# Patient Record
Sex: FEMALE | Race: WHITE | NOT HISPANIC OR LATINO | Employment: PART TIME | URBAN - METROPOLITAN AREA
[De-identification: names, ages, dates, MRNs, and addresses within clinical notes are randomized per-mention and may not be internally consistent; named-entity substitution may affect disease eponyms.]

---

## 2017-01-19 ENCOUNTER — ALLSCRIPTS OFFICE VISIT (OUTPATIENT)
Dept: OTHER | Facility: OTHER | Age: 42
End: 2017-01-19

## 2017-01-26 ENCOUNTER — ALLSCRIPTS OFFICE VISIT (OUTPATIENT)
Dept: OTHER | Facility: OTHER | Age: 42
End: 2017-01-26

## 2017-01-26 DIAGNOSIS — Z20.2 CONTACT WITH AND (SUSPECTED) EXPOSURE TO INFECTIONS WITH A PREDOMINANTLY SEXUAL MODE OF TRANSMISSION: ICD-10-CM

## 2017-01-26 DIAGNOSIS — Z12.31 ENCOUNTER FOR SCREENING MAMMOGRAM FOR MALIGNANT NEOPLASM OF BREAST: ICD-10-CM

## 2017-02-02 ENCOUNTER — LAB CONVERSION - ENCOUNTER (OUTPATIENT)
Dept: OTHER | Facility: OTHER | Age: 42
End: 2017-02-02

## 2017-02-02 LAB
HEPATITIS B SURFACE ANTIGEN (HISTORICAL): NORMAL
HEPATITIS C ANTIBODY (HISTORICAL): NORMAL
HIV AG/AB, 4TH GEN (HISTORICAL): NORMAL
RPR SCREEN (HISTORICAL): NORMAL
SIGNAL TO CUT-OFF (HISTORICAL): 0.02

## 2017-03-16 ENCOUNTER — GENERIC CONVERSION - ENCOUNTER (OUTPATIENT)
Dept: OTHER | Facility: OTHER | Age: 42
End: 2017-03-16

## 2017-03-16 LAB
CHLAMYDIA, LIQUID-BASED (HISTORICAL): NORMAL
GC BY MOL. METHOD (HISTORICAL): NORMAL
HPV 18 (HISTORICAL): NORMAL
HPV HIGH RISK 16/18 (HISTORICAL): NORMAL
HPV16 (HISTORICAL): NORMAL
PAP (HISTORICAL): NORMAL
TRICHOMONAS (HISTORICAL): NORMAL

## 2017-04-25 ENCOUNTER — HOSPITAL ENCOUNTER (OUTPATIENT)
Dept: RADIOLOGY | Age: 42
Discharge: HOME | End: 2017-04-25
Payer: COMMERCIAL

## 2017-04-25 DIAGNOSIS — Z12.31 ENCOUNTER FOR SCREENING MAMMOGRAM FOR MALIGNANT NEOPLASM OF BREAST: ICD-10-CM

## 2017-04-25 PROCEDURE — G0202 SCR MAMMO BI INCL CAD: HCPCS

## 2017-06-01 ENCOUNTER — HOSPITAL ENCOUNTER (EMERGENCY)
Facility: HOSPITAL | Age: 42
Discharge: HOME | End: 2017-06-01
Attending: EMERGENCY MEDICINE | Admitting: EMERGENCY MEDICINE
Payer: COMMERCIAL

## 2017-06-01 ENCOUNTER — HOSPITAL ENCOUNTER (OUTPATIENT)
Facility: HOSPITAL | Age: 42
Setting detail: OBSERVATION
Discharge: HOME | End: 2017-06-02
Attending: EMERGENCY MEDICINE | Admitting: INTERNAL MEDICINE
Payer: COMMERCIAL

## 2017-06-01 VITALS
SYSTOLIC BLOOD PRESSURE: 110 MMHG | OXYGEN SATURATION: 100 % | HEART RATE: 68 BPM | RESPIRATION RATE: 18 BRPM | WEIGHT: 167 LBS | TEMPERATURE: 98.2 F | DIASTOLIC BLOOD PRESSURE: 64 MMHG

## 2017-06-01 DIAGNOSIS — G43.911 INTRACTABLE MIGRAINE WITH STATUS MIGRAINOSUS, UNSPECIFIED MIGRAINE TYPE: Primary | ICD-10-CM

## 2017-06-01 DIAGNOSIS — G43.909 MIGRAINE: Primary | ICD-10-CM

## 2017-06-01 PROBLEM — G43.901 MIGRAINE WITH STATUS MIGRAINOSUS: Status: ACTIVE | Noted: 2017-06-01

## 2017-06-01 LAB
ANION GAP SERPL CALCULATED.3IONS-SCNC: 6 MMOL/L (ref 4–13)
B-HCG SERPL-ACNC: <2 MIU/ML
BASOPHILS # BLD AUTO: 0.02 THOUSANDS/ΜL (ref 0–0.1)
BASOPHILS NFR BLD AUTO: 0 % (ref 0–1)
BUN SERPL-MCNC: 13 MG/DL (ref 5–25)
CALCIUM SERPL-MCNC: 8.8 MG/DL (ref 8.3–10.1)
CHLORIDE SERPL-SCNC: 103 MMOL/L (ref 100–108)
CO2 SERPL-SCNC: 31 MMOL/L (ref 21–32)
CREAT SERPL-MCNC: 0.84 MG/DL (ref 0.6–1.3)
EOSINOPHIL # BLD AUTO: 0.2 THOUSAND/ΜL (ref 0–0.61)
EOSINOPHIL NFR BLD AUTO: 3 % (ref 0–6)
ERYTHROCYTE [DISTWIDTH] IN BLOOD BY AUTOMATED COUNT: 13.1 % (ref 11.6–15.1)
GFR SERPL CREATININE-BSD FRML MDRD: >60 ML/MIN/1.73SQ M
GLUCOSE SERPL-MCNC: 93 MG/DL (ref 65–140)
HCT VFR BLD AUTO: 39.8 % (ref 34.8–46.1)
HGB BLD-MCNC: 13.2 G/DL (ref 11.5–15.4)
LYMPHOCYTES # BLD AUTO: 2.47 THOUSANDS/ΜL (ref 0.6–4.47)
LYMPHOCYTES NFR BLD AUTO: 36 % (ref 14–44)
MCH RBC QN AUTO: 28.4 PG (ref 26.8–34.3)
MCHC RBC AUTO-ENTMCNC: 33.2 G/DL (ref 31.4–37.4)
MCV RBC AUTO: 86 FL (ref 82–98)
MONOCYTES # BLD AUTO: 0.47 THOUSAND/ΜL (ref 0.17–1.22)
MONOCYTES NFR BLD AUTO: 7 % (ref 4–12)
NEUTROPHILS # BLD AUTO: 3.66 THOUSANDS/ΜL (ref 1.85–7.62)
NEUTS SEG NFR BLD AUTO: 54 % (ref 43–75)
PLATELET # BLD AUTO: 188 THOUSANDS/UL (ref 149–390)
PMV BLD AUTO: 8.8 FL (ref 8.9–12.7)
POTASSIUM SERPL-SCNC: 4.1 MMOL/L (ref 3.5–5.3)
RBC # BLD AUTO: 4.65 MILLION/UL (ref 3.81–5.12)
SODIUM SERPL-SCNC: 140 MMOL/L (ref 136–145)
WBC # BLD AUTO: 6.82 THOUSAND/UL (ref 4.31–10.16)

## 2017-06-01 PROCEDURE — 96361 HYDRATE IV INFUSION ADD-ON: CPT

## 2017-06-01 PROCEDURE — 96375 TX/PRO/DX INJ NEW DRUG ADDON: CPT

## 2017-06-01 PROCEDURE — 99284 EMERGENCY DEPT VISIT MOD MDM: CPT

## 2017-06-01 PROCEDURE — 84702 CHORIONIC GONADOTROPIN TEST: CPT | Performed by: EMERGENCY MEDICINE

## 2017-06-01 PROCEDURE — 80048 BASIC METABOLIC PNL TOTAL CA: CPT | Performed by: EMERGENCY MEDICINE

## 2017-06-01 PROCEDURE — 96372 THER/PROPH/DIAG INJ SC/IM: CPT

## 2017-06-01 PROCEDURE — 96374 THER/PROPH/DIAG INJ IV PUSH: CPT

## 2017-06-01 PROCEDURE — 96376 TX/PRO/DX INJ SAME DRUG ADON: CPT

## 2017-06-01 PROCEDURE — 85025 COMPLETE CBC W/AUTO DIFF WBC: CPT | Performed by: EMERGENCY MEDICINE

## 2017-06-01 PROCEDURE — 36415 COLL VENOUS BLD VENIPUNCTURE: CPT | Performed by: EMERGENCY MEDICINE

## 2017-06-01 PROCEDURE — 99283 EMERGENCY DEPT VISIT LOW MDM: CPT

## 2017-06-01 RX ORDER — DIPHENHYDRAMINE HCL 25 MG
25 TABLET ORAL EVERY 6 HOURS PRN
Status: DISCONTINUED | OUTPATIENT
Start: 2017-06-01 | End: 2017-06-02 | Stop reason: HOSPADM

## 2017-06-01 RX ORDER — DIPHENHYDRAMINE HYDROCHLORIDE 50 MG/ML
50 INJECTION INTRAMUSCULAR; INTRAVENOUS ONCE
Status: COMPLETED | OUTPATIENT
Start: 2017-06-01 | End: 2017-06-01

## 2017-06-01 RX ORDER — LANOLIN ALCOHOL/MO/W.PET/CERES
3 CREAM (GRAM) TOPICAL
Status: DISCONTINUED | OUTPATIENT
Start: 2017-06-01 | End: 2017-06-02 | Stop reason: HOSPADM

## 2017-06-01 RX ORDER — METOCLOPRAMIDE HYDROCHLORIDE 5 MG/ML
INJECTION INTRAMUSCULAR; INTRAVENOUS
Status: DISCONTINUED
Start: 2017-06-01 | End: 2017-06-01 | Stop reason: WASHOUT

## 2017-06-01 RX ORDER — DIHYDROERGOTAMINE MESYLATE 1 MG/ML
1 INJECTION, SOLUTION INTRAMUSCULAR; INTRAVENOUS; SUBCUTANEOUS ONCE
Status: DISCONTINUED | OUTPATIENT
Start: 2017-06-01 | End: 2017-06-02 | Stop reason: HOSPADM

## 2017-06-01 RX ORDER — PROMETHAZINE HYDROCHLORIDE 25 MG/ML
25 INJECTION, SOLUTION INTRAMUSCULAR; INTRAVENOUS ONCE
Status: COMPLETED | OUTPATIENT
Start: 2017-06-01 | End: 2017-06-01

## 2017-06-01 RX ORDER — MAGNESIUM SULFATE HEPTAHYDRATE 40 MG/ML
2 INJECTION, SOLUTION INTRAVENOUS ONCE
Status: COMPLETED | OUTPATIENT
Start: 2017-06-01 | End: 2017-06-01

## 2017-06-01 RX ORDER — BUTALBITAL, ACETAMINOPHEN AND CAFFEINE 50; 325; 40 MG/1; MG/1; MG/1
1 TABLET ORAL EVERY 4 HOURS PRN
Qty: 20 TABLET | Refills: 0 | Status: ON HOLD | OUTPATIENT
Start: 2017-06-01 | End: 2017-06-01

## 2017-06-01 RX ORDER — ONDANSETRON 2 MG/ML
4 INJECTION INTRAMUSCULAR; INTRAVENOUS EVERY 4 HOURS PRN
Status: DISCONTINUED | OUTPATIENT
Start: 2017-06-01 | End: 2017-06-02 | Stop reason: HOSPADM

## 2017-06-01 RX ORDER — METHYLPREDNISOLONE SODIUM SUCCINATE 125 MG/2ML
125 INJECTION, POWDER, LYOPHILIZED, FOR SOLUTION INTRAMUSCULAR; INTRAVENOUS ONCE
Status: COMPLETED | OUTPATIENT
Start: 2017-06-01 | End: 2017-06-01

## 2017-06-01 RX ORDER — ACETAMINOPHEN 325 MG/1
650 TABLET ORAL EVERY 6 HOURS PRN
Status: DISCONTINUED | OUTPATIENT
Start: 2017-06-01 | End: 2017-06-02 | Stop reason: HOSPADM

## 2017-06-01 RX ORDER — ESCITALOPRAM OXALATE 20 MG/1
20 TABLET ORAL DAILY
Status: DISCONTINUED | OUTPATIENT
Start: 2017-06-01 | End: 2017-06-02 | Stop reason: HOSPADM

## 2017-06-01 RX ORDER — KETOROLAC TROMETHAMINE 30 MG/ML
15 INJECTION, SOLUTION INTRAMUSCULAR; INTRAVENOUS ONCE
Status: COMPLETED | OUTPATIENT
Start: 2017-06-01 | End: 2017-06-01

## 2017-06-01 RX ORDER — HYDROXYZINE HYDROCHLORIDE 25 MG/1
25 TABLET, FILM COATED ORAL
Status: DISCONTINUED | OUTPATIENT
Start: 2017-06-01 | End: 2017-06-02 | Stop reason: HOSPADM

## 2017-06-01 RX ORDER — ONDANSETRON 2 MG/ML
4 INJECTION INTRAMUSCULAR; INTRAVENOUS ONCE
Status: COMPLETED | OUTPATIENT
Start: 2017-06-01 | End: 2017-06-01

## 2017-06-01 RX ORDER — SODIUM CHLORIDE 9 MG/ML
100 INJECTION, SOLUTION INTRAVENOUS CONTINUOUS
Status: DISCONTINUED | OUTPATIENT
Start: 2017-06-01 | End: 2017-06-01

## 2017-06-01 RX ORDER — PROMETHAZINE HYDROCHLORIDE 25 MG/ML
25 INJECTION, SOLUTION INTRAMUSCULAR; INTRAVENOUS EVERY 6 HOURS PRN
Status: DISCONTINUED | OUTPATIENT
Start: 2017-06-01 | End: 2017-06-02 | Stop reason: HOSPADM

## 2017-06-01 RX ORDER — INDOMETHACIN 25 MG/1
50 CAPSULE ORAL
Status: DISCONTINUED | OUTPATIENT
Start: 2017-06-01 | End: 2017-06-02 | Stop reason: HOSPADM

## 2017-06-01 RX ORDER — DIAZEPAM 2 MG/1
2 TABLET ORAL 3 TIMES DAILY
Status: DISCONTINUED | OUTPATIENT
Start: 2017-06-01 | End: 2017-06-02 | Stop reason: HOSPADM

## 2017-06-01 RX ADMIN — SODIUM CHLORIDE 100 ML/HR: 0.9 INJECTION, SOLUTION INTRAVENOUS at 15:38

## 2017-06-01 RX ADMIN — MAGNESIUM SULFATE HEPTAHYDRATE 2 G: 40 INJECTION, SOLUTION INTRAVENOUS at 16:04

## 2017-06-01 RX ADMIN — INDOMETHACIN 50 MG: 25 CAPSULE ORAL at 15:33

## 2017-06-01 RX ADMIN — SODIUM CHLORIDE 500 ML: 0.9 INJECTION, SOLUTION INTRAVENOUS at 01:13

## 2017-06-01 RX ADMIN — PROMETHAZINE HYDROCHLORIDE 25 MG: 25 INJECTION, SOLUTION INTRAMUSCULAR; INTRAVENOUS at 12:48

## 2017-06-01 RX ADMIN — DIAZEPAM 2 MG: 2 TABLET ORAL at 15:38

## 2017-06-01 RX ADMIN — SODIUM CHLORIDE 1000 ML: 0.9 INJECTION, SOLUTION INTRAVENOUS at 12:43

## 2017-06-01 RX ADMIN — HYDROXYZINE HYDROCHLORIDE 25 MG: 25 TABLET, FILM COATED ORAL at 21:37

## 2017-06-01 RX ADMIN — HYDROMORPHONE HYDROCHLORIDE 1 MG: 1 INJECTION, SOLUTION INTRAMUSCULAR; INTRAVENOUS; SUBCUTANEOUS at 01:16

## 2017-06-01 RX ADMIN — KETOROLAC TROMETHAMINE 15 MG: 30 INJECTION, SOLUTION INTRAMUSCULAR at 12:46

## 2017-06-01 RX ADMIN — METHYLPREDNISOLONE SODIUM SUCCINATE 125 MG: 125 INJECTION, POWDER, FOR SOLUTION INTRAMUSCULAR; INTRAVENOUS at 12:43

## 2017-06-01 RX ADMIN — ONDANSETRON 4 MG: 2 INJECTION INTRAMUSCULAR; INTRAVENOUS at 01:12

## 2017-06-01 RX ADMIN — DIPHENHYDRAMINE HYDROCHLORIDE 50 MG: 50 INJECTION, SOLUTION INTRAMUSCULAR; INTRAVENOUS at 12:46

## 2017-06-01 RX ADMIN — DIPHENHYDRAMINE HYDROCHLORIDE 50 MG: 50 INJECTION, SOLUTION INTRAMUSCULAR; INTRAVENOUS at 01:14

## 2017-06-01 RX ADMIN — ACETAMINOPHEN 650 MG: 325 TABLET, FILM COATED ORAL at 21:36

## 2017-06-01 RX ADMIN — MELATONIN TAB 3 MG 3 MG: 3 TAB at 21:37

## 2017-06-01 RX ADMIN — DIAZEPAM 2 MG: 2 TABLET ORAL at 21:37

## 2017-06-01 RX ADMIN — HYDROMORPHONE HYDROCHLORIDE 1 MG: 1 INJECTION, SOLUTION INTRAMUSCULAR; INTRAVENOUS; SUBCUTANEOUS at 02:51

## 2017-06-01 RX ADMIN — HYDROMORPHONE HYDROCHLORIDE 1 MG: 1 INJECTION, SOLUTION INTRAMUSCULAR; INTRAVENOUS; SUBCUTANEOUS at 13:29

## 2017-06-01 RX ADMIN — ONDANSETRON 4 MG: 2 INJECTION INTRAMUSCULAR; INTRAVENOUS at 21:37

## 2017-06-02 VITALS
SYSTOLIC BLOOD PRESSURE: 110 MMHG | WEIGHT: 170.19 LBS | OXYGEN SATURATION: 96 % | TEMPERATURE: 97.6 F | DIASTOLIC BLOOD PRESSURE: 56 MMHG | HEIGHT: 66 IN | RESPIRATION RATE: 16 BRPM | BODY MASS INDEX: 27.35 KG/M2 | HEART RATE: 64 BPM

## 2017-06-02 RX ORDER — PROMETHAZINE HYDROCHLORIDE 25 MG/1
25 TABLET ORAL EVERY 6 HOURS PRN
Qty: 30 TABLET | Refills: 0 | Status: SHIPPED | OUTPATIENT
Start: 2017-06-02 | End: 2017-10-18 | Stop reason: HOSPADM

## 2017-06-02 RX ORDER — INDOMETHACIN 50 MG/1
50 CAPSULE ORAL 3 TIMES DAILY PRN
Qty: 30 CAPSULE | Refills: 0 | Status: SHIPPED | OUTPATIENT
Start: 2017-06-02 | End: 2018-08-16

## 2017-06-02 RX ORDER — DIAZEPAM 2 MG/1
2 TABLET ORAL EVERY 8 HOURS PRN
Qty: 15 TABLET | Refills: 0 | Status: SHIPPED | OUTPATIENT
Start: 2017-06-02 | End: 2017-10-18 | Stop reason: HOSPADM

## 2017-06-02 RX ADMIN — INDOMETHACIN 50 MG: 25 CAPSULE ORAL at 08:42

## 2017-06-02 RX ADMIN — DIAZEPAM 2 MG: 2 TABLET ORAL at 08:42

## 2017-06-02 RX ADMIN — ESCITALOPRAM 20 MG: 20 TABLET, FILM COATED ORAL at 08:42

## 2017-06-20 ENCOUNTER — ALLSCRIPTS OFFICE VISIT (OUTPATIENT)
Dept: OTHER | Facility: OTHER | Age: 42
End: 2017-06-20

## 2017-06-20 DIAGNOSIS — Z13.89 ENCOUNTER FOR SCREENING FOR OTHER DISORDER: ICD-10-CM

## 2017-06-20 DIAGNOSIS — Z20.2 CONTACT WITH AND (SUSPECTED) EXPOSURE TO INFECTIONS WITH A PREDOMINANTLY SEXUAL MODE OF TRANSMISSION: ICD-10-CM

## 2017-06-20 DIAGNOSIS — Z13.220 ENCOUNTER FOR SCREENING FOR LIPOID DISORDERS: ICD-10-CM

## 2017-07-28 ENCOUNTER — ALLSCRIPTS OFFICE VISIT (OUTPATIENT)
Dept: OTHER | Facility: OTHER | Age: 42
End: 2017-07-28

## 2017-10-14 ENCOUNTER — HOSPITAL ENCOUNTER (EMERGENCY)
Facility: HOSPITAL | Age: 42
Discharge: HOME | End: 2017-10-14
Attending: EMERGENCY MEDICINE | Admitting: EMERGENCY MEDICINE
Payer: COMMERCIAL

## 2017-10-14 ENCOUNTER — APPOINTMENT (EMERGENCY)
Dept: CT IMAGING | Facility: HOSPITAL | Age: 42
End: 2017-10-14
Payer: COMMERCIAL

## 2017-10-14 VITALS
SYSTOLIC BLOOD PRESSURE: 113 MMHG | TEMPERATURE: 98.7 F | OXYGEN SATURATION: 97 % | DIASTOLIC BLOOD PRESSURE: 64 MMHG | BODY MASS INDEX: 27.92 KG/M2 | HEART RATE: 84 BPM | WEIGHT: 173 LBS | RESPIRATION RATE: 16 BRPM

## 2017-10-14 DIAGNOSIS — R51.9 HEADACHE: ICD-10-CM

## 2017-10-14 DIAGNOSIS — I77.74 VERTEBRAL ARTERY DISSECTION (HCC): Primary | ICD-10-CM

## 2017-10-14 LAB
ANION GAP BLD CALC-SCNC: 14 MMOL/L (ref 4–13)
BUN BLD-MCNC: 21 MG/DL (ref 5–25)
CA-I BLD-SCNC: 1.12 MMOL/L (ref 1.12–1.32)
CHLORIDE BLD-SCNC: 106 MMOL/L (ref 100–108)
CREAT BLD-MCNC: 0.9 MG/DL (ref 0.6–1.3)
GFR SERPL CREATININE-BSD FRML MDRD: 79 ML/MIN/1.73SQ M
GLUCOSE SERPL-MCNC: 99 MG/DL (ref 65–140)
HCT VFR BLD CALC: 39 % (ref 34.8–46.1)
HGB BLDA-MCNC: 13.3 G/DL (ref 11.5–15.4)
PCO2 BLD: 23 MMOL/L (ref 21–32)
POTASSIUM BLD-SCNC: 4.6 MMOL/L (ref 3.5–5.3)
SODIUM BLD-SCNC: 138 MMOL/L (ref 136–145)
SPECIMEN SOURCE: ABNORMAL

## 2017-10-14 PROCEDURE — 96375 TX/PRO/DX INJ NEW DRUG ADDON: CPT

## 2017-10-14 PROCEDURE — 70496 CT ANGIOGRAPHY HEAD: CPT

## 2017-10-14 PROCEDURE — 96365 THER/PROPH/DIAG IV INF INIT: CPT

## 2017-10-14 PROCEDURE — 80047 BASIC METABLC PNL IONIZED CA: CPT

## 2017-10-14 PROCEDURE — 96361 HYDRATE IV INFUSION ADD-ON: CPT

## 2017-10-14 PROCEDURE — 70498 CT ANGIOGRAPHY NECK: CPT

## 2017-10-14 PROCEDURE — 85014 HEMATOCRIT: CPT

## 2017-10-14 PROCEDURE — 99284 EMERGENCY DEPT VISIT MOD MDM: CPT

## 2017-10-14 RX ORDER — ASPIRIN 325 MG
325 TABLET, DELAYED RELEASE (ENTERIC COATED) ORAL DAILY
Qty: 30 TABLET | Refills: 0 | Status: SHIPPED | OUTPATIENT
Start: 2017-10-14 | End: 2018-01-29

## 2017-10-14 RX ORDER — DEXAMETHASONE SODIUM PHOSPHATE 10 MG/ML
10 INJECTION, SOLUTION INTRAMUSCULAR; INTRAVENOUS ONCE
Status: COMPLETED | OUTPATIENT
Start: 2017-10-14 | End: 2017-10-14

## 2017-10-14 RX ORDER — ASPIRIN 325 MG
325 TABLET ORAL ONCE
Status: COMPLETED | OUTPATIENT
Start: 2017-10-14 | End: 2017-10-14

## 2017-10-14 RX ORDER — KETOROLAC TROMETHAMINE 30 MG/ML
15 INJECTION, SOLUTION INTRAMUSCULAR; INTRAVENOUS ONCE
Status: COMPLETED | OUTPATIENT
Start: 2017-10-14 | End: 2017-10-14

## 2017-10-14 RX ORDER — ONDANSETRON 2 MG/ML
4 INJECTION INTRAMUSCULAR; INTRAVENOUS ONCE
Status: COMPLETED | OUTPATIENT
Start: 2017-10-14 | End: 2017-10-14

## 2017-10-14 RX ORDER — ACETAMINOPHEN 325 MG/1
975 TABLET ORAL EVERY 6 HOURS PRN
Status: DISCONTINUED | OUTPATIENT
Start: 2017-10-14 | End: 2017-10-14 | Stop reason: HOSPADM

## 2017-10-14 RX ADMIN — ONDANSETRON 4 MG: 2 INJECTION INTRAMUSCULAR; INTRAVENOUS at 09:16

## 2017-10-14 RX ADMIN — IOHEXOL 85 ML: 350 INJECTION, SOLUTION INTRAVENOUS at 13:14

## 2017-10-14 RX ADMIN — ACETAMINOPHEN 975 MG: 325 TABLET ORAL at 15:05

## 2017-10-14 RX ADMIN — KETOROLAC TROMETHAMINE 15 MG: 30 INJECTION, SOLUTION INTRAMUSCULAR at 09:19

## 2017-10-14 RX ADMIN — SODIUM CHLORIDE 1000 ML: 0.9 INJECTION, SOLUTION INTRAVENOUS at 09:16

## 2017-10-14 RX ADMIN — ASPIRIN 325 MG: 325 TABLET ORAL at 15:05

## 2017-10-14 RX ADMIN — VALPROATE SODIUM 1000 MG: 100 INJECTION, SOLUTION INTRAVENOUS at 09:34

## 2017-10-14 RX ADMIN — DEXAMETHASONE SODIUM PHOSPHATE 10 MG: 10 INJECTION, SOLUTION INTRAMUSCULAR; INTRAVENOUS at 09:21

## 2017-10-14 NOTE — DISCHARGE INSTRUCTIONS
Please follow up with vascular surgery by calling them as soon as possible  You will likely need to follow up in about 1 month  Please take her daily aspirin  Return to the emergency department if her headache worsens or you have any nausea, vomiting, dizziness, passing out  Return if you have any facial droop or trouble talking or trouble seeing  These could be symptoms of a stroke  Acute Headache, Ambulatory Care   GENERAL INFORMATION:   An acute headache  is pain or discomfort that starts suddenly and gets worse quickly  The cause of an acute headache may not be known  It may be triggered by stress, fatigue, hormones, food, or trauma  Common related symptoms include the following:   · Fever    · Sinus pressure    · Loss of memory    · Nausea or vomiting    · Problems with your vision, such as watery or red eyes, loss of vision, or pain in bright light    · Stiff neck    · Tenderness of the head and neck area    · Trouble staying awake, or being less alert than usual     · Weakness or less energy  Seek immediate care for the following symptoms:   · Severe pain    · A headache that occurs after a blow to the head, a fall, or other trauma     · Confusion or forgetfulness    · Numbness on one side of your face or body  Treatment for an acute headache  may include medicine to decrease pain  You may also need biofeedback or cognitive behavioral therapy  Ask your healthcare provider about these and other treatments for an acute headache  Manage my symptoms:   · Apply heat  on your head for 20 to 30 minutes every 2 hours for as many days as directed  Heat helps decrease pain and muscle spasms  You may alternate heat and ice  · Apply ice  on your head for 15 to 20 minutes every hour or as directed  Use an ice pack, or put crushed ice in a plastic bag  Cover it with a towel  Ice helps decrease pain  · Relax your muscles  Lie down in a comfortable position and close your eyes  Relax your muscles slowly  Start at your toes and work your way up your body  · Keep a record of your headaches  Write down when your headaches start and stop  Include your symptoms and what you were doing when the headache began  Record what you ate or drank for 24 hours before the headache started  Describe the pain and where it hurts  Keep track of what you did to treat your headache and whether it worked  Follow up with your healthcare provider as directed:  Bring your headache record with you when you see your healthcare provider  Write down your questions so you remember to ask them during your visits  CARE AGREEMENT:   You have the right to help plan your care  Learn about your health condition and how it may be treated  Discuss treatment options with your caregivers to decide what care you want to receive  You always have the right to refuse treatment  The above information is an  only  It is not intended as medical advice for individual conditions or treatments  Talk to your doctor, nurse or pharmacist before following any medical regimen to see if it is safe and effective for you  © 2014 [address] is for End User's use only and may not be sold, redistributed or otherwise used for commercial purposes  All illustrations and images included in CareNotes® are the copyrighted property of [employer]  or [employer]

## 2017-10-14 NOTE — ED ATTENDING ATTESTATION
Sam Brothers MD, saw and evaluated the patient  I have discussed the patient with the resident/non-physician practitioner and agree with the resident's/non-physician practitioner's findings, Plan of Care, and MDM as documented in the resident's/non-physician practitioner's note, except where noted  All available labs and Radiology studies were reviewed  At this point I agree with the current assessment done in the Emergency Department  I have conducted an independent evaluation of this patient a history and physical is as follows:      Critical Care Time  CritCare Time        HA was gradual onset  No f/c/s  No neck stiffness  No focal neurological symptoms  No temporal artery pain/tenderness  No vision changes  Headaches are not increasing in severity or frequency  Not worse in the AM  No head trauma  Patient is a 59-year-old female that reports to the emergency department with a headache for the past 12 days  She notes that the headache is mostly posterior on the right side  She denies any focal neurological symptoms  She does have tenderness to the lateral paraspinal muscles down to the trapezium on the right side and notes that she has chronic neck muscle pain and tenderness including a very specific spot at the superior border of the scapula  She did have recent chiropractic manipulation but this was after the headache developed  She does do exercise and says that this sometimes triggers her achiness in her neck  I did discuss the possibility of imaging her neck to rule out dissection but given that this headache is similar to her prior headaches and she has the musculoskeletal tenderness which is also similar to her prior musculoskeletal tenderness, I suspect this is a tension-type headache that may have triggered migraine headache/migraine symptoms  Will treat in the emergency department discussed return precautions for dissection with the patient  Denies eye, ear pain      No dysarthria, nystagmus, dysphagia, diplopia, aphasia, vertigo, ataxia, visions loss, dysmetria  Normal finger to nose, gait, rapid alternating movement,  tandem gait, strength and sensation in bilat upper and lower extremities  Normal upper and lower reflexes  No pronator drift  EOMI, no visual field defects  CN 2-13 intact  GCS 15  AOx3  Spoke with Dr Anaid Farfan, patient with no posterior insufficiency symptoms, well give her full dose aspirin and have her follow up with vascular surgery in 1 month time  Patient given very strict return precautions  Per Dr hughes modular does not appear as though there is any flow limiting aspect on CTA  Patient does note chronic occasional lightheadedness and dizziness but this has not changed within the past several months this has been for years  Patient agreeable with and understands the plan  The patient (and any family present) verbalized understanding of the discharge instructions and warnings that would necessitate return to the Emergency Department  All questions were answered prior to discharge

## 2017-10-14 NOTE — ED NOTES
Pt requesting additional pain medication, PAC told and to give PRN Tylenol, pt refused     Richard Ghosh RN  10/14/17 6660

## 2017-10-14 NOTE — ED PROVIDER NOTES
History  Chief Complaint   Patient presents with    Headache - Recurrent or Known Dx Migraines     pt reports "day 12 of migraine" pt reports nausea along with s/s  Reports late on botox injections due to insurance  51-year-old female presents for evaluation of a migraine that has been ongoing for 12 days  Patient states that she has been feeling a a throbbing sensation over the right occipital region that radiates throughout her head and down her neck  States normally she has frontal headaches and that the location is different  Patient states that she has a history of migraines  States that she has been taking Fioricet for the pain with no relief, last dose note hours ago  Patient states that she normally takes Botox for her pain but as of August has been unable to due to insurance issues  Admits to photophobia and phonophobia  Patient denies any fevers, chills  Reports nausea but denies any vomiting  Denies any cough, shortness of breath, neck stiffness, change in vision, blurred vision  Headache - Recurrent or Known Dx Migraines   Associated symptoms: myalgias, nausea and neck pain    Associated symptoms: no dizziness, no fever, no neck stiffness and no vomiting        Prior to Admission Medications   Prescriptions Last Dose Informant Patient Reported? Taking? Probiotic Product (PRO-BIOTIC BLEND PO)   Yes Yes   Sig: Take by mouth   diazepam (VALIUM) 2 mg tablet   No No   Sig: Take 1 tablet by mouth every 8 (eight) hours as needed for muscle spasms for up to 10 days   escitalopram (LEXAPRO) 20 mg tablet   Yes Yes   Sig: Take 20 mg by mouth daily  hydrOXYzine HCL (ATARAX) 25 mg tablet   Yes Yes   Sig: Take 25 mg by mouth daily at bedtime Indications: Itching due to Histamine     indomethacin (INDOCIN) 50 mg capsule   No No   Sig: Take 1 capsule by mouth 3 (three) times a day as needed (headache)   promethazine (PHENERGAN) 25 mg tablet   No No   Sig: Take 1 tablet by mouth every 6 (six) hours as needed for nausea or vomiting      Facility-Administered Medications: None       Past Medical History:   Diagnosis Date    Anxiety     Migraine     Migraine        Past Surgical History:   Procedure Laterality Date     SECTION, LOW TRANSVERSE  2008    DILATION AND CURETTAGE OF UTERUS      LASIK         History reviewed  No pertinent family history  I have reviewed and agree with the history as documented  Social History   Substance Use Topics    Smoking status: Never Smoker    Smokeless tobacco: Not on file    Alcohol use No        Review of Systems   Constitutional: Negative for chills and fever  Gastrointestinal: Positive for nausea  Negative for vomiting  Musculoskeletal: Positive for myalgias and neck pain  Negative for arthralgias and neck stiffness  Neurological: Positive for headaches  Negative for dizziness and facial asymmetry  Physical Exam  ED Triage Vitals   Temperature Pulse Respirations Blood Pressure SpO2   10/14/17 0825 10/14/17 0823 10/14/17 0823 10/14/17 0823 10/14/17 0823   98 7 °F (37 1 °C) 96 18 122/69 98 %      Temp Source Heart Rate Source Patient Position - Orthostatic VS BP Location FiO2 (%)   10/14/17 0825 10/14/17 0823 10/14/17 0823 10/14/17 0823 --   Oral Monitor Lying Right arm       Pain Score       10/14/17 0823       7           Physical Exam   Constitutional: She is oriented to person, place, and time  She appears well-developed and well-nourished  She is cooperative  No distress  Well appearing in room, using phone  HENT:   Head: Normocephalic and atraumatic  Eyes: Conjunctivae and EOM are normal  Pupils are equal, round, and reactive to light  Neck: Normal range of motion  Neck supple  Muscular tenderness present  No spinous process tenderness present  No neck rigidity  Normal range of motion present  Cardiovascular: Normal rate and normal heart sounds  No murmur heard    Pulmonary/Chest: Effort normal and breath sounds normal    Musculoskeletal: Normal range of motion  Neurological: She is alert and oriented to person, place, and time  No cranial nerve deficit  Skin: Skin is warm  No rash noted  She is not diaphoretic  No erythema  Psychiatric: She has a normal mood and affect  Vitals reviewed  ED Medications  Medications   acetaminophen (TYLENOL) tablet 975 mg (975 mg Oral Given 10/14/17 1505)   sodium chloride 0 9 % bolus 1,000 mL (0 mL Intravenous Stopped 10/14/17 1419)   ondansetron (ZOFRAN) injection 4 mg (4 mg Intravenous Given 10/14/17 0916)   valproate (DEPACON) 1,000 mg in sodium chloride 0 9 % 50 mL for headache (0 g Intravenous Stopped 10/14/17 1015)   ketorolac (TORADOL) 30 mg/mL injection 15 mg (15 mg Intravenous Given 10/14/17 0919)   dexamethasone (PF) (DECADRON) injection 10 mg (10 mg Intravenous Given 10/14/17 0921)   iohexol (OMNIPAQUE) 350 MG/ML injection (MULTI-DOSE) 85 mL (85 mL Intravenous Given 10/14/17 1314)   aspirin tablet 325 mg (325 mg Oral Given 10/14/17 1505)       Diagnostic Studies  Labs Reviewed   POCT CHEM 8+ - Abnormal        Result Value Ref Range Status    Anion Gap, Istat 14 (*) 4 - 13 mmol/L Final    SODIUM, I-STAT 138  136 - 145 mmol/l Final    Potassium, i-STAT 4 6  3 5 - 5 3 mmol/L Final    Chloride, istat 106  100 - 108 mmol/L Final    CO2, i-STAT 23  21 - 32 mmol/L Final    Calcium, Ionized i-STAT 1 12  1 12 - 1 32 mmol/L Final    BUN, I-STAT 21  5 - 25 mg/dl Final    Creatinine, i-STAT 0 9  0 6 - 1 3 mg/dl Final    eGFR 79  ml/min/1 73sq m Final    Glucose, i-STAT 99  65 - 140 mg/dl Final    Hct, i-STAT 39  34 8 - 46 1 % Final    Hgb, i-STAT 13 3  11 5 - 15 4 g/dl Final    Specimen Type VENOUS   Final       CTA head and neck with and without contrast   Final Result   Findings of a right vertebral 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  Remainder of the cervical vasculature is unremarkable        No signs of intracranial vascular occlusive disease or aneurysm formation  No acute intracranial abnormality  ##phoslh##phoslh          ##cfslh   I personally discussed this result with Andrés Nicolás on 10/14/2017 2:25 PM    ##         Workstation performed: [id number]             Procedures  Procedures      Phone Contacts  ED Phone Contact    ED Course  ED Course as of Oct 14 1722   Sat Oct 14, 2017   1041 Pt reports pain initially a 7 and has decreased to 5/6 at this time  MDM  Number of Diagnoses or Management Options  Headache:   Vertebral artery dissection [facility]):   Diagnosis management comments: 44 yo female presents for evaluation of headache x 12 days  Has a hx of migraines  Will treat with migraine cocktail and reassess  Pt will get CTA if sxs persist  A/P as written in attending note  Amount and/or Complexity of Data Reviewed  Tests in the radiology section of CPT®: ordered and reviewed  Discuss the patient with other providers: yes      CritCare Time    Disposition  Final diagnoses:   Vertebral artery dissection [facility])   Headache     ED Disposition     ED Disposition Condition Comment    Discharge  Jesus Pluido discharge to home/self care  Condition at discharge: Good        Follow-up Information     Follow up With Specialties Details Why [address]  In 4 weeks  [facility] [facility] [place] [address]    Hadley Barron MD Internal Medicine In 3 days  [address] [place] [address]  [phone]          Discharge Medication List as of 10/14/2017  3:18 PM      START taking these medications    Details   aspirin (ECOTRIN) 325 mg EC tablet Take 1 tablet by mouth daily for 30 days, Starting Sat 10/14/2017, Until Mon 11/13/2017, Print         CONTINUE these medications which have NOT CHANGED    Details   escitalopram (LEXAPRO) 20 mg tablet Take 20 mg by mouth daily  , Until Discontinued, Historical Med      hydrOXYzine HCL (ATARAX) 25 mg tablet Take 25 mg by mouth daily at bedtime Indications: Itching due to Histamine  , Until Discontinued, Historical Med      Probiotic Product (PRO-BIOTIC BLEND PO) Take by mouth, Historical Med      diazepam (VALIUM) 2 mg tablet Take 1 tablet by mouth every 8 (eight) hours as needed for muscle spasms for up to 10 days, Starting 6/2/2017, Until Mon 6/12/17, Print      indomethacin (INDOCIN) 50 mg capsule Take 1 capsule by mouth 3 (three) times a day as needed (headache), Starting 6/2/2017, Until Discontinued, Normal      promethazine (PHENERGAN) 25 mg tablet Take 1 tablet by mouth every 6 (six) hours as needed for nausea or vomiting, Starting 6/2/2017, Until Discontinued, Normal           No discharge procedures on file      ED Provider  Electronically Signed by       Ulices Mallory PA-C  10/14/17 5788

## 2017-10-16 ENCOUNTER — APPOINTMENT (EMERGENCY)
Dept: CT IMAGING | Facility: HOSPITAL | Age: 42
DRG: 054 | End: 2017-10-16
Payer: COMMERCIAL

## 2017-10-16 ENCOUNTER — HOSPITAL ENCOUNTER (INPATIENT)
Facility: HOSPITAL | Age: 42
LOS: 2 days | Discharge: HOME | DRG: 054 | End: 2017-10-18
Attending: EMERGENCY MEDICINE | Admitting: FAMILY MEDICINE
Payer: COMMERCIAL

## 2017-10-16 DIAGNOSIS — I77.74 VERTEBRAL ARTERY DISSECTION (HCC): Primary | ICD-10-CM

## 2017-10-16 DIAGNOSIS — G43.901: ICD-10-CM

## 2017-10-16 LAB
ANION GAP SERPL CALCULATED.3IONS-SCNC: 5 MMOL/L (ref 4–13)
BASOPHILS # BLD AUTO: 0.02 THOUSANDS/ΜL (ref 0–0.1)
BASOPHILS NFR BLD AUTO: 0 % (ref 0–1)
BUN SERPL-MCNC: 15 MG/DL (ref 5–25)
CALCIUM SERPL-MCNC: 8.3 MG/DL (ref 8.3–10.1)
CHLORIDE SERPL-SCNC: 105 MMOL/L (ref 100–108)
CO2 SERPL-SCNC: 28 MMOL/L (ref 21–32)
CREAT SERPL-MCNC: 0.92 MG/DL (ref 0.6–1.3)
EOSINOPHIL # BLD AUTO: 0.23 THOUSAND/ΜL (ref 0–0.61)
EOSINOPHIL NFR BLD AUTO: 3 % (ref 0–6)
ERYTHROCYTE [DISTWIDTH] IN BLOOD BY AUTOMATED COUNT: 12.8 % (ref 11.6–15.1)
GFR SERPL CREATININE-BSD FRML MDRD: 77 ML/MIN/1.73SQ M
GLUCOSE SERPL-MCNC: 93 MG/DL (ref 65–140)
HCT VFR BLD AUTO: 36.1 % (ref 34.8–46.1)
HGB BLD-MCNC: 12 G/DL (ref 11.5–15.4)
LYMPHOCYTES # BLD AUTO: 2.71 THOUSANDS/ΜL (ref 0.6–4.47)
LYMPHOCYTES NFR BLD AUTO: 32 % (ref 14–44)
MCH RBC QN AUTO: 28.6 PG (ref 26.8–34.3)
MCHC RBC AUTO-ENTMCNC: 33.2 G/DL (ref 31.4–37.4)
MCV RBC AUTO: 86 FL (ref 82–98)
MONOCYTES # BLD AUTO: 0.47 THOUSAND/ΜL (ref 0.17–1.22)
MONOCYTES NFR BLD AUTO: 6 % (ref 4–12)
NEUTROPHILS # BLD AUTO: 5 THOUSANDS/ΜL (ref 1.85–7.62)
NEUTS SEG NFR BLD AUTO: 59 % (ref 43–75)
PLATELET # BLD AUTO: 203 THOUSANDS/UL (ref 149–390)
PMV BLD AUTO: 9.2 FL (ref 8.9–12.7)
POTASSIUM SERPL-SCNC: 3.8 MMOL/L (ref 3.5–5.3)
RBC # BLD AUTO: 4.19 MILLION/UL (ref 3.81–5.12)
SODIUM SERPL-SCNC: 138 MMOL/L (ref 136–145)
WBC # BLD AUTO: 8.43 THOUSAND/UL (ref 4.31–10.16)

## 2017-10-16 PROCEDURE — 96365 THER/PROPH/DIAG IV INF INIT: CPT

## 2017-10-16 PROCEDURE — 70498 CT ANGIOGRAPHY NECK: CPT

## 2017-10-16 PROCEDURE — 96366 THER/PROPH/DIAG IV INF ADDON: CPT

## 2017-10-16 PROCEDURE — 85025 COMPLETE CBC W/AUTO DIFF WBC: CPT | Performed by: PHYSICIAN ASSISTANT

## 2017-10-16 PROCEDURE — 80048 BASIC METABOLIC PNL TOTAL CA: CPT | Performed by: PHYSICIAN ASSISTANT

## 2017-10-16 PROCEDURE — 70496 CT ANGIOGRAPHY HEAD: CPT

## 2017-10-16 PROCEDURE — 96375 TX/PRO/DX INJ NEW DRUG ADDON: CPT

## 2017-10-16 PROCEDURE — 36415 COLL VENOUS BLD VENIPUNCTURE: CPT | Performed by: PHYSICIAN ASSISTANT

## 2017-10-16 RX ORDER — BUTALBITAL, ACETAMINOPHEN AND CAFFEINE 50; 325; 40 MG/1; MG/1; MG/1
1 TABLET ORAL EVERY 4 HOURS PRN
COMMUNITY
End: 2017-10-18 | Stop reason: HOSPADM

## 2017-10-16 RX ORDER — DIPHENHYDRAMINE HYDROCHLORIDE 50 MG/ML
25 INJECTION INTRAMUSCULAR; INTRAVENOUS EVERY 6 HOURS PRN
Status: DISCONTINUED | OUTPATIENT
Start: 2017-10-16 | End: 2017-10-17

## 2017-10-16 RX ORDER — ONDANSETRON 2 MG/ML
4 INJECTION INTRAMUSCULAR; INTRAVENOUS ONCE
Status: COMPLETED | OUTPATIENT
Start: 2017-10-16 | End: 2017-10-16

## 2017-10-16 RX ORDER — METOCLOPRAMIDE HYDROCHLORIDE 5 MG/ML
10 INJECTION INTRAMUSCULAR; INTRAVENOUS ONCE
Status: COMPLETED | OUTPATIENT
Start: 2017-10-16 | End: 2017-10-16

## 2017-10-16 RX ORDER — MAGNESIUM SULFATE HEPTAHYDRATE 40 MG/ML
2 INJECTION, SOLUTION INTRAVENOUS ONCE
Status: COMPLETED | OUTPATIENT
Start: 2017-10-16 | End: 2017-10-16

## 2017-10-16 RX ORDER — ONDANSETRON 4 MG/1
4 TABLET, ORALLY DISINTEGRATING ORAL ONCE
Status: DISCONTINUED | OUTPATIENT
Start: 2017-10-16 | End: 2017-10-16

## 2017-10-16 RX ADMIN — IOHEXOL 85 ML: 350 INJECTION, SOLUTION INTRAVENOUS at 21:23

## 2017-10-16 RX ADMIN — ONDANSETRON 4 MG: 2 INJECTION INTRAMUSCULAR; INTRAVENOUS at 21:43

## 2017-10-16 RX ADMIN — DIPHENHYDRAMINE HYDROCHLORIDE 25 MG: 50 INJECTION, SOLUTION INTRAMUSCULAR; INTRAVENOUS at 20:22

## 2017-10-16 RX ADMIN — MAGNESIUM SULFATE HEPTAHYDRATE 2 G: 40 INJECTION, SOLUTION INTRAVENOUS at 20:30

## 2017-10-16 RX ADMIN — METOCLOPRAMIDE 10 MG: 5 INJECTION, SOLUTION INTRAMUSCULAR; INTRAVENOUS at 20:35

## 2017-10-16 RX ADMIN — HYDROMORPHONE HYDROCHLORIDE 1 MG: 1 INJECTION, SOLUTION INTRAMUSCULAR; INTRAVENOUS; SUBCUTANEOUS at 20:23

## 2017-10-16 NOTE — ED PROVIDER NOTES
History  Chief Complaint   Patient presents with    Headache     c/o headache and nausea x 14 days  Pt seen in ER for similar symptoms on Sat -dx with Vertebral Artery Dissection  Pt presents to ER tonight "because I can't stand the pain and the pain is moving" Pt denies visual disturbances, numbness/tingling at present time  Asif Cam is a 43 y o  female w PMH anxiety, migraine who presents for evaluation of headache  Patient seen here in ED on 10/14 with severe right-sided headache  She has history of migraine but her headache was different than her usual headache  At this point headache has been ongoing for 14 days  CTA h/c on 10/14 revealed vertebral artery dissection at V3  After discussion w vascular surgery patient was deemed appropriate for outpatient follow up w vascular and she scheduled an appt in 1 mo  Since d/c she has been taking tylenol wo relief of sx  Moved up vascular appt until Wed but pain remains intense and is now located over her temple  Prior to Admission Medications   Prescriptions Last Dose Informant Patient Reported? Taking? Probiotic Product (PRO-BIOTIC BLEND PO)   Yes No   Sig: Take by mouth   aspirin (ECOTRIN) 325 mg EC tablet   No No   Sig: Take 1 tablet by mouth daily for 30 days   butalbital-acetaminophen-caffeine (FIORICET,ESGIC) -40 mg per tablet   Yes Yes   Sig: Take 1 tablet by mouth every 4 (four) hours as needed for headaches   diazepam (VALIUM) 2 mg tablet   No No   Sig: Take 1 tablet by mouth every 8 (eight) hours as needed for muscle spasms for up to 10 days   escitalopram (LEXAPRO) 20 mg tablet   Yes No   Sig: Take 20 mg by mouth daily  hydrOXYzine HCL (ATARAX) 25 mg tablet   Yes No   Sig: Take 25 mg by mouth daily at bedtime Indications: Itching due to Histamine     indomethacin (INDOCIN) 50 mg capsule   No No   Sig: Take 1 capsule by mouth 3 (three) times a day as needed (headache)   promethazine (PHENERGAN) 25 mg tablet   No No   Sig: Take 1 tablet by mouth every 6 (six) hours as needed for nausea or vomiting      Facility-Administered Medications: None       Past Medical History:   Diagnosis Date    Anxiety     Migraine     Migraine        Past Surgical History:   Procedure Laterality Date     SECTION, LOW TRANSVERSE  2008    DILATION AND CURETTAGE OF UTERUS  2007    LASIK         History reviewed  No pertinent family history  I have reviewed and agree with the history as documented  Social History   Substance Use Topics    Smoking status: Never Smoker    Smokeless tobacco: Never Used    Alcohol use No        Review of Systems   Constitutional: Negative for chills, diaphoresis and fever  HENT: Negative for congestion and sore throat  Eyes: Negative for visual disturbance  Respiratory: Negative for cough, chest tightness, shortness of breath and wheezing  Cardiovascular: Negative for chest pain and leg swelling  Gastrointestinal: Negative for abdominal pain, constipation, diarrhea, nausea and vomiting  Genitourinary: Negative for difficulty urinating, dysuria, frequency, hematuria, urgency, vaginal bleeding, vaginal discharge and vaginal pain  Musculoskeletal: Negative for arthralgias and myalgias  Neurological: Positive for headaches  Negative for dizziness, weakness, light-headedness and numbness  Psychiatric/Behavioral: The patient is not nervous/anxious  Physical Exam  ED Triage Vitals   Temperature Pulse Respirations Blood Pressure SpO2   10/16/17 1918 10/16/17 1918 10/16/17 1918 10/16/17 1918 10/16/17 2031   97 9 °F (36 6 °C) 69 18 117/68 99 %      Temp Source Heart Rate Source Patient Position - Orthostatic VS BP Location FiO2 (%)   10/16/17 1918 10/16/17 1918 10/16/17 1918 10/16/17 1918 --   Oral Monitor Sitting Left arm       Pain Score       10/16/17 1918       Worst Possible Pain           Physical Exam   Constitutional: She is oriented to person, place, and time   She appears well-developed and well-nourished  No distress  HENT:   Head: Normocephalic and atraumatic  Eyes: Pupils are equal, round, and reactive to light  Neck: Normal range of motion  Neck supple  No tracheal deviation present  Cardiovascular: Normal rate, regular rhythm, normal heart sounds and intact distal pulses  Exam reveals no gallop and no friction rub  No murmur heard  Pulmonary/Chest: Effort normal and breath sounds normal  No respiratory distress  She has no wheezes  She has no rales  Abdominal: Soft  Bowel sounds are normal  She exhibits no distension and no mass  There is no tenderness  There is no guarding  Musculoskeletal: She exhibits no edema or deformity  Neurological: She is alert and oriented to person, place, and time  gcs 15  nonfocal    Skin: Skin is warm and dry  She is not diaphoretic  Psychiatric: She has a normal mood and affect  Her behavior is normal    Nursing note and vitals reviewed        ED Medications  Medications   aspirin (ECOTRIN) EC tablet 325 mg (325 mg Oral Given 10/17/17 0927)   butalbital-acetaminophen-caffeine (FIORICET,ESGIC) -40 mg per tablet 1 tablet (not administered)   diazepam (VALIUM) tablet 2 mg (not administered)   escitalopram (LEXAPRO) tablet 20 mg (20 mg Oral Given 10/17/17 0927)   indomethacin (INDOCIN) capsule 50 mg (not administered)   sodium chloride 0 9 % infusion (75 mL/hr Intravenous New Bag 10/17/17 0114)   ondansetron (ZOFRAN) injection 4 mg (4 mg Intravenous Given 10/17/17 0940)   metoclopramide (REGLAN) injection 5 mg (5 mg Intravenous Given 10/17/17 1027)   methocarbamol (ROBAXIN) tablet 1,000 mg (not administered)   ketorolac (TORADOL) 30 mg/mL injection 30 mg (30 mg Intravenous Given 10/17/17 1129)   metoclopramide (REGLAN) injection 10 mg (10 mg Intravenous Not Given 10/17/17 1124)   HYDROmorphone (DILAUDID) 1 mg/mL injection 1 mg (1 mg Intravenous Given 10/16/17 2023)   magnesium sulfate 2 g/50 mL IVPB (premix) 2 g (0 g Intravenous Stopped 10/16/17 2349)   metoclopramide (REGLAN) injection 10 mg (10 mg Intravenous Given 10/16/17 2035)   iohexol (OMNIPAQUE) 350 MG/ML injection (MULTI-DOSE) 85 mL (85 mL Intravenous Given 10/16/17 2123)   ondansetron (ZOFRAN) injection 4 mg (4 mg Intravenous Given 10/16/17 2143)   dexamethasone (PF) (DECADRON) injection 10 mg (10 mg Intravenous Given 10/17/17 0114)   magnesium sulfate 2 g/50 mL IVPB (premix) 2 g (2 g Intravenous New Bag 10/17/17 1130)       Diagnostic Studies  Labs Reviewed   CBC AND DIFFERENTIAL - Normal       Result Value Ref Range Status    WBC 8 43  4 31 - 10 16 Thousand/uL Final    RBC 4 19  3 81 - 5 12 Million/uL Final    Hemoglobin 12 0  11 5 - 15 4 g/dL Final    Hematocrit 36 1  34 8 - 46 1 % Final    MCV 86  82 - 98 fL Final    MCH 28 6  26 8 - 34 3 pg Final    MCHC 33 2  31 4 - 37 4 g/dL Final    RDW 12 8  11 6 - 15 1 % Final    MPV 9 2  8 9 - 12 7 fL Final    Platelets 199  100 - 390 Thousands/uL Final    Neutrophils Relative 59  43 - 75 % Final    Lymphocytes Relative 32  14 - 44 % Final    Monocytes Relative 6  4 - 12 % Final    Eosinophils Relative 3  0 - 6 % Final    Basophils Relative 0  0 - 1 % Final    Neutrophils Absolute 5 00  1 85 - 7 62 Thousands/µL Final    Lymphocytes Absolute 2 71  0 60 - 4 47 Thousands/µL Final    Monocytes Absolute 0 47  0 17 - 1 22 Thousand/µL Final    Eosinophils Absolute 0 23  0 00 - 0 61 Thousand/µL Final    Basophils Absolute 0 02  0 00 - 0 10 Thousands/µL Final   BASIC METABOLIC PANEL    Sodium 799  136 - 145 mmol/L Final    Potassium 3 8  3 5 - 5 3 mmol/L Final    Chloride 105  100 - 108 mmol/L Final    CO2 28  21 - 32 mmol/L Final    Anion Gap 5  4 - 13 mmol/L Final    BUN 15  5 - 25 mg/dL Final    Creatinine 0 92  0 60 - 1 30 mg/dL Final    Comment: Standardized to IDMS reference method    Glucose 93  65 - 140 mg/dL Final    Comment:   If the patient is fasting, the ADA then defines impaired fasting glucose as > 100 mg/dL and diabetes as > or equal to 123 mg/dL  Specimen collection should occur prior to Sulfasalazine administration due to the potential for falsely depressed results  Specimen collection should occur prior to Sulfapyridine administration due to the potential for falsely elevated results  Calcium 8 3  8 3 - 10 1 mg/dL Final    eGFR 77  ml/min/1 73sq m Final    Narrative:     National Kidney Disease Education Program recommendations are as follows:  GFR calculation is accurate only with a steady state creatinine  Chronic Kidney disease less than 60 ml/min/1 73 sq  meters  Kidney failure less than 15 ml/min/1 73 sq  meters  CTA head and neck with and without contrast   Final Result   1  Unchanged appearance of right vertebral artery dissection/pseudoaneurysm V3 segment  2  No evidence of acute intracranial hemorrhage  3  Unchanged mild degenerative changes to the cervical spine with grade 1 anterolisthesis of C3 on C4  Workstation performed: [id number]             Procedures  Procedures      Phone Contacts  ED Phone Contact    ED Course  ED Course                                MDM  Number of Diagnoses or Management Options  Vertebral artery dissection [facility]):   Diagnosis management comments: DDX includes but not ltd to:   Severe worsening ha w underlying known vertebral artery dissection of V3 segment   Suspect evolution dissection vs more likely uncontrolled pain     Plan is to obtain:  CBC to check for anemia, leukocytosis, hydration status  Chemistry panel to check for lyte abnormalities, organ function   CTA head and neck for acute vascular anomaly or injury such as dissection/ aneurysm/ stenosis    Based on results:  CT unchanged from prior  Discussed w   Doctor of vascular who explained no plan for acute intervention as per vascular surgery  We will admit for neuro eval and appropriate pain control  Had considerable relief here w dilaudid / antiemetics / benadryl / Abdulkadir Speaker      Portions of the record may have been created with voice recognition software   Occasional wrong word or "sound a like" substitutions may have occurred due to the inherent limitations of voice recognition software   Read the chart carefully and recognize, using context, where substitutions have occurred  CritCare Time    Disposition  Final diagnoses:   Vertebral artery dissection [facility])     ED Disposition     ED Disposition Condition Comment    Admit  Case was discussed with [facility] and the patient's admission status was agreed to be Admission Status: inpatient status to the service of Dr Solitario Tierney   Follow-up Information     Follow up With Specialties Details Why Contact Info Additional [facility]  If symptoms worsen [address]  [phone] [facility], [address], [place], [place], [address]        Current Discharge Medication List      CONTINUE these medications which have NOT CHANGED    Details   butalbital-acetaminophen-caffeine (FIORICET,ESGIC) -40 mg per tablet Take 1 tablet by mouth every 4 (four) hours as needed for headaches      aspirin (ECOTRIN) 325 mg EC tablet Take 1 tablet by mouth daily for 30 days  Qty: 30 tablet, Refills: 0      diazepam (VALIUM) 2 mg tablet Take 1 tablet by mouth every 8 (eight) hours as needed for muscle spasms for up to 10 days  Qty: 15 tablet, Refills: 0      escitalopram (LEXAPRO) 20 mg tablet Take 20 mg by mouth daily  hydrOXYzine HCL (ATARAX) 25 mg tablet Take 25 mg by mouth daily at bedtime Indications: Itching due to Histamine        indomethacin (INDOCIN) 50 mg capsule Take 1 capsule by mouth 3 (three) times a day as needed (headache)  Qty: 30 capsule, Refills: 0      Probiotic Product (PRO-BIOTIC BLEND PO) Take by mouth      promethazine (PHENERGAN) 25 mg tablet Take 1 tablet by mouth every 6 (six) hours as needed for nausea or vomiting  Qty: 30 tablet, Refills: 0           No discharge procedures on file      ED Provider  Electronically Signed by       Arti Morgan PA-C  10/17/17 0941

## 2017-10-17 PROBLEM — I72.6 VERTEBRAL ARTERY ANEURYSM (HCC): Status: ACTIVE | Noted: 2017-10-17

## 2017-10-17 LAB
ANION GAP SERPL CALCULATED.3IONS-SCNC: 5 MMOL/L (ref 4–13)
BUN SERPL-MCNC: 12 MG/DL (ref 5–25)
CALCIUM SERPL-MCNC: 8.3 MG/DL (ref 8.3–10.1)
CHLORIDE SERPL-SCNC: 102 MMOL/L (ref 100–108)
CO2 SERPL-SCNC: 27 MMOL/L (ref 21–32)
CREAT SERPL-MCNC: 0.87 MG/DL (ref 0.6–1.3)
ERYTHROCYTE [DISTWIDTH] IN BLOOD BY AUTOMATED COUNT: 12.7 % (ref 11.6–15.1)
GFR SERPL CREATININE-BSD FRML MDRD: 82 ML/MIN/1.73SQ M
GLUCOSE SERPL-MCNC: 151 MG/DL (ref 65–140)
HCT VFR BLD AUTO: 37.2 % (ref 34.8–46.1)
HGB BLD-MCNC: 12.1 G/DL (ref 11.5–15.4)
MCH RBC QN AUTO: 28.1 PG (ref 26.8–34.3)
MCHC RBC AUTO-ENTMCNC: 32.5 G/DL (ref 31.4–37.4)
MCV RBC AUTO: 86 FL (ref 82–98)
PLATELET # BLD AUTO: 195 THOUSANDS/UL (ref 149–390)
PMV BLD AUTO: 9.1 FL (ref 8.9–12.7)
POTASSIUM SERPL-SCNC: 4.5 MMOL/L (ref 3.5–5.3)
RBC # BLD AUTO: 4.31 MILLION/UL (ref 3.81–5.12)
SODIUM SERPL-SCNC: 134 MMOL/L (ref 136–145)
WBC # BLD AUTO: 8.91 THOUSAND/UL (ref 4.31–10.16)

## 2017-10-17 PROCEDURE — 85027 COMPLETE CBC AUTOMATED: CPT | Performed by: PHYSICIAN ASSISTANT

## 2017-10-17 PROCEDURE — 80048 BASIC METABOLIC PNL TOTAL CA: CPT | Performed by: PHYSICIAN ASSISTANT

## 2017-10-17 PROCEDURE — 99285 EMERGENCY DEPT VISIT HI MDM: CPT

## 2017-10-17 RX ORDER — ACETAMINOPHEN 325 MG/1
975 TABLET ORAL EVERY 6 HOURS SCHEDULED
Status: DISCONTINUED | OUTPATIENT
Start: 2017-10-17 | End: 2017-10-17

## 2017-10-17 RX ORDER — BUTALBITAL, ACETAMINOPHEN AND CAFFEINE 50; 325; 40 MG/1; MG/1; MG/1
1 TABLET ORAL EVERY 4 HOURS PRN
Status: DISCONTINUED | OUTPATIENT
Start: 2017-10-17 | End: 2017-10-18 | Stop reason: HOSPADM

## 2017-10-17 RX ORDER — DIAZEPAM 2 MG/1
2 TABLET ORAL EVERY 8 HOURS PRN
Status: DISCONTINUED | OUTPATIENT
Start: 2017-10-17 | End: 2017-10-18 | Stop reason: HOSPADM

## 2017-10-17 RX ORDER — INDOMETHACIN 25 MG/1
50 CAPSULE ORAL 3 TIMES DAILY PRN
Status: DISCONTINUED | OUTPATIENT
Start: 2017-10-17 | End: 2017-10-18 | Stop reason: HOSPADM

## 2017-10-17 RX ORDER — KETOROLAC TROMETHAMINE 30 MG/ML
30 INJECTION, SOLUTION INTRAMUSCULAR; INTRAVENOUS EVERY 8 HOURS SCHEDULED
Status: DISCONTINUED | OUTPATIENT
Start: 2017-10-17 | End: 2017-10-17

## 2017-10-17 RX ORDER — SODIUM CHLORIDE 9 MG/ML
75 INJECTION, SOLUTION INTRAVENOUS CONTINUOUS
Status: DISCONTINUED | OUTPATIENT
Start: 2017-10-17 | End: 2017-10-18 | Stop reason: HOSPADM

## 2017-10-17 RX ORDER — METOCLOPRAMIDE HYDROCHLORIDE 5 MG/ML
5 INJECTION INTRAMUSCULAR; INTRAVENOUS EVERY 6 HOURS PRN
Status: DISCONTINUED | OUTPATIENT
Start: 2017-10-17 | End: 2017-10-18 | Stop reason: HOSPADM

## 2017-10-17 RX ORDER — DIPHENHYDRAMINE HYDROCHLORIDE 50 MG/ML
25 INJECTION INTRAMUSCULAR; INTRAVENOUS EVERY 8 HOURS SCHEDULED
Status: DISCONTINUED | OUTPATIENT
Start: 2017-10-17 | End: 2017-10-17

## 2017-10-17 RX ORDER — ASPIRIN 325 MG
325 TABLET, DELAYED RELEASE (ENTERIC COATED) ORAL DAILY
Status: DISCONTINUED | OUTPATIENT
Start: 2017-10-17 | End: 2017-10-18 | Stop reason: HOSPADM

## 2017-10-17 RX ORDER — KETOROLAC TROMETHAMINE 30 MG/ML
30 INJECTION, SOLUTION INTRAMUSCULAR; INTRAVENOUS EVERY 6 HOURS SCHEDULED
Status: DISCONTINUED | OUTPATIENT
Start: 2017-10-17 | End: 2017-10-18 | Stop reason: HOSPADM

## 2017-10-17 RX ORDER — DEXAMETHASONE SODIUM PHOSPHATE 10 MG/ML
10 INJECTION, SOLUTION INTRAMUSCULAR; INTRAVENOUS ONCE
Status: COMPLETED | OUTPATIENT
Start: 2017-10-17 | End: 2017-10-17

## 2017-10-17 RX ORDER — METOCLOPRAMIDE HYDROCHLORIDE 5 MG/ML
10 INJECTION INTRAMUSCULAR; INTRAVENOUS EVERY 6 HOURS SCHEDULED
Status: DISCONTINUED | OUTPATIENT
Start: 2017-10-17 | End: 2017-10-18 | Stop reason: HOSPADM

## 2017-10-17 RX ORDER — DIPHENHYDRAMINE HYDROCHLORIDE 50 MG/ML
25 INJECTION INTRAMUSCULAR; INTRAVENOUS EVERY 6 HOURS PRN
Status: DISCONTINUED | OUTPATIENT
Start: 2017-10-17 | End: 2017-10-18 | Stop reason: HOSPADM

## 2017-10-17 RX ORDER — MAGNESIUM SULFATE HEPTAHYDRATE 40 MG/ML
2 INJECTION, SOLUTION INTRAVENOUS ONCE
Status: COMPLETED | OUTPATIENT
Start: 2017-10-17 | End: 2017-10-17

## 2017-10-17 RX ORDER — METHOCARBAMOL 500 MG/1
1000 TABLET, FILM COATED ORAL EVERY 6 HOURS PRN
Status: DISCONTINUED | OUTPATIENT
Start: 2017-10-17 | End: 2017-10-18 | Stop reason: HOSPADM

## 2017-10-17 RX ORDER — ONDANSETRON 2 MG/ML
4 INJECTION INTRAMUSCULAR; INTRAVENOUS EVERY 6 HOURS PRN
Status: DISCONTINUED | OUTPATIENT
Start: 2017-10-17 | End: 2017-10-18 | Stop reason: HOSPADM

## 2017-10-17 RX ORDER — ESCITALOPRAM OXALATE 20 MG/1
20 TABLET ORAL DAILY
Status: DISCONTINUED | OUTPATIENT
Start: 2017-10-17 | End: 2017-10-18 | Stop reason: HOSPADM

## 2017-10-17 RX ORDER — METOCLOPRAMIDE HYDROCHLORIDE 5 MG/ML
10 INJECTION INTRAMUSCULAR; INTRAVENOUS EVERY 8 HOURS SCHEDULED
Status: DISCONTINUED | OUTPATIENT
Start: 2017-10-17 | End: 2017-10-17

## 2017-10-17 RX ORDER — KETOROLAC TROMETHAMINE 30 MG/ML
15 INJECTION, SOLUTION INTRAMUSCULAR; INTRAVENOUS EVERY 6 HOURS PRN
Status: DISCONTINUED | OUTPATIENT
Start: 2017-10-17 | End: 2017-10-17

## 2017-10-17 RX ORDER — METHOCARBAMOL 500 MG/1
500 TABLET, FILM COATED ORAL EVERY 6 HOURS PRN
Status: DISCONTINUED | OUTPATIENT
Start: 2017-10-17 | End: 2017-10-17

## 2017-10-17 RX ADMIN — METOCLOPRAMIDE 5 MG: 5 INJECTION, SOLUTION INTRAMUSCULAR; INTRAVENOUS at 02:43

## 2017-10-17 RX ADMIN — METOCLOPRAMIDE 5 MG: 5 INJECTION, SOLUTION INTRAMUSCULAR; INTRAVENOUS at 18:23

## 2017-10-17 RX ADMIN — REGULAR STRENGTH 325 MG: 325 TABLET ORAL at 09:27

## 2017-10-17 RX ADMIN — KETOROLAC TROMETHAMINE 30 MG: 30 INJECTION, SOLUTION INTRAMUSCULAR at 18:22

## 2017-10-17 RX ADMIN — MAGNESIUM SULFATE HEPTAHYDRATE 2 G: 40 INJECTION, SOLUTION INTRAVENOUS at 11:30

## 2017-10-17 RX ADMIN — ONDANSETRON 4 MG: 2 INJECTION INTRAMUSCULAR; INTRAVENOUS at 09:40

## 2017-10-17 RX ADMIN — KETOROLAC TROMETHAMINE 30 MG: 30 INJECTION, SOLUTION INTRAMUSCULAR at 18:21

## 2017-10-17 RX ADMIN — DEXAMETHASONE SODIUM PHOSPHATE 10 MG: 10 INJECTION, SOLUTION INTRAMUSCULAR; INTRAVENOUS at 01:14

## 2017-10-17 RX ADMIN — ACETAMINOPHEN 975 MG: 325 TABLET ORAL at 05:27

## 2017-10-17 RX ADMIN — METOCLOPRAMIDE 5 MG: 5 INJECTION, SOLUTION INTRAMUSCULAR; INTRAVENOUS at 10:27

## 2017-10-17 RX ADMIN — ESCITALOPRAM OXALATE 20 MG: 20 TABLET ORAL at 09:27

## 2017-10-17 RX ADMIN — SODIUM CHLORIDE 75 ML/HR: 0.9 INJECTION, SOLUTION INTRAVENOUS at 14:34

## 2017-10-17 RX ADMIN — KETOROLAC TROMETHAMINE 30 MG: 30 INJECTION, SOLUTION INTRAMUSCULAR at 11:29

## 2017-10-17 RX ADMIN — DIPHENHYDRAMINE HYDROCHLORIDE 25 MG: 50 INJECTION, SOLUTION INTRAMUSCULAR; INTRAVENOUS at 18:22

## 2017-10-17 RX ADMIN — ACETAMINOPHEN 975 MG: 325 TABLET ORAL at 01:13

## 2017-10-17 RX ADMIN — DIPHENHYDRAMINE HYDROCHLORIDE 25 MG: 50 INJECTION, SOLUTION INTRAMUSCULAR; INTRAVENOUS at 18:20

## 2017-10-17 RX ADMIN — HYDROMORPHONE HYDROCHLORIDE 1 MG: 1 INJECTION, SOLUTION INTRAMUSCULAR; INTRAVENOUS; SUBCUTANEOUS at 09:30

## 2017-10-17 RX ADMIN — DIPHENHYDRAMINE HYDROCHLORIDE 25 MG: 50 INJECTION, SOLUTION INTRAMUSCULAR; INTRAVENOUS at 09:45

## 2017-10-17 RX ADMIN — HYDROMORPHONE HYDROCHLORIDE 1 MG: 1 INJECTION, SOLUTION INTRAMUSCULAR; INTRAVENOUS; SUBCUTANEOUS at 01:13

## 2017-10-17 RX ADMIN — DIPHENHYDRAMINE HYDROCHLORIDE 25 MG: 50 INJECTION, SOLUTION INTRAMUSCULAR; INTRAVENOUS at 02:48

## 2017-10-17 RX ADMIN — ONDANSETRON 4 MG: 2 INJECTION INTRAMUSCULAR; INTRAVENOUS at 01:13

## 2017-10-17 RX ADMIN — SODIUM CHLORIDE 75 ML/HR: 0.9 INJECTION, SOLUTION INTRAVENOUS at 01:14

## 2017-10-17 NOTE — DISCHARGE INSTR - AVS FIRST PAGE
1  Cervicalgia and headache- your current exam and history support the development of cervicalgia and subsequent muscle tension headache possibly as a result of activities prior to the start of the event  Your current use of headache medications over the last 10 days or so has been inadequate to treat the muscle tension and inflammation likely producing the cervicalgias  The vertebral artery dissection/pseudoaneurysm (3 )  found on your CTA done on both the 14th and the 16th would not produce the muscle pain and tension and subsequent headache you are experiencing  2  Chronic migraine history- chronic migraine history dates for approximately 2 decades  Continue through [facility] utilizing Botox Elavil and a pharmacologic cocktail for rescue for your intermittent migraines  We will not make any changes at this time  3  Right Vertebral artery dissection/pseudoaneurysm- on your CTA done at presentation on the 14th and then subsequently repeated on her films on the 16th is noted to have a V3 segment of pseudoaneurysm noted incidentally found  There is no treatment needed at this time  the films can be reviewed in approximately 1 month for stability  You can follow up on a nonurgent basis with Neuro vascular radiology Dr Sheron Wang as an outpatient

## 2017-10-17 NOTE — CASE MANAGEMENT
Initial Clinical Review    Admission: Date/Time/Statement: 10/16/17 @ 2324     Orders Placed This Encounter   Procedures    Inpatient Admission (expected length of stay for this patient is greater than two midnights)     Standing Status:   Standing     Number of Occurrences:   1     Order Specific Question:   Admitting Physician     Answer:   Mello Wagoner [[id number]]     Order Specific Question:   Level of Care     Answer:   Med Surg [16]     Order Specific Question:   Estimated length of stay     Answer:   More than 2 Midnights     Order Specific Question:   Certification     Answer:   I certify that inpatient services are medically necessary for this patient for a duration of greater than two midnights  See H&P and MD Progress Notes for additional information about the patient's course of treatment  ED: Date/Time/Mode of Arrival:   ED Arrival Information     Expected Arrival Acuity Means of Arrival Escorted By Service Admission Type    - 10/16/2017 19:16 Emergent Walk-In Family Member General Medicine Emergency    Arrival Complaint    headache          Chief Complaint:   Chief Complaint   Patient presents with    Headache     c/o headache and nausea x 14 days  Pt seen in ER for similar symptoms on Sat -dx with Vertebral Artery Dissection  Pt presents to ER tonight "because I can't stand the pain and the pain is moving" Pt denies visual disturbances, numbness/tingling at present time  History of Illness: 43 y o  female, PMHx of Migraines- receiving botox injections at [facility], who presents with migraine, head pain x14 days  Patient states that over the last 2 weeks, she has had significant, 10/10 throbbing pain over the right side of her head  She states that she was seen in the emergency department on Saturday, and was diagnosed with a vertebral artery aneurysm  At that time, she states that she was told to take some Tylenol and follow up with vascular as an outpatient    Patient states that Tylenol has not improved her headache at all  Additionally, she states that the medication she received in the emergency department on Saturday did not give her much relief either  She states she got a migraine cocktail and Decadron at that time  Patient reports having sensitivity to light and sound, she prefers a quiet and dark room  She denies visual changes  Additionally, she reports associated nausea without vomiting  Patient states that she has a muscle spasm on her right upper back  She states that she has had several massages for this without very much relief      Patient states that she has dizziness and lightheadedness upon standing, due to her low blood pressure  She states that this is unchanged  ED Vital Signs:   ED Triage Vitals   Temperature Pulse Respirations Blood Pressure SpO2   10/16/17 1918 10/16/17 1918 10/16/17 1918 10/16/17 1918 10/16/17 2031   97 9 °F (36 6 °C) 69 18 117/68 99 %      Temp Source Heart Rate Source Patient Position - Orthostatic VS BP Location FiO2 (%)   10/16/17 1918 10/16/17 1918 10/16/17 1918 10/16/17 1918 --   Oral Monitor Sitting Left arm       Pain Score       10/16/17 1918       Worst Possible Pain        Wt Readings from Last 1 Encounters:   10/17/17 79 5 kg (175 lb 4 3 oz)     Abnormal Labs/Diagnostic Test Results:     CTA of Head/Neck: 1  Unchanged appearance of right vertebral artery dissection/pseudoaneurysm V3 segment  2  No evidence of acute intracranial hemorrhage  3  Unchanged mild degenerative changes to the cervical spine with grade 1 anterolisthesis of C3 on C4       ED Treatment:   Medication Administration from 10/16/2017 1916 to 10/17/2017 0044       Date/Time Order Dose Route Action Action by Comments     10/16/2017 2023 HYDROmorphone (DILAUDID) 1 mg/mL injection 1 mg 1 mg Intravenous Given Flavia Castellon RN      10/16/2017 2022 diphenhydrAMINE (BENADRYL) injection 25 mg 25 mg Intravenous Given Flavia Castellon RN      10/16/2017 1326 magnesium sulfate 2 g/50 mL IVPB (premix) 2 g 0 g Intravenous Stopped aPrk Jacobson RN      10/16/2017 2030 magnesium sulfate 2 g/50 mL IVPB (premix) 2 g 2 g Intravenous New Bag Park Carter RN      10/16/2017 2035 metoclopramide (REGLAN) injection 10 mg 10 mg Intravenous Given Park Jacobson RN      10/16/2017 2123 iohexol (OMNIPAQUE) 350 MG/ML injection (MULTI-DOSE) 85 mL 85 mL Intravenous Given Keshawn Jorge       10/16/2017 2143 ondansetron (ZOFRAN) injection 4 mg 4 mg Intravenous Given Park Jacobson RN         Physical Exam   Constitutional: She is oriented to person, place, and time  She appears well-developed and well-nourished  She is cooperative  She does not appear ill  No distress  Cardiovascular: Normal rate, regular rhythm, normal heart sounds and normal pulses  No murmur heard  No LE edema bilaterally  Pulmonary/Chest: Effort normal and breath sounds normal    Musculoskeletal: Normal range of motion  Severe palpable muscle spasm over bilateral proximal trapezius, R>L  Past Medical/Surgical History: Active Ambulatory Problems     Diagnosis Date Noted    Migraine with status migrainosus 06/01/2017    Migraine      Resolved Ambulatory Problems     Diagnosis Date Noted    No Resolved Ambulatory Problems     Past Medical History:   Diagnosis Date    Anxiety     Migraine     Migraine        Admitting Diagnosis: Vertebral artery dissection (HCC) [I77 74]  Migraine with status migrainosus [G43 901]  Headache [R51]    Age/Sex: 43 y o  female    Assessment/Plan:     Hospital Problem List:      Principal Problem:    Migraine with status migrainosus  Active Problems:    Anxiety        Plan for the Primary Problem(s):  · Status Migrainosus  ? Hx of Migraines  Dx with R vertebral artery dissection 10/14/17 in ED  Vascular contacted- recommended outpatient follow up  Returns today with continued migraine    ? CTA Head/Neck- Unchanged appearance of right vertebral artery dissection/pseudoaneurysm V3 segment  No evidence of acute intracranial hemorrhage  Unchanged mild degenerative changes to the cervical spine with grade 1 anterolisthesis of C3 on C4   ? Dilaudid, Benadryl, Zofran, Reglan, Mg given in ED  ? Migraine cocktail  ? Decadron  ? Neuro checks q4h  ? Consult Neurology      Plan for Additional Problems:   · Anxiety- Continue home medications      VTE Prophylaxis: Low Risk  / sequential compression device   Code Status: Full Code  POLST: POLST form is not discussed and not completed at this time      Anticipated Length of Stay:  Patient will be admitted on an Emergency basis with an anticipated length of stay of  Less than 2 midnights     Justification for Hospital Stay: Status Migrainosus         Admission Orders:  Inpatient/Tele  Continuous Cardiac Monitoring  Serial Cardiac Enzymes q6h x 3  Consult Neuro r/e migraine with status migrainosus  Bilateral Sequential Compression Device  Neuro Check q4h  NSS @ 75  Scheduled Meds:   acetaminophen 975 mg Oral Q6H [address]   aspirin 325 mg Oral Daily   diphenhydrAMINE 25 mg Intravenous Q8H [address]   escitalopram 20 mg Oral Daily   ketorolac 30 mg Intravenous Q8H [address]   magnesium sulfate 2 g Intravenous Once   metoclopramide 10 mg Intravenous Q8H [address]     IV Benadryl 25 mg x 3 thus far  IV Dilaudid 1 mg x 4 thus far  IV Reglan 5 mg x 2 thus far  IV Zofran 4 mg x 2 thus far

## 2017-10-17 NOTE — PROGRESS NOTES
Pt C/O generalized burning and itching after receiving IV Decadron  IV Benadryl given  SLIM Nathalia LUCERO, notified  No new orders at this time  Will continue to monitor

## 2017-10-17 NOTE — CONSULTS
Consultation - Neurology   Mihai Sparrow 43 y o  female MRN: [id number]  Unit/Bed#: -01 Encounter: [id number]      Physician Requesting Consult: Porsche Phelps MD  Reason for Consult / Principal Problem:  Cervicalgia and headache  Hx and PE limited by:  None  Review of previous medical records was completed  Family, was not present at the bedside for history and examination, although the patient's good friend was at the bedside  Assessment/Plan:  1  Cervicalgia and headache- this patient's current exam and history support the development of cervicalgia and subsequent muscle tension headache possibly as a result of her activities prior to the start of the event  Her current use of headache medications over the last 10 days or so has been inadequate to treat the muscle tension and inflammation likely producing the cervicalgia is  The vertebral artery dissection/pseudoaneurysm (3 )  found on her CTA done on both the 14th and the 16th when not produce the muscle pain and tension and subsequent headache she is experiencing  2  Chronic migraine history- her chronic migraine history dates for approximately 2 decades  She is currently being seen through [facility] utilizing Botox Elavil and a pharmacologic cocktail for rescue for her intermittent migraines  We will not make any changes at this time  3  Right Vertebral artery dissection/pseudoaneurysm- on her CTA done at presentation on the 14th and then subsequently repeated on her films on the 16th she is noted to have a V3 segment of pseudoaneurysm noted incidentally found  There is no treatment needed at this time  Of films can be reviewed in approximately 1 month for stability  She can follow up on a nonurgent basis with vascular surgery as an outpatient  Plan: At this time we would suggest the use of ice is the patient uses pre morbidly with relief    We will also reorder her magnesium start her on a several day program of Robaxin for muscle relaxation and then also a keep her on Toradol anti-inflammatories at this time  I have suggested to the patient that she may benefit from outpatient cervical range of motion and daily exercise at home  She is free to continue with her of Botox injections through [facility]  She should not need to follow up with our headache clinic as she is already an established patient with them  HPI: Timbo Esquivel is a right handed  43 y o  female who  neurology is asked to see for complaint of a headache  As background Ms Esther Lopez reports that she has had migraines for at least 2 decades  She reports that she has been through multiple cycles of 2 for meds depending on the severity of her migraines  She reports that currently she is following with [facility] and has been getting Botox with relief over the last year or so  She reports she was last had a Botox in June and she was due to get another 1 in September so however her insurance changed and she is currently trying to work on getting her insurance to cover [facility] again to keep the same provider  She reports she takes Elavil nightly 50 mg prescribed by her [facility] physician and although she reports this is not her prophylactic for her migraines she reports she has been on it  She also reports her rescue medications are Fioricet and indocin, although she reports she does not use them all the time together  Often separately  She reports she also follows with a chiropractor and has been seeing her chiropractor on an ongoing basis also for years  This current pain episode started approximately 2 weeks ago  The patient reports that is different than her usual migraines which she reports were generally anterior with of photo and phonophobia and significant nausea  She reports that this episode she feels significant pain predominantly all in the right cervical area with radiation to the cranial base    She reports she generally does not get of this type of pain but it feels so severe  She was in the emergency room on Saturday for this complaint of pain and she had a CTA done which was noted to have a small V3 vertebral artery questions pseudo aneurysm versus dissection  She was discharged home with instructions to see vascular surgery and have a repeat film in approximately 1 month  She reports these cervical pain that she continue to have for the next 24 hours continue to still be so severe that she came in to the emergency room yesterday again as a follow-up  She reports she had a CTA yesterday and that CTA also demonstrated the same right vertebral abnormality that it did 48 hours before  Based on the complaints of pain the patient was admitted at this time  When queried as to which she may have done different or possibly triggered or had any injury approximately 2 or 3 weeks ago she reports that she has had no falls no recent injuries  She reports she has been power washing the house  Apparently she and her friend were washing twice over the last 2-3 weeks  She is right-handed some and she reports that she would try to when she gets tired with her right hand switched to her left  She is unable to report any other injuries or falls and she reports that since this episode started within the last 2 weeks after her power washing she has also seen the chiropractor an additional 3 times in the last week alone simply because of the pain  Additionally she reports is an ongoing problem she has had dizziness frequently when she stands up or transitions from a sitting to standing position that she in fact fainted recently in the house  Apparently the visiting nurse was there visiting her father who has MS apparently the visiting nurse asked for the med list she apparently jumped up to get it and apparently she fainted in the kitchen within a few minutes    She reports no injury with that episode she reports that that was the most significant  Otherwise she reports her dizziness with the visual spots in front of her eyes is frequent when she changes to a standing position  ROS: 12 system cued query: For her typical migraines with the anterior pain in the foot on the phonophobia she gets she reports some eye pain particularly when she moves around she reports she does have some of that now with the physical exam bilaterally  She reports that she also gets the significant pain behind her eyes as well  She reports that she to self isolate she attempts to either use her Fioricet or indomethacin either 1  She reports that she is generally getting 1 or 2 migraines a month and that the Botox has been helping with them  She reports her other prophylactic medications in the past has been Topamax 50 mg b i d  with little effect she also reports she has also tried the Zoloft without relief as well  She reports she uses cold is well at home at the onset of her migraines and that is sometimes effective  Currently she reports the pain is headed back up to possibly have 5 or 6 after had come down to a 2 overnight  She reports she did not sleep at all last night and was up on the unable to sleep  She reports us some of the medications have been only mildly affected  She reports the pain is in the left shoulder area with radiation into the neck as well as the cranial base  She reports that with this current cervicalgia she is also beginning to have migrainous features  She was nauseous earlier currently she is hungry and is looking forward to eating  She denies any numbness or tingling with this negative  or previous episodes either in her face or her limbs  No lateralizing weakness no cognitive delays or speech or language issues  No lateralizing weakness no chest pain no shortness of breath no palpitations  The nausea does come and go    The remainder of her query was      Historical Information     Past Medical History: Diagnosis Date    Anxiety     Migraine      Past Surgical History:   Procedure Laterality Date     SECTION, LOW TRANSVERSE      DILATION AND CURETTAGE OF UTERUS      LASIK         Social History  she is currently unemployed she is caring for her father  She has never been a smoker no alcohol no drug use  Family History: she is single her mother is  some within a few years  She also had migraines  At the patient's father has MS he is disabled and requires care at home she cares for her father  She reports he has migraines  She reports her male relatives on her father side all have migraines  No Known Allergies  Meds:all current active meds have been reviewed    Scheduled Meds:    aspirin 325 mg Oral Daily   escitalopram 20 mg Oral Daily   ketorolac 30 mg Intravenous Q6H [facility]   metoclopramide 10 mg Intravenous Q6H [facility]     PRN Meds: butalbital-acetaminophen-caffeine    Diazepam   diphenhydrAMINE   HYDROmorphone   Indomethacin   Ketorolac   metoclopromide   Ondansetron     Physical Exam:   Objective   Vitals:Blood pressure 114/62, pulse 78, temperature 98 6 °F (37 °C), temperature source Oral, resp  rate 18, height 5' 6" (1 676 m), weight 79 5 kg (175 lb 4 3 oz), SpO2 97 %  ,Body mass index is 28 29 kg/m²  The orthostatics that we just got were all stable with the systolics in the 096-371 region  She did not have any decrease with sitting up or walking on this occasion  Patient was examined in bed she was not gaited at this time so that we could get orthostatics  General: alert, appears stated age and cooperative  Head: Normocephalic, without obvious abnormality, atraumatic  Oral exam:  Mucous membranes moist;   Neck: no carotid bruit, she had a complaints of pain to palpation on the right cranial base, the paracervical region and along the scapular and trapezius ridge  She had a somewhat limited range of motion as well    There was no significant tightness appreciated on my palpation our assessment  Lungs: clear to auscultation ant  bilaterally  Heart: regular rate and rhythm, S1, S2 normal, no murmur appreciated,   Abdomen: soft, +BS    Extremities: atraumatic, no cyanosis or edema    Neurologic:   Mental status: Alert, completely oriented, spontaneous conversation and thought content appropriate, there were no cognitive deficits noted   CN Exam: TRINIDAD, EOM's I, VF full, Gaze conjugate No sensory or motor lateralizations (No PP on face), Hearing I B, CNIX-XII I B  Motor: full power, age appropriate x 4 limbs  Sensory: intact  X 4 limbs, 4 mod inc lt, temp, vib and prop, (not PP tested)  Cerebellar:  Maneuvers intact no past pointing or drift from modified high recumbent Romberg position, no ataxia w maneuvers, Fine motor & finger taps, age appropriate, WNL  DTR's: +2 Age appropriate, WNL; Plantars: downgoing  Gait:  She was not gaited at this time so that orthostatics could be done           Lab Results:   I have personally reviewed pertinent reports    , CBC:   Results from last 7 days  Lab Units 10/17/17  0436 10/16/17  2037 10/14/17  1230   WBC Thousand/uL 8 91 8 43  --    RBC Million/uL 4 31 4 19  --    HEMOGLOBIN g/dL 12 1 12 0  --    I STAT HEMOGLOBIN g/dl  --   --  13 3   HEMATOCRIT % 37 2 36 1  --    MCV fL 86 86  --    PLATELETS Thousands/uL 195 203  --    , BMP/CMP:   Results from last 7 days  Lab Units 10/17/17  0436 10/16/17  2037 10/14/17  1230   SODIUM mmol/L 134* 138  --    POTASSIUM mmol/L 4 5 3 8  --    CHLORIDE mmol/L 102 105  --    CO2 mmol/L 27 28  --    ANION GAP mmol/L 5 5  --    BUN mg/dL 12 15  --    CREATININE mg/dL 0 87 0 92  --    GLUCOSE RANDOM mg/dL 151* 93  --    GLUCOSE, ISTAT mg/dl  --   --  99   CALCIUM mg/dL 8 3 8 3  --    EGFR ml/min/1 73sq m 82 77 79   , Vitamin B12:   , HgBA1C:   , TSH:       Invalid input(s): TSH, Coagulation:   , Lipid Profile:   , Urinalysis:       Invalid input(s): URIBILINOGEN     Imaging Studies: I have personally reviewed pertinent films in PACS and Reviewed them with the attending at this time  Noted is the small area of what appears to be a pseudo aneurysm on the right vertebral         Counseling / Coordination of Care  Total time spent today 50 minutes  Greater than 50% of total time was spent with the patient and / or family counseling and / or coordination of care  A description of the counseling / coordination of care: All of the above was discussed with the patient in the room with her friend  Several questions were answered concerning headache and migraine triggers  We also discussed cervicalgia is and triggers for all muscle tension pharmacologic as well as nonpharmacologic relief measures  Follow-up was also discussed with the patient  The nature of incidental findings on physical examination was also discussed  The benefits and risks of chiropractic care was also discussed with the patient at this time  She had several questions all of which I believe were answered to her satisfaction at this time  Dictation voice to text software has been used in the creation of this document  Please consider this in light of any contextual or grammatical errors

## 2017-10-17 NOTE — ASSESSMENT & PLAN NOTE
· Patient comfortable at this time  · We discussed narcotics and she is agreeable to discontinuing dilaudid in the setting of migraine

## 2017-10-17 NOTE — H&P
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/14/17 in ED  Vascular contacted- recommended outpatient follow up  Returns today with continued migraine  · CTA Head/Neck- Unchanged appearance of right vertebral artery dissection/pseudoaneurysm V3 segment  No evidence of acute intracranial hemorrhage  Unchanged mild degenerative changes to the cervical spine with grade 1 anterolisthesis of C3 on C4  · Dilaudid, Benadryl, Zofran, Reglan, Mg given in ED  · Migraine cocktail  · Decadron  · Neuro checks q4h  · Consult Neurology  Plan for Additional Problems:   · Anxiety- Continue home medications  VTE Prophylaxis: Low Risk  / sequential compression device   Code Status: Full Code  POLST: POLST form is not discussed and not completed at this time  Anticipated Length of Stay:  Patient will be admitted on an Emergency basis with an anticipated length of stay of  Less than 2 midnights  Justification for Hospital Stay: Status Migrainosus  Total Time for Visit, including Counseling / Coordination of Care: 45 minutes  Greater than 50% of this total time spent on direct patient counseling and coordination of care  Chief Complaint:   Migraine  History of Present Illness:    Devyn Noel is a 43 y o  female, PMHx of Migraines- receiving botox injections at [place] [address], who presents with migraine, head pain x14 days  Patient states that over the last 2 weeks, she has had significant, 10/10 throbbing pain over the right side of her head    She states that she was seen in the emergency department on Saturday, and was diagnosed with a vertebral artery aneurysm  At that time, she states that she was told to take some Tylenol and follow up with vascular as an outpatient  Patient states that Tylenol has not improved her headache at all  Additionally, she states that the medication she received in the emergency department on Saturday did not give her much relief either  She states she got a migraine cocktail and Decadron at that time  Patient reports having sensitivity to light and sound, she prefers a quiet and dark room  She denies visual changes  Additionally, she reports associated nausea without vomiting  Patient states that she has a muscle spasm on her right upper back  She states that she has had several massages for this without very much relief  Patient states that she has dizziness and lightheadedness upon standing, due to her low blood pressure  She states that this is unchanged  Review of Systems:    Review of Systems   Constitutional: Negative for appetite change, chills, diaphoresis and fever  HENT: Negative for ear pain and hearing loss  Eyes: Positive for photophobia  Negative for visual disturbance  Respiratory: Negative for cough and shortness of breath  Cardiovascular: Negative for chest pain  Gastrointestinal: Positive for nausea  Negative for abdominal pain, constipation, diarrhea and vomiting  Genitourinary: Negative for dysuria and hematuria  Neurological: Positive for dizziness, light-headedness and headaches  Negative for tremors, seizures, syncope, facial asymmetry, speech difficulty, weakness and numbness  All other systems reviewed and are negative        Past Medical and Surgical History:     Past Medical History:   Diagnosis Date    Anxiety     Migraine     Migraine        Past Surgical History:   Procedure Laterality Date     SECTION, LOW TRANSVERSE  2008    DILATION AND CURETTAGE OF UTERUS  2007    LASIK         Meds/Allergies:    Prior to Admission medications    Medication Sig Start Date End Date Taking? Authorizing Provider   butalbital-acetaminophen-caffeine (FIORICET,ESGIC) -40 mg per tablet Take 1 tablet by mouth every 4 (four) hours as needed for headaches   Yes Historical Provider, MD   aspirin (ECOTRIN) 325 mg EC tablet Take 1 tablet by mouth daily for 30 days 10/14/17 11/13/17  Violet Kam MD   diazepam (VALIUM) 2 mg tablet Take 1 tablet by mouth every 8 (eight) hours as needed for muscle spasms for up to 10 days 6/2/17 6/12/17  Harvie Goodell, MD   escitalopram (LEXAPRO) 20 mg tablet Take 20 mg by mouth daily  Historical Provider, MD   hydrOXYzine HCL (ATARAX) 25 mg tablet Take 25 mg by mouth daily at bedtime Indications: Itching due to Histamine  Historical Provider, MD   indomethacin (INDOCIN) 50 mg capsule Take 1 capsule by mouth 3 (three) times a day as needed (headache) 6/2/17   Harvie Goodell, MD   Probiotic Product (PRO-BIOTIC BLEND PO) Take by mouth    Historical Provider, MD   promethazine (PHENERGAN) 25 mg tablet Take 1 tablet by mouth every 6 (six) hours as needed for nausea or vomiting 6/2/17   Harvie Goodell, MD     I have reviewed home medications with patient personally  Allergies: No Known Allergies    Social History:     Marital Status: Single   Occupation: Unknown  Patient Pre-hospital Living Situation: Home  Patient Pre-hospital Level of Mobility: Independent  Patient Pre-hospital Diet Restrictions: None    Substance Use History:   History   Alcohol Use No     History   Smoking Status    Never Smoker   Smokeless Tobacco    Never Used     History   Drug Use No       Family History:    non-contributory    Physical Exam:     Vitals:   Blood Pressure: 115/67 (10/16/17 2115)  Pulse: 74 (10/16/17 2115)  Temperature: 97 9 °F (36 6 °C) (10/16/17 1918)  Temp Source: Oral (10/16/17 1918)  Respirations: 16 (10/16/17 2115)  Weight - Scale: 80 3 kg (177 lb) (10/16/17 1918)  SpO2: 98 % (10/16/17 2115)    Physical Exam   Constitutional: She is oriented to person, place, and time  She appears well-developed and well-nourished  She is cooperative  She does not appear ill  No distress  HENT:   Head: Normocephalic and atraumatic  Eyes: Conjunctivae, EOM and lids are normal  Pupils are equal, round, and reactive to light  Cardiovascular: Normal rate, regular rhythm, normal heart sounds and normal pulses  No murmur heard  No LE edema bilaterally  Pulmonary/Chest: Effort normal and breath sounds normal    Abdominal: Soft  Normal appearance and bowel sounds are normal  There is no tenderness  Musculoskeletal: Normal range of motion  Severe palpable muscle spasm over bilateral proximal trapezius, R>L  Neurological: She is alert and oriented to person, place, and time  She has normal strength and normal reflexes  She is not disoriented  No cranial nerve deficit or sensory deficit  Skin: Skin is warm, dry and intact  She is not diaphoretic  Psychiatric: She has a normal mood and affect  Her speech is normal and behavior is normal  Cognition and memory are normal    Vitals reviewed  Additional Data:     Lab Results: I have personally reviewed pertinent reports  Results from last 7 days  Lab Units 10/16/17  2037   WBC Thousand/uL 8 43   HEMOGLOBIN g/dL 12 0   HEMATOCRIT % 36 1   PLATELETS Thousands/uL 203   NEUTROS PCT % 59   LYMPHS PCT % 32   MONOS PCT % 6   EOS PCT % 3       Results from last 7 days  Lab Units 10/16/17  2037   SODIUM mmol/L 138   POTASSIUM mmol/L 3 8   CHLORIDE mmol/L 105   CO2 mmol/L 28   BUN mg/dL 15   CREATININE mg/dL 0 92   CALCIUM mg/dL 8 3   GLUCOSE RANDOM mg/dL 93           Imaging: I have personally reviewed pertinent reports        Cta Head And Neck With And Without Contrast    Result Date: 10/16/2017  Narrative: CTA NECK AND BRAIN WITH AND WITHOUT CONTRAST INDICATION: Worsening headache COMPARISON:   October 14, 2017 TECHNIQUE:  Routine CT imaging of the Brain without contrast   Post contrast imaging was performed after administration of iodinated contrast through the neck and brain  Post contrast axial 0 625 mm images timed to opacify the arterial system  3D rendering was performed on an independent workstation  MIP reconstructions performed  Coronal reconstructions were performed of the noncontrast portion of the brain  Radiation dose length product (DLP) for this visit:  1254 mGy-cm   This examination, like all CT scans performed in the [facility], was performed utilizing techniques to minimize radiation dose exposure, including the use of iterative reconstruction and automated exposure control  IV Contrast:  85 mL of iohexol (OMNIPAQUE)     IMAGE QUALITY:   Diagnostic FINDINGS: NONCONTRAST BRAIN PARENCHYMA:  No intracranial mass, mass effect or midline shift  No acute intracranial hemorrhage  No CT signs of acute infarction  VENTRICLES AND EXTRA-AXIAL SPACES:  Normal for patient's age  VISUALIZED ORBITS AND PARANASAL SINUSES:  Unremarkable  CALVARIUM AND EXTRACRANIAL SOFT TISSUES:   Normal  CERVICAL VASCULATURE AORTIC ARCH AND GREAT VESSELS:  Normal aortic arch and great vessel origins  Normal visualized subclavian vessels  RIGHT VERTEBRAL ARTERY CERVICAL SEGMENT: The right V1 and V2 segments are normal in caliber  There is unchanged 1 5 mm the 3 focal outpouching, 1 5 mm, which appears anterolaterally  Findings compatible with pseudoaneurysm formation from dissection  LEFT VERTEBRAL ARTERY CERVICAL SEGMENT:  Normal origin  The vessel is normal in caliber throughout the neck  RIGHT EXTRACRANIAL CAROTID SEGMENT:  Normal caliber common carotid artery  Normal bifurcation and cervical internal carotid artery  No stenosis or dissection  LEFT EXTRACRANIAL CAROTID SEGMENT:  Normal caliber common carotid artery  Normal bifurcation and cervical internal carotid artery    No stenosis or dissection  NASCET criteria was used to determine the degree of internal carotid artery diameter stenosis  INTRACRANIAL VASCULATURE INTERNAL CAROTID ARTERIES:  Normal enhancement of the intracranial portions of the internal carotid arteries  Normal ophthalmic artery origins  Normal ICA terminus  Right fetal type posterior communicating artery  ANTERIOR CIRCULATION:  Symmetric A1 segments and anterior cerebral arteries with normal enhancement  Normal anterior communicating artery  MIDDLE CEREBRAL ARTERY CIRCULATION:  M1 segment and middle cerebral artery branches demonstrate normal enhancement bilaterally  DISTAL VERTEBRAL ARTERIES:  Normal distal vertebral arteries  Posterior inferior cerebellar artery origins are normal  Normal vertebral basilar junction  Right PICA is not clearly identified  BASILAR ARTERY:  Basilar artery is normal in caliber  Normal superior cerebellar arteries  POSTERIOR CEREBRAL ARTERIES: Both posterior cerebral arteries arises from the basilar tip  Both arteries demonstrate normal flow-related enhancement  Normal posterior communicating arteries  DURAL VENOUS SINUSES:  Normal  NON VASCULAR ANATOMY BONY STRUCTURES:  No acute osseous abnormality  Reversal of the normal cervical lordosis  Mild degenerative change at C6-C7  Grade 1 anterolisthesis of C3 on C4 is unchanged with a pseudobulge  SOFT TISSUES OF THE NECK:  Normal         THORACIC INLET:  Unremarkable  Impression: 1  Unchanged appearance of right vertebral artery dissection/pseudoaneurysm V3 segment  2  No evidence of acute intracranial hemorrhage  3  Unchanged mild degenerative changes to the cervical spine with grade 1 anterolisthesis of C3 on C4  Workstation performed: [id number]     Cta Head And Neck With And Without Contrast    Result Date: 10/14/2017  Narrative: CTA NECK AND BRAIN WITH AND WITHOUT CONTRAST INDICATION: Headache  COMPARISON:   None   TECHNIQUE:  Routine CT imaging of the Brain without contrast  Post contrast imaging was performed after administration of iodinated contrast through the neck and brain  Post contrast axial 0 625 mm images timed to opacify the arterial system  3D rendering was performed on an independent workstation  MIP reconstructions performed  Coronal reconstructions were performed of the noncontrast portion of the brain  Radiation dose length product (DLP) for this visit:  1390 mGy-cm   This examination, like all CT scans performed in the [facility], was performed utilizing techniques to minimize radiation dose exposure, including the use of iterative reconstruction and automated exposure control  IV Contrast:  85 mL of iohexol (OMNIPAQUE)     IMAGE QUALITY:   Diagnostic FINDINGS: NONCONTRAST BRAIN PARENCHYMA:  There is no acute intracranial hemorrhage, mass effect or midline shift  No extra-axial collection identified  Gray-white differentiation is maintained  No substantial white matter changes are seen  Cerebral volume is within normal limits for age  VENTRICLES AND EXTRA-AXIAL SPACES:  Normal for patient's age  VISUALIZED ORBITS AND PARANASAL SINUSES:  Polypoid mucosal thickening the maxillary sinuses  CALVARIUM AND EXTRACRANIAL SOFT TISSUES:   Normal  CERVICAL VASCULATURE AORTIC ARCH AND GREAT VESSELS:  Normal aortic arch and great vessel origins  Normal visualized subclavian vessels  RIGHT VERTEBRAL ARTERY CERVICAL SEGMENT:  The right V1 and V2 segments are normal in caliber  There is a focal outpouching, 1 5 mm, which appears anteriorly directed in the proximal V3 with a rapid caliber change of V3 as it becomes V4  Findings compatible with pseudoaneurysm formation from dissection  LEFT VERTEBRAL ARTERY CERVICAL SEGMENT:  Normal origin  The vessel is normal in caliber throughout the neck  RIGHT EXTRACRANIAL CAROTID SEGMENT:  Normal caliber common carotid artery  Normal bifurcation and cervical internal carotid artery  No stenosis or dissection  0% LEFT EXTRACRANIAL CAROTID SEGMENT:  Normal caliber common carotid artery  Normal bifurcation and cervical internal carotid artery  No stenosis or dissection  0% NASCET criteria was used to determine the degree of internal carotid artery diameter stenosis  INTRACRANIAL VASCULATURE INTERNAL CAROTID ARTERIES:  Normal enhancement of the intracranial portions of the internal carotid arteries  Normal ophthalmic artery origins  Normal ICA terminus  Right fetal type posterior communicating artery  ANTERIOR CIRCULATION:  Symmetric A1 segments and anterior cerebral arteries with normal enhancement  Normal anterior communicating artery  MIDDLE CEREBRAL ARTERY CIRCULATION:  M1 segment and middle cerebral artery branches demonstrate normal enhancement bilaterally  DISTAL VERTEBRAL ARTERIES:  Bilateral vertebral arteries are patent  Slight outpouching in the dural intersection without a clear [place]  Right PICA is not clearly identified  BASILAR ARTERY:  Basilar artery is normal in caliber  Normal superior cerebellar arteries  POSTERIOR CEREBRAL ARTERIES: Both posterior cerebral arteries arises from the basilar tip  Both arteries demonstrate normal flow-related enhancement  DURAL VENOUS SINUSES:  Normal  NON VASCULAR ANATOMY BONY STRUCTURES:  No acute osseous abnormality  SOFT TISSUES OF THE NECK:  Normal         THORACIC INLET:  Unremarkable  Impression: Findings of a right vertebral 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  Remainder of the cervical vasculature is unremarkable  No signs of intracranial vascular occlusive disease or aneurysm formation  No acute intracranial abnormality  ##phoslh##phoslh  ##cfslh I personally discussed this result with Jennifer Bergman on 10/14/2017 2:25 PM  ## Workstation performed: [id number]       EKG, Pathology, and Other Studies Reviewed on Admission:   · EKG: Unavailable       Allscripts Records Reviewed: Yes     ** Please Note: Dragon 360 Dictation voice to text software may have been used in the creation of this document   **

## 2017-10-17 NOTE — PROGRESS NOTES
Orthostatic blood pressures/heart rate as follows:    Laying: /58  HR 77  Sitting: /63  HR 76  Standing: /60  HR 89    Will continue to monitor patient, call bell within reach  To get another set of orthostatics around lunch time per neurology

## 2017-10-17 NOTE — PROGRESS NOTES
Progress Note - Alyson Peers 43 y o  female MRN: [id number]    Unit/Bed#: -01 Encounter: [id number]        * Migraine with status migrainosus   Assessment & Plan    · Patient comfortable at this time  · We discussed narcotics and she is agreeable to discontinuing dilaudid in the setting of migraine        Vertebral artery aneurysm (HCC)   Assessment & Plan    · Remains stable per repeat imaging  · F/U with vascular in AM        Anxiety   Assessment & Plan    · Continue meds            DVT Prophylaxis: low risk    Discussions with Specialists or Other Care Team Provider: Neurology    Education and Discussions with Family / Patient: offered to call family, patient refused    Time Spent for Care: 20 minutes  More than 50% of total time spent on counseling and coordination of care as described above  Current Length of Stay: 1 day(s)    Code Status: Level 1 - Full Code      Subjective:   I feel better right now    Objective:     Vitals:   Temp (24hrs), Av 1 °F (36 7 °C), Min:97 8 °F (36 6 °C), Max:98 6 °F (37 °C)    HR:  [68-99] 89  Resp:  [16-18] 18  BP: (104-131)/(58-81) 111/60  SpO2:  [97 %-100 %] 97 %  Body mass index is 28 29 kg/m²  Input and Output Summary (last 24 hours): Intake/Output Summary (Last 24 hours) at 10/17/17 1033  Last data filed at 10/16/17 2349   Gross per 24 hour   Intake               50 ml   Output                0 ml   Net               50 ml       Physical Exam:     Physical Exam   Constitutional: She is oriented to person, place, and time  No distress  Cardiovascular: Normal rate  Exam reveals no gallop and no friction rub  No murmur heard  Pulmonary/Chest: Effort normal  No respiratory distress  She has no wheezes  She has no rales  Abdominal: Soft  She exhibits no distension  There is no tenderness  There is no rebound and no guarding  Musculoskeletal: She exhibits no edema or tenderness     Neurological: She is alert and oriented to person, place, and time    Skin: Skin is warm and dry  She is not diaphoretic  Additional Data:     Labs:      Results from last 7 days  Lab Units 10/17/17  0436 10/16/17  2037   WBC Thousand/uL 8 91 8 43   HEMOGLOBIN g/dL 12 1 12 0   HEMATOCRIT % 37 2 36 1   PLATELETS Thousands/uL 195 203   NEUTROS PCT %  --  59   LYMPHS PCT %  --  32   MONOS PCT %  --  6   EOS PCT %  --  3       Results from last 7 days  Lab Units 10/17/17  0436   SODIUM mmol/L 134*   POTASSIUM mmol/L 4 5   CHLORIDE mmol/L 102   CO2 mmol/L 27   BUN mg/dL 12   CREATININE mg/dL 0 87   CALCIUM mg/dL 8 3   GLUCOSE RANDOM mg/dL 151*             Recent Cultures (last 7 days):           Last 24 Hours Medication List:     acetaminophen 975 mg Oral Q6H [address]   aspirin 325 mg Oral Daily   escitalopram 20 mg Oral Daily        Today, Patient Was Seen By: Urbano Castañeda MD    ** Please Note: Dragon 360 Dictation voice to text software may have been used in the creation of this document   **

## 2017-10-18 VITALS
HEIGHT: 66 IN | HEART RATE: 77 BPM | OXYGEN SATURATION: 98 % | RESPIRATION RATE: 18 BRPM | WEIGHT: 175.27 LBS | DIASTOLIC BLOOD PRESSURE: 59 MMHG | TEMPERATURE: 98.6 F | SYSTOLIC BLOOD PRESSURE: 103 MMHG | BODY MASS INDEX: 28.17 KG/M2

## 2017-10-18 RX ORDER — METHOCARBAMOL 500 MG/1
1000 TABLET, FILM COATED ORAL EVERY 6 HOURS PRN
Qty: 20 TABLET | Refills: 0 | Status: SHIPPED | OUTPATIENT
Start: 2017-10-18 | End: 2018-04-23

## 2017-10-18 RX ADMIN — DIPHENHYDRAMINE HYDROCHLORIDE 25 MG: 50 INJECTION, SOLUTION INTRAMUSCULAR; INTRAVENOUS at 07:26

## 2017-10-18 RX ADMIN — DIPHENHYDRAMINE HYDROCHLORIDE 25 MG: 50 INJECTION, SOLUTION INTRAMUSCULAR; INTRAVENOUS at 00:57

## 2017-10-18 RX ADMIN — ESCITALOPRAM OXALATE 20 MG: 20 TABLET ORAL at 09:06

## 2017-10-18 RX ADMIN — KETOROLAC TROMETHAMINE 30 MG: 30 INJECTION, SOLUTION INTRAMUSCULAR at 00:57

## 2017-10-18 RX ADMIN — METOCLOPRAMIDE 10 MG: 5 INJECTION, SOLUTION INTRAMUSCULAR; INTRAVENOUS at 07:26

## 2017-10-18 RX ADMIN — REGULAR STRENGTH 325 MG: 325 TABLET ORAL at 09:06

## 2017-10-18 RX ADMIN — KETOROLAC TROMETHAMINE 30 MG: 30 INJECTION, SOLUTION INTRAMUSCULAR at 07:26

## 2017-10-18 RX ADMIN — METOCLOPRAMIDE 10 MG: 5 INJECTION, SOLUTION INTRAMUSCULAR; INTRAVENOUS at 00:57

## 2017-10-18 RX ADMIN — SODIUM CHLORIDE 75 ML/HR: 0.9 INJECTION, SOLUTION INTRAVENOUS at 05:19

## 2017-10-18 NOTE — PLAN OF CARE
Problem: Potential for Falls  Goal: Patient will remain free of falls  INTERVENTIONS:  - Assess patient frequently for physical needs  -  Identify cognitive and physical deficits and behaviors that affect risk of falls    -  [place] fall precautions as indicated by assessment   - Educate patient/family on patient safety including physical limitations  - Instruct patient to call for assistance with activity based on assessment  - Modify environment to reduce risk of injury  - Consider OT/PT consult to assist with strengthening/mobility   Outcome: Completed Date Met: 10/18/17

## 2017-10-18 NOTE — DISCHARGE SUMMARY
Discharge Summary - [facility]    Patient Information: Gerda Marin 43 y o  female MRN: [id number]  Unit/Bed#: -01 Encounter: [id number]    Discharging Physician / Practitioner: Charlesetta Kehr, MD  PCP: Apryl Landis MD  Admission Date: 10/16/2017  Discharge Date: 10/18/17    Reason for Admission:  Headache    Discharge Diagnoses:     Principal Problem:    Migraine with status migrainosus  Active Problems:    Vertebral artery aneurysm [facility])    Anxiety  Resolved Problems:    * No resolved hospital problems  *      Consultations During Hospital Stay:  · Neurology    Procedures Performed:     · CTA head and neck 1  Unchanged appearance of right vertebral artery dissection/pseudoaneurysm V3 segment  2  No evidence of acute intracranial hemorrhage  3  Unchanged mild degenerative changes to the cervical spine with grade 1 anterolisthesis of C3 on C4  ·   Significant Findings / Test Results:     · None    Incidental Findings:   · None     Test Results Pending at Discharge (will require follow up): · None     Outpatient Tests Requested:  · None    Complications:  None    Hospital Course:     Gerda Marin is a 43 y o  female patient who originally presented to the hospital on 10/16/2017 due to headache  Patient was recently at [facility] Emergency room on October 14th for headache during that time she had a CTA of the head and neck which was as above with a right vertebral artery dissection and pseudoaneurysm at the V3 segment  Patient is admitted for this headache which was felt to be migraine in nature  She was placed on a migraine protocol in consultation with Neurology and has had dramatic improvement    She is now her baseline is anxious to be discharged home she wants to follow up with her vascular surgeon she has an appointment for later today    Condition at Discharge: fair     Discharge Day Visit / Exam:     Subjective:  I feel much better  Vitals: Blood Pressure: 103/59 (10/18/17 0700)  Pulse: 77 (10/18/17 0700)  Temperature: 98 6 °F (37 °C) (10/18/17 0700)  Temp Source: Oral (10/18/17 0700)  Respirations: 18 (10/18/17 0700)  Height: 5' 6" (167 6 cm) (10/17/17 0041)  Weight - Scale: 79 5 kg (175 lb 4 3 oz) (10/17/17 0041)  SpO2: 98 % (10/18/17 0700)  Exam:   Physical Exam   Constitutional: She is oriented to person, place, and time  No distress  Cardiovascular: Normal rate  Exam reveals no gallop and no friction rub  No murmur heard  Pulmonary/Chest: Effort normal  No respiratory distress  She has no wheezes  She has no rales  Abdominal: Soft  She exhibits no distension  There is no tenderness  There is no rebound and no guarding  Neurological: She is alert and oriented to person, place, and time  No cranial nerve deficit  She exhibits normal muscle tone  Coordination normal    Skin: She is not diaphoretic  Discharge instructions/Information to patient and family:   See after visit summary for information provided to patient and family  Provisions for Follow-Up Care:  See after visit summary for information related to follow-up care and any pertinent home health orders  Disposition:     Home    For Discharges to [facility] SNF:   · Not Applicable to this Patient - Not Applicable to this Patient    Planned Readmission: no     Discharge Statement:  I spent 25 minutes discharging the patient  This time was spent on the day of discharge  I had direct contact with the patient on the day of discharge  Greater than 50% of the total time was spent examining patient, answering all patient questions, arranging and discussing plan of care with patient as well as directly providing post-discharge instructions  Additional time then spent on discharge activities  Discharge Medications:  See after visit summary for reconciled discharge medications provided to patient and family        ** Please Note: This note has been constructed using a voice recognition system **

## 2017-10-18 NOTE — CASE MANAGEMENT
Notification of Discharge  This is a Notification of Discharge from our facility [address]  Please be advised that this patient has been discharge from our facility  Below you will find the admission and discharge date and time including the patients disposition  PRESENTATION DATE: 10/16/2017  7:24 PM  IP ADMISSION DATE: 10/16/17 2324  DISCHARGE DATE: 10/18/2017 11:06 AM  DISPOSITION: Home/Self Care    [facility] in the [facility] by [address] for 2017  Network Utilization Review Department  Phone: [phone]; Fax [phone]  ATTENTION: The Network Utilization Review Department is now centralized for our 7 Facilities  Make a note that we have a new phone and fax numbers for our Department  Please call with any questions or concerns to [phone] and carefully follow the prompts so that you are directed to the right person  All voicemails are confidential  Fax any determinations, approvals, denials, and requests for initial or continue stay review clinical to [phone]  Due to HIGH CALL volume, it would be easier if you could please send faxed requests to expedite your requests and in part, help us provide discharge notifications faster

## 2017-11-17 ENCOUNTER — ALLSCRIPTS OFFICE VISIT (OUTPATIENT)
Dept: OTHER | Facility: OTHER | Age: 42
End: 2017-11-17

## 2017-11-17 ENCOUNTER — TRANSCRIBE ORDERS (OUTPATIENT)
Dept: ADMINISTRATIVE | Facility: HOSPITAL | Age: 42
End: 2017-11-17

## 2017-11-17 DIAGNOSIS — I77.74 DISSECTION OF VERTEBRAL ARTERY (HCC): Primary | ICD-10-CM

## 2017-11-17 DIAGNOSIS — I77.74 DISSECTION OF VERTEBRAL ARTERY (HCC): ICD-10-CM

## 2017-11-20 NOTE — CONSULTS
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)    Plan  Vertebral artery dissection    · Clopidogrel Bisulfate 75 MG Oral Tablet (Plavix); TAKE 1 TABLET DAILY   Rx By: Dave Sage; Dispense: 60 Days ; #:60 Tablet; Refill: 0;Vertebral artery dissection; MARCELLUS = N; Verified Transmission to [facility]/PHARMACY #[id number]; Last Updated By: System, [employer]; 11/17/2017 2:11:49 PM   · Follow-up visit in 1 week Evaluation and Treatment  Follow-up  Status: Hold For -Scheduling  Requested for: [id number]   Ordered; For: Vertebral artery dissection; Ordered By: Dave Sage Performed:  Due: [id number]   · (1) P2Y12 PLATELET INHIBITOR; Status:Active; Requested for:47Qkl0215;    Perform:[facility]; HCJ:[id number]; Ordered;artery dissection; Ordered By:León Myers;   · CTA HEAD AND NECK W WO CONTRAST; Status:Need Information - FinancialAuthorization; Requested for:[id number];    Perform:[facility]; [phone]; Last Updated By:Rebeka Miller; 11/17/2017 2:32:17 PM;Ordered;artery dissection; Ordered By:León Myers;    Discussion/Summary    Ms Orin Snellen is a 43year-old woman that developed a right V3 pseudoaneurysm either spontaneously or in the setting of neck strain  She had been discharged on  mg  She has persistent headaches unfortunately but no neurological deficits  She will initiate Plavix 75 mg daily and switch to 81 mg ASA and return to clinic with a follow-up CTA in about 4 weeks  She should stop taking indomethacin and she should limit her usage of Fioricet  I have encouraged her to take the Robaxin regularly  Furthermore, I have recommended she stops all sorts of sports-like physical activity such as the gym and yoga  Chief Complaint  Patient presents for consult for 1 5 mm pseudoaneurysm  History of Present Illness  The patient is being seen for an initial evaluation of and possible 1 5mm pseudoaneursym a cerebral aneurysm   Symptoms:  headache-- and-- dizziness, but-- no diplopia,-- no visual disturbance,-- no extraocular weakness,-- no facial pain,-- no facial weakness,-- no extremity weakness-- and-- no syncope  Ms Ronnell Dixon is a 43year-old woman with a history of chronic migraines who suddenly developed 15 days of worsening/severe right posterior lower headaches/upper neck pain  She denies any trauma but was performing some powerwashing and painting activities  Her migraine medications did not provide relief  She has chronic neck pain usually in the occipital area that she visits a chiropractor for who performs adjustments/manipulations  She had a treatment 2 days before her acute symptoms began  Then had 2-3 more manipulations with the onset of new neck pain  There was no relief then she went to the hospital  This pain was associated with nausea some dizziness (not vertigo) however she has occasional milder episodes of dizziness at baseline  No pulsatile tinitus, vision changes, no other stroke-like symptoms  She was in the [facility] ER where a small right V3 pseudoaneurysm was discovered  She was discharged on 325 mg ASA  She now presents approximately 3 seeks after her diagnosis and continues to have headaches  She takes fioricet and indomethacin for her headaches which have also not relieved her pain  She takes Robaxin 500 mg at night only  Review of Systems   Constitutional: No fever, no chills, feels well, no tiredness, no recent weight gain or weight loss  Eyes: right eye twitches  ENT: no complaints of earache, no loss of hearing, no nose bleeds, no nasal discharge, no sore throat, no hoarseness  Cardiovascular: No complaints of slow heart rate, no fast heart rate, no chest pain, no palpitations, no leg claudication, no lower extremity edema  Respiratory: No complaints of shortness of breath, no wheezing, no cough, no SOB on exertion, no orthopnea, no PND    Gastrointestinal: constipation--   The patient presents with complaints of nausea (along with headaches)  Genitourinary: No complaints of dysuria, no incontinence, no pelvic pain, no dysmenorrhea, no vaginal discharge or bleeding  Musculoskeletal: Neck pain, but-- No complaints of arthralgias, no myalgias, no joint swelling or stiffness, no limb pain or swelling  Integumentary: No complaints of skin rash or lesions, no itching, no skin wounds, no breast pain or lump  Neurological: headache--   The patient presents with complaints of dizziness (Gets dizzy when going from sitting to standing )  Psychiatric: anxiety-- and-- depression  Endocrine: No complaints of proptosis, no hot flashes, no muscle weakness, no deepening of the voice, no feelings of weakness  Hematologic/Lymphatic: No complaints of swollen glands, no swollen glands in the neck, does not bleed easily, does not bruise easily  ROS reviewed  Active Problems  1  Abscess of groin (682 2) (L02 214)   2  Anxiety and depression (300 00,311) (F41 8)   3  Chronic migraine without aura (346 70) (G43 709)   4  Depression screen (V79 0) (Z13 89)   5  Encounter for contraceptive management, unspecified (V25 9) (Z30 9)   6  Encounter for gynecological examination without abnormal finding (V72 31) (Z01 419)   7  Exposure to STD (V01 6) (Z20 2)   8  Neck pain (723 1) (M54 2)   9  Other screening mammogram (V76 12) (Z12 31)   10  Screening for lipoid disorders (V77 91) (Z13 220)   11  Vulvovaginitis (616 10) (N76 0)   12  Yeast infection (112 9) (B37 9)    Past Medical History  1  History of RAMIREZ positive (795 79) (R76 8)   2  History of anxiety disorder (V11 8) (Z86 59)   3  History of depression (V11 8) (Z86 59)   4  History of pregnancy (V13 29)    The active problems and past medical history were reviewed and updated today  Surgical History  1  History of Cervix Cryosurgery   2  History of  Section   3  History of Complete Colonoscopy   4  History of Corneal LASIK Bilateral   5   History of Dilation And Curettage    The surgical history was reviewed and updated today  Family History  Mother    1  Family history of hypertension (V17 49) (Z82 49)   2  Family history of lung cancer (V16 1) (Z80 1)   3  Family history of malignant neoplasm of brain (V16 8) (Z80 8)  Maternal Aunt    4  Family history of breast cancer in female (V16 3) (Z80 3)    The family history was reviewed and updated today  Social History     ·    · Feels safe at home   · Never a smoker   · No alcohol use   · No drug use  The social history was reviewed and updated today  Current Meds   1  Atarax 25 MG TABS; Therapy: (Recorded:13Nov2015) to Recorded   2  Butalbital-APAP-Caffeine -40 MG Oral Capsule; TAKE 1 CAPSULE Every 6 hours PRN; Therapy: 88HTK0065 to (Evaluate:12Vzk7101)  Requested for: 20Jun2017; Last Rx:20Jun2017 Ordered   3  Elavil 25 MG Oral Tablet; TAKE 1 TABLET AT BEDTIME; Therapy: (Recorded:17Nov2017) to Recorded   4  Escitalopram Oxalate 20 MG Oral Tablet; take 1 tablet by mouth every day; Therapy: 25Qvc8649 to (Evaluate:86Bxe5750)  Requested for: 21Aug2017; Last Rx:21Aug2017 Ordered   5  Lexapro 20 MG Oral Tablet; Therapy: (Recorded:13Nov2015) to Recorded    Allergies  1  No Known Drug Allergies    Vitals  Vital Signs    Recorded: 26KOC0376 01:09PM   Temperature 98 9 F, Oral   Heart Rate 80   Respiration 16   Systolic 812, LUE, Sitting   Diastolic 70, LUE, Sitting   Height 5 ft 6 in   Weight 170 lb    BMI Calculated 27 44   BSA Calculated 1 87   Pain Scale 3       Physical Exam    Constitutional Patient appears healthy and well developed  No signs of acute distress present  Neurologic - Mental Status: Alert and Oriented x3  -- Mood and affect: Affect is normal  -- Memory is grossly intact  -- Attention is WNL  -- Speech is articulated and fluent  -- Knowledge and vocabulary consistent with education  Cranial Nerve Exam:  2nd cranial nerve: Visual field was full to confrontation  -- 3rd, 4th, and 6th cranial nerves: Normal with no deficit  -- 5th cranial nerve: Sensation was intact in all three divisions to light touch and temperature  Motor function was intact  -- 7th cranial nerve: Face symmetrical at grimace and at rest -- 8th cranial nerve: Grossly intact to finger rub bilaterally  -- 9th and 10th cranial nerves: Uvula is midline  -- 11th cranial nerve: Shoulder shrug equal bilaterally  -- 12th cranial nerve: Tongue mideline, no atrophy present  Motor System General Motor Strength: No pronator drift and no parietal drift  Motor System - Upper Extremities: Normal to inspection and palpation  Strength: Deltoids 5/5 bilaterally  Biceps 5/5 bilaterally  Triceps 5/5 bilaterally  Extensor carpi radials is 5/5 bilaterally  Extensor digitorum 5/5 bilaterally  Intrinsic 5/5 bilaterally   5/5 bilaterally  -- Muscle tone: Normal bilaterally  -- Muscle Bulk: Normal bilaterally  Motor System - Lower Extremities: Normal to inspection and palpation  Strength: Iliopsoas 5/5 bilaterally  Quadriceps 5/5 bilaterally  Hamstrings 5/5 bilaterally  Gastrocnemius 5/5 bilaterally  -- Muscle tone: Normal bilaterally  Coordination: Finger to nose was normal   Sensory: Sensation grossly intact to light touch  Sensation grossly intact to light touch  Gait and Station: [place] with a normal gait  Results/Data   I personally reviewed the CTA in detail with the patient  Provider Comments    The total visit time today was 60 minutes with greater than 50% of the time spent counselling the patient in regards to her pseudoaneurysm  Future Appointments    Date/Time Provider Specialty Site   12/15/2017 11:00 AM PAMELA Harrison   Neurosurgery [facility] NEUROSURGICAL [facility]       Signatures   Electronically signed by : PAMELA Siddiqui ; Nov 19 2017  3:23PM EST                       (Author)

## 2017-12-04 ENCOUNTER — APPOINTMENT (OUTPATIENT)
Dept: LAB | Facility: CLINIC | Age: 42
End: 2017-12-04
Payer: COMMERCIAL

## 2017-12-04 DIAGNOSIS — I77.74 DISSECTION OF VERTEBRAL ARTERY (HCC): ICD-10-CM

## 2017-12-04 LAB — PA ADP BLD-ACNC: 176 PRU (ref 194–418)

## 2017-12-04 PROCEDURE — 85576 BLOOD PLATELET AGGREGATION: CPT

## 2017-12-04 PROCEDURE — 36415 COLL VENOUS BLD VENIPUNCTURE: CPT

## 2017-12-05 ENCOUNTER — GENERIC CONVERSION - ENCOUNTER (OUTPATIENT)
Dept: OTHER | Facility: OTHER | Age: 42
End: 2017-12-05

## 2017-12-11 ENCOUNTER — HOSPITAL ENCOUNTER (OUTPATIENT)
Dept: CT IMAGING | Facility: HOSPITAL | Age: 42
Discharge: HOME | End: 2017-12-11
Attending: RADIOLOGY
Payer: COMMERCIAL

## 2017-12-11 DIAGNOSIS — I77.74 DISSECTION OF VERTEBRAL ARTERY (HCC): ICD-10-CM

## 2017-12-11 PROCEDURE — 70498 CT ANGIOGRAPHY NECK: CPT

## 2017-12-11 PROCEDURE — 70496 CT ANGIOGRAPHY HEAD: CPT

## 2017-12-11 RX ADMIN — IOHEXOL 85 ML: 350 INJECTION, SOLUTION INTRAVENOUS at 19:09

## 2017-12-14 ENCOUNTER — GENERIC CONVERSION - ENCOUNTER (OUTPATIENT)
Dept: OTHER | Facility: OTHER | Age: 42
End: 2017-12-14

## 2017-12-15 ENCOUNTER — GENERIC CONVERSION - ENCOUNTER (OUTPATIENT)
Dept: OTHER | Facility: OTHER | Age: 42
End: 2017-12-15

## 2017-12-15 ENCOUNTER — TRANSCRIBE ORDERS (OUTPATIENT)
Dept: ADMINISTRATIVE | Facility: HOSPITAL | Age: 42
End: 2017-12-15

## 2017-12-15 DIAGNOSIS — I77.74 DISSECTION OF VERTEBRAL ARTERY (HCC): Primary | ICD-10-CM

## 2018-01-12 VITALS
WEIGHT: 170 LBS | BODY MASS INDEX: 27.32 KG/M2 | TEMPERATURE: 98.9 F | RESPIRATION RATE: 16 BRPM | HEART RATE: 80 BPM | SYSTOLIC BLOOD PRESSURE: 100 MMHG | DIASTOLIC BLOOD PRESSURE: 70 MMHG | HEIGHT: 66 IN

## 2018-01-12 VITALS
TEMPERATURE: 98.8 F | WEIGHT: 168.13 LBS | SYSTOLIC BLOOD PRESSURE: 100 MMHG | HEART RATE: 80 BPM | HEIGHT: 67 IN | BODY MASS INDEX: 26.39 KG/M2 | DIASTOLIC BLOOD PRESSURE: 70 MMHG

## 2018-01-14 VITALS
SYSTOLIC BLOOD PRESSURE: 124 MMHG | HEIGHT: 66 IN | DIASTOLIC BLOOD PRESSURE: 78 MMHG | WEIGHT: 177 LBS | BODY MASS INDEX: 28.45 KG/M2

## 2018-01-14 VITALS
TEMPERATURE: 97.9 F | HEIGHT: 67 IN | BODY MASS INDEX: 27 KG/M2 | WEIGHT: 172 LBS | HEART RATE: 88 BPM | DIASTOLIC BLOOD PRESSURE: 56 MMHG | SYSTOLIC BLOOD PRESSURE: 94 MMHG

## 2018-01-15 VITALS
HEIGHT: 67 IN | SYSTOLIC BLOOD PRESSURE: 108 MMHG | DIASTOLIC BLOOD PRESSURE: 72 MMHG | BODY MASS INDEX: 28.09 KG/M2 | WEIGHT: 179 LBS

## 2018-01-23 NOTE — RESULT NOTES
Verified Results  CTA HEAD AND NECK W WO CONTRAST [id number] 06:38PM Adrian Flores Order Number: [id number]    - Patient Instructions: To schedule this appointment, please contact Central Scheduling at [phone]  [facility] dec 11 2017 at 7pm 3 hr food fasting BA     Test Name Result Flag Reference   CTA HEAD AND NECK W WO CONTRAST (Report)     CTA NECK AND BRAIN WITH AND WITHOUT CONTRAST     INDICATION: Follow-up vertebral artery dissection  I77 74: Dissection of vertebral artery  History taken directly from the electronic ordering system  COMPARISON:  10/16/2017     TECHNIQUE: Routine CT imaging of the Brain without contrast  Post contrast imaging was performed after administration of iodinated contrast through the neck and brain  Post contrast axial 0 625 mm images timed to opacify the arterial system  3D rendering was performed on an independent workstation  MIP reconstructions performed  Coronal reconstructions were performed of the noncontrast portion of the brain  Radiation dose length product (DLP) for this visit: 1239 mGy-cm   This examination, like all CT scans performed in the [address] [place] [address], was performed utilizing techniques to minimize radiation dose exposure, including the use of iterative    reconstruction and automated exposure control  IV Contrast: 85 mL of iohexol (OMNIPAQUE)        IMAGE QUALITY:  Diagnostic     FINDINGS:   NONCONTRAST BRAIN     PARENCHYMA: No intracranial mass, mass effect or midline shift  No acute intracranial hemorrhage  No CT signs of acute infarction  VENTRICLES AND EXTRA-AXIAL SPACES: Normal for patient's age  VISUALIZED ORBITS AND PARANASAL SINUSES: Unremarkable  CALVARIUM AND EXTRACRANIAL SOFT TISSUES:  Normal        CERVICAL VASCULATURE     AORTIC ARCH AND GREAT VESSELS: Normal aortic arch and great vessel origins  Normal visualized subclavian vessels          RIGHT VERTEBRAL ARTERY CERVICAL SEGMENT: Normal origin  The vessel is normal in caliber throughout the neck  LEFT VERTEBRAL ARTERY CERVICAL SEGMENT: Normal origin  The vessel is normal in caliber throughout the neck  RIGHT EXTRACRANIAL CAROTID SEGMENT: Normal caliber common carotid artery  Normal bifurcation and cervical internal carotid artery  No stenosis or dissection  LEFT EXTRACRANIAL CAROTID SEGMENT: Normal caliber common carotid artery  Normal bifurcation and cervical internal carotid artery  No stenosis or dissection  NASCET criteria was used to determine the degree of internal carotid artery diameter stenosis  INTRACRANIAL VASCULATURE      INTERNAL CAROTID ARTERIES: Normal enhancement of the intracranial portions of the internal carotid arteries  Normal ophthalmic artery origins  Normal ICA terminus  ANTERIOR CIRCULATION: Symmetric A1 segments and anterior cerebral arteries with normal enhancement  Normal anterior communicating artery  MIDDLE CEREBRAL ARTERY CIRCULATION: M1 segment and middle cerebral artery branches demonstrate normal enhancement bilaterally  DISTAL VERTEBRAL ARTERIES: Normal distal vertebral arteries  Previously noted pseudoaneurysm in the V3 segment of the right vertebral artery no longer detected  Parasagittal anterior contour abnormality on image 122, series 5 in the transverse foramen   of the C1 segment noted in the region of previous pseudoaneurysm  No intimal flap  Distal luminal opacification normal  Posterior inferior cerebellar artery origins are normal  Normal vertebral basilar junction  BASILAR ARTERY: Basilar artery is normal in caliber  Normal superior cerebellar arteries  POSTERIOR CEREBRAL ARTERIES: There is fetal origin of the right posterior cerebral artery  The left posterior cerebral artery arises from the basilar tip  Both demonstrate no focal stenosis  Normal posterior communicating arteries       DURAL VENOUS SINUSES: Normal        NON VASCULAR ANATOMY BONY STRUCTURES: Mild spondylotic changes noted  There is nonspecific straightening of the cervical lordosis without subluxation  SOFT TISSUES OF THE NECK: Mild bilateral maxillary sinus mucosal thickening  THORACIC INLET: Unremarkable  IMPRESSION:       1  No hemodynamically significant stenosis in the major arteries of the neck  Interval resolution of the previously present pseudoaneurysm in the V3 segment of the right vertebral artery  No pseudoaneurysm or evidence of dissection currently  2  No intracranial aneurysm or major intracranial arterial stenosis  3  No acute intracranial hemorrhage  Workstation performed: [id number]     Signed by:    Eliza Oliveira MD   12/12/17

## 2018-01-23 NOTE — RESULT NOTES
Verified Results  (1) P2Y12 PLATELET INHIBITOR 24HTC9886 03:14PM Patsy Boeck Order Number: [id number]     Test Name Result Flag Reference   P2Y12 176 PRU L 194-418   Test results are reported in P2Y12 reaction units(PRU)  Values below 194 PRU are specific evidence of P2Y12 inhibitor effect  Please note: GPIIb/IIIa inhibitors such as abciximab (ReoPro), eptifibatide (Integrilin) and tirofiban (Aggrastat) interfere with the VerifyNow Aspirin Test and PRUTest (P2Y12)  Patients who have been treated with Glycoprotein IIb/IIIa inhibitor drugs should not be tested until platelet function has recovered  This time period is approximately 14 days after discontinuation of abciximab (ReoPro) and up to 48 hours for eptifibatide (integrillin) and tirofiban (Aggrastat)  The platelet function recovery times varies among individuals and is longer for patients with renal dysfunction

## 2018-01-24 ENCOUNTER — HOSPITAL ENCOUNTER (OUTPATIENT)
Dept: CT IMAGING | Facility: HOSPITAL | Age: 43
Discharge: HOME | End: 2018-01-24
Attending: RADIOLOGY
Payer: COMMERCIAL

## 2018-01-24 VITALS
SYSTOLIC BLOOD PRESSURE: 108 MMHG | WEIGHT: 170 LBS | DIASTOLIC BLOOD PRESSURE: 65 MMHG | BODY MASS INDEX: 27.32 KG/M2 | HEIGHT: 66 IN | TEMPERATURE: 97.9 F | HEART RATE: 92 BPM | RESPIRATION RATE: 16 BRPM

## 2018-01-24 VITALS — SYSTOLIC BLOOD PRESSURE: 117 MMHG | DIASTOLIC BLOOD PRESSURE: 61 MMHG | HEART RATE: 83 BPM

## 2018-01-24 VITALS — HEART RATE: 96 BPM | SYSTOLIC BLOOD PRESSURE: 100 MMHG | DIASTOLIC BLOOD PRESSURE: 71 MMHG

## 2018-01-24 DIAGNOSIS — I77.74 DISSECTION OF VERTEBRAL ARTERY (HCC): ICD-10-CM

## 2018-01-24 PROCEDURE — 70498 CT ANGIOGRAPHY NECK: CPT

## 2018-01-24 RX ADMIN — IOHEXOL 85 ML: 350 INJECTION, SOLUTION INTRAVENOUS at 09:05

## 2018-01-26 DIAGNOSIS — I77.74 DISSECTION OF VERTEBRAL ARTERY (HCC): ICD-10-CM

## 2018-01-29 ENCOUNTER — OFFICE VISIT (OUTPATIENT)
Dept: NEUROSURGERY | Facility: CLINIC | Age: 43
End: 2018-01-29
Payer: COMMERCIAL

## 2018-01-29 VITALS
WEIGHT: 185 LBS | HEART RATE: 85 BPM | SYSTOLIC BLOOD PRESSURE: 98 MMHG | HEIGHT: 66 IN | TEMPERATURE: 98.5 F | BODY MASS INDEX: 29.73 KG/M2 | DIASTOLIC BLOOD PRESSURE: 60 MMHG

## 2018-01-29 DIAGNOSIS — I77.74 DISSECTION OF VERTEBRAL ARTERY (HCC): Primary | ICD-10-CM

## 2018-01-29 PROCEDURE — 99213 OFFICE O/P EST LOW 20 MIN: CPT | Performed by: RADIOLOGY

## 2018-01-29 RX ORDER — ASPIRIN 81 MG/1
81 TABLET ORAL DAILY
COMMUNITY
End: 2019-04-15

## 2018-01-29 RX ORDER — HYDROXYZINE HYDROCHLORIDE 25 MG/1
25 TABLET, FILM COATED ORAL
COMMUNITY
End: 2018-05-31

## 2018-01-29 RX ORDER — BUTALBITAL, ASPIRIN, AND CAFFEINE 50; 325; 40 MG/1; MG/1; MG/1
1 CAPSULE ORAL EVERY 4 HOURS PRN
COMMUNITY
End: 2018-05-31

## 2018-01-29 NOTE — PROGRESS NOTES
Assessment/Plan:    No problem-specific Assessment & Plan notes found for this encounter  Diagnoses and all orders for this visit:    Dissection of vertebral artery ([address] )  -     CTA neck w wo contrast; Future    Other orders  -     aspirin (ECOTRIN LOW STRENGTH) 81 mg EC tablet; Take 81 mg by mouth daily  -     hydrOXYzine HCL (ATARAX) 25 mg tablet; Take 25 mg by mouth daily at bedtime  -     butalbital-aspirin-caffeine (FIORINAL) -40 mg per capsule; Take 1 capsule by mouth every 4 (four) hours as needed for headaches          Ms Belle Mercedes presents for follow-up of her right vertebral artery pseudoaneurysm  CTA shows good healing and stability over the past 6 weeks  She may stop Plavix and continue on ASA 81 mg  If there is a need to stop ASA for a procedure she may without hesitation  She will return to the office in one year with a follow-up CTA  She may gradually begin resuming physical activities with a reminder that she be cognizant of any potential cervical injury  Patient ID: Nathaly Bailey is a 43 y o  female  HPI    Ms Belle Mercedes presents for follow-up of her right vertebral artery pseudoaneurysm  She continues to have headaches but no worse than before  She also describes significant dizziness in the setting of likely orthostatics  She is not wearing compression stocking but is hydrating with gatorade  No episodes of syncope  No vertigo or stroke like symptoms  The following portions of the patient's history were reviewed and updated as appropriate:   She  has a past medical history of Anxiety; Depression; Migraine; Migraine; Neck pain; Pregnancy; Vertebral artery dissection ([address] ); Vulvovaginitis; and Yeast infection  She  does not have any pertinent problems on file  She  has a past surgical history that includes  section, low transverse (); Dilation and curettage of uterus (); and LASIK    Her family history includes Hypertension in her mother; Lung cancer in her mother  She  reports that she has never smoked  She has never used smokeless tobacco  She reports that she does not drink alcohol or use drugs  Current Outpatient Prescriptions   Medication Sig Dispense Refill    aspirin (ECOTRIN LOW STRENGTH) 81 mg EC tablet Take 81 mg by mouth daily      butalbital-aspirin-caffeine (FIORINAL) -40 mg per capsule Take 1 capsule by mouth every 4 (four) hours as needed for headaches      escitalopram (LEXAPRO) 20 mg tablet Take 20 mg by mouth daily   hydrOXYzine HCL (ATARAX) 25 mg tablet Take 25 mg by mouth daily at bedtime      indomethacin (INDOCIN) 50 mg capsule Take 1 capsule by mouth 3 (three) times a day as needed (headache) 30 capsule 0    methocarbamol (ROBAXIN) 500 mg tablet Take 2 tablets by mouth every 6 (six) hours as needed for muscle spasms 20 tablet 0    Probiotic Product (PRO-BIOTIC BLEND PO) Take by mouth       No current facility-administered medications for this visit  Current Outpatient Prescriptions on File Prior to Visit   Medication Sig    escitalopram (LEXAPRO) 20 mg tablet Take 20 mg by mouth daily   indomethacin (INDOCIN) 50 mg capsule Take 1 capsule by mouth 3 (three) times a day as needed (headache)    methocarbamol (ROBAXIN) 500 mg tablet Take 2 tablets by mouth every 6 (six) hours as needed for muscle spasms    Probiotic Product (PRO-BIOTIC BLEND PO) Take by mouth    [DISCONTINUED] aspirin (ECOTRIN) 325 mg EC tablet Take 1 tablet by mouth daily for 30 days     No current facility-administered medications on file prior to visit  She has No Known Allergies       Review of Systems   Constitutional: Negative for activity change, appetite change, chills, diaphoresis, fatigue, fever and unexpected weight change  HENT: Negative  Eyes: Negative  Respiratory: Negative  Cardiovascular: Negative  Gastrointestinal: Negative  Endocrine: Negative  Genitourinary: Negative  Musculoskeletal: Negative      Skin: Negative  Allergic/Immunologic: Negative  Neurological: Positive for dizziness, weakness and headaches  Negative for tremors, seizures, syncope, speech difficulty, light-headedness and numbness  Hematological: Negative  Psychiatric/Behavioral: Negative  Objective:     Physical Exam   Constitutional: She is oriented to person, place, and time  She appears well-developed  HENT:   Head: Normocephalic  Eyes: EOM are normal  Pupils are equal, round, and reactive to light  Neck: Normal range of motion  Cardiovascular: Normal rate and regular rhythm  Pulmonary/Chest: Effort normal and breath sounds normal    Abdominal: Soft  Bowel sounds are normal    Musculoskeletal: Normal range of motion  Neurological: She is alert and oriented to person, place, and time  She has normal reflexes  No cranial nerve deficit  Coordination normal    Skin: Skin is warm  Psychiatric: She has a normal mood and affect   Her behavior is normal  Judgment and thought content normal

## 2018-02-21 DIAGNOSIS — F41.8 MIXED ANXIETY DEPRESSIVE DISORDER: Primary | ICD-10-CM

## 2018-02-21 RX ORDER — ESCITALOPRAM OXALATE 20 MG/1
TABLET ORAL
Qty: 90 TABLET | Refills: 0 | Status: SHIPPED | OUTPATIENT
Start: 2018-02-21 | End: 2019-02-23 | Stop reason: SDUPTHER

## 2018-02-28 ENCOUNTER — OFFICE VISIT (OUTPATIENT)
Dept: OBGYN CLINIC | Facility: CLINIC | Age: 43
End: 2018-02-28
Payer: COMMERCIAL

## 2018-02-28 VITALS — DIASTOLIC BLOOD PRESSURE: 60 MMHG | SYSTOLIC BLOOD PRESSURE: 102 MMHG | HEIGHT: 66 IN

## 2018-02-28 DIAGNOSIS — N89.8 VAGINAL ITCHING: ICD-10-CM

## 2018-02-28 DIAGNOSIS — Z11.3 SCREENING FOR STD (SEXUALLY TRANSMITTED DISEASE): Primary | ICD-10-CM

## 2018-02-28 PROBLEM — F32.A ANXIETY AND DEPRESSION: Status: ACTIVE | Noted: 2017-06-20

## 2018-02-28 PROBLEM — F41.9 ANXIETY AND DEPRESSION: Status: ACTIVE | Noted: 2017-06-20

## 2018-02-28 PROBLEM — I77.74 VERTEBRAL ARTERY DISSECTION (HCC): Status: ACTIVE | Noted: 2017-11-17

## 2018-02-28 PROBLEM — G43.709 CHRONIC MIGRAINE WITHOUT AURA: Status: ACTIVE | Noted: 2017-06-20

## 2018-02-28 PROCEDURE — 99213 OFFICE O/P EST LOW 20 MIN: CPT | Performed by: NURSE PRACTITIONER

## 2018-02-28 RX ORDER — AMITRIPTYLINE HYDROCHLORIDE 50 MG/1
1 TABLET, FILM COATED ORAL DAILY
COMMUNITY
Start: 2018-02-06 | End: 2020-05-29

## 2018-02-28 NOTE — PROGRESS NOTES
Assessment/Plan:    No problem-specific Assessment & Plan notes found for this encounter  Reviewed with patient abrasion noted on right labial fold  Area looks red and excoriated most likely from scratching  No lesions noted on vulva or labia  No abnormal discharge or odors noted  Reviewed with patient can place Vaseline, Desitin, A&D ointment, Neosporin, or coconut oil on area  Advised to try not to scratch area as making it worse  Will send out vaginitis/vaginosis panel to evaluate due to recent antibiotic use and complaints of itching  Will send out to test for GC/CT  Rx given for HIV/Hep B and C/RPR  Pt has annual scheduled for Tuesday of next week will follow up on labial abrasion  Diagnoses and all orders for this visit:    Screening for STD (sexually transmitted disease)  -     Hepatitis B surface antigen; Future  -     Hepatitis C antibody  -     HIV 1/2 AG-AB combo  -     RPR  -     GP Chlamydia + Gonorrhea, Liquid-Based    Vaginal itching  -     GP Vaginitis/Vaginosis W/O PAP W/O HPV  Expanded  -     GP Chlamydia + Gonorrhea, Liquid-Based    Other orders  -     amitriptyline (ELAVIL) 50 mg tablet; Take 1 tablet by mouth daily          Subjective:      Patient ID: Dakota Henrietta is a 43 y o  female  Pt presents today for vaginal itching on her right labial area  Pt noticed itching began about one month ago  Pt states she did recently engage in protected intercourse with a new partner in January prior to this starting  Pt declines any lesions present  Pt denies any abnormal discharge, burning, or odor  Pt just complains of itching  Pt denies any new soaps, lotions, or detergents  Wears loose cotton clothing  Pt does get hair waxed and waxed prior to this occurring  LMP: 18  Period Cycle (Days): 30  Period Duration (Days): 3-4  Period Pattern: Regular  Menstrual Flow: Moderate  Menstrual Control:  Thin pad  Dysmenorrhea: None  OB History      Para Term  AB Living    2 1 SAB TAB Ectopic Multiple Live Births                     Obstetric Comments    Having  2008 (breech)   Abortions 1 including marriages x1             The following portions of the patient's history were reviewed and updated as appropriate: allergies, current medications, past family history, past medical history, past social history, past surgical history and problem list     Review of Systems   Genitourinary:        Vaginal itching   All other systems reviewed and are negative  Objective:    /60 (BP Location: Left arm, Cuff Size: Standard)   Ht 5' 6" (1 676 m)      Physical Exam   Constitutional: She is oriented to person, place, and time  She appears well-developed and well-nourished  Cardiovascular: Normal rate, regular rhythm and normal heart sounds  Pulmonary/Chest: Effort normal and breath sounds normal    Genitourinary: Vagina normal and uterus normal  No labial fusion  There is injury (abrasion noted in right labial fold, area of itching) on the right labia  There is no rash, tenderness or lesion on the right labia  There is no rash, tenderness, lesion or injury on the left labia  Cervix exhibits no motion tenderness, no discharge and no friability  Musculoskeletal: Normal range of motion  Neurological: She is alert and oriented to person, place, and time  Skin: Skin is warm and dry  Psychiatric: She has a normal mood and affect   Her behavior is normal  Judgment and thought content normal

## 2018-03-02 LAB
A VAGINAE DNA VAG NAA+PROBE-LOG#: ABNORMAL
A VAGINAE DNA VAG QL NAA+PROBE: DETECTED
BVAB2 DNA VAG QL NAA+PROBE: DETECTED
DEPRECATED C TRACH RRNA XXX QL PRB: NOT DETECTED
G VAGINALIS DNA SPEC QL NAA+PROBE: DETECTED
G VAGINALIS DNA VAG NAA+PROBE-LOG#: >5.25
LACTOBACILLUS DNA VAG NAA+PROBE-LOG#: ABNORMAL
LACTOBACILLUS DNA VAG NAA+PROBE-LOG#: DETECTED
MEGA1 DNA VAG QL NAA+PROBE: DETECTED
N GONORRHOEA DNA UR QL NAA+PROBE: NOT DETECTED
SL AMB C.ALBICANS BY PCR: NOT DETECTED
SL AMB CANDIDA GENUS: NOT DETECTED
T VAGINALIS RRNA SPEC QL NAA+PROBE: NOT DETECTED

## 2018-03-05 DIAGNOSIS — B96.89 BV (BACTERIAL VAGINOSIS): Primary | ICD-10-CM

## 2018-03-05 DIAGNOSIS — N76.0 BV (BACTERIAL VAGINOSIS): Primary | ICD-10-CM

## 2018-03-05 RX ORDER — METRONIDAZOLE 500 MG/1
500 TABLET ORAL EVERY 12 HOURS SCHEDULED
Qty: 14 TABLET | Refills: 0 | Status: SHIPPED | OUTPATIENT
Start: 2018-03-05 | End: 2018-03-06 | Stop reason: SDUPTHER

## 2018-03-06 DIAGNOSIS — N76.0 BV (BACTERIAL VAGINOSIS): ICD-10-CM

## 2018-03-06 DIAGNOSIS — B96.89 BV (BACTERIAL VAGINOSIS): ICD-10-CM

## 2018-03-06 RX ORDER — METRONIDAZOLE 500 MG/1
500 TABLET ORAL EVERY 12 HOURS SCHEDULED
Qty: 14 TABLET | Refills: 0 | Status: SHIPPED | OUTPATIENT
Start: 2018-03-06 | End: 2018-03-13

## 2018-03-09 ENCOUNTER — TELEPHONE (OUTPATIENT)
Dept: OBGYN CLINIC | Facility: CLINIC | Age: 43
End: 2018-03-09

## 2018-03-09 NOTE — TELEPHONE ENCOUNTER
Reviewed with patient that she may have a yeast infection from antibiotic that she was put on  She said that it can't be because it is the same itch as when she came in to be evaluated in the first place    Routed Call to [place] to review with patient

## 2018-03-09 NOTE — TELEPHONE ENCOUNTER
Spoke with Dorcas Last  She states area that is itching in more external genital area on the right side only  After reviewing my note I reviewed with patient that area had looked like an area of dry cracked skin with a red streak right in labial fold  Pt was questioning if she can use her daughters nystatin cream  Reviewed with patient to try Vaseline first, patient states it does not work  Pt also complaining of anal itching reviewed with patient may be hemorrhoids  Pt expressed she has been straining with BM lately  Advised Hydrocortisone cream on hemorrhoids  Reviewed with patient can try hydrocortisone cream on vaginal area as well  Asked patient if she would like to try Nystatin cream in case it is a skin yeast infection  Pt stated she will try Hydrocortisone first  Pt has appointment for annual exam next week  Can reassess at that appointment  Pt also clarifying if she needs to finish antibiotic  Reviewed with patient to finish all of her antibiotic

## 2018-03-14 ENCOUNTER — OFFICE VISIT (OUTPATIENT)
Dept: OBGYN CLINIC | Facility: CLINIC | Age: 43
End: 2018-03-14
Payer: COMMERCIAL

## 2018-03-14 VITALS
HEIGHT: 66 IN | SYSTOLIC BLOOD PRESSURE: 112 MMHG | BODY MASS INDEX: 28.61 KG/M2 | DIASTOLIC BLOOD PRESSURE: 70 MMHG | WEIGHT: 178 LBS

## 2018-03-14 DIAGNOSIS — Z01.419 ENCNTR FOR GYN EXAM (GENERAL) (ROUTINE) W/O ABN FINDINGS: ICD-10-CM

## 2018-03-14 DIAGNOSIS — Z12.39 BREAST CANCER SCREENING: Primary | ICD-10-CM

## 2018-03-14 PROCEDURE — 99396 PREV VISIT EST AGE 40-64: CPT | Performed by: NURSE PRACTITIONER

## 2018-03-14 NOTE — PROGRESS NOTES
Assessment/Plan   Diagnoses and all orders for this visit:    Breast cancer screening  -     Mammo screening bilateral w cad; Future    Encntr for gyn exam (general) (routine) w/o abn findings  -     GP Liquid-Based Pap + HPV Plus        Discussion    Reviewed normal exam today  Normal breast exam today  Monthly SBEs advised  Mammograms yearly  Rx for mammogram given  Pt to check insurance coverage for tubal ligation and will call office to set up appointment with doctor for consult  Declines any birth control now  Reviewed with patient no hemorrhoids, rashes, or fissures noted in rectal area  Reviewed if itching persists to contact GI  Encourage at least 1200 mg calcium citrate + 2000 IUs vitamin D3 divided through diet and supplement throughout the day  Encourage 30-40 min weight bearing exercise most days of week  Colon cancer screening with a colonoscopy is recommended starting at age 48 and reviewed benefits  All questions have been answered to her satisfaction  RTO for annual or sooner if needed  Subjective      Null is a 43 y o  female who presents for annual well woman exam    Last exam 18 Pap Normal HPV negative   Pap guidelines reviewed with patient  Pt would like Pap today  Pt denies any abnormal vaginal discharge, itching, or odor  Recently treated with Flagyl for BV  Pt not currently in a relationship and denies the need for STD testing  Recently had testing when seen for a problem visit 18 GC/CT/Trich negative  Menstrual Cycle:  LMP: 3/1/18  OB History      Para Term  AB Living    2 1            SAB TAB Ectopic Multiple Live Births    Obstetric Comments    Having   (breech)   Abortions 1 including marriages x1    Contraception: None, desires tubal ligation, will check with insurance prior to scheduling consult  Practices monthly SBEs, no breast complaints today     Last Mammogram 17 [facility] 1 Negative  Colonoscopy  Normal   Denies any bowel or bladder issues  Does state some itching in rectal area  Pt follows with PCP for regular check-ups and blood work  Review of Systems   All other systems reviewed and are negative  The following portions of the patient's history were reviewed and updated as appropriate: allergies, current medications, past family history, past medical history, past social history, past surgical history and problem list     Period Cycle (Days): 30  Period Duration (Days): 2-3  Period Pattern: Regular  Menstrual Flow: Moderate  Menstrual Control: Maxi pad  Dysmenorrhea: None    OB History      Para Term  AB Living    2 1            SAB TAB Ectopic Multiple Live Births                     Obstetric Comments    Having  2008 (breech)   Abortions 1 including marriages x1           Past Medical History:   Diagnosis Date    RAMIREZ positive     Anxiety     Depression     Migraine     Migraine     Neck pain     Pregnancy     Vertebral artery dissection ([facility] [address] )     Vulvovaginitis     Yeast infection        Past Surgical History:   Procedure Laterality Date     SECTION, LOW TRANSVERSE      COLONOSCOPY      DILATION AND CURETTAGE OF UTERUS  2007    GYNECOLOGIC CRYOSURGERY      cervix    LASIK         Family History   Problem Relation Age of Onset    Hypertension Mother     Lung cancer Mother     Brain cancer Mother     Breast cancer Maternal Aunt      dx in 42's        Social History     Social History    Marital status:      Spouse name: N/A    Number of children: N/A    Years of education: N/A     Occupational History    Not on file       Social History Main Topics    Smoking status: Never Smoker    Smokeless tobacco: Never Used    Alcohol use No    Drug use: No    Sexual activity: Not on file     Other Topics Concern    Not on file     Social History Narrative    Feels safe at home          Current Outpatient Prescriptions:     amitriptyline (ELAVIL) 50 mg tablet, Take 1 tablet by mouth daily, Disp: , Rfl:     aspirin (ECOTRIN LOW STRENGTH) 81 mg EC tablet, Take 81 mg by mouth daily, Disp: , Rfl:     butalbital-aspirin-caffeine (FIORINAL) -40 mg per capsule, Take 1 capsule by mouth every 4 (four) hours as needed for headaches, Disp: , Rfl:     escitalopram (LEXAPRO) 20 mg tablet, TAKE 1 TABLET BY MOUTH EVERY DAY, Disp: 90 tablet, Rfl: 0    hydrOXYzine HCL (ATARAX) 25 mg tablet, Take 25 mg by mouth daily at bedtime, Disp: , Rfl:     indomethacin (INDOCIN) 50 mg capsule, Take 1 capsule by mouth 3 (three) times a day as needed (headache), Disp: 30 capsule, Rfl: 0    methocarbamol (ROBAXIN) 500 mg tablet, Take 2 tablets by mouth every 6 (six) hours as needed for muscle spasms, Disp: 20 tablet, Rfl: 0    Probiotic Product (PRO-BIOTIC BLEND PO), Take by mouth, Disp: , Rfl:     No Known Allergies    Objective   Vitals:    03/14/18 1303   BP: 112/70   BP Location: Left arm   Patient Position: Sitting   Cuff Size: Adult   Weight: 80 7 kg (178 lb)   Height: 5' 6" (1 676 m)     Physical Exam   Constitutional: She is oriented to person, place, and time  She appears well-developed and well-nourished  HENT:   Head: Normocephalic  Neck: Normal range of motion  Neck supple  No tracheal deviation present  No thyromegaly present  Cardiovascular: Normal rate, regular rhythm and normal heart sounds  Pulmonary/Chest: Effort normal and breath sounds normal  Right breast exhibits no inverted nipple, no mass, no nipple discharge, no skin change and no tenderness  Left breast exhibits no inverted nipple, no mass, no nipple discharge, no skin change and no tenderness  Breasts are symmetrical    Abdominal: Soft  Bowel sounds are normal  She exhibits no distension and no mass  There is no tenderness  There is no rebound and no guarding     Genitourinary: Vagina normal and uterus normal  Rectal exam shows no external hemorrhoid, no fissure, no mass, no tenderness, anal tone normal and guaiac negative stool  No breast swelling, tenderness, discharge or bleeding  No labial fusion  There is no rash, tenderness, lesion or injury on the right labia  There is no rash, tenderness, lesion or injury on the left labia  Cervix exhibits no motion tenderness, no discharge and no friability  Right adnexum displays no mass, no tenderness and no fullness  Left adnexum displays no mass, no tenderness and no fullness  Musculoskeletal: Normal range of motion  Neurological: She is alert and oriented to person, place, and time  Skin: Skin is warm and dry  Psychiatric: She has a normal mood and affect   Her behavior is normal  Judgment and thought content normal

## 2018-03-19 LAB
HPV HR 12 DNA CVX QL NAA+PROBE: NOT DETECTED
HPV16 DNA SPEC QL NAA+PROBE: NOT DETECTED
HPV18 DNA SPEC QL NAA+PROBE: NOT DETECTED
THIN PREP CVX: NORMAL

## 2018-03-27 LAB
HBV SURFACE AG SERPL QL IA: NORMAL
HCV AB S/CO SERPL IA: 0.01
HCV AB SERPL QL IA: NORMAL
HIV 1+2 AB+HIV1 P24 AG SERPL QL IA: NORMAL
RPR SER QL: NORMAL

## 2018-04-23 ENCOUNTER — APPOINTMENT (EMERGENCY)
Dept: CT IMAGING | Facility: HOSPITAL | Age: 43
End: 2018-04-23
Payer: COMMERCIAL

## 2018-04-23 ENCOUNTER — HOSPITAL ENCOUNTER (EMERGENCY)
Facility: HOSPITAL | Age: 43
Discharge: HOME | End: 2018-04-23
Attending: EMERGENCY MEDICINE
Payer: COMMERCIAL

## 2018-04-23 VITALS
DIASTOLIC BLOOD PRESSURE: 59 MMHG | BODY MASS INDEX: 28.59 KG/M2 | WEIGHT: 177.91 LBS | TEMPERATURE: 97.7 F | SYSTOLIC BLOOD PRESSURE: 104 MMHG | RESPIRATION RATE: 18 BRPM | OXYGEN SATURATION: 100 % | HEIGHT: 66 IN | HEART RATE: 92 BPM

## 2018-04-23 DIAGNOSIS — F41.9 ANXIETY: ICD-10-CM

## 2018-04-23 DIAGNOSIS — K29.70 GASTRITIS: Primary | ICD-10-CM

## 2018-04-23 LAB
ALBUMIN SERPL BCP-MCNC: 3.7 G/DL (ref 3.5–5)
ALP SERPL-CCNC: 98 U/L (ref 46–116)
ALT SERPL W P-5'-P-CCNC: 58 U/L (ref 12–78)
ANION GAP SERPL CALCULATED.3IONS-SCNC: 10 MMOL/L (ref 4–13)
AST SERPL W P-5'-P-CCNC: 63 U/L (ref 5–45)
BACTERIA UR QL AUTO: ABNORMAL /HPF
BASOPHILS # BLD AUTO: 0.03 THOUSANDS/ΜL (ref 0–0.1)
BASOPHILS NFR BLD AUTO: 0 % (ref 0–1)
BILIRUB SERPL-MCNC: 0.5 MG/DL (ref 0.2–1)
BILIRUB UR QL STRIP: NEGATIVE
BUN SERPL-MCNC: 21 MG/DL (ref 5–25)
CALCIUM SERPL-MCNC: 9.3 MG/DL (ref 8.3–10.1)
CHLORIDE SERPL-SCNC: 99 MMOL/L (ref 100–108)
CLARITY UR: ABNORMAL
CO2 SERPL-SCNC: 29 MMOL/L (ref 21–32)
COLOR UR: YELLOW
CREAT SERPL-MCNC: 1.18 MG/DL (ref 0.6–1.3)
EOSINOPHIL # BLD AUTO: 0.2 THOUSAND/ΜL (ref 0–0.61)
EOSINOPHIL NFR BLD AUTO: 2 % (ref 0–6)
ERYTHROCYTE [DISTWIDTH] IN BLOOD BY AUTOMATED COUNT: 12.8 % (ref 11.6–15.1)
EXT PREG TEST URINE: NEGATIVE
GFR SERPL CREATININE-BSD FRML MDRD: 57 ML/MIN/1.73SQ M
GLUCOSE SERPL-MCNC: 113 MG/DL (ref 65–140)
GLUCOSE UR STRIP-MCNC: NEGATIVE MG/DL
HCT VFR BLD AUTO: 40.6 % (ref 34.8–46.1)
HGB BLD-MCNC: 13.9 G/DL (ref 11.5–15.4)
HGB UR QL STRIP.AUTO: NEGATIVE
KETONES UR STRIP-MCNC: NEGATIVE MG/DL
LACTATE SERPL-SCNC: 2.2 MMOL/L (ref 0.5–2)
LEUKOCYTE ESTERASE UR QL STRIP: NEGATIVE
LIPASE SERPL-CCNC: 151 U/L (ref 73–393)
LYMPHOCYTES # BLD AUTO: 1.61 THOUSANDS/ΜL (ref 0.6–4.47)
LYMPHOCYTES NFR BLD AUTO: 14 % (ref 14–44)
MCH RBC QN AUTO: 28.6 PG (ref 26.8–34.3)
MCHC RBC AUTO-ENTMCNC: 34.2 G/DL (ref 31.4–37.4)
MCV RBC AUTO: 84 FL (ref 82–98)
MONOCYTES # BLD AUTO: 0.66 THOUSAND/ΜL (ref 0.17–1.22)
MONOCYTES NFR BLD AUTO: 6 % (ref 4–12)
NEUTROPHILS # BLD AUTO: 9.09 THOUSANDS/ΜL (ref 1.85–7.62)
NEUTS SEG NFR BLD AUTO: 78 % (ref 43–75)
NITRITE UR QL STRIP: NEGATIVE
NON-SQ EPI CELLS URNS QL MICRO: ABNORMAL /HPF
PH UR STRIP.AUTO: 8 [PH] (ref 4.5–8)
PLATELET # BLD AUTO: 213 THOUSANDS/UL (ref 149–390)
PMV BLD AUTO: 8.7 FL (ref 8.9–12.7)
POTASSIUM SERPL-SCNC: 3.6 MMOL/L (ref 3.5–5.3)
PROT SERPL-MCNC: 7.9 G/DL (ref 6.4–8.2)
PROT UR STRIP-MCNC: ABNORMAL MG/DL
RBC # BLD AUTO: 4.86 MILLION/UL (ref 3.81–5.12)
RBC #/AREA URNS AUTO: ABNORMAL /HPF
SODIUM SERPL-SCNC: 138 MMOL/L (ref 136–145)
SP GR UR STRIP.AUTO: 1.01 (ref 1–1.03)
UROBILINOGEN UR QL STRIP.AUTO: 0.2 E.U./DL
WBC # BLD AUTO: 11.59 THOUSAND/UL (ref 4.31–10.16)
WBC #/AREA URNS AUTO: ABNORMAL /HPF

## 2018-04-23 PROCEDURE — 81001 URINALYSIS AUTO W/SCOPE: CPT

## 2018-04-23 PROCEDURE — 80053 COMPREHEN METABOLIC PANEL: CPT | Performed by: EMERGENCY MEDICINE

## 2018-04-23 PROCEDURE — 96375 TX/PRO/DX INJ NEW DRUG ADDON: CPT

## 2018-04-23 PROCEDURE — 99284 EMERGENCY DEPT VISIT MOD MDM: CPT

## 2018-04-23 PROCEDURE — 36415 COLL VENOUS BLD VENIPUNCTURE: CPT | Performed by: EMERGENCY MEDICINE

## 2018-04-23 PROCEDURE — 81025 URINE PREGNANCY TEST: CPT | Performed by: EMERGENCY MEDICINE

## 2018-04-23 PROCEDURE — 83690 ASSAY OF LIPASE: CPT | Performed by: EMERGENCY MEDICINE

## 2018-04-23 PROCEDURE — 85025 COMPLETE CBC W/AUTO DIFF WBC: CPT | Performed by: EMERGENCY MEDICINE

## 2018-04-23 PROCEDURE — 83605 ASSAY OF LACTIC ACID: CPT | Performed by: EMERGENCY MEDICINE

## 2018-04-23 PROCEDURE — 96374 THER/PROPH/DIAG INJ IV PUSH: CPT

## 2018-04-23 PROCEDURE — 74177 CT ABD & PELVIS W/CONTRAST: CPT

## 2018-04-23 RX ORDER — SUCRALFATE 1 G/1
1 TABLET ORAL 4 TIMES DAILY
Qty: 28 TABLET | Refills: 0 | Status: SHIPPED | OUTPATIENT
Start: 2018-04-23 | End: 2018-05-31

## 2018-04-23 RX ORDER — FAMOTIDINE 20 MG/1
20 TABLET, FILM COATED ORAL 2 TIMES DAILY
Qty: 30 TABLET | Refills: 0 | Status: SHIPPED | OUTPATIENT
Start: 2018-04-23 | End: 2018-05-31

## 2018-04-23 RX ORDER — OXYCODONE HYDROCHLORIDE 5 MG/1
5 TABLET ORAL EVERY 6 HOURS PRN
Qty: 12 TABLET | Refills: 0 | Status: SHIPPED | OUTPATIENT
Start: 2018-04-23 | End: 2018-05-03

## 2018-04-23 RX ORDER — ONDANSETRON 2 MG/ML
4 INJECTION INTRAMUSCULAR; INTRAVENOUS ONCE
Status: COMPLETED | OUTPATIENT
Start: 2018-04-23 | End: 2018-04-23

## 2018-04-23 RX ORDER — PROMETHAZINE HYDROCHLORIDE 12.5 MG/1
12.5 TABLET ORAL EVERY 6 HOURS PRN
COMMUNITY
End: 2018-08-16

## 2018-04-23 RX ADMIN — IOHEXOL 100 ML: 350 INJECTION, SOLUTION INTRAVENOUS at 04:51

## 2018-04-23 RX ADMIN — ONDANSETRON 4 MG: 2 INJECTION INTRAMUSCULAR; INTRAVENOUS at 03:49

## 2018-04-23 RX ADMIN — HYDROMORPHONE HYDROCHLORIDE 1 MG: 1 INJECTION, SOLUTION INTRAMUSCULAR; INTRAVENOUS; SUBCUTANEOUS at 03:49

## 2018-04-23 RX ADMIN — FAMOTIDINE 20 MG: 10 INJECTION INTRAVENOUS at 05:42

## 2018-04-23 NOTE — ED PROVIDER NOTES
History  Chief Complaint   Patient presents with    Abdominal Pain     Pt arrives via EMS after waking up from sleep a little before 0230 with severe upper abdominal pain which radiates into her back  Denies N/V/D       27-year-old female comes in complaining of severe upper abdominal pain that radiates into her back  Patient denies nausea vomiting or diarrhea  Patient states that she has never had an episode like this in the past   Patient is very anxious on arrival to the emergency department  Patient is on crying and unable to give much of a history  Upon review of her medical record she does have a history of having a vertebral artery dissection approximately 1 year ago  History provided by:  Patient  History limited by:  Acuity of condition   used: No    Abdominal Pain   Pain location:  Epigastric  Pain quality: sharp, shooting and stabbing    Pain radiates to:  Back  Pain severity:  Severe  Onset quality:  Sudden  Duration:  2 hours  Timing:  Constant  Progression:  Worsening  Chronicity:  New  Context: awakening from sleep    Context: not alcohol use, not previous surgeries and not trauma    Ineffective treatments:  None tried  Associated symptoms: anorexia    Associated symptoms: no chest pain, no cough, no diarrhea, no fatigue, no fever, no hematuria, no shortness of breath and no vomiting    Risk factors: no alcohol abuse, no NSAID use and not pregnant        Prior to Admission Medications   Prescriptions Last Dose Informant Patient Reported? Taking?    Probiotic Product (PRO-BIOTIC BLEND PO)   Yes Yes   Sig: Take by mouth   amitriptyline (ELAVIL) 50 mg tablet   Yes Yes   Sig: Take 1 tablet by mouth daily   aspirin (ECOTRIN LOW STRENGTH) 81 mg EC tablet   Yes Yes   Sig: Take 81 mg by mouth daily   butalbital-aspirin-caffeine (FIORINAL) -40 mg per capsule  Self Yes Yes   Sig: Take 1 capsule by mouth every 4 (four) hours as needed for headaches   escitalopram (LEXAPRO) 20 mg tablet   No Yes   Sig: TAKE 1 TABLET BY MOUTH EVERY DAY   hydrOXYzine HCL (ATARAX) 25 mg tablet  Self Yes Yes   Sig: Take 25 mg by mouth daily at bedtime   indomethacin (INDOCIN) 50 mg capsule   No Yes   Sig: Take 1 capsule by mouth 3 (three) times a day as needed (headache)   promethazine (PHENERGAN) 12 5 MG tablet   Yes Yes   Sig: Take 12 5 mg by mouth every 6 (six) hours as needed      Facility-Administered Medications: None       Past Medical History:   Diagnosis Date    RAMIREZ positive     Anxiety     Depression     Migraine     Migraine     Neck pain     Pregnancy     Vertebral artery dissection (HCC)     Vulvovaginitis     Yeast infection        Past Surgical History:   Procedure Laterality Date     SECTION, LOW TRANSVERSE      COLONOSCOPY      DILATION AND CURETTAGE OF UTERUS  2007    GYNECOLOGIC CRYOSURGERY      cervix    LASIK         Family History   Problem Relation Age of Onset    Hypertension Mother     Lung cancer Mother     Brain cancer Mother     Breast cancer Maternal Aunt      dx in 42's      I have reviewed and agree with the history as documented  Social History   Substance Use Topics    Smoking status: Never Smoker    Smokeless tobacco: Never Used    Alcohol use No        Review of Systems   Constitutional: Negative for fatigue and fever  HENT: Negative for congestion and ear pain  Eyes: Negative for discharge and redness  Respiratory: Negative for apnea, cough, shortness of breath and wheezing  Cardiovascular: Negative for chest pain  Gastrointestinal: Positive for abdominal pain and anorexia  Negative for diarrhea and vomiting  Endocrine: Negative for cold intolerance and polydipsia  Genitourinary: Negative for difficulty urinating and hematuria  Musculoskeletal: Negative for arthralgias and back pain  Skin: Negative for color change and rash  Allergic/Immunologic: Negative for environmental allergies and immunocompromised state  Neurological: Negative for numbness and headaches  Hematological: Negative for adenopathy  Does not bruise/bleed easily  Psychiatric/Behavioral: Negative for agitation and behavioral problems  Physical Exam  ED Triage Vitals [04/23/18 0314]   Temperature Pulse Respirations Blood Pressure SpO2   97 7 °F (36 5 °C) 84 20 117/61 100 %      Temp Source Heart Rate Source Patient Position - Orthostatic VS BP Location FiO2 (%)   Oral Monitor Lying Left arm --      Pain Score       Worst Possible Pain           Orthostatic Vital Signs  Vitals:    04/23/18 0314 04/23/18 0430 04/23/18 0500 04/23/18 0544   BP: 117/61 120/71 132/63 104/59   Pulse: 84 100 104 92   Patient Position - Orthostatic VS: Lying  Lying Lying       Physical Exam   Constitutional: She is oriented to person, place, and time  Vital signs are normal  She appears well-developed and well-nourished  Non-toxic appearance  She appears distressed  HENT:   Head: Normocephalic and atraumatic  Right Ear: Tympanic membrane and external ear normal    Left Ear: Tympanic membrane and external ear normal    Nose: Nose normal  No rhinorrhea, sinus tenderness or nasal deformity  Mouth/Throat: Uvula is midline and oropharynx is clear and moist  Normal dentition  Eyes: Conjunctivae, EOM and lids are normal  Pupils are equal, round, and reactive to light  Right eye exhibits no discharge  Left eye exhibits no discharge  Neck: Trachea normal and normal range of motion  Neck supple  No JVD present  Carotid bruit is not present  Cardiovascular: Normal rate, regular rhythm, intact distal pulses and normal pulses  No extrasystoles are present  PMI is not displaced  Pulmonary/Chest: Effort normal and breath sounds normal  No accessory muscle usage  No respiratory distress  She has no wheezes  She has no rhonchi  She has no rales  Abdominal: Soft  Normal appearance and bowel sounds are normal  She exhibits no mass   There is tenderness in the epigastric area  There is no rigidity, no rebound and no guarding  Musculoskeletal:        Right shoulder: She exhibits normal range of motion, no bony tenderness, no swelling and no deformity  Cervical back: Normal  She exhibits normal range of motion, no tenderness, no bony tenderness and no deformity  Lymphadenopathy:     She has no cervical adenopathy  She has no axillary adenopathy  Neurological: She is alert and oriented to person, place, and time  She has normal strength and normal reflexes  No cranial nerve deficit or sensory deficit  GCS eye subscore is 4  GCS verbal subscore is 5  GCS motor subscore is 6  Skin: Skin is warm and dry  No rash noted  Psychiatric: Her speech is normal and behavior is normal  Her mood appears anxious  Nursing note and vitals reviewed        ED Medications  Medications   ondansetron (ZOFRAN) injection 4 mg (4 mg Intravenous Given 4/23/18 0349)   HYDROmorphone (DILAUDID) injection 1 mg (1 mg Intravenous Given 4/23/18 0349)   iohexol (OMNIPAQUE) 350 MG/ML injection (MULTI-DOSE) 100 mL (100 mL Intravenous Given 4/23/18 0451)   famotidine (PEPCID) injection 20 mg (20 mg Intravenous Given 4/23/18 0542)       Diagnostic Studies  Results Reviewed     Procedure Component Value Units Date/Time    Urine Microscopic [[id number]]  (Abnormal) Collected:  04/23/18 0431    Lab Status:  Final result Specimen:  Urine from Urine, Clean Catch Updated:  04/23/18 0528     RBC, UA 0-1 (A) /hpf      WBC, UA 0-1 (A) /hpf      Epithelial Cells Occasional /hpf      Bacteria, UA Occasional /hpf     POCT pregnancy, urine [[id number]]  (Normal) Resulted:  04/23/18 0432    Lab Status:  Final result Updated:  04/23/18 0432     EXT PREG TEST UR (Ref: Negative) negative    ED Urine Macroscopic [[id number]]  (Abnormal) Collected:  04/23/18 0431    Lab Status:  Final result Specimen:  Urine Updated:  04/23/18 0430     Color, UA Yellow     Clarity, UA Cloudy     pH, UA 8 0     Leukocytes, UA Negative Nitrite, UA Negative     Protein, UA Trace (A) mg/dl      Glucose, UA Negative mg/dl      Ketones, UA Negative mg/dl      Urobilinogen, UA 0 2 E U /dl      Bilirubin, UA Negative     Blood, UA Negative     Specific Gravity, UA 1 015    Narrative:       CLINITEK RESULT    Lactic acid, plasma [[id number]]  (Abnormal) Collected:  04/23/18 0341    Lab Status:  Final result Specimen:  Blood from Arm, Left Updated:  04/23/18 0428     LACTIC ACID 2 2 (HH) mmol/L     Narrative:         Result may be elevated if tourniquet was used during collection  Lipase [[id number]]  (Normal) Collected:  04/23/18 0341    Lab Status:  Final result Specimen:  Blood from Arm, Left Updated:  04/23/18 0416     Lipase 151 u/L     Comprehensive metabolic panel [[id number]]  (Abnormal) Collected:  04/23/18 0341    Lab Status:  Final result Specimen:  Blood from Arm, Left Updated:  04/23/18 0416     Sodium 138 mmol/L      Potassium 3 6 mmol/L      Chloride 99 (L) mmol/L      CO2 29 mmol/L      Anion Gap 10 mmol/L      BUN 21 mg/dL      Creatinine 1 18 mg/dL      Glucose 113 mg/dL      Calcium 9 3 mg/dL      AST 63 (H) U/L      ALT 58 U/L      Alkaline Phosphatase 98 U/L      Total Protein 7 9 g/dL      Albumin 3 7 g/dL      Total Bilirubin 0 50 mg/dL      eGFR 57 ml/min/1 73sq m     Narrative:         National Kidney Disease Education Program recommendations are as follows:  GFR calculation is accurate only with a steady state creatinine  Chronic Kidney disease less than 60 ml/min/1 73 sq  meters  Kidney failure less than 15 ml/min/1 73 sq  meters      CBC and differential [[id number]]  (Abnormal) Collected:  04/23/18 0341    Lab Status:  Final result Specimen:  Blood from Arm, Left Updated:  04/23/18 0401     WBC 11 59 (H) Thousand/uL      RBC 4 86 Million/uL      Hemoglobin 13 9 g/dL      Hematocrit 40 6 %      MCV 84 fL      MCH 28 6 pg      MCHC 34 2 g/dL      RDW 12 8 %      MPV 8 7 (L) fL      Platelets 635 Thousands/uL      Neutrophils Relative 78 (H) %      Lymphocytes Relative 14 %      Monocytes Relative 6 %      Eosinophils Relative 2 %      Basophils Relative 0 %      Neutrophils Absolute 9 09 (H) Thousands/µL      Lymphocytes Absolute 1 61 Thousands/µL      Monocytes Absolute 0 66 Thousand/µL      Eosinophils Absolute 0 20 Thousand/µL      Basophils Absolute 0 03 Thousands/µL                  CT abdomen pelvis with contrast   Final Result by Alexei Burleson DO (04/23 0543)   1  No acute abdominal pelvic pathology  2   2 cm right ovarian cyst with trace fluid in the right adnexa and pelvis  3   Heterogeneous appearance of the uterus  Consider routine, nonemergent, outpatient follow-up ultrasound of the pelvis for further evaluation  Findings are consistent with the preliminary report from [facility] which was provided shortly after completion of the exam          Workstation performed: [id number]                    Procedures  Procedures       Phone Contacts  ED Phone Contact    ED Course  ED Course                                MDM  Number of Diagnoses or Management Options  Anxiety: new and requires workup  Gastritis: new and requires workup     Amount and/or Complexity of Data Reviewed  Clinical lab tests: ordered and reviewed  Tests in the radiology section of CPT®: ordered and reviewed  Tests in the medicine section of CPT®: ordered and reviewed    Risk of Complications, Morbidity, and/or Mortality  General comments: Patient is now pain-free      Patient Progress  Patient progress: improved    CritCare Time    Disposition  Final diagnoses:   Gastritis   Anxiety     Time reflects when diagnosis was documented in both MDM as applicable and the Disposition within this note     Time User Action Codes Description Comment    4/23/2018  5:15 AM Phyliss Grade K Add [K29 70] Gastritis     4/23/2018  5:15 AM Annabella Latin Add [F41 9] Anxiety       ED Disposition     ED Disposition Condition Comment    Discharge  Toby Rothman discharge to home/self care  Condition at discharge: Good        Follow-up Information     Follow up With Specialties Details Why Contact Info    Park Gaxiola MD Internal Medicine Schedule an appointment as soon as possible for a visit  [facility]      Rod Clark MD Gastroenterology Schedule an appointment as soon as possible for a visit  [address] [place] [address] 8  [phone]          Discharge Medication List as of 4/23/2018  5:22 AM      START taking these medications    Details   famotidine (PEPCID) 20 mg tablet Take 1 tablet (20 mg total) by mouth 2 (two) times a day, Starting Mon 4/23/2018, Normal      oxyCODONE (ROXICODONE) 5 mg immediate release tablet Take 1 tablet (5 mg total) by mouth every 6 (six) hours as needed for moderate pain for up to 10 days Max Daily Amount: 20 mg, Starting Mon 4/23/2018, Until Thu 5/3/2018, Print      sucralfate (CARAFATE) 1 g tablet Take 1 tablet (1 g total) by mouth 4 (four) times a day for 7 days, Starting Mon 4/23/2018, Until Mon 4/30/2018, Normal         CONTINUE these medications which have NOT CHANGED    Details   amitriptyline (ELAVIL) 50 mg tablet Take 1 tablet by mouth daily, Starting Tue 2/6/2018, Historical Med      aspirin (ECOTRIN LOW STRENGTH) 81 mg EC tablet Take 81 mg by mouth daily, Historical Med      butalbital-aspirin-caffeine (FIORINAL) -40 mg per capsule Take 1 capsule by mouth every 4 (four) hours as needed for headaches, Historical Med      escitalopram (LEXAPRO) 20 mg tablet TAKE 1 TABLET BY MOUTH EVERY DAY, Normal      hydrOXYzine HCL (ATARAX) 25 mg tablet Take 25 mg by mouth daily at bedtime, Historical Med      indomethacin (INDOCIN) 50 mg capsule Take 1 capsule by mouth 3 (three) times a day as needed (headache), Starting 6/2/2017, Until Discontinued, Normal      Probiotic Product (PRO-BIOTIC BLEND PO) Take by mouth, Historical Med      promethazine (PHENERGAN) 12 5 MG tablet Take 12 5 mg by mouth every 6 (six) hours as needed, Historical Med           No discharge procedures on file      ED Provider  Electronically Signed by           Nika Thurston DO  04/25/18 3274

## 2018-04-23 NOTE — DISCHARGE INSTRUCTIONS
Gastritis   AMBULATORY CARE:   Gastritis  is inflammation or irritation of the lining of your stomach  Common symptoms include the following:   · Stomach pain, burning, or tenderness when you press on it    · Stomach fullness or tightness    · Nausea or vomiting    · Loss of appetite, or feeling full quickly when you eat    · Bad breath    · Fatigue or feeling more tired than usual    · Heartburn  Call 911 for any of the following:   · You develop chest pain or shortness of breath  Seek care immediately if:   · You vomit blood  · You have black or bloody bowel movements  · You have severe stomach or back pain  Contact your healthcare provider if:   · You have a fever  · You have new or worsening symptoms, even after treatment  · You have questions or concerns about your condition or care  Treatment for gastritis:  Your symptoms may go away without treatment  Treatment will depend on what is causing your gastritis  Your healthcare provider may recommend changes to the medicines you take  Medicines may be given to help treat a bacterial infection or decrease stomach acid  Manage or prevent gastritis:   · Do not smoke  Nicotine and other chemicals in cigarettes and cigars can make your symptoms worse and cause lung damage  Ask your healthcare provider for information if you currently smoke and need help to quit  E-cigarettes or smokeless tobacco still contain nicotine  Talk to your healthcare provider before you use these products  · Do not drink alcohol  Alcohol can prevent healing and make your gastritis worse  Talk to your healthcare provider if you need help to stop drinking  · Do not take NSAIDs or aspirin unless directed  These and similar medicines can cause irritation  If your healthcare provider says it is okay to take NSAIDs, take them with food  · Do not eat foods that cause irritation  Foods such as oranges and salsa can cause burning or pain   Eat a variety of healthy foods  Examples include fruits (not citrus), vegetables, low-fat dairy products, beans, whole-grain breads, and lean meats and fish  Try to eat small meals, and drink water with your meals  Do not eat for at least 3 hours before you go to bed  · Find ways to relax and decrease stress  Stress can increase stomach acid and make gastritis worse  Activities such as yoga, meditation, or listening to music can help you relax  Spend time with friends, or do things you enjoy  Follow up with your healthcare provider as directed: You may need ongoing tests or treatment, or referral to a gastroenterologist  Write down your questions so you remember to ask them during your visits  © 2017 [employer]  Information is for End User's use only and may not be sold, redistributed or otherwise used for commercial purposes  All illustrations and images included in CareNotes® are the copyrighted property of [employer]  or [employer]  The above information is an  only  It is not intended as medical advice for individual conditions or treatments  Talk to your doctor, nurse or pharmacist before following any medical regimen to see if it is safe and effective for you

## 2018-04-24 ENCOUNTER — TRANSCRIBE ORDERS (OUTPATIENT)
Dept: LAB | Facility: CLINIC | Age: 43
End: 2018-04-24

## 2018-05-10 ENCOUNTER — HOSPITAL ENCOUNTER (OUTPATIENT)
Dept: RADIOLOGY | Age: 43
Discharge: HOME | End: 2018-05-10
Payer: COMMERCIAL

## 2018-05-10 DIAGNOSIS — Z12.39 BREAST CANCER SCREENING: ICD-10-CM

## 2018-05-10 PROCEDURE — 77067 SCR MAMMO BI INCL CAD: CPT

## 2018-05-31 ENCOUNTER — OFFICE VISIT (OUTPATIENT)
Dept: INTERNAL MEDICINE CLINIC | Age: 43
End: 2018-05-31
Payer: COMMERCIAL

## 2018-05-31 VITALS
DIASTOLIC BLOOD PRESSURE: 74 MMHG | OXYGEN SATURATION: 98 % | SYSTOLIC BLOOD PRESSURE: 120 MMHG | BODY MASS INDEX: 28.48 KG/M2 | HEART RATE: 89 BPM | TEMPERATURE: 98.6 F | WEIGHT: 177.2 LBS | HEIGHT: 66 IN

## 2018-05-31 DIAGNOSIS — F41.9 ANXIETY AND DEPRESSION: ICD-10-CM

## 2018-05-31 DIAGNOSIS — K21.9 GASTROESOPHAGEAL REFLUX DISEASE, ESOPHAGITIS PRESENCE NOT SPECIFIED: Primary | ICD-10-CM

## 2018-05-31 DIAGNOSIS — G43.709 CHRONIC MIGRAINE WITHOUT AURA WITHOUT STATUS MIGRAINOSUS, NOT INTRACTABLE: ICD-10-CM

## 2018-05-31 DIAGNOSIS — F32.A ANXIETY AND DEPRESSION: ICD-10-CM

## 2018-05-31 PROBLEM — G44.86 CERVICOGENIC HEADACHE: Status: ACTIVE | Noted: 2018-05-31

## 2018-05-31 PROCEDURE — 99213 OFFICE O/P EST LOW 20 MIN: CPT | Performed by: INTERNAL MEDICINE

## 2018-05-31 RX ORDER — OMEPRAZOLE 20 MG/1
20 CAPSULE, DELAYED RELEASE ORAL DAILY
Qty: 30 CAPSULE | Refills: 1 | Status: SHIPPED | OUTPATIENT
Start: 2018-05-31 | End: 2018-08-16

## 2018-05-31 RX ORDER — BUTALBITAL, ACETAMINOPHEN AND CAFFEINE 300; 40; 50 MG/1; MG/1; MG/1
CAPSULE ORAL
COMMUNITY
End: 2018-06-18 | Stop reason: SDUPTHER

## 2018-05-31 NOTE — ASSESSMENT & PLAN NOTE
Patient complains of difficulty swallowing at times and indigestion  She does take Pepcid p r n  Her risk factors do include the intermittent use of nonsteroidals  She is instructed to try and stop doing that and we will start her on a months worth of omeprazole to see if it resolves it    If not she probably will need an EGD

## 2018-05-31 NOTE — ASSESSMENT & PLAN NOTE
She also has a history of mild anxiety and depression and remains on Lexapro 20 mg daily with a rare Xanax and Elavil HS

## 2018-05-31 NOTE — PROGRESS NOTES
Assessment/Plan:    GERD (gastroesophageal reflux disease)  Patient complains of difficulty swallowing at times and indigestion  She does take Pepcid p r n  Her risk factors do include the intermittent use of nonsteroidals  She is instructed to try and stop doing that and we will start her on a months worth of omeprazole to see if it resolves it  If not she probably will need an EGD    Chronic migraine without aura   Patient has a long history of chronic migraines that flare periodically  She does see neuro and gets Botox injections every 3 months  Recently she ended up in the hospital with a headache and had to be treated with a narcotic and nonsteroidals  Currently she appears back at baseline    Anxiety and depression   She also has a history of mild anxiety and depression and remains on Lexapro 20 mg daily with a rare Xanax and Elavil HS       Diagnoses and all orders for this visit:    Gastroesophageal reflux disease, esophagitis presence not specified  -     omeprazole (PriLOSEC) 20 mg delayed release capsule; Take 1 capsule (20 mg total) by mouth daily for 30 days    Chronic migraine without aura without status migrainosus, not intractable    Anxiety and depression    Other orders  -     Botulinum Toxin Type A (BOTOX) 200 units SOLR; Inject IM in the physicians office  -     Butalbital-APAP-Caffeine -40 MG CAPS; Take 1 capsule (-40mg) by mouth as needed  PRN          Subjective:      Patient ID: Timbo Esquivel is a 43 y o  female  Although the patient has several chronic issues that she wants to briefly discussed , she is really here for her indigestion and mild dysphagia  Heartburn   She complains of belching, chest pain, dysphagia and heartburn  She reports no abdominal pain, no coughing, no hoarse voice, no nausea, no sore throat, no water brash or no wheezing  This is a new problem  The current episode started more than 1 month ago  The problem occurs occasionally   The problem has been unchanged  The symptoms are aggravated by certain foods  No associated symptoms  She has tried an antacid and a histamine-2 antagonist for the symptoms  The treatment provided moderate relief  Review of Systems   Constitutional: Negative  HENT: Negative for congestion, ear pain, hearing loss, hoarse voice, postnasal drip, sinus pressure, sore throat, trouble swallowing and voice change  Eyes: Negative for visual disturbance  Respiratory: Negative for cough, chest tightness, shortness of breath and wheezing  Cardiovascular: Positive for chest pain  Negative for palpitations and leg swelling  Gastrointestinal: Positive for dysphagia and heartburn  Negative for abdominal distention, abdominal pain, anal bleeding, blood in stool, constipation, diarrhea and nausea  Dysphasia, indigestion   Endocrine: Negative  Negative for cold intolerance, polydipsia, polyphagia and polyuria  Genitourinary: Negative for dysuria, flank pain, frequency, hematuria and urgency  Musculoskeletal: Negative  Negative for arthralgias, back pain, joint swelling, myalgias and neck pain  Skin: Negative for rash  Allergic/Immunologic: Negative for immunocompromised state  Neurological: Positive for headaches  Hematological: Negative for adenopathy  Psychiatric/Behavioral: Negative for confusion, dysphoric mood, sleep disturbance and suicidal ideas  The patient is not nervous/anxious            Objective:      /74 (BP Location: Left arm, Patient Position: Sitting)   Pulse 89   Temp 98 6 °F (37 °C) (Tympanic)   Ht 5' 6" (1 676 m)   Wt 80 4 kg (177 lb 3 2 oz)   SpO2 98%   BMI 28 60 kg/m²          Physical Exam

## 2018-05-31 NOTE — PATIENT INSTRUCTIONS
Gastroesophageal Reflux Disease   AMBULATORY CARE:   Gastroesophageal reflux  reflux occurs when acid and food in the stomach back up into the esophagus  Gastroesophageal reflux disease (GERD) is reflux that occurs more than twice a week for a few weeks  It usually causes heartburn and other symptoms  GERD can cause other health problems over time if it is not treated  Common symptoms include:  Heartburn is the most common symptom of GERD  You may feel burning pain in your chest or below the breast bone  This usually occurs after meals and spreads to your neck, jaw, or shoulder  The pain gets better when you change positions  You may also have any of the following:  · Bitter or acid taste in your mouth    · Dry cough    · Trouble swallowing or pain with swallowing    · Hoarseness or sore throat    · Frequent burping or hiccups    · Feeling of fullness soon after you start eating  Seek care immediately if:  · You feel full and cannot burp or vomit  · You have severe chest pain and sudden trouble breathing  · Your bowel movements are black, bloody, or tarry-looking  · Your vomit looks like coffee grounds or has blood in it  Contact your healthcare provider if:   · You vomit large amounts, or you vomit often  · You have trouble breathing after you vomit  · You have trouble swallowing, or pain with swallowing  · You are losing weight without trying  · Your symptoms get worse or do not improve with treatment  · You have questions or concerns about your condition or care  Treatment for GERD:  Your healthcare provider may prescribe medicine to decrease stomach acid  He may also prescribe medicine that help your esophagus and stomach move food and liquid to your intestines  Surgery may be done if other treatments do not work  You may need surgery to wrap the upper part of the stomach around the esophageal sphincter  This will strengthen the sphincter and prevent reflux     Manage GERD: · Do not have foods or drinks that may increase heartburn  These include chocolate, peppermint, fried or fatty foods, drinks that contain caffeine, or carbonated drinks (soda)  Other foods include spicy foods, onions, tomatoes, and tomato-based foods  Do not have foods or drinks that can irritate your esophagus, such as citrus fruits, juices, and alcohol  · Do not eat large meals  When you eat a lot of food at one time, your stomach needs more acid to digest it  Eat 6 small meals each day instead of 3 large ones, and eat slowly  Do not eat meals 2 to 3 hours before bedtime  · Elevate the head of your bed  Place 6-inch blocks under the head of your bed frame  You may also use more than one pillow under your head and shoulders while you sleep  · Maintain a healthy weight  If you are overweight, weight loss may help relieve symptoms of GERD  · Do not smoke  Smoking weakens the lower esophageal sphincter and increases the risk of GERD  Ask your healthcare provider for information if you currently smoke and need help to quit  E-cigarettes or smokeless tobacco still contain nicotine  Talk to your healthcare provider before you use these products  · Do not wear clothing that is tight around your waist   Tight clothing can put pressure on your stomach and cause or worsen GERD symptoms  Follow up with your healthcare provider as directed:  Write down your questions so you remember to ask them during your visits  © 2017 [employer] [employer] Information is for End User's use only and may not be sold, redistributed or otherwise used for commercial purposes  All illustrations and images included in CareNotes® are the copyrighted property of [employer] , [employer]  or [facility]  The above information is an  only  It is not intended as medical advice for individual conditions or treatments   Talk to your doctor, nurse or pharmacist before following any medical regimen to see if it is safe and effective for you

## 2018-05-31 NOTE — ASSESSMENT & PLAN NOTE
Patient has a long history of chronic migraines that flare periodically  She does see neuro and gets Botox injections every 3 months  Recently she ended up in the hospital with a headache and had to be treated with a narcotic and nonsteroidals    Currently she appears back at baseline

## 2018-06-18 DIAGNOSIS — G43.909 MIGRAINE WITHOUT STATUS MIGRAINOSUS, NOT INTRACTABLE, UNSPECIFIED MIGRAINE TYPE: Primary | ICD-10-CM

## 2018-06-18 RX ORDER — BUTALBITAL, ACETAMINOPHEN AND CAFFEINE 300; 40; 50 MG/1; MG/1; MG/1
1 CAPSULE ORAL 2 TIMES DAILY PRN
Qty: 50 CAPSULE | Refills: 0 | Status: SHIPPED | OUTPATIENT
Start: 2018-06-18 | End: 2020-12-14

## 2018-07-31 DIAGNOSIS — B37.3 YEAST INFECTION OF THE VAGINA: Primary | ICD-10-CM

## 2018-07-31 RX ORDER — FLUCONAZOLE 150 MG/1
150 TABLET ORAL EVERY OTHER DAY
Qty: 2 TABLET | Refills: 0 | Status: SHIPPED | OUTPATIENT
Start: 2018-07-31 | End: 2018-08-03

## 2018-08-16 ENCOUNTER — OFFICE VISIT (OUTPATIENT)
Dept: INTERNAL MEDICINE CLINIC | Age: 43
End: 2018-08-16
Payer: COMMERCIAL

## 2018-08-16 VITALS
HEIGHT: 66 IN | TEMPERATURE: 98.5 F | SYSTOLIC BLOOD PRESSURE: 100 MMHG | WEIGHT: 182 LBS | DIASTOLIC BLOOD PRESSURE: 70 MMHG | BODY MASS INDEX: 29.25 KG/M2 | HEART RATE: 64 BPM

## 2018-08-16 DIAGNOSIS — R13.19 ESOPHAGEAL DYSPHAGIA: ICD-10-CM

## 2018-08-16 DIAGNOSIS — F41.9 ANXIETY AND DEPRESSION: ICD-10-CM

## 2018-08-16 DIAGNOSIS — R07.9 CHEST PAIN IN ADULT: ICD-10-CM

## 2018-08-16 DIAGNOSIS — F32.A ANXIETY AND DEPRESSION: ICD-10-CM

## 2018-08-16 DIAGNOSIS — K21.9 GASTROESOPHAGEAL REFLUX DISEASE, ESOPHAGITIS PRESENCE NOT SPECIFIED: Primary | ICD-10-CM

## 2018-08-16 PROBLEM — R13.10 DYSPHAGIA: Status: ACTIVE | Noted: 2018-08-16

## 2018-08-16 PROCEDURE — 3008F BODY MASS INDEX DOCD: CPT | Performed by: INTERNAL MEDICINE

## 2018-08-16 PROCEDURE — 99214 OFFICE O/P EST MOD 30 MIN: CPT | Performed by: INTERNAL MEDICINE

## 2018-08-16 PROCEDURE — 3725F SCREEN DEPRESSION PERFORMED: CPT | Performed by: INTERNAL MEDICINE

## 2018-08-16 PROCEDURE — 93000 ELECTROCARDIOGRAM COMPLETE: CPT | Performed by: INTERNAL MEDICINE

## 2018-08-16 RX ORDER — PANTOPRAZOLE SODIUM 40 MG/1
40 TABLET, DELAYED RELEASE ORAL DAILY
Qty: 30 TABLET | Refills: 1 | Status: SHIPPED | OUTPATIENT
Start: 2018-08-16 | End: 2018-10-12 | Stop reason: SDUPTHER

## 2018-08-16 RX ORDER — ALPRAZOLAM 0.25 MG/1
0.25 TABLET ORAL 2 TIMES DAILY PRN
Qty: 30 TABLET | Refills: 0 | Status: SHIPPED | OUTPATIENT
Start: 2018-08-16 | End: 2019-04-15

## 2018-08-16 RX ORDER — HYDROXYZINE HYDROCHLORIDE 25 MG/1
TABLET, FILM COATED ORAL
COMMUNITY
Start: 2018-08-02 | End: 2020-05-29

## 2018-08-16 NOTE — PATIENT INSTRUCTIONS
Gastroesophageal Reflux Disease   AMBULATORY CARE:   Gastroesophageal reflux  reflux occurs when acid and food in the stomach back up into the esophagus  Gastroesophageal reflux disease (GERD) is reflux that occurs more than twice a week for a few weeks  It usually causes heartburn and other symptoms  GERD can cause other health problems over time if it is not treated  Common symptoms include:  Heartburn is the most common symptom of GERD  You may feel burning pain in your chest or below the breast bone  This usually occurs after meals and spreads to your neck, jaw, or shoulder  The pain gets better when you change positions  You may also have any of the following:  · Bitter or acid taste in your mouth    · Dry cough    · Trouble swallowing or pain with swallowing    · Hoarseness or sore throat    · Frequent burping or hiccups    · Feeling of fullness soon after you start eating  Seek care immediately if:  · You feel full and cannot burp or vomit  · You have severe chest pain and sudden trouble breathing  · Your bowel movements are black, bloody, or tarry-looking  · Your vomit looks like coffee grounds or has blood in it  Contact your healthcare provider if:   · You vomit large amounts, or you vomit often  · You have trouble breathing after you vomit  · You have trouble swallowing, or pain with swallowing  · You are losing weight without trying  · Your symptoms get worse or do not improve with treatment  · You have questions or concerns about your condition or care  Treatment for GERD:  Your healthcare provider may prescribe medicine to decrease stomach acid  He may also prescribe medicine that help your esophagus and stomach move food and liquid to your intestines  Surgery may be done if other treatments do not work  You may need surgery to wrap the upper part of the stomach around the esophageal sphincter  This will strengthen the sphincter and prevent reflux     Manage GERD: · Do not have foods or drinks that may increase heartburn  These include chocolate, peppermint, fried or fatty foods, drinks that contain caffeine, or carbonated drinks (soda)  Other foods include spicy foods, onions, tomatoes, and tomato-based foods  Do not have foods or drinks that can irritate your esophagus, such as citrus fruits, juices, and alcohol  · Do not eat large meals  When you eat a lot of food at one time, your stomach needs more acid to digest it  Eat 6 small meals each day instead of 3 large ones, and eat slowly  Do not eat meals 2 to 3 hours before bedtime  · Elevate the head of your bed  Place 6-inch blocks under the head of your bed frame  You may also use more than one pillow under your head and shoulders while you sleep  · Maintain a healthy weight  If you are overweight, weight loss may help relieve symptoms of GERD  · Do not smoke  Smoking weakens the lower esophageal sphincter and increases the risk of GERD  Ask your healthcare provider for information if you currently smoke and need help to quit  E-cigarettes or smokeless tobacco still contain nicotine  Talk to your healthcare provider before you use these products  · Do not wear clothing that is tight around your waist   Tight clothing can put pressure on your stomach and cause or worsen GERD symptoms  Follow up with your healthcare provider as directed:  Write down your questions so you remember to ask them during your visits  © 2017 [employer] Information is for End User's use only and may not be sold, redistributed or otherwise used for commercial purposes  All illustrations and images included in CareNotes® are the copyrighted property of [employer]  or [employer]  The above information is an  only  It is not intended as medical advice for individual conditions or treatments   Talk to your doctor, nurse or pharmacist before following any medical regimen to see if it is safe and effective for you

## 2018-08-16 NOTE — ASSESSMENT & PLAN NOTE
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

## 2018-08-16 NOTE — PROGRESS NOTES
Assessment/Plan:    GERD (gastroesophageal reflux disease)  Patient still has epigastric/substernal chest discomfort  She had mild relief with omeprazole but ran out  She has never off had an ultrasound of her gallbladder  She does note that now she is having some difficulty swallowing  Will restart a PPI in the form of Protonix    Dysphagia  Patient has had rare dysphagia or food does not want to go down right over the last several years however recently has become much more common and associated with painful swallowing  She denies significant our reflux and no water brash  Will refer to GI  She sought EP GI in the past for colonoscopy that was normal    Anxiety and depression  Patient does see someone for her anxiety and depression but notes that her anxiety at times he has become unbearable  She has a caretaker for her father who has a neurological disorder  Will give her 30 Xanax    Chest pain in adult  Since her discomfort is mostly substernal an EKG was performed and was normal       Diagnoses and all orders for this visit:    Gastroesophageal reflux disease, esophagitis presence not specified  -     pantoprazole (PROTONIX) 40 mg tablet; Take 1 tablet (40 mg total) by mouth daily  -     Ambulatory referral to Gastroenterology; Future    Esophageal dysphagia  -     Ambulatory referral to Gastroenterology; Future  -     US gallbladder; Future    Anxiety and depression  -     ALPRAZolam (XANAX) 0 25 mg tablet; Take 1 tablet (0 25 mg total) by mouth 2 (two) times a day as needed for anxiety for up to 30 days    Chest pain in adult  -     POCT ECG  -     US gallbladder; Future    Other orders  -     hydrOXYzine HCL (ATARAX) 25 mg tablet;           Subjective:      Patient ID: Ralph Brush is a 37 y o  female  She is here to talk about her epigastric discomfort      Heartburn   She complains of abdominal pain, choking and dysphagia   She reports no belching, no chest pain, no coughing, no early satiety, no hoarse voice, no nausea, no sore throat, no water brash or no wheezing  This is a recurrent problem  The current episode started more than 1 month ago  The problem occurs frequently  The problem has been gradually worsening  Nothing aggravates the symptoms  Pertinent negatives include no anemia, fatigue, orthopnea or weight loss  Risk factors include caffeine use  She has tried an antacid, a diet change and a PPI for the symptoms  The treatment provided mild relief  CT of abdomen in April  Anxiety   Presents for follow-up visit  Symptoms include decreased concentration, depressed mood, excessive worry, nervous/anxious behavior, obsessions, palpitations and panic  Patient reports no chest pain, confusion, dizziness, nausea, shortness of breath or suicidal ideas  Symptoms occur occasionally  The severity of symptoms is moderate  The quality of sleep is fair  Compliance with medications is 51-75%  Side effects of treatment include GI discomfort  Review of Systems   Constitutional: Negative for chills, fatigue, fever, unexpected weight change and weight loss  HENT: Negative for congestion, ear pain, hearing loss, hoarse voice, postnasal drip, sinus pressure, sore throat, trouble swallowing and voice change  Eyes: Negative for visual disturbance  Respiratory: Positive for choking  Negative for cough, chest tightness, shortness of breath and wheezing  Cardiovascular: Positive for palpitations  Negative for chest pain and leg swelling  Gastrointestinal: Positive for abdominal pain and dysphagia  Negative for abdominal distention, anal bleeding, blood in stool, constipation, diarrhea and nausea  Endocrine: Negative for cold intolerance, polydipsia, polyphagia and polyuria  Genitourinary: Negative for dysuria, flank pain, frequency, hematuria and urgency  Musculoskeletal: Negative for arthralgias, back pain, gait problem, joint swelling, myalgias and neck pain  Skin: Negative for rash  Allergic/Immunologic: Negative for immunocompromised state  Neurological: Negative for dizziness, syncope, facial asymmetry, weakness, light-headedness, numbness and headaches  Hematological: Negative for adenopathy  Psychiatric/Behavioral: Positive for decreased concentration  Negative for confusion, sleep disturbance and suicidal ideas  The patient is nervous/anxious  Objective:      /70 (BP Location: Left arm, Patient Position: Sitting)   Pulse 64   Temp 98 5 °F (36 9 °C)   Ht 5' 6" (1 676 m)   Wt 82 6 kg (182 lb)   BMI 29 38 kg/m²          Physical Exam   Constitutional: She is oriented to person, place, and time  She appears well-developed and well-nourished  No distress  HENT:   Right Ear: External ear normal    Left Ear: External ear normal    Nose: Nose normal    Mouth/Throat: Oropharynx is clear and moist  No oropharyngeal exudate  Eyes: EOM are normal  Pupils are equal, round, and reactive to light  Neck: Normal range of motion  Neck supple  No JVD present  No thyromegaly present  Cardiovascular: Normal rate, regular rhythm, normal heart sounds and intact distal pulses  Exam reveals no gallop  No murmur heard  Pulmonary/Chest: Effort normal and breath sounds normal  No respiratory distress  She has no wheezes  She has no rales  Abdominal: Soft  Bowel sounds are normal  She exhibits no distension and no mass  There is no tenderness  Musculoskeletal: Normal range of motion  She exhibits no tenderness  Lymphadenopathy:     She has no cervical adenopathy  Neurological: She is alert and oriented to person, place, and time  No cranial nerve deficit  Coordination normal    Skin: No rash noted  Psychiatric: She has a normal mood and affect   Her behavior is normal  Judgment and thought content normal

## 2018-08-16 NOTE — ASSESSMENT & PLAN NOTE
Patient still has epigastric/substernal chest discomfort  She had mild relief with omeprazole but ran out  She has never off had an ultrasound of her gallbladder  She does note that now she is having some difficulty swallowing    Will restart a PPI in the form of Protonix

## 2018-08-16 NOTE — ASSESSMENT & PLAN NOTE
Patient has had rare dysphagia or food does not want to go down right over the last several years however recently has become much more common and associated with painful swallowing  She denies significant our reflux and no water brash  Will refer to GI    She sought EP GI in the past for colonoscopy that was normal

## 2018-08-18 DIAGNOSIS — K21.9 GASTROESOPHAGEAL REFLUX DISEASE, ESOPHAGITIS PRESENCE NOT SPECIFIED: ICD-10-CM

## 2018-08-20 RX ORDER — OMEPRAZOLE 20 MG/1
20 CAPSULE, DELAYED RELEASE ORAL DAILY
Qty: 30 CAPSULE | Refills: 1 | OUTPATIENT
Start: 2018-08-20 | End: 2018-09-19

## 2018-10-01 ENCOUNTER — TELEPHONE (OUTPATIENT)
Dept: NEUROSURGERY | Facility: CLINIC | Age: 43
End: 2018-10-01

## 2018-10-01 NOTE — TELEPHONE ENCOUNTER
Received call from Jamari Archer reporting sudden onset of neck pain radiating down her back to her shoulder blade  She said that it started on Friday and has not improved, feels this pain is replicant of the neck pain she experienced when she was diagnosed with dissection of vertebral artery  She denies tingle, numbers, vertigo, blurred vision  Migraines reported however she has them chronically  Spoke with Dr Freya Loa about her symptoms he said it would be ok to scheduled her 1 year follow up with CTA sooner  Advised patient that should be begin to experience stroke like symptoms to report to the ED right away

## 2018-10-04 ENCOUNTER — HOSPITAL ENCOUNTER (OUTPATIENT)
Dept: RADIOLOGY | Age: 43
Discharge: HOME | End: 2018-10-04
Attending: RADIOLOGY
Payer: COMMERCIAL

## 2018-10-04 DIAGNOSIS — I77.74 DISSECTION OF VERTEBRAL ARTERY (HCC): ICD-10-CM

## 2018-10-04 PROCEDURE — 70498 CT ANGIOGRAPHY NECK: CPT

## 2018-10-04 RX ADMIN — IOHEXOL 85 ML: 350 INJECTION, SOLUTION INTRAVENOUS at 14:46

## 2018-10-12 DIAGNOSIS — K21.9 GASTROESOPHAGEAL REFLUX DISEASE, ESOPHAGITIS PRESENCE NOT SPECIFIED: ICD-10-CM

## 2018-10-12 RX ORDER — PANTOPRAZOLE SODIUM 40 MG/1
TABLET, DELAYED RELEASE ORAL
Qty: 30 TABLET | Refills: 1 | Status: SHIPPED | OUTPATIENT
Start: 2018-10-12 | End: 2018-12-15 | Stop reason: SDUPTHER

## 2018-10-22 ENCOUNTER — TELEPHONE (OUTPATIENT)
Dept: NEUROSURGERY | Facility: CLINIC | Age: 43
End: 2018-10-22

## 2018-10-22 NOTE — TELEPHONE ENCOUNTER
LMOM TO RESCHEDULE APT ON 10/29/18 WITH TRAVIS  TOLD HER THAT TRAVIS IS SEEING PATIENT THE NEXT DAY (TUESDAY) OR ANYTIME AFTER THAT      LEFT MY EXT TO CALL ME BACK

## 2018-10-30 ENCOUNTER — TELEPHONE (OUTPATIENT)
Dept: NEUROSURGERY | Facility: CLINIC | Age: 43
End: 2018-10-30

## 2018-10-30 DIAGNOSIS — I77.74 VERTEBRAL ARTERY DISSECTION (HCC): Primary | ICD-10-CM

## 2018-10-30 NOTE — TELEPHONE ENCOUNTER
Ms Sujaat Retana had recent complaints of severe headache  There was concern for repeat dissection although no trauma  Repeat CTA was ordered which showed no vascular pathology  She may stop her aspirin  No further follow-up is necessary  I have recommended she see a Neurologist for headache management  She was not interested in a referral at this time

## 2018-12-15 DIAGNOSIS — K21.9 GASTROESOPHAGEAL REFLUX DISEASE, ESOPHAGITIS PRESENCE NOT SPECIFIED: ICD-10-CM

## 2018-12-17 RX ORDER — PANTOPRAZOLE SODIUM 40 MG/1
TABLET, DELAYED RELEASE ORAL
Qty: 30 TABLET | Refills: 1 | Status: SHIPPED | OUTPATIENT
Start: 2018-12-17 | End: 2019-02-10 | Stop reason: SDUPTHER

## 2018-12-28 DIAGNOSIS — B37.3 YEAST VAGINITIS: Primary | ICD-10-CM

## 2018-12-28 RX ORDER — FLUCONAZOLE 150 MG/1
150 TABLET ORAL EVERY OTHER DAY
Qty: 2 TABLET | Refills: 0 | Status: SHIPPED | OUTPATIENT
Start: 2018-12-28 | End: 2018-12-31

## 2019-02-10 DIAGNOSIS — K21.9 GASTROESOPHAGEAL REFLUX DISEASE, ESOPHAGITIS PRESENCE NOT SPECIFIED: ICD-10-CM

## 2019-02-10 RX ORDER — PANTOPRAZOLE SODIUM 40 MG/1
TABLET, DELAYED RELEASE ORAL
Qty: 30 TABLET | Refills: 1 | Status: SHIPPED | OUTPATIENT
Start: 2019-02-10 | End: 2019-04-09 | Stop reason: SDUPTHER

## 2019-02-23 DIAGNOSIS — F41.8 MIXED ANXIETY DEPRESSIVE DISORDER: ICD-10-CM

## 2019-02-25 RX ORDER — ESCITALOPRAM OXALATE 20 MG/1
TABLET ORAL
Qty: 90 TABLET | Refills: 0 | Status: SHIPPED | OUTPATIENT
Start: 2019-02-25 | End: 2019-06-14 | Stop reason: SDUPTHER

## 2019-04-09 DIAGNOSIS — K21.9 GASTROESOPHAGEAL REFLUX DISEASE, ESOPHAGITIS PRESENCE NOT SPECIFIED: ICD-10-CM

## 2019-04-09 RX ORDER — PANTOPRAZOLE SODIUM 40 MG/1
TABLET, DELAYED RELEASE ORAL
Qty: 30 TABLET | Refills: 0 | Status: SHIPPED | OUTPATIENT
Start: 2019-04-09 | End: 2019-06-01 | Stop reason: SDUPTHER

## 2019-04-15 ENCOUNTER — ANNUAL EXAM (OUTPATIENT)
Dept: OBGYN CLINIC | Facility: CLINIC | Age: 44
End: 2019-04-15
Payer: COMMERCIAL

## 2019-04-15 VITALS
DIASTOLIC BLOOD PRESSURE: 64 MMHG | WEIGHT: 185 LBS | SYSTOLIC BLOOD PRESSURE: 116 MMHG | HEIGHT: 66 IN | BODY MASS INDEX: 29.73 KG/M2

## 2019-04-15 DIAGNOSIS — Z12.31 ENCOUNTER FOR SCREENING MAMMOGRAM FOR MALIGNANT NEOPLASM OF BREAST: ICD-10-CM

## 2019-04-15 DIAGNOSIS — Z11.3 SCREENING FOR STD (SEXUALLY TRANSMITTED DISEASE): ICD-10-CM

## 2019-04-15 DIAGNOSIS — Z01.419 ENCOUNTER FOR GYNECOLOGICAL EXAMINATION (GENERAL) (ROUTINE) WITHOUT ABNORMAL FINDINGS: Primary | ICD-10-CM

## 2019-04-15 PROCEDURE — 99396 PREV VISIT EST AGE 40-64: CPT | Performed by: PHYSICIAN ASSISTANT

## 2019-04-15 RX ORDER — LATANOPROST 50 UG/ML
SOLUTION/ DROPS OPHTHALMIC
Refills: 2 | COMMUNITY
Start: 2019-01-14

## 2019-04-15 RX ORDER — ALPRAZOLAM 0.25 MG/1
TABLET ORAL
COMMUNITY
End: 2019-08-13 | Stop reason: SDUPTHER

## 2019-04-15 RX ORDER — LINACLOTIDE 145 UG/1
CAPSULE, GELATIN COATED ORAL
Refills: 3 | COMMUNITY
Start: 2019-01-07 | End: 2019-08-13

## 2019-04-18 LAB
DEPRECATED C TRACH RRNA XXX QL PRB: NOT DETECTED
N GONORRHOEA DNA UR QL NAA+PROBE: NOT DETECTED
T VAGINALIS RRNA SPEC QL NAA+PROBE: NOT DETECTED

## 2019-05-06 ENCOUNTER — EVALUATION (OUTPATIENT)
Dept: PHYSICAL THERAPY | Facility: CLINIC | Age: 44
End: 2019-05-06
Payer: COMMERCIAL

## 2019-05-06 ENCOUNTER — TRANSCRIBE ORDERS (OUTPATIENT)
Dept: PHYSICAL THERAPY | Facility: CLINIC | Age: 44
End: 2019-05-06

## 2019-05-06 DIAGNOSIS — M76.72 PERONEAL TENDINITIS OF LEFT LOWER EXTREMITY: Primary | ICD-10-CM

## 2019-05-06 PROCEDURE — 97140 MANUAL THERAPY 1/> REGIONS: CPT | Performed by: PHYSICAL THERAPIST

## 2019-05-06 PROCEDURE — 97161 PT EVAL LOW COMPLEX 20 MIN: CPT | Performed by: PHYSICAL THERAPIST

## 2019-05-10 ENCOUNTER — OFFICE VISIT (OUTPATIENT)
Dept: PHYSICAL THERAPY | Facility: CLINIC | Age: 44
End: 2019-05-10
Payer: COMMERCIAL

## 2019-05-10 DIAGNOSIS — M76.72 PERONEAL TENDINITIS OF LEFT LOWER EXTREMITY: Primary | ICD-10-CM

## 2019-05-10 PROCEDURE — 97110 THERAPEUTIC EXERCISES: CPT | Performed by: PHYSICAL THERAPIST

## 2019-05-10 PROCEDURE — 97140 MANUAL THERAPY 1/> REGIONS: CPT | Performed by: PHYSICAL THERAPIST

## 2019-05-13 ENCOUNTER — APPOINTMENT (OUTPATIENT)
Dept: PHYSICAL THERAPY | Facility: CLINIC | Age: 44
End: 2019-05-13
Payer: COMMERCIAL

## 2019-05-17 ENCOUNTER — OFFICE VISIT (OUTPATIENT)
Dept: PHYSICAL THERAPY | Facility: CLINIC | Age: 44
End: 2019-05-17
Payer: COMMERCIAL

## 2019-05-17 DIAGNOSIS — M76.72 PERONEAL TENDINITIS OF LEFT LOWER EXTREMITY: Primary | ICD-10-CM

## 2019-05-17 PROCEDURE — 97110 THERAPEUTIC EXERCISES: CPT | Performed by: PHYSICAL THERAPIST

## 2019-05-17 PROCEDURE — 97140 MANUAL THERAPY 1/> REGIONS: CPT | Performed by: PHYSICAL THERAPIST

## 2019-05-21 ENCOUNTER — APPOINTMENT (OUTPATIENT)
Dept: PHYSICAL THERAPY | Facility: CLINIC | Age: 44
End: 2019-05-21
Payer: COMMERCIAL

## 2019-05-24 ENCOUNTER — APPOINTMENT (OUTPATIENT)
Dept: PHYSICAL THERAPY | Facility: CLINIC | Age: 44
End: 2019-05-24
Payer: COMMERCIAL

## 2019-06-01 DIAGNOSIS — K21.9 GASTROESOPHAGEAL REFLUX DISEASE, ESOPHAGITIS PRESENCE NOT SPECIFIED: ICD-10-CM

## 2019-06-03 ENCOUNTER — HOSPITAL ENCOUNTER (OUTPATIENT)
Dept: RADIOLOGY | Age: 44
Discharge: HOME | End: 2019-06-03
Payer: COMMERCIAL

## 2019-06-03 VITALS — BODY MASS INDEX: 28.77 KG/M2 | WEIGHT: 179 LBS | HEIGHT: 66 IN

## 2019-06-03 DIAGNOSIS — Z12.31 ENCOUNTER FOR SCREENING MAMMOGRAM FOR MALIGNANT NEOPLASM OF BREAST: ICD-10-CM

## 2019-06-03 PROCEDURE — 77067 SCR MAMMO BI INCL CAD: CPT

## 2019-06-03 PROCEDURE — 77063 BREAST TOMOSYNTHESIS BI: CPT

## 2019-06-03 RX ORDER — PANTOPRAZOLE SODIUM 40 MG/1
TABLET, DELAYED RELEASE ORAL
Qty: 30 TABLET | Refills: 0 | Status: SHIPPED | OUTPATIENT
Start: 2019-06-03 | End: 2019-07-02 | Stop reason: SDUPTHER

## 2019-06-14 DIAGNOSIS — B37.3 YEAST VAGINITIS: Primary | ICD-10-CM

## 2019-06-14 DIAGNOSIS — F41.8 MIXED ANXIETY DEPRESSIVE DISORDER: ICD-10-CM

## 2019-06-14 RX ORDER — ESCITALOPRAM OXALATE 20 MG/1
TABLET ORAL
Qty: 30 TABLET | Refills: 0 | Status: SHIPPED | OUTPATIENT
Start: 2019-06-14 | End: 2019-07-24 | Stop reason: SDUPTHER

## 2019-06-14 RX ORDER — FLUCONAZOLE 150 MG/1
150 TABLET ORAL EVERY OTHER DAY
Qty: 2 TABLET | Refills: 0 | Status: SHIPPED | OUTPATIENT
Start: 2019-06-14 | End: 2019-06-17

## 2019-07-02 DIAGNOSIS — K21.9 GASTROESOPHAGEAL REFLUX DISEASE, ESOPHAGITIS PRESENCE NOT SPECIFIED: ICD-10-CM

## 2019-07-02 RX ORDER — PANTOPRAZOLE SODIUM 40 MG/1
TABLET, DELAYED RELEASE ORAL
Qty: 30 TABLET | Refills: 0 | Status: SHIPPED | OUTPATIENT
Start: 2019-07-02 | End: 2019-08-13 | Stop reason: SDUPTHER

## 2019-07-23 ENCOUNTER — TELEPHONE (OUTPATIENT)
Dept: OBGYN CLINIC | Facility: CLINIC | Age: 44
End: 2019-07-23

## 2019-07-23 DIAGNOSIS — B37.9 YEAST INFECTION: Primary | ICD-10-CM

## 2019-07-23 RX ORDER — FLUCONAZOLE 150 MG/1
150 TABLET ORAL EVERY OTHER DAY
Qty: 2 TABLET | Refills: 0 | Status: SHIPPED | OUTPATIENT
Start: 2019-07-23 | End: 2019-07-26

## 2019-07-23 NOTE — TELEPHONE ENCOUNTER
Sent rx to [facility] to sign  Diflucan 150mg, QOD added to chart for [facility]

## 2019-07-24 DIAGNOSIS — F41.8 MIXED ANXIETY DEPRESSIVE DISORDER: ICD-10-CM

## 2019-07-24 RX ORDER — ESCITALOPRAM OXALATE 20 MG/1
TABLET ORAL
Qty: 30 TABLET | Refills: 0 | Status: SHIPPED | OUTPATIENT
Start: 2019-07-24 | End: 2019-08-22 | Stop reason: SDUPTHER

## 2019-08-01 DIAGNOSIS — K21.9 GASTROESOPHAGEAL REFLUX DISEASE, ESOPHAGITIS PRESENCE NOT SPECIFIED: ICD-10-CM

## 2019-08-01 RX ORDER — PANTOPRAZOLE SODIUM 40 MG/1
TABLET, DELAYED RELEASE ORAL
Qty: 30 TABLET | Refills: 0 | OUTPATIENT
Start: 2019-08-01

## 2019-08-12 ENCOUNTER — TELEPHONE (OUTPATIENT)
Dept: INTERNAL MEDICINE CLINIC | Age: 44
End: 2019-08-12

## 2019-08-12 DIAGNOSIS — K21.9 GASTROESOPHAGEAL REFLUX DISEASE, ESOPHAGITIS PRESENCE NOT SPECIFIED: ICD-10-CM

## 2019-08-12 RX ORDER — PANTOPRAZOLE SODIUM 40 MG/1
40 TABLET, DELAYED RELEASE ORAL DAILY
Qty: 30 TABLET | Refills: 0 | Status: CANCELLED | OUTPATIENT
Start: 2019-08-12

## 2019-08-13 ENCOUNTER — OFFICE VISIT (OUTPATIENT)
Dept: INTERNAL MEDICINE CLINIC | Age: 44
End: 2019-08-13
Payer: COMMERCIAL

## 2019-08-13 VITALS
DIASTOLIC BLOOD PRESSURE: 60 MMHG | HEIGHT: 66 IN | HEART RATE: 76 BPM | SYSTOLIC BLOOD PRESSURE: 110 MMHG | OXYGEN SATURATION: 98 % | WEIGHT: 187 LBS | BODY MASS INDEX: 30.05 KG/M2 | TEMPERATURE: 97.6 F

## 2019-08-13 DIAGNOSIS — K21.9 GASTROESOPHAGEAL REFLUX DISEASE, ESOPHAGITIS PRESENCE NOT SPECIFIED: ICD-10-CM

## 2019-08-13 DIAGNOSIS — E66.9 OBESITY (BMI 30.0-34.9): ICD-10-CM

## 2019-08-13 DIAGNOSIS — R63.5 WEIGHT GAIN: ICD-10-CM

## 2019-08-13 DIAGNOSIS — Z13.220 SCREENING, LIPID: ICD-10-CM

## 2019-08-13 DIAGNOSIS — F41.8 MIXED ANXIETY DEPRESSIVE DISORDER: Primary | ICD-10-CM

## 2019-08-13 PROBLEM — G43.901 MIGRAINE WITH STATUS MIGRAINOSUS: Status: RESOLVED | Noted: 2017-06-01 | Resolved: 2019-08-13

## 2019-08-13 PROBLEM — Z12.39 ENCOUNTER FOR SCREENING FOR MALIGNANT NEOPLASM OF BREAST: Status: ACTIVE | Noted: 2019-08-13

## 2019-08-13 PROBLEM — R07.9 CHEST PAIN IN ADULT: Status: RESOLVED | Noted: 2018-08-16 | Resolved: 2019-08-13

## 2019-08-13 PROBLEM — E66.811 OBESITY (BMI 30.0-34.9): Status: ACTIVE | Noted: 2019-08-13

## 2019-08-13 PROBLEM — R13.10 DYSPHAGIA: Status: RESOLVED | Noted: 2018-08-16 | Resolved: 2019-08-13

## 2019-08-13 PROCEDURE — 3725F SCREEN DEPRESSION PERFORMED: CPT | Performed by: INTERNAL MEDICINE

## 2019-08-13 PROCEDURE — 99214 OFFICE O/P EST MOD 30 MIN: CPT | Performed by: INTERNAL MEDICINE

## 2019-08-13 PROCEDURE — 3008F BODY MASS INDEX DOCD: CPT | Performed by: INTERNAL MEDICINE

## 2019-08-13 PROCEDURE — 1036F TOBACCO NON-USER: CPT | Performed by: INTERNAL MEDICINE

## 2019-08-13 RX ORDER — PANTOPRAZOLE SODIUM 40 MG/1
40 TABLET, DELAYED RELEASE ORAL DAILY
Qty: 30 TABLET | Refills: 0 | Status: SHIPPED | OUTPATIENT
Start: 2019-08-13 | End: 2019-09-09 | Stop reason: SDUPTHER

## 2019-08-13 RX ORDER — HYDROXYZINE HYDROCHLORIDE 25 MG/1
TABLET, FILM COATED ORAL
COMMUNITY
End: 2019-08-13

## 2019-08-13 RX ORDER — ALPRAZOLAM 0.25 MG/1
0.5 TABLET ORAL 2 TIMES DAILY PRN
Qty: 30 TABLET | Refills: 1 | Status: SHIPPED | OUTPATIENT
Start: 2019-08-13 | End: 2020-02-11 | Stop reason: SDUPTHER

## 2019-08-13 NOTE — ASSESSMENT & PLAN NOTE
Patient has gained weight over the past year blames it on her current social and family situation  She was again counseled on diet and exercise for same

## 2019-08-13 NOTE — PROGRESS NOTES
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 is in a child custody lopez with her ex  She does not want to go on an antidepressant    Obesity (BMI 30 0-34  9)  Patient has gained weight over the past year blames it on her current social and family situation  She was again counseled on diet and exercise for same  Diagnoses and all orders for this visit:    Mixed anxiety depressive disorder  -     ALPRAZolam (XANAX) 0 25 mg tablet; Take 2 tablets (0 5 mg total) by mouth 2 (two) times a day as needed for anxiety    Gastroesophageal reflux disease, esophagitis presence not specified  -     pantoprazole (PROTONIX) 40 mg tablet; Take 1 tablet (40 mg total) by mouth daily    Screening, lipid  -     Lipid panel; Future    Weight gain  -     Comprehensive metabolic panel; Future  -     TSH, 3rd generation with Free T4 reflex; Future    Obesity (BMI 30 0-34 9)    Other orders  -     Discontinue: hydrOXYzine HCL (ATARAX) 25 mg tablet; hydroxyzine HCl 25 mg tablet          Subjective:      Patient ID: Geni Erazo is a 40 y o  female  Patient is here for mostly for refills of her Xanax and Protonix  She is doing well other than that she is overwhelmed by caring for her father with end-stage MS and having custody issues with her Ex  Anxiety   Presents for follow-up visit  Symptoms include chest pain, decreased concentration, depressed mood and nervous/anxious behavior  Patient reports no confusion, dizziness, nausea, palpitations, shortness of breath or suicidal ideas  Symptoms occur most days  The severity of symptoms is moderate  The quality of sleep is fair   Nighttime awakenings: occasional                Review of Systems Constitutional: Negative for chills, fatigue, fever and unexpected weight change  HENT: Negative for congestion, ear pain, hearing loss, postnasal drip, sinus pressure, sore throat, trouble swallowing and voice change  Eyes: Negative for visual disturbance  Respiratory: Negative for cough, chest tightness, shortness of breath and wheezing  Cardiovascular: Positive for chest pain  Negative for palpitations and leg swelling  Gastrointestinal: Negative for abdominal distention, abdominal pain, anal bleeding, blood in stool, constipation, diarrhea and nausea  Endocrine: Negative for cold intolerance, polydipsia, polyphagia and polyuria  Genitourinary: Negative for dysuria, flank pain, frequency, hematuria and urgency  Musculoskeletal: Negative for arthralgias, back pain, gait problem, joint swelling, myalgias and neck pain  Skin: Negative for rash  Allergic/Immunologic: Negative for immunocompromised state  Neurological: Negative for dizziness, syncope, facial asymmetry, weakness, light-headedness, numbness and headaches  Hematological: Negative for adenopathy  Psychiatric/Behavioral: Positive for decreased concentration  Negative for confusion, sleep disturbance and suicidal ideas  The patient is nervous/anxious  Objective:      /60 (BP Location: Left arm, Patient Position: Sitting)   Pulse 76   Temp 97 6 °F (36 4 °C) (Tympanic)   Ht 5' 6" (1 676 m)   Wt 84 8 kg (187 lb)   SpO2 98%   BMI 30 18 kg/m²          Physical Exam   Constitutional: She is oriented to person, place, and time  She appears well-developed and well-nourished  No distress  Overweight   HENT:   Right Ear: External ear normal    Left Ear: External ear normal    Nose: Nose normal    Mouth/Throat: Oropharynx is clear and moist  No oropharyngeal exudate  Eyes: Pupils are equal, round, and reactive to light  EOM are normal    Neck: Normal range of motion  Neck supple  No JVD present   No thyromegaly present  Cardiovascular: Normal rate, regular rhythm, normal heart sounds and intact distal pulses  Exam reveals no gallop  No murmur heard  Pulmonary/Chest: Effort normal and breath sounds normal  No respiratory distress  She has no wheezes  She has no rales  Abdominal: Soft  Bowel sounds are normal  She exhibits no distension and no mass  There is no tenderness  Musculoskeletal: Normal range of motion  She exhibits no tenderness  Lymphadenopathy:     She has no cervical adenopathy  Neurological: She is alert and oriented to person, place, and time  No cranial nerve deficit  Coordination normal    Skin: No rash noted  Psychiatric: She has a normal mood and affect  Her behavior is normal  Judgment and thought content normal    Vitals reviewed  BMI Counseling: Body mass index is 30 18 kg/m²  Discussed the patient's BMI with her  The BMI is above average  BMI counseling and education was provided to the patient  Exercise recommendations include moderate aerobic physical activity for 150 minutes/week

## 2019-08-13 NOTE — PATIENT INSTRUCTIONS
Weight Management   AMBULATORY CARE:   Why it is important to manage your weight:  Being overweight increases your risk of health conditions such as heart disease, high blood pressure, type 2 diabetes, and certain types of cancer  It can also increase your risk for osteoarthritis, sleep apnea, and other respiratory problems  Aim for a slow, steady weight loss  Even a small amount of weight loss can lower your risk of health problems  How to lose weight safely:  A safe and healthy way to lose weight is to eat fewer calories and get regular exercise  You can lose up about 1 pound a week by decreasing the number of calories you eat by 500 calories each day  You can decrease calories by eating smaller portion sizes or by cutting out high-calorie foods  Read labels to find out how many calories are in the foods you eat  You can also burn calories with exercise such as walking, swimming, or biking  You will be more likely to keep weight off if you make these changes part of your lifestyle  Healthy meal plan for weight management:  A healthy meal plan includes a variety of foods, contains fewer calories, and helps you stay healthy  A healthy meal plan includes the following:  · Eat whole-grain foods more often  A healthy meal plan should contain fiber  Fiber is the part of grains, fruits, and vegetables that is not broken down by your body  Whole-grain foods are healthy and provide extra fiber in your diet  Some examples of whole-grain foods are whole-wheat breads and pastas, oatmeal, brown rice, and bulgur  · Eat a variety of vegetables every day  Include dark, leafy greens such as spinach, kale, aleah greens, and mustard greens  Eat yellow and orange vegetables such as carrots, sweet potatoes, and winter squash  · Eat a variety of fruits every day  Choose fresh or canned fruit (canned in its own juice or light syrup) instead of juice  Fruit juice has very little or no fiber  · Eat low-fat dairy foods  Drink fat-free (skim) milk or 1% milk  Eat fat-free yogurt and low-fat cottage cheese  Try low-fat cheeses such as mozzarella and other reduced-fat cheeses  · Choose meat and other protein foods that are low in fat  Choose beans or other legumes such as split peas or lentils  Choose fish, skinless poultry (chicken or turkey), or lean cuts of red meat (beef or pork)  Before you cook meat or poultry, cut off any visible fat  · Use less fat and oil  Try baking foods instead of frying them  Add less fat, such as margarine, sour cream, regular salad dressing and mayonnaise to foods  Eat fewer high-fat foods  Some examples of high-fat foods include french fries, doughnuts, ice cream, and cakes  · Eat fewer sweets  Limit foods and drinks that are high in sugar  This includes candy, cookies, regular soda, and sweetened drinks  Ways to decrease calories:   · Eat smaller portions  ¨ Use a small plate with smaller servings  ¨ Do not eat second helpings  ¨ When you eat at a restaurant, ask for a box and place half of your meal in the box before you eat  ¨ Share an entrée with someone else  · Replace high-calorie snacks with healthy, low-calorie snacks  ¨ Choose fresh fruit, vegetables, fat-free rice cakes, or air-popped popcorn instead of potato chips, nuts, or chocolate  ¨ Choose water or calorie-free drinks instead of soda or sweetened drinks  · Eat regular meals  Skipping meals can lead to overeating later in the day  Eat a healthy snack in place of a meal if you do not have time to eat a regular meal      · Do not shop for groceries when you are hungry  You may be more likely to make unhealthy food choices  Take a grocery list of healthy foods and shop after you have eaten  Exercise:  Exercise at least 30 minutes per day on most days of the week  Some examples of exercise include walking, biking, dancing, and swimming   You can also fit in more physical activity by taking the stairs instead of the elevator or parking farther away from stores  Ask your healthcare provider about the best exercise plan for you  Other things to consider as you try to lose weight:   · Be aware of situations that may give you the urge to overeat, such as eating while watching television  Find ways to avoid these situations  For example, read a book, go for a walk, or do crafts  · Meet with a weight loss support group or friends who are also trying to lose weight  This may help you stay motivated to continue working on your weight loss goals  © 2017 [facility] Information is for End User's use only and may not be sold, redistributed or otherwise used for commercial purposes  All illustrations and images included in CareNotes® are the copyrighted property of A D A M , Inc  or Roman Davis  The above information is an  only  It is not intended as medical advice for individual conditions or treatments  Talk to your doctor, nurse or pharmacist before following any medical regimen to see if it is safe and effective for you  Heart Healthy Diet   AMBULATORY CARE:   A heart healthy diet  is an eating plan low in total fat, unhealthy fats, and sodium (salt)  A heart healthy diet helps decrease your risk for heart disease and stroke  Limit the amount of fat you eat to 25% to 35% of your total daily calories  Limit sodium to less than 2,300 mg each day  Healthy fats:  Healthy fats can help improve cholesterol levels  The risk for heart disease is decreased when cholesterol levels are normal  Choose healthy fats, such as the following:  · Unsaturated fat  is found in foods such as soybean, canola, olive, corn, and safflower oils  It is also found in soft tub margarine that is made with liquid vegetable oil  · Omega-3 fat  is found in certain fish, such as salmon, tuna, and trout, and in walnuts and flaxseed    Unhealthy fats:  Unhealthy fats can cause unhealthy cholesterol levels in your blood and increase your risk of heart disease  Limit unhealthy fats, such as the following:  · Cholesterol  is found in animal foods, such as eggs and lobster, and in dairy products made from whole milk  Limit cholesterol to less than 300 milligrams (mg) each day  You may need to limit cholesterol to 200 mg each day if you have heart disease  · Saturated fat  is found in meats, such as messer and hamburger  It is also found in chicken or turkey skin, whole milk, and butter  Limit saturated fat to less than 7% of your total daily calories  Limit saturated fat to less than 6% if you have heart disease or are at increased risk for it  · Trans fat  is found in packaged foods, such as potato chips and cookies  It is also in hard margarine, some fried foods, and shortening  Avoid trans fats as much as possible    Heart healthy foods and drinks to include:  Ask your dietitian or healthcare provider how many servings to have from each of the following food groups:  · Grains:      ¨ Whole-wheat breads, cereals, and pastas, and brown rice    ¨ Low-fat, low-sodium crackers and chips    · Vegetables:      ¨ Broccoli, green beans, green peas, and spinach    ¨ Collards, kale, and lima beans    ¨ Carrots, sweet potatoes, tomatoes, and peppers    ¨ Canned vegetables with no salt added    · Fruits:      ¨ Bananas, peaches, pears, and pineapple    ¨ Grapes, raisins, and dates    ¨ Oranges, tangerines, grapefruit, orange juice, and grapefruit juice    ¨ Apricots, mangoes, melons, and papaya    ¨ Raspberries and strawberries    ¨ Canned fruit with no added sugar    · Low-fat dairy products:      ¨ Nonfat (skim) milk, 1% milk, and low-fat almond, cashew, or soy milks fortified with calcium    ¨ Low-fat cheese, regular or frozen yogurt, and cottage cheese    · Meats and proteins , such as lean cuts of beef and pork (loin, leg, round), skinless chicken and turkey, legumes, soy products, egg whites, and nuts  Foods and drinks to limit or avoid:  Ask your dietitian or healthcare provider about these and other foods that are high in unhealthy fat, sodium, and sugar:  · Snack or packaged foods , such as frozen dinners, cookies, macaroni and cheese, and cereals with more than 300 mg of sodium per serving    · Canned or dry mixes  for cakes, soups, sauces, or gravies    · Vegetables with added sodium , such as instant potatoes, vegetables with added sauces, or regular canned vegetables    · Other foods high in sodium , such as ketchup, barbecue sauce, salad dressing, pickles, olives, soy sauce, and miso    · High-fat dairy foods  such as whole or 2% milk, cream cheese, or sour cream, and cheeses     · High-fat protein foods  such as high-fat cuts of beef (T-bone steaks, ribs), chicken or turkey with skin, and organ meats, such as liver    · Cured or smoked meats , such as hot dogs, messer, and sausage    · Unhealthy fats and oils , such as butter, stick margarine, shortening, and cooking oils such as coconut or palm oil    · Food and drinks high in sugar , such as soft drinks (soda), sports drinks, sweetened tea, candy, cake, cookies, pies, and doughnuts  Other diet guidelines to follow:   · Eat more foods containing omega-3 fats  Eat fish high in omega-3 fats at least 2 times a week  · Limit alcohol  Too much alcohol can damage your heart and raise your blood pressure  Women should limit alcohol to 1 drink a day  Men should limit alcohol to 2 drinks a day  A drink of alcohol is 12 ounces of beer, 5 ounces of wine, or 1½ ounces of liquor  · Choose low-sodium foods  High-sodium foods can lead to high blood pressure  Add little or no salt to food you prepare  Use herbs and spices in place of salt  · Eat more fiber  to help lower cholesterol levels  Eat at least 5 servings of fruits and vegetables each day  Eat 3 ounces of whole-grain foods each day  Legumes (beans) are also a good source of fiber    Lifestyle guidelines: · Do not smoke  Nicotine and other chemicals in cigarettes and cigars can cause lung and heart damage  Ask your healthcare provider for information if you currently smoke and need help to quit  E-cigarettes or smokeless tobacco still contain nicotine  Talk to your healthcare provider before you use these products  · Exercise regularly  to help you maintain a healthy weight and improve your blood pressure and cholesterol levels  Ask your healthcare provider about the best exercise plan for you  Do not start an exercise program without asking your healthcare provider  Follow up with your healthcare provider as directed:  Write down your questions so you remember to ask them during your visits  © 2017 [address] Information is for End User's use only and may not be sold, redistributed or otherwise used for commercial purposes  All illustrations and images included in CareNotes® are the copyrighted property of [employer]  or Roman Davis  The above information is an  only  It is not intended as medical advice for individual conditions or treatments  Talk to your doctor, nurse or pharmacist before following any medical regimen to see if it is safe and effective for you

## 2019-08-13 NOTE — ASSESSMENT & PLAN NOTE
She continues to follow with Psychology for her anxiety and depression but still requires Xanax p r n  She takes care of her father's end-stage MS and is in a child custody lopez with her ex    She does not want to go on an antidepressant

## 2019-08-22 ENCOUNTER — DOCTOR'S OFFICE (OUTPATIENT)
Dept: URBAN - METROPOLITAN AREA CLINIC 136 | Facility: CLINIC | Age: 44
Setting detail: OPHTHALMOLOGY
End: 2019-08-22
Payer: COMMERCIAL

## 2019-08-22 ENCOUNTER — RX ONLY (RX ONLY)
Age: 44
End: 2019-08-22

## 2019-08-22 DIAGNOSIS — F41.8 MIXED ANXIETY DEPRESSIVE DISORDER: ICD-10-CM

## 2019-08-22 DIAGNOSIS — H10.12: ICD-10-CM

## 2019-08-22 DIAGNOSIS — B30.2: ICD-10-CM

## 2019-08-22 DIAGNOSIS — B30.1: ICD-10-CM

## 2019-08-22 DIAGNOSIS — H10.11: ICD-10-CM

## 2019-08-22 DIAGNOSIS — B30.3: ICD-10-CM

## 2019-08-22 DIAGNOSIS — B30.0: ICD-10-CM

## 2019-08-22 PROCEDURE — 92004 COMPRE OPH EXAM NEW PT 1/>: CPT | Performed by: OPHTHALMOLOGY

## 2019-08-22 RX ORDER — ESCITALOPRAM OXALATE 20 MG/1
TABLET ORAL
Qty: 30 TABLET | Refills: 0 | Status: SHIPPED | OUTPATIENT
Start: 2019-08-22 | End: 2019-09-18 | Stop reason: SDUPTHER

## 2019-08-22 ASSESSMENT — VISUAL ACUITY
OS_BCVA: 20/30-2
OD_BCVA: 20/20-1

## 2019-08-22 ASSESSMENT — REFRACTION_MANIFEST
OS_VA1: 20/
OS_VA2: 20/
OS_VA3: 20/
OS_VA1: 20/
OU_VA: 20/
OD_VA1: 20/
OD_VA2: 20/
OD_VA1: 20/
OD_VA3: 20/
OS_VA2: 20/
OS_VA3: 20/
OU_VA: 20/
OD_VA3: 20/
OD_VA2: 20/

## 2019-08-22 ASSESSMENT — LID EXAM ASSESSMENTS
OD_BLEPHARITIS: T
OD_EDEMA: RUL 2+
OD_MEIBOMITIS: T

## 2019-08-22 ASSESSMENT — REFRACTION_CURRENTRX
OS_OVR_VA: 20/
OD_OVR_VA: 20/
OS_OVR_VA: 20/
OD_OVR_VA: 20/
OS_OVR_VA: 20/
OD_OVR_VA: 20/

## 2019-08-22 ASSESSMENT — SUPERFICIAL PUNCTATE KERATITIS (SPK)
OD_SPK: T
OS_SPK: T

## 2019-08-29 ENCOUNTER — DOCTOR'S OFFICE (OUTPATIENT)
Dept: URBAN - METROPOLITAN AREA CLINIC 136 | Facility: CLINIC | Age: 44
Setting detail: OPHTHALMOLOGY
End: 2019-08-29
Payer: COMMERCIAL

## 2019-08-29 DIAGNOSIS — B30.0: ICD-10-CM

## 2019-08-29 DIAGNOSIS — B30.3: ICD-10-CM

## 2019-08-29 DIAGNOSIS — B30.2: ICD-10-CM

## 2019-08-29 DIAGNOSIS — B30.1: ICD-10-CM

## 2019-08-29 PROCEDURE — 99213 OFFICE O/P EST LOW 20 MIN: CPT | Performed by: OPTOMETRIST

## 2019-08-29 ASSESSMENT — LID EXAM ASSESSMENTS
OD_BLEPHARITIS: T
OD_MEIBOMITIS: T
OD_EDEMA: RUL T 1+

## 2019-08-29 ASSESSMENT — REFRACTION_CURRENTRX
OD_OVR_VA: 20/
OS_OVR_VA: 20/
OD_OVR_VA: 20/
OD_OVR_VA: 20/

## 2019-08-29 ASSESSMENT — REFRACTION_MANIFEST
OS_VA1: 20/
OD_VA2: 20/
OD_VA1: 20/
OS_VA1: 20/
OD_VA3: 20/
OD_VA3: 20/
OD_VA1: 20/
OS_VA2: 20/
OS_VA3: 20/
OS_VA2: 20/
OU_VA: 20/
OU_VA: 20/
OD_VA2: 20/
OS_VA3: 20/

## 2019-08-29 ASSESSMENT — KERATOMETRY
OS_K2POWER_DIOPTERS: 43.25
OD_AXISANGLE_DEGREES: 063
OD_K2POWER_DIOPTERS: 42.75
OD_K1POWER_DIOPTERS: 42.00
OS_K1POWER_DIOPTERS: 41.75
OS_AXISANGLE_DEGREES: 108

## 2019-08-29 ASSESSMENT — SUPERFICIAL PUNCTATE KERATITIS (SPK)
OS_SPK: T
OD_SPK: T

## 2019-08-29 ASSESSMENT — REFRACTION_AUTOREFRACTION
OS_AXIS: 002
OS_SPHERE: -0.50
OD_SPHERE: -0.50
OD_CYLINDER: -0.50
OD_AXIS: 031
OS_CYLINDER: -0.75

## 2019-08-29 ASSESSMENT — CONFRONTATIONAL VISUAL FIELD TEST (CVF)
OS_FINDINGS: FULL
OD_FINDINGS: FULL

## 2019-08-29 ASSESSMENT — VISUAL ACUITY
OD_BCVA: 20/20-2
OS_BCVA: 20/20-2

## 2019-08-29 ASSESSMENT — AXIALLENGTH_DERIVED
OD_AL: 24.3191
OS_AL: 24.322

## 2019-08-29 ASSESSMENT — SPHEQUIV_DERIVED
OD_SPHEQUIV: -0.75
OS_SPHEQUIV: -0.875

## 2019-09-05 ENCOUNTER — DOCTOR'S OFFICE (OUTPATIENT)
Dept: URBAN - METROPOLITAN AREA CLINIC 136 | Facility: CLINIC | Age: 44
Setting detail: OPHTHALMOLOGY
End: 2019-09-05
Payer: COMMERCIAL

## 2019-09-05 DIAGNOSIS — B30.1: ICD-10-CM

## 2019-09-05 DIAGNOSIS — H16.221: ICD-10-CM

## 2019-09-05 DIAGNOSIS — H10.11: ICD-10-CM

## 2019-09-05 DIAGNOSIS — H10.12: ICD-10-CM

## 2019-09-05 DIAGNOSIS — H16.222: ICD-10-CM

## 2019-09-05 DIAGNOSIS — B30.0: ICD-10-CM

## 2019-09-05 DIAGNOSIS — H00.15: ICD-10-CM

## 2019-09-05 DIAGNOSIS — B30.3: ICD-10-CM

## 2019-09-05 DIAGNOSIS — B30.2: ICD-10-CM

## 2019-09-05 PROCEDURE — 99214 OFFICE O/P EST MOD 30 MIN: CPT | Performed by: OPHTHALMOLOGY

## 2019-09-05 ASSESSMENT — REFRACTION_MANIFEST
OD_VA2: 20/
OS_VA1: 20/
OD_VA1: 20/
OS_VA3: 20/
OU_VA: 20/
OD_VA2: 20/
OS_VA3: 20/
OD_VA3: 20/
OU_VA: 20/
OD_VA3: 20/
OS_VA2: 20/
OD_VA1: 20/
OS_VA2: 20/
OS_VA1: 20/

## 2019-09-05 ASSESSMENT — LID EXAM ASSESSMENTS
OD_BLEPHARITIS: T
OS_EDEMA: LLL 1+ 2+
OD_MEIBOMITIS: T
OD_EDEMA: ABSENT

## 2019-09-05 ASSESSMENT — VISUAL ACUITY
OD_BCVA: 20/20
OS_BCVA: 20/25+1

## 2019-09-05 ASSESSMENT — REFRACTION_AUTOREFRACTION
OS_SPHERE: -0.50
OS_AXIS: 002
OD_CYLINDER: -0.50
OS_CYLINDER: -0.75
OD_SPHERE: -0.50
OD_AXIS: 031

## 2019-09-05 ASSESSMENT — SPHEQUIV_DERIVED
OS_SPHEQUIV: -0.875
OD_SPHEQUIV: -0.75

## 2019-09-05 ASSESSMENT — SUPERFICIAL PUNCTATE KERATITIS (SPK)
OS_SPK: T
OD_SPK: T

## 2019-09-05 ASSESSMENT — REFRACTION_CURRENTRX
OS_OVR_VA: 20/
OD_OVR_VA: 20/
OD_OVR_VA: 20/
OS_OVR_VA: 20/
OS_OVR_VA: 20/
OD_OVR_VA: 20/

## 2019-09-09 DIAGNOSIS — K21.9 GASTROESOPHAGEAL REFLUX DISEASE, ESOPHAGITIS PRESENCE NOT SPECIFIED: ICD-10-CM

## 2019-09-12 ENCOUNTER — DOCTOR'S OFFICE (OUTPATIENT)
Dept: URBAN - METROPOLITAN AREA CLINIC 136 | Facility: CLINIC | Age: 44
Setting detail: OPHTHALMOLOGY
End: 2019-09-12
Payer: COMMERCIAL

## 2019-09-12 DIAGNOSIS — H00.11: ICD-10-CM

## 2019-09-12 DIAGNOSIS — H10.11: ICD-10-CM

## 2019-09-12 DIAGNOSIS — B30.0: ICD-10-CM

## 2019-09-12 DIAGNOSIS — B30.3: ICD-10-CM

## 2019-09-12 DIAGNOSIS — B30.1: ICD-10-CM

## 2019-09-12 DIAGNOSIS — H16.221: ICD-10-CM

## 2019-09-12 DIAGNOSIS — H00.15: ICD-10-CM

## 2019-09-12 DIAGNOSIS — B30.2: ICD-10-CM

## 2019-09-12 DIAGNOSIS — H40.002: ICD-10-CM

## 2019-09-12 DIAGNOSIS — H10.12: ICD-10-CM

## 2019-09-12 DIAGNOSIS — H40.001: ICD-10-CM

## 2019-09-12 DIAGNOSIS — H16.222: ICD-10-CM

## 2019-09-12 PROCEDURE — 99214 OFFICE O/P EST MOD 30 MIN: CPT | Performed by: OPHTHALMOLOGY

## 2019-09-12 RX ORDER — PANTOPRAZOLE SODIUM 40 MG/1
TABLET, DELAYED RELEASE ORAL
Qty: 30 TABLET | Refills: 0 | Status: SHIPPED | OUTPATIENT
Start: 2019-09-12 | End: 2020-03-25

## 2019-09-12 ASSESSMENT — VISUAL ACUITY
OD_BCVA: 20/20-1
OS_BCVA: 20/20-2

## 2019-09-12 ASSESSMENT — REFRACTION_MANIFEST
OU_VA: 20/
OS_VA3: 20/
OS_VA2: 20/
OD_VA3: 20/
OD_VA2: 20/
OU_VA: 20/
OD_VA1: 20/
OS_VA3: 20/
OS_VA1: 20/
OS_VA2: 20/
OD_VA2: 20/
OD_VA1: 20/
OS_VA1: 20/
OD_VA3: 20/

## 2019-09-12 ASSESSMENT — CONFRONTATIONAL VISUAL FIELD TEST (CVF)
OS_FINDINGS: FULL
OD_FINDINGS: FULL

## 2019-09-12 ASSESSMENT — LID EXAM ASSESSMENTS
OS_EDEMA: LLL T
OD_MEIBOMITIS: T
OD_BLEPHARITIS: T
OD_EDEMA: T 1+

## 2019-09-12 ASSESSMENT — REFRACTION_CURRENTRX
OS_OVR_VA: 20/
OD_OVR_VA: 20/
OS_OVR_VA: 20/
OS_OVR_VA: 20/

## 2019-09-12 ASSESSMENT — REFRACTION_AUTOREFRACTION
OD_SPHERE: -0.50
OS_SPHERE: -0.50
OS_AXIS: 002
OS_CYLINDER: -0.75
OD_AXIS: 031
OD_CYLINDER: -0.50

## 2019-09-12 ASSESSMENT — SPHEQUIV_DERIVED
OD_SPHEQUIV: -0.75
OS_SPHEQUIV: -0.875

## 2019-09-12 ASSESSMENT — SUPERFICIAL PUNCTATE KERATITIS (SPK)
OS_SPK: T
OD_SPK: T

## 2019-09-18 DIAGNOSIS — F41.8 MIXED ANXIETY DEPRESSIVE DISORDER: ICD-10-CM

## 2019-09-18 RX ORDER — ESCITALOPRAM OXALATE 20 MG/1
TABLET ORAL
Qty: 30 TABLET | Refills: 0 | Status: SHIPPED | OUTPATIENT
Start: 2019-09-18 | End: 2019-10-14 | Stop reason: SDUPTHER

## 2019-10-11 DIAGNOSIS — K21.9 GASTROESOPHAGEAL REFLUX DISEASE, ESOPHAGITIS PRESENCE NOT SPECIFIED: ICD-10-CM

## 2019-10-11 RX ORDER — PANTOPRAZOLE SODIUM 40 MG/1
TABLET, DELAYED RELEASE ORAL
Qty: 30 TABLET | Refills: 0 | Status: SHIPPED | OUTPATIENT
Start: 2019-10-11 | End: 2019-11-13 | Stop reason: SDUPTHER

## 2019-10-14 DIAGNOSIS — F41.8 MIXED ANXIETY DEPRESSIVE DISORDER: ICD-10-CM

## 2019-10-14 RX ORDER — ESCITALOPRAM OXALATE 20 MG/1
20 TABLET ORAL DAILY
Qty: 30 TABLET | Refills: 3 | Status: SHIPPED | OUTPATIENT
Start: 2019-10-14 | End: 2020-03-13 | Stop reason: SDUPTHER

## 2019-11-13 DIAGNOSIS — K21.9 GASTROESOPHAGEAL REFLUX DISEASE, ESOPHAGITIS PRESENCE NOT SPECIFIED: ICD-10-CM

## 2019-11-13 RX ORDER — PANTOPRAZOLE SODIUM 40 MG/1
TABLET, DELAYED RELEASE ORAL
Qty: 30 TABLET | Refills: 0 | Status: SHIPPED | OUTPATIENT
Start: 2019-11-13 | End: 2019-12-21 | Stop reason: SDUPTHER

## 2019-12-21 DIAGNOSIS — K21.9 GASTROESOPHAGEAL REFLUX DISEASE, ESOPHAGITIS PRESENCE NOT SPECIFIED: ICD-10-CM

## 2019-12-23 RX ORDER — PANTOPRAZOLE SODIUM 40 MG/1
TABLET, DELAYED RELEASE ORAL
Qty: 30 TABLET | Refills: 0 | Status: SHIPPED | OUTPATIENT
Start: 2019-12-23 | End: 2020-01-20

## 2020-01-08 ENCOUNTER — HOSPITAL ENCOUNTER (EMERGENCY)
Facility: HOSPITAL | Age: 45
Discharge: HOME | End: 2020-01-08
Attending: EMERGENCY MEDICINE | Admitting: EMERGENCY MEDICINE
Payer: COMMERCIAL

## 2020-01-08 VITALS
HEART RATE: 80 BPM | OXYGEN SATURATION: 100 % | RESPIRATION RATE: 18 BRPM | WEIGHT: 180 LBS | SYSTOLIC BLOOD PRESSURE: 110 MMHG | TEMPERATURE: 98.7 F | DIASTOLIC BLOOD PRESSURE: 70 MMHG | BODY MASS INDEX: 29.05 KG/M2

## 2020-01-08 DIAGNOSIS — G43.909 MIGRAINE: Primary | ICD-10-CM

## 2020-01-08 LAB
EXT PREG TEST URINE: NEGATIVE
EXT. CONTROL ED NAV: NORMAL

## 2020-01-08 PROCEDURE — 99283 EMERGENCY DEPT VISIT LOW MDM: CPT

## 2020-01-08 PROCEDURE — 96376 TX/PRO/DX INJ SAME DRUG ADON: CPT

## 2020-01-08 PROCEDURE — 99284 EMERGENCY DEPT VISIT MOD MDM: CPT | Performed by: EMERGENCY MEDICINE

## 2020-01-08 PROCEDURE — 81025 URINE PREGNANCY TEST: CPT | Performed by: EMERGENCY MEDICINE

## 2020-01-08 PROCEDURE — 96374 THER/PROPH/DIAG INJ IV PUSH: CPT

## 2020-01-08 PROCEDURE — 96361 HYDRATE IV INFUSION ADD-ON: CPT

## 2020-01-08 PROCEDURE — 96375 TX/PRO/DX INJ NEW DRUG ADDON: CPT

## 2020-01-08 RX ORDER — SUMATRIPTAN 5 MG/1
1 SPRAY NASAL EVERY 2 HOUR PRN
Qty: 1 INHALER | Refills: 0 | Status: SHIPPED | OUTPATIENT
Start: 2020-01-08 | End: 2020-06-30

## 2020-01-08 RX ORDER — DEXAMETHASONE SODIUM PHOSPHATE 4 MG/ML
10 INJECTION, SOLUTION INTRA-ARTICULAR; INTRALESIONAL; INTRAMUSCULAR; INTRAVENOUS; SOFT TISSUE ONCE
Status: COMPLETED | OUTPATIENT
Start: 2020-01-08 | End: 2020-01-08

## 2020-01-08 RX ORDER — DIPHENHYDRAMINE HYDROCHLORIDE 50 MG/ML
25 INJECTION INTRAMUSCULAR; INTRAVENOUS ONCE
Status: COMPLETED | OUTPATIENT
Start: 2020-01-08 | End: 2020-01-08

## 2020-01-08 RX ORDER — KETOROLAC TROMETHAMINE 30 MG/ML
15 INJECTION, SOLUTION INTRAMUSCULAR; INTRAVENOUS ONCE
Status: COMPLETED | OUTPATIENT
Start: 2020-01-08 | End: 2020-01-08

## 2020-01-08 RX ORDER — METOCLOPRAMIDE HYDROCHLORIDE 5 MG/ML
10 INJECTION INTRAMUSCULAR; INTRAVENOUS ONCE
Status: COMPLETED | OUTPATIENT
Start: 2020-01-08 | End: 2020-01-08

## 2020-01-08 RX ADMIN — DIPHENHYDRAMINE HYDROCHLORIDE 25 MG: 50 INJECTION, SOLUTION INTRAMUSCULAR; INTRAVENOUS at 19:23

## 2020-01-08 RX ADMIN — DEXAMETHASONE SODIUM PHOSPHATE 10 MG: 4 INJECTION, SOLUTION INTRAMUSCULAR; INTRAVENOUS at 19:57

## 2020-01-08 RX ADMIN — KETOROLAC TROMETHAMINE 15 MG: 30 INJECTION, SOLUTION INTRAMUSCULAR at 19:56

## 2020-01-08 RX ADMIN — DIPHENHYDRAMINE HYDROCHLORIDE 25 MG: 50 INJECTION, SOLUTION INTRAMUSCULAR; INTRAVENOUS at 20:17

## 2020-01-08 RX ADMIN — METOCLOPRAMIDE 10 MG: 5 INJECTION, SOLUTION INTRAMUSCULAR; INTRAVENOUS at 19:26

## 2020-01-08 RX ADMIN — SODIUM CHLORIDE 1000 ML: 0.9 INJECTION, SOLUTION INTRAVENOUS at 19:23

## 2020-01-09 NOTE — DISCHARGE INSTRUCTIONS
Try Imitrex nasal spray at the onset of headaches  Can use every 2 hours  Do not use more than 8 times per day

## 2020-01-09 NOTE — ED NOTES
Patient has c/o "being so uncomfortable, I cant take the reglan it makes me so restless"   Will make provider aware     Elina Eduardo RN  01/08/20 1955

## 2020-01-09 NOTE — ED NOTES
PT awake and alert, no distress noted  No other questions upon d/c       April Francis Neal RN  01/08/20 8261

## 2020-01-09 NOTE — ED PROVIDER NOTES
History  Chief Complaint   Patient presents with    Migraine     per pt "she has been having some migraine for 9 days now, she has a hx of chromic migraines but her meds have not been working "       History provided by:  Medical records and patient   used: No    Migraine   Location:  Frontal, temporal  Quality:  Throbbing  Severity:  Moderate  Onset quality:  Gradual  Duration:  9 days  Timing:  Constant  Progression:  Waxing and waning  Chronicity:  Recurrent  Context:  Cough, URI symptoms  Relieved by:  Nothing  Worsened by:  Lights, coughing  Ineffective treatments:  Fiorcet  Associated symptoms: congestion, cough, fatigue, headaches, rhinorrhea and vomiting    Associated symptoms: no abdominal pain, no chest pain, no fever, no loss of consciousness, no myalgias, no rash, no shortness of breath and no sore throat        Prior to Admission Medications   Prescriptions Last Dose Informant Patient Reported? Taking?    ALPRAZolam (XANAX) 0 25 mg tablet   No No   Sig: Take 2 tablets (0 5 mg total) by mouth 2 (two) times a day as needed for anxiety   Botulinum Toxin Type A (BOTOX) 200 units SOLR   Yes No   Sig: Inject IM in the physicians office   Butalbital-APAP-Caffeine -40 MG CAPS   No No   Sig: Take 1 tablet by mouth 2 (two) times a day as needed (migraine)   Probiotic Product (PRO-BIOTIC BLEND PO)   Yes No   Sig: Take by mouth   amitriptyline (ELAVIL) 50 mg tablet   Yes No   Sig: Take 1 tablet by mouth daily   escitalopram (LEXAPRO) 20 mg tablet   No No   Sig: Take 1 tablet (20 mg total) by mouth daily   hydrOXYzine HCL (ATARAX) 25 mg tablet   Yes No   latanoprost (XALATAN) 0 005 % ophthalmic solution   Yes No   Sig: INSTILL 1 DROP IN AFFECTED EYE AT BEDTIME   pantoprazole (PROTONIX) 40 mg tablet   No No   Sig: TAKE 1 TABLET BY MOUTH EVERY DAY   pantoprazole (PROTONIX) 40 mg tablet   No No   Sig: TAKE 1 TABLET BY MOUTH EVERY DAY      Facility-Administered Medications: None       Past Medical History:   Diagnosis Date    Abnormal Pap smear of cervix     RAMIREZ positive     Anxiety     Basal cell carcinoma     Depression     Migraine     Migraine     Neck pain     Pregnancy     Vertebral artery dissection (HCC)     Vulvovaginitis     Yeast infection        Past Surgical History:   Procedure Laterality Date     SECTION, LOW TRANSVERSE  2008    COLONOSCOPY      DILATION AND CURETTAGE OF UTERUS  2007    GYNECOLOGIC CRYOSURGERY      cervix    LASIK      SKIN LESION EXCISION      basal cell carcinoma of left cheek       Family History   Problem Relation Age of Onset    Hypertension Mother     Lung cancer Mother     Brain cancer Mother     Breast cancer Maternal Aunt         dx in 42's     No Known Problems Maternal Aunt      I have reviewed and agree with the history as documented  Social History     Tobacco Use    Smoking status: Never Smoker    Smokeless tobacco: Never Used   Substance Use Topics    Alcohol use: No    Drug use: No        Review of Systems   Constitutional: Positive for fatigue  Negative for fever  HENT: Positive for congestion and rhinorrhea  Negative for sore throat  Respiratory: Positive for cough  Negative for shortness of breath  Cardiovascular: Negative for chest pain  Gastrointestinal: Positive for vomiting  Negative for abdominal pain  Musculoskeletal: Negative for myalgias  Skin: Negative for rash  Neurological: Positive for headaches  Negative for loss of consciousness  All other systems reviewed and are negative  Physical Exam  Physical Exam   Constitutional: She is oriented to person, place, and time  She appears well-developed and well-nourished  HENT:   Head: Normocephalic and atraumatic  No temporal TTP bilaterally   Eyes: Conjunctivae are normal  Right eye exhibits no nystagmus  Left eye exhibits no nystagmus  Fundoscopic exam:       The right eye shows no papilledema          The left eye shows no papilledema  Neck: Normal range of motion  Neck supple  No neck rigidity  No Kernig's sign noted  Cardiovascular: Normal rate, regular rhythm, normal heart sounds and intact distal pulses  No murmur heard  Pulmonary/Chest: Effort normal and breath sounds normal  No respiratory distress  Abdominal: Soft  Musculoskeletal: Normal range of motion  She exhibits no deformity  Neurological: She is alert and oriented to person, place, and time  No cranial nerve deficit or sensory deficit  She exhibits normal muscle tone  She displays a negative Romberg sign  Coordination and gait normal  GCS eye subscore is 4  GCS verbal subscore is 5  GCS motor subscore is 6  Skin: Skin is warm and dry  She is not diaphoretic  Psychiatric: She has a normal mood and affect  Her behavior is normal  Judgment and thought content normal    Nursing note and vitals reviewed        Vital Signs  ED Triage Vitals [01/08/20 1655]   Temperature Pulse Respirations Blood Pressure SpO2   99 1 °F (37 3 °C) 105 18 104/61 100 %      Temp Source Heart Rate Source Patient Position - Orthostatic VS BP Location FiO2 (%)   Oral Monitor Sitting Left arm --      Pain Score       Worst Possible Pain           Vitals:    01/08/20 1655 01/08/20 2101   BP: 104/61 110/70   Pulse: 105 80   Patient Position - Orthostatic VS: Sitting Sitting         Visual Acuity      ED Medications  Medications   sodium chloride 0 9 % bolus 1,000 mL (0 mL Intravenous Stopped 1/8/20 2101)   metoclopramide (REGLAN) injection 10 mg (10 mg Intravenous Given 1/8/20 1926)   diphenhydrAMINE (BENADRYL) injection 25 mg (25 mg Intravenous Given 1/8/20 1923)   ketorolac (TORADOL) injection 15 mg (15 mg Intravenous Given 1/8/20 1956)   dexamethasone (DECADRON) injection 10 mg (10 mg Intravenous Given 1/8/20 1957)   diphenhydrAMINE (BENADRYL) injection 25 mg (25 mg Intravenous Given 1/8/20 2017)       Diagnostic Studies  Results Reviewed     Procedure Component Value Units Date/Time POCT pregnancy, urine [[id number]]  (Normal) Resulted:  01/08/20 1925    Lab Status:  Final result Updated:  01/08/20 1925     EXT PREG TEST UR (Ref: Negative) negative     Control valid                 No orders to display              Procedures  Procedures         ED Course  ED Course as of Jan 08 2153 Wed Jan 08, 2020 2130 Improved symptoms with ED treatment  HA at a 2 on scale to 10, comfortable with discharge  MDM  Number of Diagnoses or Management Options  Migraine:   Diagnosis management comments: 41 yo female with h/o migraines presents with usual migraine symptoms unrelieved by home meds, denies trauma, denies fever, denies sudden onset or vision/speech/motor changes  Pt neuro intact  With marked improvement of symptoms after ED treatment  Feels safe with discharge  Low suspicion for SAH, CO toxicity, Giant Cell, or meningitis  Amount and/or Complexity of Data Reviewed  Review and summarize past medical records: yes    Risk of Complications, Morbidity, and/or Mortality  Presenting problems: moderate  Diagnostic procedures: moderate  Management options: moderate    Patient Progress  Patient progress: improved        Disposition  Final diagnoses:   Migraine     Time reflects when diagnosis was documented in both MDM as applicable and the Disposition within this note     Time User Action Codes Description Comment    1/8/2020  9:31 PM Caitlin, [place] Add [G43 909] Migraine       ED Disposition     ED Disposition Condition Date/Time Comment    Discharge Stable Wed Jan 8, 2020  9:34 PM Laurel Bear discharge to home/self care              Follow-up Information     Follow up With Specialties Details Why Contact Info Additional Information    Mireya Torrez MD Internal Medicine In 1 week  [address]       [address] Emergency Department Emergency Medicine  As needed, If symptoms worsen [address] [facility] [address] [place] [address]  [phone] [facility] ED, [address], [place], [place], [address]          Patient's Medications   Discharge Prescriptions    SUMATRIPTAN (IMITREX) 5 MG/ACT NASAL SPRAY    1 spray (5 mg total) into each nostril every 2 (two) hours as needed for migraine (not to exceed 40mg per day)       Start Date: 1/8/2020  End Date: --       Order Dose: 5 mg       Quantity: 1 Inhaler    Refills: 0     No discharge procedures on file      ED Provider  Electronically Signed by           Chrissy Headley MD  01/08/20 3036

## 2020-01-19 DIAGNOSIS — K21.9 GASTROESOPHAGEAL REFLUX DISEASE, ESOPHAGITIS PRESENCE NOT SPECIFIED: ICD-10-CM

## 2020-01-20 RX ORDER — PANTOPRAZOLE SODIUM 40 MG/1
TABLET, DELAYED RELEASE ORAL
Qty: 30 TABLET | Refills: 4 | Status: SHIPPED | OUTPATIENT
Start: 2020-01-20 | End: 2020-06-24 | Stop reason: SDUPTHER

## 2020-02-11 DIAGNOSIS — F41.8 MIXED ANXIETY DEPRESSIVE DISORDER: ICD-10-CM

## 2020-02-11 NOTE — TELEPHONE ENCOUNTER
Report Prepared: 2020   Date Range: 2019 - 2020       Download PDF      Download CSV    Cholo Mendieta     Linked Records  Name  ID Gender Address   Meche Leal 1975 1 female 36 [address] [place] [address] [address] [place] [id number]   Report Criteria  First Nameprimo  Last Namekngena  DOB1975  Summary      Summary  Total Prescriptions   6   Total Private Pay   1   Total Prescribers   5   Total Pharmacies   1    Opioids* (excluding buprenorphine)  Current Qty   0 0   Current MME/day   0 0   30 Day Avg MME/day   2 28    Buprenorphine*  Current Qty   0 0   Current mg/day   0 0   30 Day Avg mg/day   0 0    Prescriptions    Filled  ID  Written  Drug  QTY  Days  Prescriber  Rx #  Pharmacy *  Refills  Daily Dose  Pymt Type      2020  1  2020  PROMETHAZINE-CODEINE SYRUP  120 0  6  GE MAN  [id number]  [facility] ([id number])  0  6 0 MME  [place]    01/10/2020  1  01/10/2020  PROMETHAZINE-CODEINE SYRUP  120 0  30  SA ZER  [id number]  [facility] ([id number])  0  1 2 MME  [place]    2019  1  2019  [id number] -40  45 0  8  AS CHR  [id number]  [facility] ([id number])  0   [place]    2019  1  2019  ALPRAZOLAM 0 25 MG TABLET  30 0  8  LI BLO  [id number]  [facility] ([id number])  1   [place]    2019  1  2019  ALPRAZOLAM 0 25 MG TABLET  30 0  8  LI BLO  [id number]  [facility] ([id number])  0   [place]    2019  1  2019  DIAZEPAM 10 MG TABLET  4 0  1  HAILEY BAS  [id number]  [facility] ([id number])  0   [place]    *Pharmacy is created using a combination of pharmacy name and the last four digits of the pharmacy license number  *Per CDC guidance, the MME conversion factors prescribed or provided as part of medication-assisted treatment for opioid use disorder should not be used to benchmark against dosage thresholds meant for opioids prescribed for pain   Buprenorphine products have no agreed upon morphine equivalency, and as partial opioid agonists, are not expected to be associated with overdose risk in the same dose-dependent manner as doses for full agonist opioids  MME = morphine milligram equivalents  mg = dose in milligrams  Prescribers    Name  Linda Fitzpatrick [address]  Zip  Phone    [address]  [place]  [address]  [place]  [place]  [address]     [facility]  [address]  [place]  [address]  [address] [facility] [address]  [address]  [place]  [address]     [address] [facility]  [address] [facility]  [place]  [address]  Zip  Phone    [address], [place] [phone] [address]  [place]  [address]  [phone]    ×   Contested Record Flag    Prescriber Delegate - Unlicensed Disclaimer:  Information from the [address] is protected health information and any information accessed must be treated as confidential  Any person who unintentionally or intentionally makes an unauthorized disclosure of information from the [address] will be subject to civil and criminal penalties as set forth in the ABC-MAP Act 2014-191, Act of Oct  27, 2014, P L  2911  The information in the [address] may contain errors resulting from the reporting of information received  Additional independent verification of patient profile information with pharmacies and prescribers may sometimes be prudent or necessary       Powered By       [place] Linda [address]    [address], [address] [place] [address]

## 2020-02-12 DIAGNOSIS — F41.8 MIXED ANXIETY DEPRESSIVE DISORDER: ICD-10-CM

## 2020-02-12 RX ORDER — ALPRAZOLAM 0.25 MG/1
0.5 TABLET ORAL 2 TIMES DAILY PRN
Qty: 30 TABLET | Refills: 0 | Status: SHIPPED | OUTPATIENT
Start: 2020-02-12 | End: 2020-03-25 | Stop reason: SDUPTHER

## 2020-02-12 RX ORDER — ALPRAZOLAM 0.25 MG/1
0.5 TABLET ORAL 2 TIMES DAILY PRN
Qty: 30 TABLET | Refills: 0 | Status: SHIPPED | OUTPATIENT
Start: 2020-02-12 | End: 2020-02-12 | Stop reason: SDUPTHER

## 2020-03-13 DIAGNOSIS — F41.8 MIXED ANXIETY DEPRESSIVE DISORDER: ICD-10-CM

## 2020-03-13 RX ORDER — ESCITALOPRAM OXALATE 20 MG/1
20 TABLET ORAL DAILY
Qty: 30 TABLET | Refills: 0 | Status: SHIPPED | OUTPATIENT
Start: 2020-03-13 | End: 2020-03-25 | Stop reason: SDUPTHER

## 2020-03-25 ENCOUNTER — TELEMEDICINE (OUTPATIENT)
Dept: INTERNAL MEDICINE CLINIC | Age: 45
End: 2020-03-25
Payer: COMMERCIAL

## 2020-03-25 DIAGNOSIS — G43.709 CHRONIC MIGRAINE WITHOUT AURA WITHOUT STATUS MIGRAINOSUS, NOT INTRACTABLE: ICD-10-CM

## 2020-03-25 DIAGNOSIS — F32.A ANXIETY AND DEPRESSION: Primary | ICD-10-CM

## 2020-03-25 DIAGNOSIS — F41.9 ANXIETY AND DEPRESSION: Primary | ICD-10-CM

## 2020-03-25 PROCEDURE — G2012 BRIEF CHECK IN BY MD/QHP: HCPCS | Performed by: INTERNAL MEDICINE

## 2020-03-25 RX ORDER — ALPRAZOLAM 0.25 MG/1
0.5 TABLET ORAL 2 TIMES DAILY PRN
Qty: 30 TABLET | Refills: 5 | Status: SHIPPED | OUTPATIENT
Start: 2020-03-25 | End: 2020-08-12 | Stop reason: SDUPTHER

## 2020-03-25 RX ORDER — ESCITALOPRAM OXALATE 20 MG/1
20 TABLET ORAL DAILY
Qty: 30 TABLET | Refills: 0 | Status: SHIPPED | OUTPATIENT
Start: 2020-03-25 | End: 2020-05-06

## 2020-03-25 NOTE — PATIENT INSTRUCTIONS
Anxiety   AMBULATORY CARE:   Anxiety  is a condition that causes you to feel extremely worried or nervous  The feelings are so strong that they can cause problems with your daily activities or sleep  Anxiety may be triggered by something you fear, or it may happen without a cause  Family or work stress, smoking, caffeine, and alcohol can increase your risk for anxiety  Certain medicines or health conditions can also increase your risk  Anxiety can become a long-term condition if it is not managed or treated  Common signs and symptoms that may occur with anxiety:   · Fatigue or muscle tightness     · Shaking, restlessness, or irritability     · Problems focusing     · Trouble sleeping     · Feeling jumpy, easily startled, or dizzy     · Rapid heartbeat or shortness of breath  Call 911 if:   · You have chest pain, tightness, or heaviness that may spread to your shoulders, arms, jaw, neck, or back  · You feel like hurting yourself or someone else  Contact your healthcare provider if:   · Your symptoms get worse or do not get better with treatment  · You think your medicine may be causing side effects  · Your anxiety keeps you from doing your regular daily activities  · You have new symptoms since your last visit  · You have questions or concerns about your condition or care  Treatment for anxiety  may include medicines to help you feel calm and relaxed, and decrease your symptoms  Medicines are usually given together with therapy or other treatments  Manage anxiety:   · Talk to someone about your anxiety  Your healthcare provider may suggest counseling  Cognitive behavioral therapy can help you understand and change how you react to events that trigger your symptoms  You might feel more comfortable talking with a friend or family member about your anxiety  Choose someone you know will be supportive and encouraging  · Find ways to relax    Activities such as exercise, meditation, or listening to music can help you relax  Spend time with friends, or do things you enjoy  · Practice deep breathing  Deep breathing can help you relax when you feel anxious  Focus on taking slow, deep breaths several times a day, or during an anxiety attack  Breathe in through your nose and out through your mouth  · Create a regular sleep routine  Regular sleep can help you feel calmer during the day  Go to sleep and wake up at the same times every day  Do not watch television or use the computer right before bed  Your room should be comfortable, dark, and quiet  · Eat a variety of healthy foods  Healthy foods include fruits, vegetables, low-fat dairy products, lean meats, fish, whole-grain breads, and cooked beans  Healthy foods can help you feel less anxious and have more energy  · Exercise regularly  Exercise can increase your energy level  Exercise may also lift your mood and help you sleep better  Your healthcare provider can help you create an exercise plan  · Do not smoke  Nicotine and other chemicals in cigarettes and cigars can increase anxiety  Ask your healthcare provider for information if you currently smoke and need help to quit  E-cigarettes or smokeless tobacco still contain nicotine  Talk to your healthcare provider before you use these products  · Do not have caffeine  Caffeine can make your symptoms worse  Do not have foods or drinks that are meant to increase your energy level  · Limit or do not drink alcohol  Ask your healthcare provider if alcohol is safe for you  You may not be able to drink alcohol if you take certain anxiety or depression medicines  Limit alcohol to 1 drink per day if you are a woman  Limit alcohol to 2 drinks per day if you are a man  A drink of alcohol is 12 ounces of beer, 5 ounces of wine, or 1½ ounces of liquor  · Do not use drugs  Drugs can make your anxiety worse  It can also make anxiety hard to manage   Talk to your healthcare provider if you use drugs and want help to quit  Follow up with your healthcare provider as directed:  Write down your questions so you remember to ask them during your visits  © 2017 [address] Information is for End User's use only and may not be sold, redistributed or otherwise used for commercial purposes  All illustrations and images included in CareNotes® are the copyrighted property of A D A M , Inc  or Roman Davis  The above information is an  only  It is not intended as medical advice for individual conditions or treatments  Talk to your doctor, nurse or pharmacist before following any medical regimen to see if it is safe and effective for you

## 2020-03-25 NOTE — ASSESSMENT & PLAN NOTE
Her migraines are actually the biggest problem but she sees a doctor at [facility] for same and is on multiple injections for same    She has an appointment with them on April 18th

## 2020-03-25 NOTE — PROGRESS NOTES
Virtual Regular Visit    Problem List Items Addressed This Visit        Cardiovascular and Mediastinum    Chronic migraine without aura     Her migraines are actually the biggest problem but she sees a doctor at [facility] for same and is on multiple injections for same  She has an appointment with them on April 18th         Relevant Medications    Fremanezumab-vfrm (Ajovy) 225 MG/1 5ML SOSY    escitalopram (LEXAPRO) 20 mg tablet       Other    Anxiety and depression - Primary     Despite recent events and her father having MS and being in the hospital recently, she states that her mood has been quite stable and only takes a rare Xanax plus her usual Lexapro         Relevant Medications    escitalopram (LEXAPRO) 20 mg tablet    ALPRAZolam (XANAX) 0 25 mg tablet               Reason for visit is for recheck of her anxiety and depression and refills    Encounter provider Anat Teixeira MD    Provider located at [address] [place] [id number]      Recent Visits  No visits were found meeting these conditions  Showing recent visits within past 7 days and meeting all other requirements     Future Appointments  No visits were found meeting these conditions  Showing future appointments within next 150 days and meeting all other requirements        After connecting through [employer], the patient was identified by name and date of birth  David Hobson was informed that this is a telemedicine visit and that the visit is being conducted through telephone which may not be secure and therefore, might not be HIPAA-compliant  My office door was closed  No one else was in the room  She acknowledged consent and understanding of privacy and security of the video platform  The patient has agreed to participate and understands they can discontinue the visit at any time      Subjective  David Hobson is a 40 y o  female with chronic anxiety and depression and chronic migraines      Past Medical History:   Diagnosis Date    Abnormal Pap smear of cervix     RAMIREZ positive     Anxiety     Basal cell carcinoma     Depression     Migraine     Migraine     Neck pain     Pregnancy     Vertebral artery dissection (HCC)     Vulvovaginitis     Yeast infection        Past Surgical History:   Procedure Laterality Date     SECTION, LOW TRANSVERSE      COLONOSCOPY      DILATION AND CURETTAGE OF UTERUS      GYNECOLOGIC CRYOSURGERY      cervix    LASIK      SKIN LESION EXCISION      basal cell carcinoma of left cheek       Current Outpatient Medications   Medication Sig Dispense Refill    ALPRAZolam (XANAX) 0 25 mg tablet Take 2 tablets (0 5 mg total) by mouth 2 (two) times a day as needed for anxiety 30 tablet 5    amitriptyline (ELAVIL) 50 mg tablet Take 1 tablet by mouth daily      Botulinum Toxin Type A (BOTOX) 200 units SOLR Inject IM in the physicians office      Butalbital-APAP-Caffeine -40 MG CAPS Take 1 tablet by mouth 2 (two) times a day as needed (migraine) 50 capsule 0    escitalopram (LEXAPRO) 20 mg tablet Take 1 tablet (20 mg total) by mouth daily 30 tablet 0    Fremanezumab-vfrm (Ajovy) 225 MG/1 5ML SOSY 1 5 mL      hydrOXYzine HCL (ATARAX) 25 mg tablet       latanoprost (XALATAN) 0 005 % ophthalmic solution INSTILL 1 DROP IN AFFECTED EYE AT BEDTIME  2    pantoprazole (PROTONIX) 40 mg tablet TAKE 1 TABLET BY MOUTH EVERY DAY 30 tablet 4    Probiotic Product (PRO-BIOTIC BLEND PO) Take by mouth      SUMAtriptan (IMITREX) 5 MG/ACT nasal spray 1 spray (5 mg total) into each nostril every 2 (two) hours as needed for migraine (not to exceed 40mg per day) 1 Inhaler 0     No current facility-administered medications for this visit  No Known Allergies    Review of Systems   Constitutional: Negative for chills, fatigue, fever and unexpected weight change     HENT: Negative for congestion, ear pain, hearing loss, postnasal drip, sinus pressure, sore throat, trouble swallowing and voice change  Eyes: Negative for visual disturbance  Respiratory: Negative for cough, chest tightness, shortness of breath and wheezing  Cardiovascular: Negative for chest pain, palpitations and leg swelling  Gastrointestinal: Negative for abdominal distention, abdominal pain, anal bleeding, blood in stool, constipation, diarrhea and nausea  Endocrine: Negative for cold intolerance, polydipsia, polyphagia and polyuria  Genitourinary: Negative for dysuria, flank pain, frequency, hematuria and urgency  Musculoskeletal: Negative for arthralgias, back pain, gait problem, joint swelling, myalgias and neck pain  Skin: Negative for rash  Allergic/Immunologic: Negative for immunocompromised state  Neurological: Negative for dizziness, syncope, facial asymmetry, weakness, light-headedness, numbness and headaches  Hematological: Negative for adenopathy  Psychiatric/Behavioral: Negative for confusion, sleep disturbance and suicidal ideas  I spent 15 minutes with the patient during this visit

## 2020-03-25 NOTE — ASSESSMENT & PLAN NOTE
Despite recent events and her father having MS and being in the hospital recently, she states that her mood has been quite stable and only takes a rare Xanax plus her usual Lexapro

## 2020-04-28 DIAGNOSIS — B37.3 VAGINAL YEAST INFECTION: Primary | ICD-10-CM

## 2020-04-28 RX ORDER — FLUCONAZOLE 150 MG/1
150 TABLET ORAL EVERY OTHER DAY
Qty: 2 TABLET | Refills: 0 | Status: SHIPPED | OUTPATIENT
Start: 2020-04-28 | End: 2020-05-01

## 2020-05-06 DIAGNOSIS — F41.9 ANXIETY AND DEPRESSION: ICD-10-CM

## 2020-05-06 DIAGNOSIS — F32.A ANXIETY AND DEPRESSION: ICD-10-CM

## 2020-05-06 RX ORDER — ESCITALOPRAM OXALATE 20 MG/1
TABLET ORAL
Qty: 30 TABLET | Refills: 0 | Status: SHIPPED | OUTPATIENT
Start: 2020-05-06 | End: 2020-05-28

## 2020-05-28 ENCOUNTER — TELEPHONE (OUTPATIENT)
Dept: INTERNAL MEDICINE CLINIC | Age: 45
End: 2020-05-28

## 2020-05-28 DIAGNOSIS — F41.9 ANXIETY AND DEPRESSION: ICD-10-CM

## 2020-05-28 DIAGNOSIS — F32.A ANXIETY AND DEPRESSION: ICD-10-CM

## 2020-05-28 RX ORDER — ESCITALOPRAM OXALATE 20 MG/1
TABLET ORAL
Qty: 30 TABLET | Refills: 0 | Status: SHIPPED | OUTPATIENT
Start: 2020-05-28 | End: 2020-06-24

## 2020-05-29 DIAGNOSIS — F51.04 PSYCHOPHYSIOLOGICAL INSOMNIA: Primary | ICD-10-CM

## 2020-05-29 RX ORDER — MIRTAZAPINE 7.5 MG/1
7.5 TABLET, FILM COATED ORAL
Qty: 30 TABLET | Refills: 5 | Status: SHIPPED | OUTPATIENT
Start: 2020-05-29 | End: 2020-06-24

## 2020-06-11 DIAGNOSIS — B37.3 YEAST VAGINITIS: Primary | ICD-10-CM

## 2020-06-11 RX ORDER — FLUCONAZOLE 150 MG/1
150 TABLET ORAL EVERY OTHER DAY
Qty: 2 TABLET | Refills: 0 | Status: SHIPPED | OUTPATIENT
Start: 2020-06-11 | End: 2020-06-14

## 2020-06-24 ENCOUNTER — OFFICE VISIT (OUTPATIENT)
Dept: INTERNAL MEDICINE CLINIC | Age: 45
End: 2020-06-24
Payer: COMMERCIAL

## 2020-06-24 VITALS
BODY MASS INDEX: 30.22 KG/M2 | WEIGHT: 188 LBS | HEART RATE: 94 BPM | HEIGHT: 66 IN | OXYGEN SATURATION: 98 % | DIASTOLIC BLOOD PRESSURE: 60 MMHG | SYSTOLIC BLOOD PRESSURE: 100 MMHG | TEMPERATURE: 98.3 F

## 2020-06-24 DIAGNOSIS — F32.A ANXIETY AND DEPRESSION: ICD-10-CM

## 2020-06-24 DIAGNOSIS — E66.9 OBESITY (BMI 30.0-34.9): ICD-10-CM

## 2020-06-24 DIAGNOSIS — G47.09 OTHER INSOMNIA: Primary | ICD-10-CM

## 2020-06-24 DIAGNOSIS — F41.9 ANXIETY AND DEPRESSION: ICD-10-CM

## 2020-06-24 DIAGNOSIS — K21.9 GASTROESOPHAGEAL REFLUX DISEASE, ESOPHAGITIS PRESENCE NOT SPECIFIED: ICD-10-CM

## 2020-06-24 PROCEDURE — 3008F BODY MASS INDEX DOCD: CPT | Performed by: INTERNAL MEDICINE

## 2020-06-24 PROCEDURE — 99213 OFFICE O/P EST LOW 20 MIN: CPT | Performed by: INTERNAL MEDICINE

## 2020-06-24 PROCEDURE — 1036F TOBACCO NON-USER: CPT | Performed by: INTERNAL MEDICINE

## 2020-06-24 RX ORDER — ZOLPIDEM TARTRATE 5 MG/1
5 TABLET ORAL
Qty: 15 TABLET | Refills: 0 | Status: SHIPPED | OUTPATIENT
Start: 2020-06-24 | End: 2020-07-24 | Stop reason: SDUPTHER

## 2020-06-24 RX ORDER — PANTOPRAZOLE SODIUM 40 MG/1
40 TABLET, DELAYED RELEASE ORAL DAILY
Qty: 30 TABLET | Refills: 4 | Status: SHIPPED | OUTPATIENT
Start: 2020-06-24 | End: 2020-09-18 | Stop reason: SDUPTHER

## 2020-06-24 RX ORDER — ESCITALOPRAM OXALATE 20 MG/1
TABLET ORAL
Qty: 30 TABLET | Refills: 0 | Status: SHIPPED | OUTPATIENT
Start: 2020-06-24 | End: 2020-07-24

## 2020-06-30 ENCOUNTER — ANNUAL EXAM (OUTPATIENT)
Dept: OBGYN CLINIC | Facility: CLINIC | Age: 45
End: 2020-06-30
Payer: COMMERCIAL

## 2020-06-30 VITALS
HEIGHT: 66 IN | WEIGHT: 187.6 LBS | TEMPERATURE: 97.5 F | DIASTOLIC BLOOD PRESSURE: 72 MMHG | SYSTOLIC BLOOD PRESSURE: 108 MMHG | BODY MASS INDEX: 30.15 KG/M2

## 2020-06-30 DIAGNOSIS — Z11.3 SCREENING EXAMINATION FOR STD (SEXUALLY TRANSMITTED DISEASE): ICD-10-CM

## 2020-06-30 DIAGNOSIS — Z01.419 ENCOUNTER FOR GYNECOLOGICAL EXAMINATION WITHOUT ABNORMAL FINDING: Primary | ICD-10-CM

## 2020-06-30 DIAGNOSIS — N89.8 VAGINAL ITCHING: ICD-10-CM

## 2020-06-30 DIAGNOSIS — Z12.39 BREAST CANCER SCREENING: ICD-10-CM

## 2020-06-30 DIAGNOSIS — R23.2 HOT FLASHES: ICD-10-CM

## 2020-06-30 PROCEDURE — 87661 TRICHOMONAS VAGINALIS AMPLIF: CPT | Performed by: PHYSICIAN ASSISTANT

## 2020-06-30 PROCEDURE — G0145 SCR C/V CYTO,THINLAYER,RESCR: HCPCS | Performed by: PHYSICIAN ASSISTANT

## 2020-06-30 PROCEDURE — 99396 PREV VISIT EST AGE 40-64: CPT | Performed by: PHYSICIAN ASSISTANT

## 2020-06-30 PROCEDURE — 87591 N.GONORRHOEAE DNA AMP PROB: CPT | Performed by: PHYSICIAN ASSISTANT

## 2020-06-30 PROCEDURE — 87491 CHLMYD TRACH DNA AMP PROBE: CPT | Performed by: PHYSICIAN ASSISTANT

## 2020-06-30 PROCEDURE — 87070 CULTURE OTHR SPECIMN AEROBIC: CPT | Performed by: PHYSICIAN ASSISTANT

## 2020-06-30 PROCEDURE — 87624 HPV HI-RISK TYP POOLED RSLT: CPT | Performed by: PHYSICIAN ASSISTANT

## 2020-07-01 LAB
HPV HR 12 DNA CVX QL NAA+PROBE: NEGATIVE
HPV16 DNA CVX QL NAA+PROBE: NEGATIVE
HPV18 DNA CVX QL NAA+PROBE: NEGATIVE

## 2020-07-02 LAB
C TRACH DNA SPEC QL NAA+PROBE: NEGATIVE
N GONORRHOEA DNA SPEC QL NAA+PROBE: NEGATIVE

## 2020-07-04 LAB
BACTERIA GENITAL AEROBE CULT: NORMAL
T VAGINALIS RRNA SPEC QL NAA+PROBE: NEGATIVE

## 2020-07-06 LAB
LAB AP GYN PRIMARY INTERPRETATION: NORMAL
Lab: NORMAL

## 2020-07-09 ENCOUNTER — OFFICE VISIT (OUTPATIENT)
Dept: INTERNAL MEDICINE CLINIC | Age: 45
End: 2020-07-09
Payer: COMMERCIAL

## 2020-07-09 VITALS
BODY MASS INDEX: 30.57 KG/M2 | DIASTOLIC BLOOD PRESSURE: 54 MMHG | TEMPERATURE: 97.8 F | HEIGHT: 66 IN | OXYGEN SATURATION: 97 % | SYSTOLIC BLOOD PRESSURE: 84 MMHG | WEIGHT: 190.2 LBS | HEART RATE: 97 BPM

## 2020-07-09 DIAGNOSIS — G25.81 RESTLESS LEGS SYNDROME (RLS): Primary | ICD-10-CM

## 2020-07-09 DIAGNOSIS — G47.09 OTHER INSOMNIA: ICD-10-CM

## 2020-07-09 PROCEDURE — 1036F TOBACCO NON-USER: CPT | Performed by: INTERNAL MEDICINE

## 2020-07-09 PROCEDURE — 99213 OFFICE O/P EST LOW 20 MIN: CPT | Performed by: INTERNAL MEDICINE

## 2020-07-09 PROCEDURE — 3008F BODY MASS INDEX DOCD: CPT | Performed by: INTERNAL MEDICINE

## 2020-07-09 RX ORDER — PRAMIPEXOLE DIHYDROCHLORIDE 0.25 MG/1
0.25 TABLET ORAL 3 TIMES DAILY
Qty: 90 TABLET | Refills: 0 | Status: SHIPPED | OUTPATIENT
Start: 2020-07-09 | End: 2020-08-03

## 2020-07-09 NOTE — PATIENT INSTRUCTIONS
Restless Legs Syndrome   AMBULATORY CARE:   Restless legs syndrome  (RLS) is a condition that causes a powerful urge to move your legs and feet  You may also have tingling, creeping, itching, or throbbing sensations in your legs  You may have discomfort or pain  Movement relieves the symptoms for a short time  RLS is usually worse late in the day and at night  Your symptoms may come and go for days or weeks at a time, and worsen during periods of stress  Contact your healthcare provider if:   · You cannot sleep because of your symptoms  · Your symptoms are getting worse  · You have questions or concerns about your condition or care  Medicines:   · Medicines  may help decrease your RLS symptoms  · Take your medicine as directed  Contact your healthcare provider if you think your medicine is not helping or if you have side effects  Tell him of her if you are allergic to any medicine  Keep a list of the medicines, vitamins, and herbs you take  Include the amounts, and when and why you take them  Bring the list or the pill bottles to follow-up visits  Carry your medicine list with you in case of an emergency  Manage your symptoms:   · Keep your legs warm  Wear thick socks or use an electric blanket  It may also help to take a hot bath or massage your legs before bedtime  · Exercise regularly  Moderate physical activity such as walking and stretching may help relieve your symptoms  Ask your healthcare provider about the best exercise plan for you  · Get enough sleep  You may need to go to bed earlier to get the sleep you need  Go to bed and wake up at the same time every day  · Do not drink caffeine or alcohol in the evening  Do not smoke or use tobacco products in the evening  Caffeine, alcohol, and tobacco can prevent you from sleeping well  Follow up with your healthcare provider as directed:  Write down your questions so you remember to ask them during your visits     © 2017 [employer] [address] is for End User's use only and may not be sold, redistributed or otherwise used for commercial purposes  All illustrations and images included in CareNotes® are the copyrighted property of A D A M , Inc  or Roman Davis  The above information is an  only  It is not intended as medical advice for individual conditions or treatments  Talk to your doctor, nurse or pharmacist before following any medical regimen to see if it is safe and effective for you

## 2020-07-09 NOTE — PROGRESS NOTES
Assessment/Plan:    Restless legs syndrome (RLS)  Patient is complaining of restless legs which is gradually getting worse and extending up her legs  It is interfering with her sleep and she does occasionally take a Ambien for insomnia  Try Mirapex and she will go for her blood work    Other insomnia  She is under significant stress due to placing her father with severe Parkinson's in an SNF  But she is only taking a rare Ambien       Diagnoses and all orders for this visit:    Restless legs syndrome (RLS)  -     pramipexole (MIRAPEX) 0 25 mg tablet; Take 1 tablet (0 25 mg total) by mouth 3 (three) times a day    Other insomnia          Subjective:      Patient ID: Mare Ray is a 40 y o  female  Patient has had increasing restless legs that is gradually extending up her legs on a daily basis  She has no symptoms of Parkinson's at present but is very stressed over her father's health and placement issues  She has not gone for the blood work that was previously ordered but did see her gynecologist   Although her sleep is poor she has only taken an occasional Ambien to get rest every few days          Review of Systems   Constitutional: Negative for chills, fatigue, fever and unexpected weight change  HENT: Negative for congestion, ear pain, hearing loss, postnasal drip, sinus pressure, sore throat, trouble swallowing and voice change  Eyes: Negative for visual disturbance  Respiratory: Negative for cough, chest tightness, shortness of breath and wheezing  Cardiovascular: Negative for chest pain, palpitations and leg swelling  Gastrointestinal: Negative for abdominal distention, abdominal pain, anal bleeding, blood in stool, constipation, diarrhea and nausea  Endocrine: Negative for cold intolerance, polydipsia, polyphagia and polyuria  Genitourinary: Negative for dysuria, flank pain, frequency, hematuria and urgency     Musculoskeletal: Negative for arthralgias, back pain, gait problem, joint swelling, myalgias and neck pain  Skin: Negative for rash  Allergic/Immunologic: Negative for immunocompromised state  Neurological: Positive for headaches  Negative for dizziness, tremors, syncope, facial asymmetry, weakness, light-headedness and numbness  Restless legs   Hematological: Negative for adenopathy  Psychiatric/Behavioral: Positive for sleep disturbance  Negative for confusion and suicidal ideas  The patient is nervous/anxious  Objective:      BP (!) 84/54 (BP Location: Left arm, Patient Position: Sitting)   Pulse 97   Temp 97 8 °F (36 6 °C) (Tympanic)   Ht 5' 6" (1 676 m)   Wt 86 3 kg (190 lb 3 2 oz)   LMP 06/23/2020 (Exact Date)   SpO2 97%   BMI 30 70 kg/m²          Physical Exam   Constitutional: She is oriented to person, place, and time  She appears well-developed and well-nourished  No distress  HENT:   Right Ear: External ear normal    Left Ear: External ear normal    Nose: Nose normal    Mouth/Throat: Oropharynx is clear and moist  No oropharyngeal exudate  Eyes: Pupils are equal, round, and reactive to light  EOM are normal    Neck: Normal range of motion  Neck supple  No JVD present  No thyromegaly present  Cardiovascular: Normal rate, regular rhythm, normal heart sounds and intact distal pulses  Exam reveals no gallop  No murmur heard  Pulmonary/Chest: Effort normal and breath sounds normal  No respiratory distress  She has no wheezes  She has no rales  Abdominal: Soft  Bowel sounds are normal  She exhibits no distension and no mass  There is no tenderness  Musculoskeletal: Normal range of motion  She exhibits no tenderness  Lymphadenopathy:     She has no cervical adenopathy  Neurological: She is alert and oriented to person, place, and time  No cranial nerve deficit  Coordination normal    Skin: No rash noted  Psychiatric: She has a normal mood and affect   Her behavior is normal  Judgment and thought content normal

## 2020-07-09 NOTE — ASSESSMENT & PLAN NOTE
Patient is complaining of restless legs which is gradually getting worse and extending up her legs  It is interfering with her sleep and she does occasionally take a Ambien for insomnia    Try Mirapex and she will go for her blood work

## 2020-07-09 NOTE — ASSESSMENT & PLAN NOTE
She is under significant stress due to placing her father with severe Parkinson's in an SNF    But she is only taking a rare Ambien

## 2020-07-14 LAB
ALBUMIN SERPL-MCNC: 4 G/DL (ref 3.6–5.1)
ALBUMIN/GLOB SERPL: 1.3 (CALC) (ref 1–2.5)
ALP SERPL-CCNC: 75 U/L (ref 31–125)
ALT SERPL-CCNC: 12 U/L (ref 6–29)
AST SERPL-CCNC: 18 U/L (ref 10–30)
BASOPHILS # BLD AUTO: 31 CELLS/UL (ref 0–200)
BASOPHILS NFR BLD AUTO: 0.5 %
BILIRUB SERPL-MCNC: 0.4 MG/DL (ref 0.2–1.2)
BUN SERPL-MCNC: 11 MG/DL (ref 7–25)
BUN/CREAT SERPL: NORMAL (CALC) (ref 6–22)
CALCIUM SERPL-MCNC: 9 MG/DL (ref 8.6–10.2)
CHLORIDE SERPL-SCNC: 104 MMOL/L (ref 98–110)
CHOLEST SERPL-MCNC: 185 MG/DL
CHOLEST/HDLC SERPL: 2.8 (CALC)
CO2 SERPL-SCNC: 29 MMOL/L (ref 20–32)
CREAT SERPL-MCNC: 0.87 MG/DL (ref 0.5–1.1)
EOSINOPHIL # BLD AUTO: 153 CELLS/UL (ref 15–500)
EOSINOPHIL NFR BLD AUTO: 2.5 %
ERYTHROCYTE [DISTWIDTH] IN BLOOD BY AUTOMATED COUNT: 13 % (ref 11–15)
GLOBULIN SER CALC-MCNC: 3 G/DL (CALC) (ref 1.9–3.7)
GLUCOSE SERPL-MCNC: 91 MG/DL (ref 65–99)
HBV SURFACE AG SERPL QL IA: NORMAL
HCT VFR BLD AUTO: 40.6 % (ref 35–45)
HCV AB S/CO SERPL IA: 0.01
HCV AB SERPL QL IA: NORMAL
HDLC SERPL-MCNC: 65 MG/DL
HGB BLD-MCNC: 13.5 G/DL (ref 11.7–15.5)
HIV 1+2 AB+HIV1 P24 AG SERPL QL IA: NORMAL
LDLC SERPL CALC-MCNC: 101 MG/DL (CALC)
LYMPHOCYTES # BLD AUTO: 1781 CELLS/UL (ref 850–3900)
LYMPHOCYTES NFR BLD AUTO: 29.2 %
MCH RBC QN AUTO: 28.8 PG (ref 27–33)
MCHC RBC AUTO-ENTMCNC: 33.3 G/DL (ref 32–36)
MCV RBC AUTO: 86.6 FL (ref 80–100)
MONOCYTES # BLD AUTO: 354 CELLS/UL (ref 200–950)
MONOCYTES NFR BLD AUTO: 5.8 %
NEUTROPHILS # BLD AUTO: 3782 CELLS/UL (ref 1500–7800)
NEUTROPHILS NFR BLD AUTO: 62 %
NONHDLC SERPL-MCNC: 120 MG/DL (CALC)
PLATELET # BLD AUTO: 225 THOUSAND/UL (ref 140–400)
PMV BLD REES-ECKER: 9.2 FL (ref 7.5–12.5)
POTASSIUM SERPL-SCNC: 4.2 MMOL/L (ref 3.5–5.3)
PROT SERPL-MCNC: 7 G/DL (ref 6.1–8.1)
RBC # BLD AUTO: 4.69 MILLION/UL (ref 3.8–5.1)
RPR SER QL: NORMAL
SL AMB EGFR AFRICAN AMERICAN: 94 ML/MIN/1.73M2
SL AMB EGFR NON AFRICAN AMERICAN: 81 ML/MIN/1.73M2
SODIUM SERPL-SCNC: 139 MMOL/L (ref 135–146)
T4 FREE SERPL-MCNC: 1 NG/DL (ref 0.8–1.8)
TRIGL SERPL-MCNC: 97 MG/DL
TSH SERPL-ACNC: 1.4 MIU/L
WBC # BLD AUTO: 6.1 THOUSAND/UL (ref 3.8–10.8)

## 2020-07-24 DIAGNOSIS — F41.9 ANXIETY AND DEPRESSION: ICD-10-CM

## 2020-07-24 DIAGNOSIS — F32.A ANXIETY AND DEPRESSION: ICD-10-CM

## 2020-07-24 DIAGNOSIS — G47.09 OTHER INSOMNIA: ICD-10-CM

## 2020-07-24 RX ORDER — ESCITALOPRAM OXALATE 20 MG/1
TABLET ORAL
Qty: 30 TABLET | Refills: 0 | Status: SHIPPED | OUTPATIENT
Start: 2020-07-24 | End: 2020-08-17

## 2020-07-24 RX ORDER — ZOLPIDEM TARTRATE 10 MG/1
10 TABLET ORAL
Qty: 30 TABLET | Refills: 1 | Status: SHIPPED | OUTPATIENT
Start: 2020-07-24 | End: 2020-10-01 | Stop reason: SDUPTHER

## 2020-07-24 NOTE — TELEPHONE ENCOUNTER
·   Home  ? Dashboard  ?  Announcements    RxSearch  ? Patient Request  ? Bulk Patient Search  ? Requests History  ? MyRx    User Profile  ? My Profile  ? Default PMPi States  ? Password Reset  ? Log Out    Training  ? [employer] User Guide  ? Help    PDMP Links  ? Terms and Conditions  ? Clinical Resources  ? [place] Prescribing an     ? Report Suspicious     ? More Links    · kavya Wild  ? My Profile  ? Default PMPi States  ? Password Reset  ?  Log Out  RxSearch  > Patient Request      Support: [phone]    Back    Patient Report    Refine Search   Report Prepared: 2020   Date Range: 2019 - 2020       Download PDF      Download CSV    Zachary Pace     Linked Records  Name  ID Gender Address   Jamarcus Russ 1975 1 female [address] [place] [address]   Report Criteria  First Namejessica  Last Nameknight  DOB1975  Summary      Summary  Total Prescriptions   8   Total Private Pay   1   Total Prescribers   4   Total Pharmacies   1    Opioids* (excluding buprenorphine)  Current Qty   0 0   Current MME/day   0 0   30 Day Avg MME/day   0 0    Buprenorphine*  Current Qty   0 0   Current mg/day   0 0   30 Day Avg mg/day   0 0    Prescriptions    Filled  ID  Written  Drug  QTY  Days  Prescriber  Rx #  Pharmacy *  Refills  Daily Dose  Pymt Type      2020  1  2020  ZOLPIDEM TARTRATE 5 MG TABLET  15 0  15  [facility]  [id number]  [place] ([id number])  0   [place]    2020  1  2020  ALPRAZOLAM 0 25 MG TABLET  30 0  7  [facility]  [id number]  [facility] ([id number])  0   [place]    2020  1  2020  ALPRAZOLAM 0 25 MG TABLET  30 0  15  [facility]  [id number]  [facility] ([id number])  0   [place]    2020  1  2020  PROMETHAZINE-CODEINE SYRUP  120 0  6  GE MAN  [id number]  [facility]NS ([id number])  0  6 0 MME  [place]    01/10/2020  1  01/10/2020  PROMETHAZINE-CODEINE SYRUP  120 0  30  SA ZER  [id number]  P[facility]NS ([id number])  0  1 2 MME  [place]    2019  1  2019 [id number]  45 0  8  AS CHR  [id number]  [facility] ([id number])  0   [place]    11/06/2019  1  08/13/2019  ALPRAZOLAM 0 25 MG TABLET  30 0  8  [facility]  [id number]  [facility] ([id number])  1   [place]    08/13/2019  1  08/13/2019  ALPRAZOLAM 0 25 MG TABLET  30 0  8  [facility]  [id number]  [facility] ([id number])  0   [place]    *Pharmacy is created using a combination of pharmacy name and the last four digits of the pharmacy license number  *Per CDC guidance, the MME conversion factors prescribed or provided as part of medication-assisted treatment for opioid use disorder should not be used to benchmark against dosage thresholds meant for opioids prescribed for pain  Buprenorphine products have no agreed upon morphine equivalency, and as partial opioid agonists, are not expected to be associated with overdose risk in the same dose-dependent manner as doses for full agonist opioids  MME = morphine milligram equivalents  mg = dose in milligrams  Prescribers    Name  Linda Fitzpatrick [address]  Zip  Phone    Edu Gonzáles MD  [facility]  [place]  [address]  [address]  [address]  [place]  [address]     [address] [facility]  [address] [facility]  [place]  [address]  Zip  Phone    [address]  [phone] [facility]  [place]  [place]  [address]  [phone]    ×   Contested Record Flag    Prescriber Delegate - Unlicensed Disclaimer:  Information from the [address] is protected health information and any information accessed must be treated as confidential  Any person who unintentionally or intentionally makes an unauthorized disclosure of information from the [address] will be subject to civil and criminal penalties as set forth in the ABC-MAP Act 2014-191, Act of Oct  27, 2014, P L  2911   The information in the [address] may contain errors resulting from the reporting of information received  Additional independent verification of patient profile information with pharmacies and prescribers may sometimes be prudent or necessary

## 2020-08-01 DIAGNOSIS — G25.81 RESTLESS LEGS SYNDROME (RLS): ICD-10-CM

## 2020-08-03 RX ORDER — PRAMIPEXOLE DIHYDROCHLORIDE 0.25 MG/1
TABLET ORAL
Qty: 90 TABLET | Refills: 0 | Status: SHIPPED | OUTPATIENT
Start: 2020-08-03 | End: 2020-08-31

## 2020-08-12 DIAGNOSIS — F41.9 ANXIETY AND DEPRESSION: ICD-10-CM

## 2020-08-12 DIAGNOSIS — F32.A ANXIETY AND DEPRESSION: ICD-10-CM

## 2020-08-12 RX ORDER — ALPRAZOLAM 0.25 MG/1
0.5 TABLET ORAL 2 TIMES DAILY PRN
Qty: 30 TABLET | Refills: 1 | Status: SHIPPED | OUTPATIENT
Start: 2020-08-12 | End: 2021-07-29 | Stop reason: SDUPTHER

## 2020-08-16 DIAGNOSIS — F32.A ANXIETY AND DEPRESSION: ICD-10-CM

## 2020-08-16 DIAGNOSIS — F41.9 ANXIETY AND DEPRESSION: ICD-10-CM

## 2020-08-17 RX ORDER — ESCITALOPRAM OXALATE 20 MG/1
TABLET ORAL
Qty: 30 TABLET | Refills: 0 | Status: SHIPPED | OUTPATIENT
Start: 2020-08-17 | End: 2020-09-02

## 2020-08-30 DIAGNOSIS — G25.81 RESTLESS LEGS SYNDROME (RLS): ICD-10-CM

## 2020-08-31 RX ORDER — PRAMIPEXOLE DIHYDROCHLORIDE 0.25 MG/1
TABLET ORAL
Qty: 90 TABLET | Refills: 3 | Status: SHIPPED | OUTPATIENT
Start: 2020-08-31 | End: 2021-01-18 | Stop reason: SDUPTHER

## 2020-09-02 DIAGNOSIS — F41.9 ANXIETY AND DEPRESSION: ICD-10-CM

## 2020-09-02 DIAGNOSIS — F32.A ANXIETY AND DEPRESSION: ICD-10-CM

## 2020-09-02 RX ORDER — ESCITALOPRAM OXALATE 20 MG/1
TABLET ORAL
Qty: 30 TABLET | Refills: 0 | Status: SHIPPED | OUTPATIENT
Start: 2020-09-02 | End: 2020-09-02

## 2020-09-02 RX ORDER — ESCITALOPRAM OXALATE 20 MG/1
20 TABLET ORAL DAILY
Qty: 30 TABLET | Refills: 0 | Status: SHIPPED | OUTPATIENT
Start: 2020-09-02 | End: 2020-12-15

## 2020-09-02 RX ORDER — ESCITALOPRAM OXALATE 20 MG/1
TABLET ORAL
Qty: 30 TABLET | Refills: 0 | Status: SHIPPED | OUTPATIENT
Start: 2020-09-02 | End: 2020-09-02 | Stop reason: SDUPTHER

## 2020-09-07 ENCOUNTER — APPOINTMENT (EMERGENCY)
Dept: CT IMAGING | Facility: HOSPITAL | Age: 45
End: 2020-09-07
Payer: COMMERCIAL

## 2020-09-07 ENCOUNTER — HOSPITAL ENCOUNTER (EMERGENCY)
Facility: HOSPITAL | Age: 45
Discharge: HOME | End: 2020-09-07
Attending: EMERGENCY MEDICINE | Admitting: EMERGENCY MEDICINE
Payer: COMMERCIAL

## 2020-09-07 VITALS
OXYGEN SATURATION: 99 % | BODY MASS INDEX: 30.12 KG/M2 | HEART RATE: 66 BPM | TEMPERATURE: 98.3 F | WEIGHT: 187.39 LBS | SYSTOLIC BLOOD PRESSURE: 127 MMHG | HEIGHT: 66 IN | RESPIRATION RATE: 18 BRPM | DIASTOLIC BLOOD PRESSURE: 75 MMHG

## 2020-09-07 DIAGNOSIS — G43.909 MIGRAINE: Primary | ICD-10-CM

## 2020-09-07 LAB
ANION GAP SERPL CALCULATED.3IONS-SCNC: 6 MMOL/L (ref 4–13)
BASOPHILS # BLD AUTO: 0.03 THOUSANDS/ΜL (ref 0–0.1)
BASOPHILS NFR BLD AUTO: 0 % (ref 0–1)
BUN SERPL-MCNC: 16 MG/DL (ref 5–25)
CALCIUM SERPL-MCNC: 8.4 MG/DL (ref 8.3–10.1)
CHLORIDE SERPL-SCNC: 103 MMOL/L (ref 100–108)
CO2 SERPL-SCNC: 27 MMOL/L (ref 21–32)
CREAT SERPL-MCNC: 0.88 MG/DL (ref 0.6–1.3)
EOSINOPHIL # BLD AUTO: 0.23 THOUSAND/ΜL (ref 0–0.61)
EOSINOPHIL NFR BLD AUTO: 3 % (ref 0–6)
ERYTHROCYTE [DISTWIDTH] IN BLOOD BY AUTOMATED COUNT: 12.4 % (ref 11.6–15.1)
EXT PREG TEST URINE: NEGATIVE
EXT. CONTROL ED NAV: NORMAL
GFR SERPL CREATININE-BSD FRML MDRD: 80 ML/MIN/1.73SQ M
GLUCOSE SERPL-MCNC: 95 MG/DL (ref 65–140)
HCT VFR BLD AUTO: 38.1 % (ref 34.8–46.1)
HGB BLD-MCNC: 12.4 G/DL (ref 11.5–15.4)
IMM GRANULOCYTES # BLD AUTO: 0.03 THOUSAND/UL (ref 0–0.2)
IMM GRANULOCYTES NFR BLD AUTO: 0 % (ref 0–2)
LYMPHOCYTES # BLD AUTO: 1.65 THOUSANDS/ΜL (ref 0.6–4.47)
LYMPHOCYTES NFR BLD AUTO: 23 % (ref 14–44)
MCH RBC QN AUTO: 28.8 PG (ref 26.8–34.3)
MCHC RBC AUTO-ENTMCNC: 32.5 G/DL (ref 31.4–37.4)
MCV RBC AUTO: 89 FL (ref 82–98)
MONOCYTES # BLD AUTO: 0.39 THOUSAND/ΜL (ref 0.17–1.22)
MONOCYTES NFR BLD AUTO: 5 % (ref 4–12)
NEUTROPHILS # BLD AUTO: 4.89 THOUSANDS/ΜL (ref 1.85–7.62)
NEUTS SEG NFR BLD AUTO: 69 % (ref 43–75)
NRBC BLD AUTO-RTO: 0 /100 WBCS
PLATELET # BLD AUTO: 193 THOUSANDS/UL (ref 149–390)
PMV BLD AUTO: 9.1 FL (ref 8.9–12.7)
POTASSIUM SERPL-SCNC: 3.8 MMOL/L (ref 3.5–5.3)
RBC # BLD AUTO: 4.3 MILLION/UL (ref 3.81–5.12)
SODIUM SERPL-SCNC: 136 MMOL/L (ref 136–145)
WBC # BLD AUTO: 7.22 THOUSAND/UL (ref 4.31–10.16)

## 2020-09-07 PROCEDURE — 99284 EMERGENCY DEPT VISIT MOD MDM: CPT | Performed by: EMERGENCY MEDICINE

## 2020-09-07 PROCEDURE — 96367 TX/PROPH/DG ADDL SEQ IV INF: CPT

## 2020-09-07 PROCEDURE — 96375 TX/PRO/DX INJ NEW DRUG ADDON: CPT

## 2020-09-07 PROCEDURE — 36415 COLL VENOUS BLD VENIPUNCTURE: CPT | Performed by: EMERGENCY MEDICINE

## 2020-09-07 PROCEDURE — 96365 THER/PROPH/DIAG IV INF INIT: CPT

## 2020-09-07 PROCEDURE — 99284 EMERGENCY DEPT VISIT MOD MDM: CPT

## 2020-09-07 PROCEDURE — 80048 BASIC METABOLIC PNL TOTAL CA: CPT | Performed by: EMERGENCY MEDICINE

## 2020-09-07 PROCEDURE — 70498 CT ANGIOGRAPHY NECK: CPT

## 2020-09-07 PROCEDURE — G1004 CDSM NDSC: HCPCS

## 2020-09-07 PROCEDURE — 85025 COMPLETE CBC W/AUTO DIFF WBC: CPT | Performed by: EMERGENCY MEDICINE

## 2020-09-07 PROCEDURE — 70496 CT ANGIOGRAPHY HEAD: CPT

## 2020-09-07 PROCEDURE — 96372 THER/PROPH/DIAG INJ SC/IM: CPT

## 2020-09-07 PROCEDURE — 81025 URINE PREGNANCY TEST: CPT | Performed by: EMERGENCY MEDICINE

## 2020-09-07 RX ORDER — MAGNESIUM SULFATE 1 G/100ML
1 INJECTION INTRAVENOUS ONCE
Status: COMPLETED | OUTPATIENT
Start: 2020-09-07 | End: 2020-09-07

## 2020-09-07 RX ORDER — KETOROLAC TROMETHAMINE 30 MG/ML
15 INJECTION, SOLUTION INTRAMUSCULAR; INTRAVENOUS ONCE
Status: COMPLETED | OUTPATIENT
Start: 2020-09-07 | End: 2020-09-07

## 2020-09-07 RX ORDER — ACETAMINOPHEN 325 MG/1
975 TABLET ORAL ONCE
Status: COMPLETED | OUTPATIENT
Start: 2020-09-07 | End: 2020-09-07

## 2020-09-07 RX ORDER — DIPHENHYDRAMINE HYDROCHLORIDE 50 MG/ML
25 INJECTION INTRAMUSCULAR; INTRAVENOUS ONCE
Status: COMPLETED | OUTPATIENT
Start: 2020-09-07 | End: 2020-09-07

## 2020-09-07 RX ORDER — DEXAMETHASONE SODIUM PHOSPHATE 4 MG/ML
10 INJECTION, SOLUTION INTRA-ARTICULAR; INTRALESIONAL; INTRAMUSCULAR; INTRAVENOUS; SOFT TISSUE ONCE
Status: COMPLETED | OUTPATIENT
Start: 2020-09-07 | End: 2020-09-07

## 2020-09-07 RX ORDER — OLANZAPINE 10 MG/1
5 INJECTION, POWDER, LYOPHILIZED, FOR SOLUTION INTRAMUSCULAR ONCE
Status: COMPLETED | OUTPATIENT
Start: 2020-09-07 | End: 2020-09-07

## 2020-09-07 RX ADMIN — VALPROATE SODIUM 1000 MG: 100 INJECTION, SOLUTION INTRAVENOUS at 19:39

## 2020-09-07 RX ADMIN — WATER 1 ML: 1 INJECTION INTRAMUSCULAR; INTRAVENOUS; SUBCUTANEOUS at 17:38

## 2020-09-07 RX ADMIN — SODIUM CHLORIDE 1000 ML: 0.9 INJECTION, SOLUTION INTRAVENOUS at 17:24

## 2020-09-07 RX ADMIN — OLANZAPINE 5 MG: 10 INJECTION, POWDER, FOR SOLUTION INTRAMUSCULAR at 17:28

## 2020-09-07 RX ADMIN — KETOROLAC TROMETHAMINE 15 MG: 30 INJECTION, SOLUTION INTRAMUSCULAR at 17:25

## 2020-09-07 RX ADMIN — IOHEXOL 85 ML: 350 INJECTION, SOLUTION INTRAVENOUS at 19:03

## 2020-09-07 RX ADMIN — MAGNESIUM SULFATE IN DEXTROSE 1 G: 10 INJECTION, SOLUTION INTRAVENOUS at 17:30

## 2020-09-07 RX ADMIN — DEXAMETHASONE SODIUM PHOSPHATE 10 MG: 4 INJECTION, SOLUTION INTRAMUSCULAR; INTRAVENOUS at 18:44

## 2020-09-07 RX ADMIN — DIPHENHYDRAMINE HYDROCHLORIDE 25 MG: 50 INJECTION, SOLUTION INTRAMUSCULAR; INTRAVENOUS at 17:26

## 2020-09-07 RX ADMIN — ACETAMINOPHEN 650 MG: 325 TABLET, FILM COATED ORAL at 17:30

## 2020-09-07 NOTE — ED PROVIDER NOTES
History  Chief Complaint   Patient presents with    Migraine     Pt reports a migraine since friday  Pt reports today around noon it has gotten worse  Pt reports her migraine medications are not helping the pain  Pt denies hitting her head  Pt reports it feels a little worse than her normal migraine  HPI     Patient is a pleasant 39year-old female that reports to the emergency department with a migraine and Friday  The patient has a history, dissection, diagnosed by myself  She notes this feels like her typical migraine and does not feel like her prior dissection  No focal neurological defects  No vomiting but she does have some nausea  Headache is posterior, radiating anterior, associated with photophobia, constant, present since Friday  HA was gradual onset  No f/c/s  No neck stiffness  No focal neurological symptoms  No temporal artery pain/tenderness  No vision changes  Not worse in the AM  No head trauma  No dysarthria, nystagmus, dysphagia, diplopia, aphasia, vertigo, ataxia, visions loss, dysmetria  Normal finger to nose, gait, rapid alternating movement,  tandem gait, strength and sensation in bilat upper and lower extremities  Normal upper and lower reflexes  No pronator drift  Normal heel to shin  EOMI, no visual field defects  CN 2-12 intact  GCS 15  AOx3  MDM pleasant 51-year-old female, will treat for migraine, doubt dissection, if no improvement noted in her symptoms consider imaging, otherwise, if feeling better, discharged home  Of note, migraine initially was not improving, CT ordered, no acute findings, now at time of dc, migraine improved, will dc  Discussed with the patient who agrees with the workup and plan, understands return precautions and followup instructions  Prior to Admission Medications   Prescriptions Last Dose Informant Patient Reported? Taking?    ALPRAZolam (XANAX) 0 25 mg tablet   No No   Sig: Take 2 tablets (0 5 mg total) by mouth 2 (two) times a day as needed for anxiety   Botulinum Toxin Type A (BOTOX) 200 units SOLR   Yes No   Sig: Inject IM in the physicians office   Butalbital-APAP-Caffeine -40 MG CAPS   No No   Sig: Take 1 tablet by mouth 2 (two) times a day as needed (migraine)   Galcanezumab-gnlm (Emgality) 120 MG/ML SOAJ   Yes No   Sig: Emgality Pen 120 mg/mL subcutaneous pen injector   Inject 120mg once a month   Probiotic Product (PRO-BIOTIC BLEND PO)   Yes No   Sig: Take by mouth   escitalopram (LEXAPRO) 20 mg tablet   No No   Sig: Take 1 tablet (20 mg total) by mouth daily   latanoprost (XALATAN) 0 005 % ophthalmic solution   Yes No   Sig: INSTILL 1 DROP IN AFFECTED EYE AT BEDTIME   pantoprazole (PROTONIX) 40 mg tablet   No No   Sig: Take 1 tablet (40 mg total) by mouth daily   pramipexole (MIRAPEX) 0 25 mg tablet   No No   Sig: TAKE 1 TABLET BY MOUTH THREE TIMES A DAY   zolpidem (AMBIEN) 10 mg tablet   No No   Sig: Take 1 tablet (10 mg total) by mouth daily at bedtime as needed for sleep      Facility-Administered Medications: None       Past Medical History:   Diagnosis Date    Abnormal Pap smear of cervix     RAMIREZ positive     Anxiety     Basal cell carcinoma     Depression     Migraine     Migraine     Neck pain     Pregnancy     Vertebral artery dissection (HCC)     Vulvovaginitis     Yeast infection        Past Surgical History:   Procedure Laterality Date     SECTION, LOW TRANSVERSE      COLONOSCOPY      DILATION AND CURETTAGE OF UTERUS  2007    GYNECOLOGIC CRYOSURGERY      cervix    LASIK      SKIN LESION EXCISION      basal cell carcinoma of left cheek       Family History   Problem Relation Age of Onset    Hypertension Mother     Lung cancer Mother     Brain cancer Mother     Breast cancer Maternal Aunt         dx in 42's     No Known Problems Maternal Aunt      I have reviewed and agree with the history as documented      E-Cigarette/Vaping    E-Cigarette Use Never User E-Cigarette/Vaping Substances     Social History     Tobacco Use    Smoking status: Never Smoker    Smokeless tobacco: Never Used   Substance Use Topics    Alcohol use: No    Drug use: No       Review of Systems   Neurological: Positive for headaches  All other systems reviewed and are negative  Physical Exam  Physical Exam  Vitals signs and nursing note reviewed  Constitutional:       Appearance: She is well-developed  HENT:      Head: Normocephalic and atraumatic  Right Ear: External ear normal       Left Ear: External ear normal    Eyes:      Conjunctiva/sclera: Conjunctivae normal    Neck:      Musculoskeletal: Normal range of motion and neck supple  Vascular: No JVD  Trachea: No tracheal deviation  Cardiovascular:      Rate and Rhythm: Normal rate and regular rhythm  Heart sounds: Normal heart sounds  Pulmonary:      Effort: Pulmonary effort is normal  No respiratory distress  Breath sounds: No wheezing or rales  Abdominal:      General: Bowel sounds are normal       Palpations: Abdomen is soft  Tenderness: There is no abdominal tenderness  There is no guarding or rebound  Musculoskeletal:         General: No tenderness  Skin:     General: Skin is warm and dry  Findings: No erythema or rash  Neurological:      General: No focal deficit present  Mental Status: She is alert and oriented to person, place, and time  Motor: No weakness  Psychiatric:         Behavior: Behavior normal          Thought Content:  Thought content normal          Vital Signs  ED Triage Vitals   Temperature Pulse Respirations Blood Pressure SpO2   09/07/20 1647 09/07/20 1648 09/07/20 1648 09/07/20 1648 09/07/20 1648   98 3 °F (36 8 °C) 81 18 129/77 99 %      Temp Source Heart Rate Source Patient Position - Orthostatic VS BP Location FiO2 (%)   09/07/20 1647 09/07/20 1648 09/07/20 1648 09/07/20 1648 --   Oral Monitor Lying Right arm       Pain Score       09/07/20 1648       Worst Possible Pain           Vitals:    09/07/20 1648 09/07/20 1923   BP: 129/77 125/74   Pulse: 81 67   Patient Position - Orthostatic VS: Lying Lying         Visual Acuity      ED Medications  Medications   valproate (DEPACON) 1,000 mg in sodium chloride 0 9 % 50 mL for headache (1,000 mg Intravenous New Bag 9/7/20 1939)   OLANZapine (ZyPREXA) IM injection 5 mg (5 mg Intramuscular Given 9/7/20 1728)   diphenhydrAMINE (BENADRYL) injection 25 mg (25 mg Intravenous Given 9/7/20 1726)   sodium chloride 0 9 % bolus 1,000 mL (0 mL Intravenous Stopped 9/7/20 1844)   ketorolac (TORADOL) injection 15 mg (15 mg Intravenous Given 9/7/20 1725)   acetaminophen (TYLENOL) tablet 975 mg (650 mg Oral Given 9/7/20 1730)   magnesium sulfate IVPB (premix) SOLN 1 g (0 g Intravenous Stopped 9/7/20 1840)   sterile water injection **ADS Override Pull** (1 mL  Given 9/7/20 1738)   dexamethasone (DECADRON) injection 10 mg (10 mg Intravenous Given 9/7/20 1844)   iohexol (OMNIPAQUE) 350 MG/ML injection (MULTI-DOSE) 85 mL (85 mL Intravenous Given 9/7/20 1903)       Diagnostic Studies  Results Reviewed     Procedure Component Value Units Date/Time    Basic metabolic panel [[id number]] Collected:  09/07/20 1809    Lab Status:  Final result Specimen:  Blood from Arm, Left Updated:  09/07/20 1832     Sodium 136 mmol/L      Potassium 3 8 mmol/L      Chloride 103 mmol/L      CO2 27 mmol/L      ANION GAP 6 mmol/L      BUN 16 mg/dL      Creatinine 0 88 mg/dL      Glucose 95 mg/dL      Calcium 8 4 mg/dL      eGFR 80 ml/min/1 73sq m     Narrative:       [place] guidelines for Chronic Kidney Disease (CKD):     Stage 1 with normal or high GFR (GFR > 90 mL/min/1 73 square meters)    Stage 2 Mild CKD (GFR = 60-89 mL/min/1 73 square meters)    Stage 3A Moderate CKD (GFR = 45-59 mL/min/1 73 square meters)    Stage 3B Moderate CKD (GFR = 30-44 mL/min/1 73 square meters)    Stage 4 Severe CKD (GFR = 15-29 mL/min/1 73 square meters)    Stage 5 End Stage CKD (GFR <15 mL/min/1 73 square meters)  Note: GFR calculation is accurate only with a steady state creatinine    CBC and differential [[id number]] Collected:  09/07/20 1809    Lab Status:  Final result Specimen:  Blood from Arm, Left Updated:  09/07/20 1821     WBC 7 22 Thousand/uL      RBC 4 30 Million/uL      Hemoglobin 12 4 g/dL      Hematocrit 38 1 %      MCV 89 fL      MCH 28 8 pg      MCHC 32 5 g/dL      RDW 12 4 %      MPV 9 1 fL      Platelets 291 Thousands/uL      nRBC 0 /100 WBCs      Neutrophils Relative 69 %      Immat GRANS % 0 %      Lymphocytes Relative 23 %      Monocytes Relative 5 %      Eosinophils Relative 3 %      Basophils Relative 0 %      Neutrophils Absolute 4 89 Thousands/µL      Immature Grans Absolute 0 03 Thousand/uL      Lymphocytes Absolute 1 65 Thousands/µL      Monocytes Absolute 0 39 Thousand/µL      Eosinophils Absolute 0 23 Thousand/µL      Basophils Absolute 0 03 Thousands/µL     POCT pregnancy, urine [[id number]]  (Normal) Resulted:  09/07/20 1715    Lab Status:  Final result Updated:  09/07/20 1715     EXT PREG TEST UR (Ref: Negative) Negative     Control Valid                 CTA head and neck with and without contrast   Final Result by Annette Betancur MD (09/07 1943)      No acute intracranial abnormality  Mild maxillary sinus inflammatory sinus disease  Unremarkable CTA of the head and neck  Workstation performed: [id number]                    Procedures  Procedures         ED Course       US AUDIT      Most Recent Value   Initial Alcohol Screen: US AUDIT-C    1  How often do you have a drink containing alcohol?  0 Filed at: 09/07/2020 1648   2  How many drinks containing alcohol do you have on a typical day you are drinking? 0 Filed at: 09/07/2020 1648   3b  FEMALE Any Age, or MALE 65+: How often do you have 4 or more drinks on one occassion?   0 Filed at: 09/07/2020 1648   Audit-C Score  0 Filed at: 09/07/2020 1648                  EVERTON/DAST-10      Most Recent Value   How many times in the past year have you    Used an illegal drug or used a prescription medication for non-medical reasons? Never Filed at: 09/07/2020 1648                                MDM      Disposition  Final diagnoses:   Migraine     Time reflects when diagnosis was documented in both MDM as applicable and the Disposition within this note     Time User Action Codes Description Comment    9/7/2020  7:58 PM Dolores Thompson, [address] [F12 [id number]] Migraine       ED Disposition     ED Disposition Condition Date/Time Comment    Discharge Stable Mon Sep 7, 2020  7:58 PM Brad Kolb discharge to home/self care  Follow-up Information     Follow up With Specialties Details Why Contact Info    Elizabeth Hernandez MD Internal Medicine In 1 day  [address] [place] [address]  [phone]            Patient's Medications   Discharge Prescriptions    No medications on file     No discharge procedures on file      PDMP Review       Value Time User    PDMP Reviewed  Yes 3/25/2020  9:39 AM Elizabeth Hernandez MD          ED Provider  Electronically Signed by           Roderick Mcbride MD  09/07/20 2014

## 2020-09-18 DIAGNOSIS — K21.9 GASTROESOPHAGEAL REFLUX DISEASE, ESOPHAGITIS PRESENCE NOT SPECIFIED: ICD-10-CM

## 2020-09-18 RX ORDER — PANTOPRAZOLE SODIUM 40 MG/1
40 TABLET, DELAYED RELEASE ORAL DAILY
Qty: 30 TABLET | Refills: 4 | Status: SHIPPED | OUTPATIENT
Start: 2020-09-18 | End: 2021-06-17 | Stop reason: SDUPTHER

## 2020-10-01 DIAGNOSIS — G47.09 OTHER INSOMNIA: ICD-10-CM

## 2020-10-01 RX ORDER — ZOLPIDEM TARTRATE 10 MG/1
10 TABLET ORAL
Qty: 30 TABLET | Refills: 1 | Status: SHIPPED | OUTPATIENT
Start: 2020-10-01 | End: 2020-12-07

## 2020-12-07 DIAGNOSIS — G47.09 OTHER INSOMNIA: ICD-10-CM

## 2020-12-07 RX ORDER — ZOLPIDEM TARTRATE 10 MG/1
TABLET ORAL
Qty: 30 TABLET | Refills: 1 | Status: SHIPPED | OUTPATIENT
Start: 2020-12-07 | End: 2021-02-05 | Stop reason: SDUPTHER

## 2020-12-09 ENCOUNTER — TELEPHONE (OUTPATIENT)
Dept: OBGYN CLINIC | Facility: CLINIC | Age: 45
End: 2020-12-09

## 2020-12-14 ENCOUNTER — OFFICE VISIT (OUTPATIENT)
Dept: OBGYN CLINIC | Facility: CLINIC | Age: 45
End: 2020-12-14
Payer: COMMERCIAL

## 2020-12-14 VITALS — HEIGHT: 66 IN | DIASTOLIC BLOOD PRESSURE: 82 MMHG | SYSTOLIC BLOOD PRESSURE: 120 MMHG | BODY MASS INDEX: 30.25 KG/M2

## 2020-12-14 DIAGNOSIS — L29.2 VULVAR ITCHING: Primary | ICD-10-CM

## 2020-12-14 PROCEDURE — 1036F TOBACCO NON-USER: CPT | Performed by: NURSE PRACTITIONER

## 2020-12-14 PROCEDURE — 99213 OFFICE O/P EST LOW 20 MIN: CPT | Performed by: NURSE PRACTITIONER

## 2020-12-14 RX ORDER — CLOBETASOL PROPIONATE 0.5 MG/G
CREAM TOPICAL
Qty: 30 G | Refills: 0 | Status: SHIPPED | OUTPATIENT
Start: 2020-12-14 | End: 2021-11-08 | Stop reason: ALTCHOICE

## 2020-12-14 RX ORDER — HYDROXYZINE HYDROCHLORIDE 25 MG/1
25 TABLET, FILM COATED ORAL DAILY
COMMUNITY

## 2020-12-14 RX ORDER — BUTALBITAL, ACETAMINOPHEN AND CAFFEINE 50; 325; 40 MG/1; MG/1; MG/1
TABLET ORAL
COMMUNITY
End: 2021-07-29

## 2020-12-15 DIAGNOSIS — F32.A ANXIETY AND DEPRESSION: ICD-10-CM

## 2020-12-15 DIAGNOSIS — F41.9 ANXIETY AND DEPRESSION: ICD-10-CM

## 2020-12-15 RX ORDER — ESCITALOPRAM OXALATE 20 MG/1
TABLET ORAL
Qty: 30 TABLET | Refills: 2 | Status: SHIPPED | OUTPATIENT
Start: 2020-12-15 | End: 2021-03-22

## 2021-01-18 DIAGNOSIS — G25.81 RESTLESS LEGS SYNDROME (RLS): ICD-10-CM

## 2021-01-18 RX ORDER — PRAMIPEXOLE DIHYDROCHLORIDE 0.25 MG/1
0.25 TABLET ORAL
Qty: 30 TABLET | Refills: 0 | Status: SHIPPED | OUTPATIENT
Start: 2021-01-18 | End: 2021-02-05 | Stop reason: SDUPTHER

## 2021-02-05 ENCOUNTER — OFFICE VISIT (OUTPATIENT)
Dept: INTERNAL MEDICINE CLINIC | Age: 46
End: 2021-02-05
Payer: COMMERCIAL

## 2021-02-05 VITALS
HEIGHT: 66 IN | BODY MASS INDEX: 28.51 KG/M2 | SYSTOLIC BLOOD PRESSURE: 108 MMHG | WEIGHT: 177.4 LBS | OXYGEN SATURATION: 97 % | TEMPERATURE: 97.2 F | HEART RATE: 72 BPM | DIASTOLIC BLOOD PRESSURE: 60 MMHG

## 2021-02-05 DIAGNOSIS — G47.09 OTHER INSOMNIA: ICD-10-CM

## 2021-02-05 DIAGNOSIS — F41.9 ANXIETY AND DEPRESSION: Primary | ICD-10-CM

## 2021-02-05 DIAGNOSIS — F32.A ANXIETY AND DEPRESSION: Primary | ICD-10-CM

## 2021-02-05 DIAGNOSIS — G25.81 RESTLESS LEGS SYNDROME (RLS): ICD-10-CM

## 2021-02-05 PROCEDURE — 1036F TOBACCO NON-USER: CPT | Performed by: INTERNAL MEDICINE

## 2021-02-05 PROCEDURE — 99213 OFFICE O/P EST LOW 20 MIN: CPT | Performed by: INTERNAL MEDICINE

## 2021-02-05 PROCEDURE — 3725F SCREEN DEPRESSION PERFORMED: CPT | Performed by: INTERNAL MEDICINE

## 2021-02-05 PROCEDURE — 3008F BODY MASS INDEX DOCD: CPT | Performed by: INTERNAL MEDICINE

## 2021-02-05 RX ORDER — PRAMIPEXOLE DIHYDROCHLORIDE 0.25 MG/1
0.25 TABLET ORAL
Qty: 30 TABLET | Refills: 0 | Status: SHIPPED | OUTPATIENT
Start: 2021-02-05 | End: 2021-02-05 | Stop reason: SDUPTHER

## 2021-02-05 RX ORDER — ZOLPIDEM TARTRATE 10 MG/1
10 TABLET ORAL
Qty: 30 TABLET | Refills: 1 | Status: SHIPPED | OUTPATIENT
Start: 2021-02-05 | End: 2021-05-03

## 2021-02-05 RX ORDER — PRAMIPEXOLE DIHYDROCHLORIDE 0.25 MG/1
0.25 TABLET ORAL
Qty: 30 TABLET | Refills: 0 | Status: SHIPPED | OUTPATIENT
Start: 2021-02-05 | End: 2021-06-03

## 2021-02-05 RX ORDER — MINOCYCLINE HYDROCHLORIDE 100 MG/1
CAPSULE ORAL
COMMUNITY
End: 2021-07-29

## 2021-02-05 NOTE — PATIENT INSTRUCTIONS
Weight Management   AMBULATORY CARE:   Why it is important to manage your weight:  Being overweight increases your risk of health conditions such as heart disease, high blood pressure, type 2 diabetes, and certain types of cancer  It can also increase your risk for osteoarthritis, sleep apnea, and other respiratory problems  Aim for a slow, steady weight loss  Even a small amount of weight loss can lower your risk of health problems  How to lose weight safely:  A safe and healthy way to lose weight is to eat fewer calories and get regular exercise  · You can lose up about 1 pound a week by decreasing the number of calories you eat by 500 calories each day  You can decrease calories by eating smaller portion sizes or by cutting out high-calorie foods  Read labels to find out how many calories are in the foods you eat  · You can also burn calories with exercise such as walking, swimming, or biking  You will be more likely to keep weight off if you make these changes part of your lifestyle  Exercise at least 30 minutes per day on most days of the week  You can also fit in more physical activity by taking the stairs instead of the elevator or parking farther away from stores  Ask your healthcare provider about the best exercise plan for you  Healthy meal plan for weight management:  A healthy meal plan includes a variety of foods, contains fewer calories, and helps you stay healthy  A healthy meal plan includes the following:     · Eat whole-grain foods more often  A healthy meal plan should contain fiber  Fiber is the part of grains, fruits, and vegetables that is not broken down by your body  Whole-grain foods are healthy and provide extra fiber in your diet  Some examples of whole-grain foods are whole-wheat breads and pastas, oatmeal, brown rice, and bulgur  · Eat a variety of vegetables every day  Include dark, leafy greens such as spinach, kale, aleah greens, and mustard greens   Eat yellow and orange vegetables such as carrots, sweet potatoes, and winter squash  · Eat a variety of fruits every day  Choose fresh or canned fruit (canned in its own juice or light syrup) instead of juice  Fruit juice has very little or no fiber  · Eat low-fat dairy foods  Drink fat-free (skim) milk or 1% milk  Eat fat-free yogurt and low-fat cottage cheese  Try low-fat cheeses such as mozzarella and other reduced-fat cheeses  · Choose meat and other protein foods that are low in fat  Choose beans or other legumes such as split peas or lentils  Choose fish, skinless poultry (chicken or turkey), or lean cuts of red meat (beef or pork)  Before you cook meat or poultry, cut off any visible fat  · Use less fat and oil  Try baking foods instead of frying them  Add less fat, such as margarine, sour cream, regular salad dressing and mayonnaise to foods  Eat fewer high-fat foods  Some examples of high-fat foods include french fries, doughnuts, ice cream, and cakes  · Eat fewer sweets  Limit foods and drinks that are high in sugar  This includes candy, cookies, regular soda, and sweetened drinks  Ways to decrease calories:   · Eat smaller portions  ? Use a small plate with smaller servings  ? Do not eat second helpings  ? When you eat at a restaurant, ask for a box and place half of your meal in the box before you eat  ? Share an entrée with someone else  · Replace high-calorie snacks with healthy, low-calorie snacks  ? Choose fresh fruit, vegetables, fat-free rice cakes, or air-popped popcorn instead of potato chips, nuts, or chocolate  ? Choose water or calorie-free drinks instead of soda or sweetened drinks  · Do not shop for groceries when you are hungry  You may be more likely to make unhealthy food choices  Take a grocery list of healthy foods and shop after you have eaten  · Eat regular meals  Do not skip meals  Skipping meals can lead to overeating later in the day   This can make it harder for you to lose weight  Eat a healthy snack in place of a meal if you do not have time to eat a regular meal  Talk with a dietitian to help you create a meal plan and schedule that is right for you  Other things to consider as you try to lose weight:   · Be aware of situations that may give you the urge to overeat, such as eating while watching television  Find ways to avoid these situations  For example, read a book, go for a walk, or do crafts  · Meet with a weight loss support group or friends who are also trying to lose weight  This may help you stay motivated to continue working on your weight loss goals  © Copyright [address] Information is for End User's use only and may not be sold, redistributed or otherwise used for commercial purposes  All illustrations and images included in CareNotes® are the copyrighted property of [employer]  or [facility]   The above information is an  only  It is not intended as medical advice for individual conditions or treatments  Talk to your doctor, nurse or pharmacist before following any medical regimen to see if it is safe and effective for you  Low Fat Diet   AMBULATORY CARE:   A low-fat diet  is an eating plan that is low in total fat, unhealthy fat, and cholesterol  You may need to follow a low-fat diet if you have trouble digesting or absorbing fat  You may also need to follow this diet if you have high cholesterol  You can also lower your cholesterol by increasing the amount of fiber in your diet  Soluble fiber is a type of fiber that helps to decrease cholesterol levels  Different types of fat in food:   · Limit unhealthy fats  A diet that is high in cholesterol, saturated fat, and trans fat may cause unhealthy cholesterol levels  Unhealthy cholesterol levels increase your risk of heart disease  ? Cholesterol:  Limit intake of cholesterol to less than 200 mg per day   Cholesterol is found in meat, eggs, and dairy  ? Saturated fat:  Limit saturated fat to less than 7% of your total daily calories  Ask your dietitian how many calories you need each day  Saturated fat is found in butter, cheese, ice cream, whole milk, and palm oil  Saturated fat is also found in meat, such as beef, pork, chicken skin, and processed meats  Processed meats include sausage, hot dogs, and bologna  ? Trans fat:  Avoid trans fat as much as possible  Trans fat is used in fried and baked foods  Foods that say trans fat free on the label may still have up to 0 5 grams of trans fat per serving  · Include healthy fats  Replace foods that are high in saturated and trans fat with foods high in healthy fats  This may help to decrease high cholesterol levels  ? Monounsaturated fats: These are found in avocados, nuts, and vegetable oils, such as olive, canola, and sunflower oil  ? Polyunsaturated fats: These can be found in vegetable oils, such as soybean or corn oil  Omega-3 fats can help to decrease the risk of heart disease  Omega-3 fats are found in fish, such as salmon, herring, trout, and tuna  Omega-3 fats can also be found in plant foods, such as walnuts, flaxseed, soybeans, and canola oil  Foods to limit or avoid:   · Grains:      ? Snacks that are made with partially hydrogenated oils, such as chips, regular crackers, and butter-flavored popcorn    ? High-fat baked goods, such as biscuits, croissants, doughnuts, pies, cookies, and pastries    · Dairy:      ? Whole milk, 2% milk, and yogurt and ice cream made with whole milk    ? Half and half creamer, heavy cream, and whipping cream    ? Cheese, cream cheese, and sour cream    · Meats and proteins:      ? High-fat cuts of meat (T-bone steak, regular hamburger, and ribs)    ? Fried meat, poultry (turkey and chicken), and fish    ? Poultry (chicken and turkey) with skin    ? Cold cuts (salami or bologna), hot dogs, messer, and sausage    ?  Whole eggs and egg yolks    · Vegetables and fruits with added fat:      ? Fried vegetables or vegetables in butter or high-fat sauces, such as cream or cheese sauces    ? Fried fruit or fruit served with butter or cream    · Fats:      ? Butter, stick margarine, and shortening    ? Coconut, palm oil, and palm kernel oil    Foods to include:   · Grains:      ? Whole-grain breads, cereals, pasta, and brown rice    ? Low-fat crackers and pretzels    · Vegetables and fruits:      ? Fresh, frozen, or canned vegetables (no salt or low-sodium)    ? Fresh, frozen, dried, or canned fruit (canned in light syrup or fruit juice)    ? Avocado    · Low-fat dairy products:      ? Nonfat (skim) or 1% milk    ? Nonfat or low-fat cheese, yogurt, and cottage cheese    · Meats and proteins:      ? Chicken or turkey with no skin    ? Baked or broiled fish    ? Lean beef and pork (loin, round, extra lean hamburger)    ? Beans and peas, unsalted nuts, soy products    ? Egg whites and substitutes    ? Seeds and nuts    · Fats:      ? Unsaturated oil, such as canola, olive, peanut, soybean, or sunflower oil    ? Soft or liquid margarine and vegetable oil spread    ? Low-fat salad dressing    Other ways to decrease fat:   · Read food labels before you buy foods  Choose foods that have less than 30% of calories from fat  Choose low-fat or fat-free dairy products  Remember that fat free does not mean calorie free  These foods still contain calories, and too many calories can lead to weight gain  · Trim fat from meat and avoid fried food  Trim all visible fat from meat before you cook it  Remove the skin from poultry  Do not rowell meat, fish, or poultry  Bake, roast, boil, or broil these foods instead  Avoid fried foods  Eat a baked potato instead of [place] fries  Steam vegetables instead of sautéing them in butter  · Add less fat to foods  Use imitation messer bits on salads and baked potatoes instead of regular messer bits   Use fat-free or low-fat salad dressings instead of regular dressings  Use low-fat or nonfat butter-flavored topping instead of regular butter or margarine on popcorn and other foods  Ways to decrease fat in recipes:  Replace high-fat ingredients with low-fat or nonfat ones  This may cause baked goods to be drier than usual  You may need to use nonfat cooking spray on pans to prevent food from sticking  You also may need to change the amount of other ingredients, such as water, in the recipe  Try the following:  · Use low-fat or light margarine instead of regular margarine or shortening  · Use lean ground turkey breast or chicken, or lean ground beef (less than 5% fat) instead of hamburger  · Add 1 teaspoon of canola oil to 8 ounces of skim milk instead of using cream or half and half  · Use grated zucchini, carrots, or apples in breads instead of coconut  · Use blenderized, low-fat cottage cheese, plain tofu, or low-fat ricotta cheese instead of cream cheese  · Use 1 egg white and 1 teaspoon of canola oil, or use ¼ cup (2 ounces) of fat-free egg substitute instead of a whole egg  · Replace half of the oil that is called for in a recipe with applesauce when you bake  Use 3 tablespoons of cocoa powder and 1 tablespoon of canola oil instead of a square of baking chocolate  How to increase fiber:  Eat enough high-fiber foods to get 20 to 30 grams of fiber every day  Slowly increase your fiber intake to avoid stomach cramps, gas, and other problems  · Eat 3 ounces of whole-grain foods each day  An ounce is about 1 slice of bread  Eat whole-grain breads, such as whole-wheat bread  Whole wheat, whole-wheat flour, or other whole grains should be listed as the first ingredient on the food label  Replace white flour with whole-grain flour or use half of each in recipes  Whole-grain flour is heavier than white flour, so you may have to add more yeast or baking powder       · Eat a high-fiber cereal for breakfast   Gris Montes is a good source of soluble fiber  Look for cereals that have bran or fiber in the name  Choose whole-grain products, such as brown rice, barley, and whole-wheat pasta  · Eat more beans, peas, and lentils  For example, add beans to soups or salads  Eat at least 5 cups of fruits and vegetables each day  Eat fruits and vegetables with the peel because the peel is high in fiber  © Copyright [address] Information is for End User's use only and may not be sold, redistributed or otherwise used for commercial purposes  All illustrations and images included in CareNotes® are the copyrighted property of [employer] , Inc  or [address] [place] [address]  The above information is an  only  It is not intended as medical advice for individual conditions or treatments  Talk to your doctor, nurse or pharmacist before following any medical regimen to see if it is safe and effective for you  Heart Healthy Diet   AMBULATORY CARE:   A heart healthy diet  is an eating plan low in unhealthy fats and sodium (salt)  The plan is high in healthy fats and fiber  A heart healthy diet helps improve your cholesterol levels and lowers your risk for heart disease and stroke  A dietitian will teach you how to read and understand food labels  Heart healthy diet guidelines to follow:   · Choose foods that contain healthy fats  ? Unsaturated fats  include monounsaturated and polyunsaturated fats  Unsaturated fat is found in foods such as soybean, canola, olive, corn, and safflower oils  It is also found in soft tub margarine that is made with liquid vegetable oil  ? Omega-3 fat  is found in certain fish, such as salmon, tuna, and trout, and in walnuts and flaxseed  Eat fish high in omega-3 fats at least 2 times a week  · Get 20 to 30 grams of fiber each day  Fruits, vegetables, whole-grain foods, and legumes (cooked beans) are good sources of fiber  · Limit or do not have unhealthy fats  ?  Cholesterol is found in animal foods, such as eggs and lobster, and in dairy products made from whole milk  Limit cholesterol to less than 200 mg each day  ? Saturated fat  is found in meats, such as messer and hamburger  It is also found in chicken or turkey skin, whole milk, and butter  Limit saturated fat to less than 7% of your total daily calories  ? Trans fat  is found in packaged foods, such as potato chips and cookies  It is also in hard margarine, some fried foods, and shortening  Do not eat foods that contain trans fats  · Limit sodium as directed  You may be told to limit sodium to 2,000 to 2,300 mg each day  Choose low-sodium or no-salt-added foods  Add little or no salt to food you prepare  Use herbs and spices in place of salt  Include the following in your heart healthy plan:  Ask your dietitian or healthcare provider how many servings to have from each of the following food groups:  · Grains:      ? Whole-wheat breads, cereals, and pastas, and brown rice    ? Low-fat, low-sodium crackers and chips    · Vegetables:      ? Broccoli, green beans, green peas, and spinach    ? Collards, kale, and lima beans    ? Carrots, sweet potatoes, tomatoes, and peppers    ? Canned vegetables with no salt added    · Fruits:      ? Bananas, peaches, pears, and pineapple    ? Grapes, raisins, and dates    ? Oranges, tangerines, grapefruit, orange juice, and grapefruit juice    ? Apricots, mangoes, melons, and papaya    ? Raspberries and strawberries    ? Canned fruit with no added sugar    · Low-fat dairy:      ? Nonfat (skim) milk, 1% milk, and low-fat almond, cashew, or soy milks fortified with calcium    ? Low-fat cheese, regular or frozen yogurt, and cottage cheese    · Meats and proteins:      ? Lean cuts of beef and pork (loin, leg, round), skinless chicken and turkey    ?  Legumes, soy products, egg whites, or nuts    Limit or do not include the following in your heart healthy plan:   · Unhealthy fats and oils:      ? Whole or 2% milk, cream cheese, sour cream, or cheese    ? High-fat cuts of beef (T-bone steaks, ribs), chicken or turkey with skin, and organ meats such as liver    ? Butter, stick margarine, shortening, and cooking oils such as coconut or palm oil    · Foods and liquids high in sodium:      ? Packaged foods, such as frozen dinners, cookies, macaroni and cheese, and cereals with more than 300 mg of sodium per serving    ? Vegetables with added sodium, such as instant potatoes, vegetables with added sauces, or regular canned vegetables    ? Cured or smoked meats, such as hot dogs, messer, and sausage    ? High-sodium ketchup, barbecue sauce, salad dressing, pickles, olives, soy sauce, or miso    · Foods and liquids high in sugar:      ? Candy, cake, cookies, pies, or doughnuts    ? Soft drinks (soda), sports drinks, or sweetened tea    ? Canned or dry mixes for cakes, soups, sauces, or gravies    Other healthy heart guidelines:   · Do not smoke  Nicotine and other chemicals in cigarettes and cigars can cause lung and heart damage  Ask your healthcare provider for information if you currently smoke and need help to quit  E-cigarettes or smokeless tobacco still contain nicotine  Talk to your healthcare provider before you use these products  · Limit or do not drink alcohol as directed  Alcohol can damage your heart and raise your blood pressure  Your healthcare provider may give you specific daily and weekly limits  The general recommended limit is 1 drink a day for women 21 or older and for men 72 or older  Do not have more than 3 drinks in a day or 7 in a week  The recommended limit is 2 drinks a day for men 24to 59years of age  Do not have more than 4 drinks in a day or 14 in a week  A drink of alcohol is 12 ounces of beer, 5 ounces of wine, or 1½ ounces of liquor  · Exercise regularly  Exercise can help you maintain a healthy weight and improve your blood pressure and cholesterol levels  Regular exercise can also decrease your risk for heart problems  Ask your healthcare provider about the best exercise plan for you  Do not start an exercise program without asking your healthcare provider  Follow up with your doctor or cardiologist as directed:  Write down your questions so you remember to ask them during your visits  © Copyright [address] Information is for End User's use only and may not be sold, redistributed or otherwise used for commercial purposes  All illustrations and images included in CareNotes® are the copyrighted property of [employer]  or [facility]   The above information is an  only  It is not intended as medical advice for individual conditions or treatments  Talk to your doctor, nurse or pharmacist before following any medical regimen to see if it is safe and effective for you

## 2021-02-05 NOTE — ASSESSMENT & PLAN NOTE
He still has marked difficulty sleeping in his honestly using Ambien to frequently but I have gotten

## 2021-02-05 NOTE — PROGRESS NOTES
Assessment/Plan:    Restless legs syndrome (RLS)   She did find that the Mirapex to helped her a lot and she has even tried a half a pill she would like a refill so she can try a    Other insomnia   He still has marked difficulty sleeping in his honestly using Ambien to frequently but I have gotten    BMI 28 0-28 9,adult   She has lost 10 lb since last September  She was again counseled on diet and exercise    Anxiety and depression   She continues on her Lexapro with an occasional Xanax        Diagnoses and all orders for this visit:    Anxiety and depression    Restless legs syndrome (RLS)  -     Discontinue: pramipexole (MIRAPEX) 0 25 mg tablet; Take 1 tablet (0 25 mg total) by mouth daily at bedtime  -     pramipexole (MIRAPEX) 0 25 mg tablet; Take 1 tablet (0 25 mg total) by mouth daily at bedtime    Other insomnia  -     zolpidem (AMBIEN) 10 mg tablet; Take 1 tablet (10 mg total) by mouth daily at bedtime as needed for sleep    BMI 28 0-28 9,adult    Other orders  -     minocycline (MINOCIN) 100 mg capsule; minocycline 100 mg capsule   TAKE ONE CAPSULE TWICE DAILY FOR 10 DAYS          Subjective:      Patient ID: Deborah Bolaños is a 39 y o  female  Patient is actually here for her restless legs and has found that the Mirapex helped  In addition on she continues to take her anxiety depression medications  The only problem that has not improved unless she takes a sleeping pill is her insomnia  We did again briefly discussed going to a sleep doctor and she understands the risks of Ambien          Review of Systems   Constitutional: Negative for chills, fatigue, fever and unexpected weight change  HENT: Negative for congestion, ear pain, hearing loss, postnasal drip, sinus pressure, sore throat, trouble swallowing and voice change  Eyes: Negative for visual disturbance  Respiratory: Negative for cough, chest tightness, shortness of breath and wheezing      Cardiovascular: Negative for chest pain, palpitations and leg swelling  Gastrointestinal: Negative for abdominal distention, abdominal pain, anal bleeding, blood in stool, constipation, diarrhea and nausea  Endocrine: Negative for cold intolerance, polydipsia, polyphagia and polyuria  Genitourinary: Negative for dysuria, flank pain, frequency, hematuria and urgency  Musculoskeletal: Negative for arthralgias, back pain, gait problem, joint swelling, myalgias and neck pain  Skin: Negative for rash  Allergic/Immunologic: Negative for immunocompromised state  Neurological: Negative for dizziness, syncope, facial asymmetry, weakness, light-headedness, numbness and headaches  Hematological: Negative for adenopathy  Psychiatric/Behavioral: Positive for dysphoric mood and sleep disturbance  Negative for confusion and suicidal ideas  Objective:      /60 (BP Location: Left arm, Patient Position: Sitting, Cuff Size: Standard)   Pulse 72   Temp (!) 97 2 °F (36 2 °C) (Tympanic)   Ht 5' 6" (1 676 m)   Wt 80 5 kg (177 lb 6 4 oz)   SpO2 97%   BMI 28 63 kg/m²          Physical Exam  Vitals signs reviewed  Constitutional:       General: She is not in acute distress  Appearance: She is well-developed  HENT:      Right Ear: External ear normal       Left Ear: External ear normal       Nose: Nose normal       Mouth/Throat:      Pharynx: No oropharyngeal exudate  Eyes:      Pupils: Pupils are equal, round, and reactive to light  Neck:      Musculoskeletal: Normal range of motion and neck supple  Thyroid: No thyromegaly  Vascular: No JVD  Cardiovascular:      Rate and Rhythm: Normal rate and regular rhythm  Heart sounds: Normal heart sounds  No murmur  No gallop  Pulmonary:      Effort: Pulmonary effort is normal  No respiratory distress  Breath sounds: Normal breath sounds  No wheezing or rales  Abdominal:      General: Bowel sounds are normal  There is no distension  Palpations: Abdomen is soft  There is no mass  Tenderness: There is no abdominal tenderness  Musculoskeletal: Normal range of motion  General: No tenderness  Lymphadenopathy:      Cervical: No cervical adenopathy  Skin:     Findings: No rash  Neurological:      Mental Status: She is alert and oriented to person, place, and time  Cranial Nerves: No cranial nerve deficit  Coordination: Coordination normal    Psychiatric:         Behavior: Behavior normal          Thought Content: Thought content normal          Judgment: Judgment normal        BMI Counseling: Body mass index is 28 63 kg/m²  The BMI is above normal  Exercise recommendations include exercising 3-5 times per week

## 2021-02-05 NOTE — ASSESSMENT & PLAN NOTE
She did find that the Mirapex to helped her a lot and she has even tried a half a pill she would like a refill so she can try a

## 2021-03-20 DIAGNOSIS — F32.A ANXIETY AND DEPRESSION: ICD-10-CM

## 2021-03-20 DIAGNOSIS — F41.9 ANXIETY AND DEPRESSION: ICD-10-CM

## 2021-03-22 RX ORDER — ESCITALOPRAM OXALATE 20 MG/1
TABLET ORAL
Qty: 30 TABLET | Refills: 2 | Status: SHIPPED | OUTPATIENT
Start: 2021-03-22 | End: 2021-06-17

## 2021-03-30 DIAGNOSIS — Z23 ENCOUNTER FOR IMMUNIZATION: ICD-10-CM

## 2021-04-26 ENCOUNTER — HOSPITAL ENCOUNTER (OUTPATIENT)
Dept: RADIOLOGY | Age: 46
Discharge: HOME | End: 2021-04-26
Payer: COMMERCIAL

## 2021-04-26 ENCOUNTER — TRANSCRIBE ORDERS (OUTPATIENT)
Dept: ADMINISTRATIVE | Age: 46
End: 2021-04-26

## 2021-04-26 ENCOUNTER — APPOINTMENT (OUTPATIENT)
Dept: RADIOLOGY | Age: 46
End: 2021-04-26
Payer: COMMERCIAL

## 2021-04-26 VITALS — WEIGHT: 168 LBS | BODY MASS INDEX: 27 KG/M2 | HEIGHT: 66 IN

## 2021-04-26 DIAGNOSIS — Z12.31 ENCOUNTER FOR SCREENING MAMMOGRAM FOR MALIGNANT NEOPLASM OF BREAST: ICD-10-CM

## 2021-04-26 DIAGNOSIS — M47.812 CERVICAL SPONDYLOSIS WITHOUT MYELOPATHY: Primary | ICD-10-CM

## 2021-04-26 DIAGNOSIS — Z12.39 BREAST CANCER SCREENING: ICD-10-CM

## 2021-04-26 PROCEDURE — 77067 SCR MAMMO BI INCL CAD: CPT

## 2021-04-26 PROCEDURE — 72040 X-RAY EXAM NECK SPINE 2-3 VW: CPT

## 2021-04-26 PROCEDURE — 77063 BREAST TOMOSYNTHESIS BI: CPT

## 2021-05-02 DIAGNOSIS — G47.09 OTHER INSOMNIA: ICD-10-CM

## 2021-05-03 RX ORDER — ZOLPIDEM TARTRATE 10 MG/1
TABLET ORAL
Qty: 30 TABLET | Refills: 0 | Status: SHIPPED | OUTPATIENT
Start: 2021-05-03 | End: 2021-06-03

## 2021-05-31 DIAGNOSIS — G47.09 OTHER INSOMNIA: ICD-10-CM

## 2021-06-03 DIAGNOSIS — G25.81 RESTLESS LEGS SYNDROME (RLS): ICD-10-CM

## 2021-06-03 DIAGNOSIS — G47.09 OTHER INSOMNIA: ICD-10-CM

## 2021-06-03 RX ORDER — ZOLPIDEM TARTRATE 10 MG/1
TABLET ORAL
Qty: 30 TABLET | Refills: 0 | Status: SHIPPED | OUTPATIENT
Start: 2021-06-03 | End: 2021-07-02

## 2021-06-03 RX ORDER — ZOLPIDEM TARTRATE 10 MG/1
10 TABLET ORAL
Qty: 30 TABLET | Refills: 0 | OUTPATIENT
Start: 2021-06-03

## 2021-06-03 RX ORDER — PRAMIPEXOLE DIHYDROCHLORIDE 0.25 MG/1
TABLET ORAL
Qty: 30 TABLET | Refills: 0 | Status: SHIPPED | OUTPATIENT
Start: 2021-06-03 | End: 2021-07-28

## 2021-06-03 NOTE — TELEPHONE ENCOUNTER
Downloads icon     2              Pdf icon                          [id number]  [place] PDMP System      Support: [phone]                    Back      Patient Report  Refine Search       Report Prepared: 2021    Date Range: 2020 - 2021          Pdf icon    Download PDF   Csv icon    Download CSV           Adi Locket           Linked Records      Name        ID    Gender    Address      Elidia Marlow 1975 1 female 36 [id number]       Report Criteria        First Name    Karen Taveras      Last Name    frank MATHEWS    1975            Summary            Summary  Total Prescriptions 13 Total Private Pay 1 Total Prescribers 2 Total Pharmacies 1      Opioids* (excluding buprenorphine)  Current Qty 0 0 Current MME/day 0 0 30 Day Avg MME/day 0 0      Buprenorphine*  Current Qty 0 0 Current mg/day 0 0 30 Day Avg mg/day 0 0              Prescriptions                                         Filled      ID      Written      Drug      QTY      Days      Prescriber      Rx #      Pharmacy *      Refills      Daily Dose      Pymt Type             2021  1  2021    ZOLPIDEM TARTRATE 10 MG TABLET    30 0  30  LI BLO    [id number]    [place]2423[place]  1  2021    ZOLPIDEM TARTRATE 10 MG TABLET    30 0  30  LI BLO    [id number]    [place]2423[place]  1  2021    ZOLPIDEM TARTRATE 10 MG TABLET    30 0  30  LI BLO    [id number]    [place]2423[place]  1  2020    ZOLPIDEM TARTRATE 10 MG TABLET    30 0  30  LI BLO    [id number]    [place]6914[place]  1  2020    ZOLPIDEM TARTRATE 10 MG TABLET    30 0  30  LI BLO    [id number]    [place] [place]2926[place]    2020  1  10/01/2020    ZOLPIDEM TARTRATE 10 MG TABLET    30 0  30  LI BLO    [id number]    [place]6579[place]    10/01/2020  1  10/01/2020    ZOLPIDEM TARTRATE 10 MG TABLET    30 0  30  LI BLO    [id number]    [place]NS [place]2423[place]    09/08/2020  1  09/08/2020    [id number] -40    45 0  11  AS CHR    [id number]    [place]ENNS (2423[place]  [place]    09/02/2020  1  08/12/2020    ALPRAZOLAM 0 25 MG TABLET    30 0  8  LI BLO    [id number]    [place]2423[place]    09/02/2020  1  07/24/2020    ZOLPIDEM TARTRATE 10 MG TABLET    30 0  30  LI BLO    [id number]    [place]ENNS (2423[place]  [place]    08/12/2020  1  08/12/2020    ALPRAZOLAM 0 25 MG TABLET    30 0  8  LI BLO    [id number]    [place]2423[place]    07/24/2020  1  07/24/2020    ZOLPIDEM TARTRATE 10 MG TABLET    30 0  30  LI BLO    [id number]    [place]2423[place]    06/24/2020  1  06/24/2020    ZOLPIDEM TARTRATE 5 MG TABLET    15 0  15  LI BLO    [id number]    [place]NS [place]2423[place]         Filled      ID      Written      Drug      QTY      Days      Prescriber      Rx #      Pharmacy *      Refills      Daily Dose      Pymt Type             *Pharmacy is created using a combination of pharmacy name and the last four digits of the pharmacy license number  *Per CDC guidance, the MME conversion factors prescribed or provided as part of medication-assisted treatment for opioid use disorder should not be used to benchmark against dosage thresholds meant for opioids prescribed for pain  Buprenorphine products have no agreed upon morphine equivalency, and as partial opioid agonists, are not expected to be associated with overdose risk in the same dose-dependent manner as doses for full agonist opioids  MME = morphine milligram equivalents  mg = dose in milligrams               Prescribers                                     Name      [address] [facility]      Zip      Phone       Edu Gonzáles, MD  [address] [facility]  JAZMINE Thurman  [address] [facility]  [facility] Address      [address] [facility]      Zip      Phone       [facility]   [place]  [address]  [phone]                                 Prescriber Delegate - Unlicensed Disclaimer:    Information from the [address] is protected health information and any information accessed must be treated as confidential  Any person who unintentionally or intentionally makes an unauthorized disclosure of information from the [address] will be subject to civil and criminal penalties as set forth in the ABC-MAP Act 2014-191, Act of Oct  27, 2014, P L  2916  The information in the [address] may contain errors resulting from the reporting of information received  Additional independent verification of patient profile information with pharmacies and prescribers may sometimes be prudent or necessary

## 2021-06-17 DIAGNOSIS — F32.A ANXIETY AND DEPRESSION: ICD-10-CM

## 2021-06-17 DIAGNOSIS — K21.9 GASTROESOPHAGEAL REFLUX DISEASE: ICD-10-CM

## 2021-06-17 DIAGNOSIS — F41.9 ANXIETY AND DEPRESSION: ICD-10-CM

## 2021-06-17 RX ORDER — ESCITALOPRAM OXALATE 20 MG/1
TABLET ORAL
Qty: 30 TABLET | Refills: 2 | Status: SHIPPED | OUTPATIENT
Start: 2021-06-17 | End: 2021-09-20

## 2021-06-18 RX ORDER — PANTOPRAZOLE SODIUM 40 MG/1
40 TABLET, DELAYED RELEASE ORAL DAILY
Qty: 30 TABLET | Refills: 4 | Status: SHIPPED | OUTPATIENT
Start: 2021-06-18 | End: 2021-11-22

## 2021-07-02 DIAGNOSIS — G47.09 OTHER INSOMNIA: ICD-10-CM

## 2021-07-02 RX ORDER — ZOLPIDEM TARTRATE 10 MG/1
TABLET ORAL
Qty: 30 TABLET | Refills: 0 | Status: SHIPPED | OUTPATIENT
Start: 2021-07-02 | End: 2021-08-13

## 2021-07-19 ENCOUNTER — HOSPITAL ENCOUNTER (OUTPATIENT)
Dept: MRI IMAGING | Facility: HOSPITAL | Age: 46
Discharge: HOME | End: 2021-07-19
Payer: COMMERCIAL

## 2021-07-19 DIAGNOSIS — M47.817 LUMBOSACRAL SPONDYLOSIS WITHOUT MYELOPATHY: ICD-10-CM

## 2021-07-19 PROCEDURE — 72195 MRI PELVIS W/O DYE: CPT

## 2021-07-28 DIAGNOSIS — G25.81 RESTLESS LEGS SYNDROME (RLS): ICD-10-CM

## 2021-07-28 RX ORDER — PRAMIPEXOLE DIHYDROCHLORIDE 0.25 MG/1
TABLET ORAL
Qty: 30 TABLET | Refills: 0 | Status: SHIPPED | OUTPATIENT
Start: 2021-07-28 | End: 2021-08-23

## 2021-07-29 ENCOUNTER — OFFICE VISIT (OUTPATIENT)
Dept: INTERNAL MEDICINE CLINIC | Age: 46
End: 2021-07-29
Payer: COMMERCIAL

## 2021-07-29 VITALS
HEART RATE: 70 BPM | WEIGHT: 167 LBS | HEIGHT: 66 IN | BODY MASS INDEX: 26.84 KG/M2 | OXYGEN SATURATION: 98 % | SYSTOLIC BLOOD PRESSURE: 102 MMHG | TEMPERATURE: 97.8 F | DIASTOLIC BLOOD PRESSURE: 58 MMHG

## 2021-07-29 DIAGNOSIS — F32.A ANXIETY AND DEPRESSION: ICD-10-CM

## 2021-07-29 DIAGNOSIS — F41.9 ANXIETY AND DEPRESSION: ICD-10-CM

## 2021-07-29 PROBLEM — G57.01 PIRIFORMIS SYNDROME OF RIGHT SIDE: Status: ACTIVE | Noted: 2021-06-30

## 2021-07-29 PROCEDURE — 1036F TOBACCO NON-USER: CPT | Performed by: INTERNAL MEDICINE

## 2021-07-29 PROCEDURE — 99213 OFFICE O/P EST LOW 20 MIN: CPT | Performed by: INTERNAL MEDICINE

## 2021-07-29 PROCEDURE — 3725F SCREEN DEPRESSION PERFORMED: CPT | Performed by: INTERNAL MEDICINE

## 2021-07-29 PROCEDURE — 3008F BODY MASS INDEX DOCD: CPT | Performed by: INTERNAL MEDICINE

## 2021-07-29 RX ORDER — ALPRAZOLAM 0.25 MG/1
0.5 TABLET ORAL 2 TIMES DAILY PRN
Qty: 60 TABLET | Refills: 1 | Status: SHIPPED | OUTPATIENT
Start: 2021-07-29 | End: 2021-11-29

## 2021-07-29 RX ORDER — MELOXICAM 15 MG/1
TABLET ORAL
COMMUNITY
Start: 2021-07-06 | End: 2021-07-29

## 2021-07-29 NOTE — PROGRESS NOTES
Assessment/Plan:    No problem-specific Assessment & Plan notes found for this encounter  {Assess/PlanSmartLinks:34159}      Subjective:      Patient ID: Christina Ordonez is a 39 y o  female      HPI    {Common ambulatory SmartLinks:48962}    Review of Systems      Objective:      /58 (BP Location: Left arm, Patient Position: Sitting, Cuff Size: Standard)   Pulse 70   Temp 97 8 °F (36 6 °C)   Ht 5' 6" (1 676 m)   Wt 75 8 kg (167 lb)   SpO2 98%   BMI 26 95 kg/m²          Physical Exam

## 2021-07-29 NOTE — PROGRESS NOTES
Assessment/Plan:    No problem-specific Assessment & Plan notes found for this encounter  Diagnoses and all orders for this visit:    Anxiety and depression  -     ALPRAZolam (XANAX) 0 25 mg tablet; Take 2 tablets (0 5 mg total) by mouth 2 (two) times a day as needed for anxiety    Other orders  -     Discontinue: meloxicam (MOBIC) 15 mg tablet; TAKE 1 TABLET EVERY DAY BY ORAL ROUTE FOR 5 DAYS  -     mupirocin (BACTROBAN) 2 % ointment; mupirocin 2 % topical ointment          Subjective:      Patient ID: Zulema Greenberg is a 39 y o  female  Patient is lots step going on life right now and she feels like her anxiety is flaring   A lot  She does continue to have a Lexapro with p r n  Xanax  Otherwise most of her problems are stable and she feels well  Anxiety  Presents for follow-up visit  Symptoms include decreased concentration, depressed mood, excessive worry, insomnia, nervous/anxious behavior and restlessness  Patient reports no chest pain, confusion, dizziness, nausea, palpitations, shortness of breath or suicidal ideas  Symptoms occur occasionally  The severity of symptoms is moderate  The quality of sleep is fair  Compliance with medications is %  Review of Systems   Constitutional: Negative for chills, fatigue, fever and unexpected weight change  HENT: Negative for congestion, ear pain, hearing loss, postnasal drip, sinus pressure, sore throat, trouble swallowing and voice change  Eyes: Negative for visual disturbance  Respiratory: Negative for cough, chest tightness, shortness of breath and wheezing  Cardiovascular: Negative for chest pain, palpitations and leg swelling  Gastrointestinal: Negative for abdominal distention, abdominal pain, anal bleeding, blood in stool, constipation, diarrhea and nausea  Endocrine: Negative for cold intolerance, polydipsia, polyphagia and polyuria     Genitourinary: Negative for dysuria, flank pain, frequency, hematuria and urgency  Musculoskeletal: Negative for arthralgias, back pain, gait problem, joint swelling, myalgias and neck pain  Skin: Negative for rash  Allergic/Immunologic: Negative for immunocompromised state  Neurological: Negative for dizziness, syncope, facial asymmetry, weakness, light-headedness, numbness and headaches  Hematological: Negative for adenopathy  Psychiatric/Behavioral: Positive for decreased concentration  Negative for confusion, sleep disturbance and suicidal ideas  The patient is nervous/anxious and has insomnia  Objective:      /58 (BP Location: Left arm, Patient Position: Sitting, Cuff Size: Standard)   Pulse 70   Temp 97 8 °F (36 6 °C)   Ht 5' 6" (1 676 m)   Wt 75 8 kg (167 lb)   SpO2 98%   BMI 26 95 kg/m²          Physical Exam  Constitutional:       General: She is not in acute distress  Appearance: She is well-developed  HENT:      Right Ear: External ear normal       Left Ear: External ear normal       Nose: Nose normal       Mouth/Throat:      Pharynx: No oropharyngeal exudate  Eyes:      Pupils: Pupils are equal, round, and reactive to light  Neck:      Thyroid: No thyromegaly  Vascular: No JVD  Cardiovascular:      Rate and Rhythm: Normal rate and regular rhythm  Heart sounds: Normal heart sounds  No murmur heard  No gallop  Pulmonary:      Effort: Pulmonary effort is normal  No respiratory distress  Breath sounds: Normal breath sounds  No wheezing or rales  Abdominal:      General: Bowel sounds are normal  There is no distension  Palpations: Abdomen is soft  There is no mass  Tenderness: There is no abdominal tenderness  Musculoskeletal:         General: No tenderness  Normal range of motion  Cervical back: Normal range of motion and neck supple  Lymphadenopathy:      Cervical: No cervical adenopathy  Skin:     Findings: No rash     Neurological:      Mental Status: She is alert and oriented to person, place, and time  Cranial Nerves: No cranial nerve deficit  Coordination: Coordination normal    Psychiatric:         Behavior: Behavior normal          Thought Content:  Thought content normal          Judgment: Judgment normal

## 2021-07-30 NOTE — ASSESSMENT & PLAN NOTE
Although she always has some level of anxiety and depression right now it has been acting up and she is out of Xanax

## 2021-08-11 DIAGNOSIS — G47.09 OTHER INSOMNIA: ICD-10-CM

## 2021-08-11 RX ORDER — ZOLPIDEM TARTRATE 10 MG/1
10 TABLET ORAL
Qty: 30 TABLET | Refills: 0 | OUTPATIENT
Start: 2021-08-11

## 2021-08-12 DIAGNOSIS — G43.709 CHRONIC MIGRAINE WITHOUT AURA WITHOUT STATUS MIGRAINOSUS, NOT INTRACTABLE: Primary | ICD-10-CM

## 2021-08-12 RX ORDER — BUTALBITAL, ACETAMINOPHEN AND CAFFEINE 300; 40; 50 MG/1; MG/1; MG/1
1 CAPSULE ORAL 3 TIMES DAILY PRN
Qty: 30 CAPSULE | Refills: 0 | Status: SHIPPED | OUTPATIENT
Start: 2021-08-12 | End: 2021-11-08 | Stop reason: ALTCHOICE

## 2021-08-13 RX ORDER — ZOLPIDEM TARTRATE 10 MG/1
TABLET ORAL
Qty: 30 TABLET | Refills: 0 | Status: SHIPPED | OUTPATIENT
Start: 2021-08-13 | End: 2021-09-20

## 2021-08-16 ENCOUNTER — APPOINTMENT (EMERGENCY)
Dept: CT IMAGING | Facility: HOSPITAL | Age: 46
End: 2021-08-16
Payer: COMMERCIAL

## 2021-08-16 ENCOUNTER — HOSPITAL ENCOUNTER (EMERGENCY)
Facility: HOSPITAL | Age: 46
Discharge: HOME | End: 2021-08-16
Attending: EMERGENCY MEDICINE
Payer: COMMERCIAL

## 2021-08-16 ENCOUNTER — TELEPHONE (OUTPATIENT)
Dept: CT IMAGING | Facility: HOSPITAL | Age: 46
End: 2021-08-16

## 2021-08-16 VITALS
SYSTOLIC BLOOD PRESSURE: 111 MMHG | RESPIRATION RATE: 18 BRPM | DIASTOLIC BLOOD PRESSURE: 55 MMHG | HEART RATE: 64 BPM | WEIGHT: 168 LBS | TEMPERATURE: 97.5 F | BODY MASS INDEX: 27 KG/M2 | HEIGHT: 66 IN | OXYGEN SATURATION: 99 %

## 2021-08-16 DIAGNOSIS — G43.909 MIGRAINE HEADACHE: Primary | ICD-10-CM

## 2021-08-16 LAB
ALBUMIN SERPL BCP-MCNC: 3.2 G/DL (ref 3.5–5)
ALP SERPL-CCNC: 74 U/L (ref 46–116)
ALT SERPL W P-5'-P-CCNC: 23 U/L (ref 12–78)
ANION GAP SERPL CALCULATED.3IONS-SCNC: 10 MMOL/L (ref 4–13)
AST SERPL W P-5'-P-CCNC: 23 U/L (ref 5–45)
BASOPHILS # BLD AUTO: 0.02 THOUSANDS/ΜL (ref 0–0.1)
BASOPHILS NFR BLD AUTO: 0 % (ref 0–1)
BILIRUB SERPL-MCNC: 0.2 MG/DL (ref 0.2–1)
BUN SERPL-MCNC: 12 MG/DL (ref 5–25)
CALCIUM ALBUM COR SERPL-MCNC: 9.7 MG/DL (ref 8.3–10.1)
CALCIUM SERPL-MCNC: 9.1 MG/DL (ref 8.3–10.1)
CHLORIDE SERPL-SCNC: 106 MMOL/L (ref 100–108)
CO2 SERPL-SCNC: 25 MMOL/L (ref 21–32)
CREAT SERPL-MCNC: 0.79 MG/DL (ref 0.6–1.3)
EOSINOPHIL # BLD AUTO: 0.1 THOUSAND/ΜL (ref 0–0.61)
EOSINOPHIL NFR BLD AUTO: 1 % (ref 0–6)
ERYTHROCYTE [DISTWIDTH] IN BLOOD BY AUTOMATED COUNT: 13.2 % (ref 11.6–15.1)
EXT PREG TEST URINE: NEGATIVE
EXT. CONTROL ED NAV: NORMAL
GFR SERPL CREATININE-BSD FRML MDRD: 90 ML/MIN/1.73SQ M
GLUCOSE SERPL-MCNC: 83 MG/DL (ref 65–140)
HCT VFR BLD AUTO: 38.5 % (ref 34.8–46.1)
HGB BLD-MCNC: 12.3 G/DL (ref 11.5–15.4)
IMM GRANULOCYTES # BLD AUTO: 0.03 THOUSAND/UL (ref 0–0.2)
IMM GRANULOCYTES NFR BLD AUTO: 0 % (ref 0–2)
LYMPHOCYTES # BLD AUTO: 1.71 THOUSANDS/ΜL (ref 0.6–4.47)
LYMPHOCYTES NFR BLD AUTO: 20 % (ref 14–44)
MCH RBC QN AUTO: 29.4 PG (ref 26.8–34.3)
MCHC RBC AUTO-ENTMCNC: 31.9 G/DL (ref 31.4–37.4)
MCV RBC AUTO: 92 FL (ref 82–98)
MONOCYTES # BLD AUTO: 0.42 THOUSAND/ΜL (ref 0.17–1.22)
MONOCYTES NFR BLD AUTO: 5 % (ref 4–12)
NEUTROPHILS # BLD AUTO: 6.22 THOUSANDS/ΜL (ref 1.85–7.62)
NEUTS SEG NFR BLD AUTO: 74 % (ref 43–75)
NRBC BLD AUTO-RTO: 0 /100 WBCS
PLATELET # BLD AUTO: 212 THOUSANDS/UL (ref 149–390)
PMV BLD AUTO: 8.7 FL (ref 8.9–12.7)
POTASSIUM SERPL-SCNC: 4.4 MMOL/L (ref 3.5–5.3)
PROT SERPL-MCNC: 7.3 G/DL (ref 6.4–8.2)
RBC # BLD AUTO: 4.18 MILLION/UL (ref 3.81–5.12)
SODIUM SERPL-SCNC: 141 MMOL/L (ref 136–145)
WBC # BLD AUTO: 8.5 THOUSAND/UL (ref 4.31–10.16)

## 2021-08-16 PROCEDURE — 70496 CT ANGIOGRAPHY HEAD: CPT

## 2021-08-16 PROCEDURE — 85025 COMPLETE CBC W/AUTO DIFF WBC: CPT | Performed by: EMERGENCY MEDICINE

## 2021-08-16 PROCEDURE — 36415 COLL VENOUS BLD VENIPUNCTURE: CPT

## 2021-08-16 PROCEDURE — 81025 URINE PREGNANCY TEST: CPT | Performed by: EMERGENCY MEDICINE

## 2021-08-16 PROCEDURE — 96365 THER/PROPH/DIAG IV INF INIT: CPT

## 2021-08-16 PROCEDURE — 99284 EMERGENCY DEPT VISIT MOD MDM: CPT

## 2021-08-16 PROCEDURE — 96375 TX/PRO/DX INJ NEW DRUG ADDON: CPT

## 2021-08-16 PROCEDURE — 70498 CT ANGIOGRAPHY NECK: CPT

## 2021-08-16 PROCEDURE — 80053 COMPREHEN METABOLIC PANEL: CPT | Performed by: EMERGENCY MEDICINE

## 2021-08-16 PROCEDURE — 99284 EMERGENCY DEPT VISIT MOD MDM: CPT | Performed by: EMERGENCY MEDICINE

## 2021-08-16 RX ORDER — KETOROLAC TROMETHAMINE 30 MG/ML
15 INJECTION, SOLUTION INTRAMUSCULAR; INTRAVENOUS ONCE
Status: COMPLETED | OUTPATIENT
Start: 2021-08-16 | End: 2021-08-16

## 2021-08-16 RX ORDER — DIPHENHYDRAMINE HYDROCHLORIDE 50 MG/ML
25 INJECTION INTRAMUSCULAR; INTRAVENOUS ONCE
Status: COMPLETED | OUTPATIENT
Start: 2021-08-16 | End: 2021-08-16

## 2021-08-16 RX ORDER — PROMETHAZINE HYDROCHLORIDE 25 MG/ML
12.5 INJECTION, SOLUTION INTRAMUSCULAR; INTRAVENOUS ONCE
Status: COMPLETED | OUTPATIENT
Start: 2021-08-16 | End: 2021-08-16

## 2021-08-16 RX ORDER — DEXAMETHASONE SODIUM PHOSPHATE 4 MG/ML
10 INJECTION, SOLUTION INTRA-ARTICULAR; INTRALESIONAL; INTRAMUSCULAR; INTRAVENOUS; SOFT TISSUE ONCE
Status: COMPLETED | OUTPATIENT
Start: 2021-08-16 | End: 2021-08-16

## 2021-08-16 RX ORDER — MAGNESIUM SULFATE HEPTAHYDRATE 40 MG/ML
2 INJECTION, SOLUTION INTRAVENOUS ONCE
Status: COMPLETED | OUTPATIENT
Start: 2021-08-16 | End: 2021-08-16

## 2021-08-16 RX ADMIN — MAGNESIUM SULFATE HEPTAHYDRATE 2 G: 40 INJECTION, SOLUTION INTRAVENOUS at 15:24

## 2021-08-16 RX ADMIN — KETOROLAC TROMETHAMINE 15 MG: 30 INJECTION, SOLUTION INTRAMUSCULAR; INTRAVENOUS at 16:45

## 2021-08-16 RX ADMIN — IOHEXOL 85 ML: 350 INJECTION, SOLUTION INTRAVENOUS at 16:01

## 2021-08-16 RX ADMIN — SODIUM CHLORIDE 1000 ML: 0.9 INJECTION, SOLUTION INTRAVENOUS at 15:20

## 2021-08-16 RX ADMIN — PROMETHAZINE HYDROCHLORIDE 12.5 MG: 25 INJECTION INTRAMUSCULAR; INTRAVENOUS at 15:21

## 2021-08-16 RX ADMIN — DEXAMETHASONE SODIUM PHOSPHATE 10 MG: 4 INJECTION, SOLUTION INTRAMUSCULAR; INTRAVENOUS at 15:21

## 2021-08-16 RX ADMIN — DIPHENHYDRAMINE HYDROCHLORIDE 25 MG: 50 INJECTION, SOLUTION INTRAMUSCULAR; INTRAVENOUS at 15:23

## 2021-08-16 NOTE — ED PROVIDER NOTES
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, presents to the ED for evaluation of right-sided posterior headache ongoing for the past 9 days, despite taking indomethacin and Fioricet  She also complains of right-sided neck pain  She also admits to photophobia and phonophobia  She states that she has a busy week coming up with her daughter and she can not take the headache anymore  She states that she does get a bad migraine about once a year and it does feel typical of that  She denies any fevers or chills  History provided by:  Patient   used: No    Migraine  Severity:  Moderate  Onset quality:  Gradual  Duration:  9 days  Associated symptoms: headaches        Prior to Admission Medications   Prescriptions Last Dose Informant Patient Reported? Taking?    ALPRAZolam (XANAX) 0 25 mg tablet   No No   Sig: Take 2 tablets (0 5 mg total) by mouth 2 (two) times a day as needed for anxiety   Butalbital-APAP-Caffeine -40 MG CAPS   No No   Sig: Take 1 capsule by mouth 3 (three) times a day as needed (Headache)   Probiotic Product (PRO-BIOTIC BLEND PO)   Yes No   Sig: Take by mouth   clobetasol (TEMOVATE) 0 05 % cream   No No   Sig: Apply topically daily at bedtime as needed (vulvar irritation/ itching)   escitalopram (LEXAPRO) 20 mg tablet   No No   Sig: TAKE 1 TABLET BY MOUTH EVERY DAY   hydrOXYzine HCL (ATARAX) 25 mg tablet   Yes No   Sig: hydroxyzine HCl 25 mg tablet   TAKE 1 TABLET NIGHTLY AS NEEDED FOR ITCH   latanoprost (XALATAN) 0 005 % ophthalmic solution   Yes No   Sig: INSTILL 1 DROP IN AFFECTED EYE AT BEDTIME   mupirocin (BACTROBAN) 2 % ointment   Yes No   Sig: mupirocin 2 % topical ointment   pantoprazole (PROTONIX) 40 mg tablet   No No   Sig: Take 1 tablet (40 mg total) by mouth daily   pramipexole (MIRAPEX) 0 25 mg tablet   No No   Sig: TAKE 1 TABLET BY MOUTH DAILY AT BEDTIME   zolpidem (AMBIEN) 10 mg tablet   No No   Sig: TAKE 1 TABLET BY MOUTH EVERY DAY AT BEDTIME AS NEEDED FOR SLEEP      Facility-Administered Medications: None       Past Medical History:   Diagnosis Date    Abnormal Pap smear of cervix     RAMIREZ positive     Anxiety     Basal cell carcinoma     Depression     Migraine     Migraine     Neck pain     Pregnancy     Vertebral artery dissection (HCC)     Vulvovaginitis     Yeast infection        Past Surgical History:   Procedure Laterality Date     SECTION, LOW TRANSVERSE      COLONOSCOPY      DILATION AND CURETTAGE OF UTERUS      GYNECOLOGIC CRYOSURGERY      cervix    LASIK      SKIN LESION EXCISION      basal cell carcinoma of left cheek       Family History   Problem Relation Age of Onset    Hypertension Mother     Lung cancer Mother 64    Brain cancer Mother 64    Breast cancer Maternal Aunt         dx in 42's     Multiple sclerosis Father     No Known Problems Daughter     No Known Problems Maternal Grandmother     No Known Problems Maternal Grandfather     No Known Problems Paternal Grandmother     No Known Problems Paternal Grandfather     No Known Problems Maternal Aunt      I have reviewed and agree with the history as documented  E-Cigarette/Vaping    E-Cigarette Use Never User      E-Cigarette/Vaping Substances     Social History     Tobacco Use    Smoking status: Never Smoker    Smokeless tobacco: Never Used   Vaping Use    Vaping Use: Never used   Substance Use Topics    Alcohol use: No     Alcohol/week: 0 0 standard drinks    Drug use: No       Review of Systems   Neurological: Positive for headaches  All other systems reviewed and are negative        Physical Exam  Physical Exam  Vitals and nursing note reviewed  Constitutional:       Appearance: Normal appearance  She is well-developed and normal weight  HENT:      Head: Normocephalic and atraumatic  Right Ear: External ear normal       Left Ear: External ear normal       Nose: Nose normal       Mouth/Throat:      Mouth: Mucous membranes are moist       Pharynx: Oropharynx is clear  Eyes:      General: No scleral icterus  Conjunctiva/sclera: Conjunctivae normal    Cardiovascular:      Rate and Rhythm: Normal rate and regular rhythm  Pulses: Normal pulses  Heart sounds: Normal heart sounds  Pulmonary:      Effort: Pulmonary effort is normal       Breath sounds: Normal breath sounds  Abdominal:      General: Abdomen is flat  There is no distension  Palpations: Abdomen is soft  Tenderness: There is no abdominal tenderness  Musculoskeletal:         General: Normal range of motion  Cervical back: Normal range of motion  Skin:     General: Skin is warm and dry  Capillary Refill: Capillary refill takes less than 2 seconds  Coloration: Skin is not jaundiced  Neurological:      General: No focal deficit present  Mental Status: She is alert and oriented to person, place, and time  Mental status is at baseline  Cranial Nerves: No cranial nerve deficit  Sensory: No sensory deficit  Motor: No weakness        Coordination: Coordination normal    Psychiatric:         Mood and Affect: Mood normal          Behavior: Behavior normal          Vital Signs  ED Triage Vitals   Temperature Pulse Respirations Blood Pressure SpO2   08/16/21 1251 08/16/21 1250 08/16/21 1250 08/16/21 1250 08/16/21 1250   97 5 °F (36 4 °C) 80 18 127/56 99 %      Temp Source Heart Rate Source Patient Position - Orthostatic VS BP Location FiO2 (%)   08/16/21 1250 08/16/21 1250 08/16/21 1250 08/16/21 1250 --   Oral Monitor Sitting Left arm       Pain Score       08/16/21 1251       8           Vitals:    08/16/21 1250 08/16/21 1505   BP: 127/56 111/55   Pulse: 80 64   Patient Position - Orthostatic VS: Sitting Lying         Visual Acuity      ED Medications  Medications   sodium chloride 0 9 % bolus 1,000 mL (0 mL Intravenous Stopped 8/16/21 1620)   diphenhydrAMINE (BENADRYL) injection 25 mg (25 mg Intravenous Given 8/16/21 1523)   dexamethasone (DECADRON) injection 10 mg (10 mg Intravenous Given 8/16/21 1521)   magnesium sulfate 2 g/50 mL IVPB (premix) 2 g (0 g Intravenous Stopped 8/16/21 1624)   promethazine (PHENERGAN) injection 12 5 mg (12 5 mg Intravenous Given 8/16/21 1521)   iohexol (OMNIPAQUE) 350 MG/ML injection (SINGLE-DOSE) 100 mL (85 mL Intravenous Given 8/16/21 1601)   ketorolac (TORADOL) injection 15 mg (15 mg Intravenous Given 8/16/21 1645)       Diagnostic Studies  Results Reviewed     Procedure Component Value Units Date/Time    Comprehensive metabolic panel [[id number]]  (Abnormal) Collected: 08/16/21 1258    Lab Status: Final result Specimen: Blood from Arm, Left Updated: 08/16/21 1443     Sodium 141 mmol/L      Potassium 4 4 mmol/L      Chloride 106 mmol/L      CO2 25 mmol/L      ANION GAP 10 mmol/L      BUN 12 mg/dL      Creatinine 0 79 mg/dL      Glucose 83 mg/dL      Calcium 9 1 mg/dL      Corrected Calcium 9 7 mg/dL      AST 23 U/L      ALT 23 U/L      Alkaline Phosphatase 74 U/L      Total Protein 7 3 g/dL      Albumin 3 2 g/dL      Total Bilirubin 0 20 mg/dL      eGFR 90 ml/min/1 73sq m     Narrative:      Radha guidelines for Chronic Kidney Disease (CKD):     Stage 1 with normal or high GFR (GFR > 90 mL/min/1 73 square meters)    Stage 2 Mild CKD (GFR = 60-89 mL/min/1 73 square meters)    Stage 3A Moderate CKD (GFR = 45-59 mL/min/1 73 square meters)    Stage 3B Moderate CKD (GFR = 30-44 mL/min/1 73 square meters)    Stage 4 Severe CKD (GFR = 15-29 mL/min/1 73 square meters)    Stage 5 End Stage CKD (GFR <15 mL/min/1 73 square meters)  Note: GFR calculation is accurate only with a steady state creatinine    POCT pregnancy, urine [[id number]]  (Normal) Resulted: 08/16/21 1429    Lab Status: Final result Updated: 08/16/21 1430     EXT PREG TEST UR (Ref: Negative) negative     Control VALID    CBC and differential [[id number]]  (Abnormal) Collected: 08/16/21 1258    Lab Status: Final result Specimen: Blood from Arm, Left Updated: 08/16/21 1311     WBC 8 50 Thousand/uL      RBC 4 18 Million/uL      Hemoglobin 12 3 g/dL      Hematocrit 38 5 %      MCV 92 fL      MCH 29 4 pg      MCHC 31 9 g/dL      RDW 13 2 %      MPV 8 7 fL      Platelets 245 Thousands/uL      nRBC 0 /100 WBCs      Neutrophils Relative 74 %      Immat GRANS % 0 %      Lymphocytes Relative 20 %      Monocytes Relative 5 %      Eosinophils Relative 1 %      Basophils Relative 0 %      Neutrophils Absolute 6 22 Thousands/µL      Immature Grans Absolute 0 03 Thousand/uL      Lymphocytes Absolute 1 71 Thousands/µL      Monocytes Absolute 0 42 Thousand/µL      Eosinophils Absolute 0 10 Thousand/µL      Basophils Absolute 0 02 Thousands/µL                  CTA head and neck with and without contrast   Final Result by Park Andujar MD (08/16 1620)      No acute intracranial abnormality  Negative CTA head and neck for large vessel occlusion, dissection, aneurysm, or high-grade stenosis  Mild sinus disease  Workstation performed: [id number]                    Procedures  Procedures         ED Course  ED Course as of Aug 16 1807   Mon Aug 16, 2021   1737 Workup negative, no vertebral dissection or intracranial hemorrhage or tumor on CTA head and neck  Labs are negative, the patient symptoms have resolved, will discharge home, diagnosis migraine headache  SBIRT 22yo+      Most Recent Value   SBIRT (22 yo +)   In order to provide better care to our patients, we are screening all of our patients for alcohol and drug use   Would it be okay to ask you these screening questions? Unable to answer at this time Filed at: 08/16/2021 1253                    [facility]  Number of Diagnoses or Management Options  Migraine headache: established and worsening  Diagnosis management comments: 72-year-old female with 9 days of headache, migrainous in nature  She does have a history of right vertebral artery dissection from 4 years ago which resolved  She does state that her headache today similar to that episode  Considered recurrent vertebral artery dissection, pursued CTA head and neck  Fortunately this was negative, her symptoms improved with migraine cocktail  She felt well enough to go home, discharge in improved condition, follow up with her neurologist        Amount and/or Complexity of Data Reviewed  Clinical lab tests: ordered and reviewed  Tests in the radiology section of CPT®: ordered and reviewed  Decide to obtain previous medical records or to obtain history from someone other than the patient: yes    Risk of Complications, Morbidity, and/or Mortality  Presenting problems: moderate  Diagnostic procedures: moderate  Management options: moderate    Patient Progress  Patient progress: stable      Disposition  Final diagnoses:   Migraine headache     Time reflects when diagnosis was documented in both MDM as applicable and the Disposition within this note     Time User Action Codes Description Comment    8/16/2021  5:38 PM Madelaine Martel Add [G43 909] Migraine headache       ED Disposition     ED Disposition Condition Date/Time Comment    Discharge Stable Mon Aug 16, 2021  5:38 PM Tad Ram discharge to home/self care              Follow-up Information     Follow up With Specialties Details Why Contact Info    Ahmet Velez MD Internal Medicine In 3 days  [address] [place] [address]  [phone]            Discharge Medication List as of 8/16/2021  5:38 PM      CONTINUE these medications which have NOT CHANGED    Details   ALPRAZolam (XANAX) 0 25 mg tablet Take 2 tablets (0 5 mg total) by mouth 2 (two) times a day as needed for anxiety, Starting Thu 7/29/2021, Normal      Butalbital-APAP-Caffeine -40 MG CAPS Take 1 capsule by mouth 3 (three) times a day as needed (Headache), Starting Thu 8/12/2021, Normal      clobetasol (TEMOVATE) 0 05 % cream Apply topically daily at bedtime as needed (vulvar irritation/ itching), Starting Mon 12/14/2020, Normal      escitalopram (LEXAPRO) 20 mg tablet TAKE 1 TABLET BY MOUTH EVERY DAY, Normal      hydrOXYzine HCL (ATARAX) 25 mg tablet hydroxyzine HCl 25 mg tablet   TAKE 1 TABLET NIGHTLY AS NEEDED FOR ITCH, Historical Med      latanoprost (XALATAN) 0 005 % ophthalmic solution INSTILL 1 DROP IN AFFECTED EYE AT BEDTIME, Historical Med      mupirocin (BACTROBAN) 2 % ointment mupirocin 2 % topical ointment, Historical Med      pantoprazole (PROTONIX) 40 mg tablet Take 1 tablet (40 mg total) by mouth daily, Starting Fri 6/18/2021, Normal      pramipexole (MIRAPEX) 0 25 mg tablet TAKE 1 TABLET BY MOUTH DAILY AT BEDTIME, Normal      Probiotic Product (PRO-BIOTIC BLEND PO) Take by mouth, Historical Med      zolpidem (AMBIEN) 10 mg tablet TAKE 1 TABLET BY MOUTH EVERY DAY AT BEDTIME AS NEEDED FOR SLEEP, Normal           No discharge procedures on file      PDMP Review       Value Time User    PDMP Reviewed  Yes 8/13/2021  9:30 AM Kaci Shah MD          ED Provider  Electronically Signed by           Nilesh Foreman DO  08/16/21 3484

## 2021-08-22 DIAGNOSIS — G25.81 RESTLESS LEGS SYNDROME (RLS): ICD-10-CM

## 2021-08-23 RX ORDER — PRAMIPEXOLE DIHYDROCHLORIDE 0.25 MG/1
TABLET ORAL
Qty: 30 TABLET | Refills: 0 | Status: SHIPPED | OUTPATIENT
Start: 2021-08-23 | End: 2021-09-27

## 2021-09-08 ENCOUNTER — TELEPHONE (OUTPATIENT)
Dept: OBGYN CLINIC | Facility: CLINIC | Age: 46
End: 2021-09-08

## 2021-09-08 NOTE — TELEPHONE ENCOUNTER
Spoke with Dr Addison Crenshaw and she replied - Certainly could be perimenopausal changes to her menstrual cycle and sounds normal  Can offer an appointment if patient would like to discuss further - Informed patient of response and stated she will call if anything changed or worsens and doesn't feel an appointment is needed at this time as long as we were not concerned and symptoms sound normal

## 2021-09-08 NOTE — TELEPHONE ENCOUNTER
Patient calling regarding irregular periods for the past year  States that in the last 6 months that they are becoming more irregular where she is approximately having 2 periods per months  She describes period currently as being pretty light where she only needs a panty Liner but her last period lasted 8 days and was heavier than her normal  Cycles are lasting between 16- 22 days but has not been consistent  Also complains of mild cramping along with breast tenderness accomyining her periods  Was experiencing a lot of hot flashes but they have recently seem to be subsiding  Explained to patient that this could all be normal but I would review with a provider and get back to her  Tiger text sent to Dr Chari López

## 2021-09-16 DIAGNOSIS — G47.09 OTHER INSOMNIA: ICD-10-CM

## 2021-09-17 NOTE — TELEPHONE ENCOUNTER
Patient Report    Refine Search   Report Prepared: 2021   Date Range: 2020 - 2021       Download PDF      Download CSV    Ethyl Pierini     Linked Records  Name  ID Gender Address   Mag Farris 1975 1 female [address] [place] [address]   Report Criteria  First Nameprimo  Last Namefrank  DOB1975  Summary      Summary  Total Prescriptions    11    Total Private Pay    0    Total Prescribers    1    Total Pharmacies    1     Opioids* (excluding buprenorphine)  Current Qty    0 0    Current MME/day    0 0    30 Day Avg MME/day    0 0     Buprenorphine*  Current Qty    0 0    Current mg/day    0 0    30 Day Avg mg/day    0 0     Prescriptions    Filled  ID  Written  Drug  QTY  Days  Prescriber  Rx #  Pharmacy *  Refills  Daily Dose  Pymt Type      2021  1  2021  ZOLPIDEM TARTRATE 10 MG TABLET  30 0  30  [facility]  [id number]  [facility] [place]2423[place]    [place]  1  2021  ALPRAZOLAM 0 25 MG TABLET  60 0  15  [facility] BLO  [id number]  [facility] [place]2423[place]    20[place]  1  2021  ZOLPIDEM TARTRATE 10 MG TABLET  30 0  30  [facility]  [id number]  [facility] [place]2423[place]    20[place]  1  2021  ZOLPIDEM TARTRATE 10 MG TABLET  30 0  30  [facility] BLO  [id number]  [facility] [place]2423[place]    20[place]  1  2021  ZOLPIDEM TARTRATE 10 MG TABLET  30 0  30  [facility]LO  [id number]  [facility] [place]2423[place]    20[place]  1  2021  ZOLPIDEM TARTRATE 10 MG TABLET  30 0  30  [facility] BLO  [id number]  [facility] [place]2423[place]    2021  1  2021  ZOLPIDEM TARTRATE 10 MG TABLET  30 0  30  [facility] BLO  [id number]  [facility] [place]2423[place]    2021  1  2020  ZOLPIDEM TARTRATE 10 MG TABLET  30 0  30  LI BLO  [id number]  [facility] [place]2423[place]    2020  1  2020  ZOLPIDEM TARTRATE 10 MG TABLET  30 0  30  [facility] BLO  [id number]  [facility] [place]2423[place]    2020  1  10/01/2020  ZOLPIDEM TARTRATE 10 MG TABLET  30 0  30  LI BLO  [id number]  [facility] (2387)  1   [place]    10/01/2020  1  10/01/2020  ZOLPIDEM TARTRATE 10 MG TABLET  30 0  30  LI BLO  [id number]  [place] (1973[place]    *Pharmacy is created using a combination of pharmacy name and the last four digits of the pharmacy license number  *Per CDC guidance, the MME conversion factors prescribed or provided as part of medication-assisted treatment for opioid use disorder should not be used to benchmark against dosage thresholds meant for opioids prescribed for pain  Buprenorphine products have no agreed upon morphine equivalency, and as partial opioid agonists, are not expected to be associated with overdose risk in the same dose-dependent manner as doses for full agonist opioids  MME = morphine milligram equivalents  mg = dose in milligrams  Prescribers    Name  Linda Fitzpatrick [address]  Zip  Phone    Name  Linda Da Sil[address]rinhbert [address]  Zip  Phone    Edu Gonzáles MD  [phone]  [facility]  [id number] [place] [address]  Zip  Phone    [address]  [phone] [facility]  [address]  [phone]    ×   Contested Record [address]    Prescriber Delegate - Unlicensed Disclaimer:  Information from the [address] is protected health information and any information accessed must be treated as confidential  Any person who unintentionally or intentionally makes an unauthorized disclosure of information from the [address] will be subject to civil and criminal penalties as set forth in the ABC-[employer] Act 2014-191, Act of Oct  27, 2014, P L  3687  The information in the [address] may contain errors resulting from the reporting of information received  Additional independent verification of patient profile information with pharmacies and prescribers may sometimes be prudent or necessary

## 2021-09-18 DIAGNOSIS — F41.9 ANXIETY AND DEPRESSION: ICD-10-CM

## 2021-09-18 DIAGNOSIS — F32.A ANXIETY AND DEPRESSION: ICD-10-CM

## 2021-09-20 RX ORDER — ESCITALOPRAM OXALATE 20 MG/1
TABLET ORAL
Qty: 30 TABLET | Refills: 2 | Status: SHIPPED | OUTPATIENT
Start: 2021-09-20 | End: 2021-12-20

## 2021-09-20 RX ORDER — ZOLPIDEM TARTRATE 10 MG/1
TABLET ORAL
Qty: 30 TABLET | Refills: 0 | Status: SHIPPED | OUTPATIENT
Start: 2021-09-20 | End: 2021-11-03 | Stop reason: SDUPTHER

## 2021-09-25 DIAGNOSIS — G25.81 RESTLESS LEGS SYNDROME (RLS): ICD-10-CM

## 2021-09-27 RX ORDER — PRAMIPEXOLE DIHYDROCHLORIDE 0.25 MG/1
TABLET ORAL
Qty: 30 TABLET | Refills: 0 | Status: SHIPPED | OUTPATIENT
Start: 2021-09-27 | End: 2021-11-17 | Stop reason: SDUPTHER

## 2021-11-03 DIAGNOSIS — G47.09 OTHER INSOMNIA: ICD-10-CM

## 2021-11-04 RX ORDER — ZOLPIDEM TARTRATE 10 MG/1
10 TABLET ORAL
Qty: 30 TABLET | Refills: 0 | Status: SHIPPED | OUTPATIENT
Start: 2021-11-04 | End: 2021-11-29 | Stop reason: SDUPTHER

## 2021-11-08 ENCOUNTER — OFFICE VISIT (OUTPATIENT)
Dept: OBGYN CLINIC | Facility: CLINIC | Age: 46
End: 2021-11-08
Payer: COMMERCIAL

## 2021-11-08 VITALS — HEIGHT: 66 IN | DIASTOLIC BLOOD PRESSURE: 76 MMHG | BODY MASS INDEX: 27.12 KG/M2 | SYSTOLIC BLOOD PRESSURE: 116 MMHG

## 2021-11-08 DIAGNOSIS — N93.9 ABNORMAL UTERINE BLEEDING (AUB): Primary | ICD-10-CM

## 2021-11-08 PROCEDURE — 99213 OFFICE O/P EST LOW 20 MIN: CPT | Performed by: STUDENT IN AN ORGANIZED HEALTH CARE EDUCATION/TRAINING PROGRAM

## 2021-11-16 ENCOUNTER — ANNUAL EXAM (OUTPATIENT)
Dept: OBGYN CLINIC | Facility: CLINIC | Age: 46
End: 2021-11-16
Payer: COMMERCIAL

## 2021-11-16 VITALS
DIASTOLIC BLOOD PRESSURE: 82 MMHG | HEIGHT: 66 IN | BODY MASS INDEX: 27.51 KG/M2 | WEIGHT: 171.2 LBS | SYSTOLIC BLOOD PRESSURE: 124 MMHG

## 2021-11-16 DIAGNOSIS — Z01.419 WELL WOMAN EXAM: Primary | ICD-10-CM

## 2021-11-16 PROCEDURE — 0503F POSTPARTUM CARE VISIT: CPT | Performed by: PHYSICIAN ASSISTANT

## 2021-11-16 PROCEDURE — 99396 PREV VISIT EST AGE 40-64: CPT | Performed by: PHYSICIAN ASSISTANT

## 2021-11-17 DIAGNOSIS — G25.81 RESTLESS LEGS SYNDROME (RLS): ICD-10-CM

## 2021-11-17 RX ORDER — PRAMIPEXOLE DIHYDROCHLORIDE 0.25 MG/1
0.25 TABLET ORAL
Qty: 30 TABLET | Refills: 1 | Status: SHIPPED | OUTPATIENT
Start: 2021-11-17 | End: 2021-11-22

## 2021-11-19 ENCOUNTER — TELEPHONE (OUTPATIENT)
Dept: INTERNAL MEDICINE CLINIC | Age: 46
End: 2021-11-19

## 2021-11-22 ENCOUNTER — TELEPHONE (OUTPATIENT)
Dept: INTERNAL MEDICINE CLINIC | Age: 46
End: 2021-11-22

## 2021-11-22 DIAGNOSIS — K21.9 GASTROESOPHAGEAL REFLUX DISEASE: ICD-10-CM

## 2021-11-22 RX ORDER — PANTOPRAZOLE SODIUM 40 MG/1
TABLET, DELAYED RELEASE ORAL
Qty: 30 TABLET | Refills: 4 | Status: SHIPPED | OUTPATIENT
Start: 2021-11-22 | End: 2022-05-17 | Stop reason: SDUPTHER

## 2021-11-29 ENCOUNTER — OFFICE VISIT (OUTPATIENT)
Dept: INTERNAL MEDICINE CLINIC | Age: 46
End: 2021-11-29
Payer: COMMERCIAL

## 2021-11-29 VITALS
HEIGHT: 66 IN | BODY MASS INDEX: 27.93 KG/M2 | WEIGHT: 173.8 LBS | OXYGEN SATURATION: 98 % | HEART RATE: 64 BPM | SYSTOLIC BLOOD PRESSURE: 110 MMHG | DIASTOLIC BLOOD PRESSURE: 64 MMHG | TEMPERATURE: 98.2 F

## 2021-11-29 DIAGNOSIS — G25.81 RESTLESS LEGS SYNDROME (RLS): ICD-10-CM

## 2021-11-29 DIAGNOSIS — G47.09 OTHER INSOMNIA: ICD-10-CM

## 2021-11-29 PROCEDURE — 99213 OFFICE O/P EST LOW 20 MIN: CPT | Performed by: INTERNAL MEDICINE

## 2021-11-29 RX ORDER — GABAPENTIN 300 MG/1
600 CAPSULE ORAL 2 TIMES DAILY
Qty: 120 CAPSULE | Refills: 1 | Status: SHIPPED | OUTPATIENT
Start: 2021-11-29 | End: 2022-02-17

## 2021-11-29 RX ORDER — ZOLPIDEM TARTRATE 10 MG/1
10 TABLET ORAL
Qty: 30 TABLET | Refills: 0 | Status: SHIPPED | OUTPATIENT
Start: 2021-11-29 | End: 2022-01-07

## 2021-11-29 RX ORDER — AMOXICILLIN AND CLAVULANATE POTASSIUM 500; 125 MG/1; MG/1
TABLET, FILM COATED ORAL
COMMUNITY
Start: 2021-11-24 | End: 2022-02-08

## 2021-11-29 RX ORDER — MOMETASONE FUROATE 1 MG/G
CREAM TOPICAL
COMMUNITY
Start: 2021-11-15

## 2021-11-29 RX ORDER — DIAZEPAM 5 MG/1
TABLET ORAL
COMMUNITY
Start: 2021-11-29 | End: 2022-03-02

## 2021-12-19 DIAGNOSIS — F32.A ANXIETY AND DEPRESSION: ICD-10-CM

## 2021-12-19 DIAGNOSIS — F41.9 ANXIETY AND DEPRESSION: ICD-10-CM

## 2021-12-20 RX ORDER — ESCITALOPRAM OXALATE 20 MG/1
TABLET ORAL
Qty: 30 TABLET | Refills: 2 | Status: SHIPPED | OUTPATIENT
Start: 2021-12-20 | End: 2022-03-23

## 2022-01-04 ENCOUNTER — CLINICAL SUPPORT (OUTPATIENT)
Dept: INTERNAL MEDICINE CLINIC | Age: 47
End: 2022-01-04

## 2022-01-04 DIAGNOSIS — R09.81 HEAD CONGESTION: ICD-10-CM

## 2022-01-04 DIAGNOSIS — Z20.822 EXPOSURE TO COVID-19 VIRUS: ICD-10-CM

## 2022-01-04 DIAGNOSIS — R53.83 FATIGUE, UNSPECIFIED TYPE: Primary | ICD-10-CM

## 2022-01-04 PROCEDURE — U0005 INFEC AGEN DETEC AMPLI PROBE: HCPCS | Performed by: INTERNAL MEDICINE

## 2022-01-04 PROCEDURE — U0003 INFECTIOUS AGENT DETECTION BY NUCLEIC ACID (DNA OR RNA); SEVERE ACUTE RESPIRATORY SYNDROME CORONAVIRUS 2 (SARS-COV-2) (CORONAVIRUS DISEASE [COVID-19]), AMPLIFIED PROBE TECHNIQUE, MAKING USE OF HIGH THROUGHPUT TECHNOLOGIES AS DESCRIBED BY CMS-2020-01-R: HCPCS | Performed by: INTERNAL MEDICINE

## 2022-01-06 DIAGNOSIS — G47.09 OTHER INSOMNIA: ICD-10-CM

## 2022-01-06 LAB — SARS-COV-2 RNA RESP QL NAA+PROBE: NEGATIVE

## 2022-01-07 RX ORDER — ZOLPIDEM TARTRATE 10 MG/1
TABLET ORAL
Qty: 30 TABLET | Refills: 0 | Status: SHIPPED | OUTPATIENT
Start: 2022-01-07 | End: 2022-02-08

## 2022-01-21 DIAGNOSIS — G43.709 CHRONIC MIGRAINE WITHOUT AURA WITHOUT STATUS MIGRAINOSUS, NOT INTRACTABLE: Primary | ICD-10-CM

## 2022-01-21 RX ORDER — BUTALBITAL, ACETAMINOPHEN AND CAFFEINE 50; 325; 40 MG/1; MG/1; MG/1
1 TABLET ORAL EVERY 4 HOURS PRN
Qty: 30 TABLET | Refills: 0 | Status: SHIPPED | OUTPATIENT
Start: 2022-01-21 | End: 2022-07-06 | Stop reason: SDUPTHER

## 2022-02-17 DIAGNOSIS — G25.81 RESTLESS LEGS SYNDROME (RLS): ICD-10-CM

## 2022-02-17 RX ORDER — GABAPENTIN 300 MG/1
CAPSULE ORAL
Qty: 120 CAPSULE | Refills: 1 | Status: SHIPPED | OUTPATIENT
Start: 2022-02-17

## 2022-03-02 ENCOUNTER — OFFICE VISIT (OUTPATIENT)
Dept: INTERNAL MEDICINE CLINIC | Age: 47
End: 2022-03-02
Payer: COMMERCIAL

## 2022-03-02 VITALS
DIASTOLIC BLOOD PRESSURE: 76 MMHG | HEIGHT: 66 IN | HEART RATE: 92 BPM | SYSTOLIC BLOOD PRESSURE: 110 MMHG | BODY MASS INDEX: 28.45 KG/M2 | OXYGEN SATURATION: 97 % | WEIGHT: 177 LBS | TEMPERATURE: 98.5 F

## 2022-03-02 DIAGNOSIS — R05.9 COUGH: Primary | ICD-10-CM

## 2022-03-02 DIAGNOSIS — G47.09 OTHER INSOMNIA: ICD-10-CM

## 2022-03-02 PROBLEM — J06.9 UPPER RESPIRATORY TRACT INFECTION: Status: ACTIVE | Noted: 2022-03-02

## 2022-03-02 PROBLEM — J06.9 UPPER RESPIRATORY TRACT INFECTION: Status: RESOLVED | Noted: 2022-03-02 | Resolved: 2022-03-02

## 2022-03-02 PROCEDURE — 99213 OFFICE O/P EST LOW 20 MIN: CPT | Performed by: INTERNAL MEDICINE

## 2022-03-02 RX ORDER — DEXTROMETHORPHAN HYDROBROMIDE AND PROMETHAZINE HYDROCHLORIDE 15; 6.25 MG/5ML; MG/5ML
5 SOLUTION ORAL 4 TIMES DAILY PRN
Qty: 118 ML | Refills: 1 | Status: SHIPPED | OUTPATIENT
Start: 2022-03-02 | End: 2022-03-09

## 2022-03-02 RX ORDER — AZITHROMYCIN 250 MG/1
TABLET, FILM COATED ORAL
Qty: 6 TABLET | Refills: 0 | Status: SHIPPED | OUTPATIENT
Start: 2022-03-02 | End: 2022-03-07

## 2022-03-02 RX ORDER — ZOLPIDEM TARTRATE 5 MG/1
5 TABLET ORAL
Qty: 30 TABLET | Refills: 0 | Status: SHIPPED | OUTPATIENT
Start: 2022-03-02 | End: 2022-04-15

## 2022-03-02 NOTE — PROGRESS NOTES
Assessment/Plan:    Cough  Patient has a URI associated with a mildly productive cough for which I will treat with a Z-Paul and cough syrup    Other insomnia  Has had difficulty sleeping and trying to wean her off Ambien       Diagnoses and all orders for this visit:    Cough  -     azithromycin (ZITHROMAX) 250 mg tablet; Take 2 tablets today then 1 tablet daily x 4 days  -     Promethazine-DM (PHENERGAN-DM) 6 25-15 mg/5 mL oral syrup; Take 5 mL by mouth 4 (four) times a day as needed for cough for up to 7 days    Other insomnia  -     zolpidem (AMBIEN) 5 mg tablet; Take 1 tablet (5 mg total) by mouth daily at bedtime as needed for sleep          Subjective:      Patient ID: Mare Ray is a 55 y o  female  Cough  This is a new problem  The current episode started in the past 7 days  The problem has been unchanged  The cough is productive of sputum  Associated symptoms include nasal congestion and postnasal drip  Pertinent negatives include no chest pain, chills, ear pain, fever, headaches, myalgias, rash, sore throat, shortness of breath or wheezing  Nothing aggravates the symptoms  She has tried body position changes, OTC cough suppressant and rest for the symptoms  The treatment provided no relief  Fatigue  Associated symptoms include coughing and fatigue  Pertinent negatives include no abdominal pain, arthralgias, chest pain, chills, congestion, fever, headaches, joint swelling, myalgias, nausea, neck pain, numbness, rash, sore throat or weakness  Review of Systems   Constitutional: Positive for fatigue  Negative for chills, fever and unexpected weight change  HENT: Positive for postnasal drip  Negative for congestion, ear pain, hearing loss, sinus pressure, sore throat, trouble swallowing and voice change  Eyes: Negative for visual disturbance  Respiratory: Positive for cough  Negative for chest tightness, shortness of breath and wheezing      Cardiovascular: Negative for chest pain, palpitations and leg swelling  Gastrointestinal: Negative for abdominal distention, abdominal pain, anal bleeding, blood in stool, constipation, diarrhea and nausea  Endocrine: Negative for cold intolerance, polydipsia, polyphagia and polyuria  Genitourinary: Negative for dysuria, flank pain, frequency, hematuria and urgency  Musculoskeletal: Negative for arthralgias, back pain, gait problem, joint swelling, myalgias and neck pain  Skin: Negative for rash  Allergic/Immunologic: Negative for immunocompromised state  Neurological: Negative for dizziness, syncope, facial asymmetry, weakness, light-headedness, numbness and headaches  Hematological: Negative for adenopathy  Psychiatric/Behavioral: Negative for confusion, sleep disturbance and suicidal ideas  Objective:      /76 (BP Location: Left arm, Patient Position: Sitting, Cuff Size: Standard)   Pulse 92   Temp 98 5 °F (36 9 °C)   Ht 5' 6" (1 676 m)   Wt 80 3 kg (177 lb)   SpO2 97%   BMI 28 57 kg/m²          Physical Exam  Constitutional:       General: She is not in acute distress  Appearance: She is well-developed  HENT:      Right Ear: External ear normal       Left Ear: External ear normal       Nose: Nose normal       Mouth/Throat:      Pharynx: No oropharyngeal exudate  Eyes:      Pupils: Pupils are equal, round, and reactive to light  Neck:      Thyroid: No thyromegaly  Vascular: No JVD  Cardiovascular:      Rate and Rhythm: Normal rate and regular rhythm  Heart sounds: Normal heart sounds  No murmur heard  No gallop  Pulmonary:      Effort: Pulmonary effort is normal  No respiratory distress  Breath sounds: Normal breath sounds  No wheezing or rales  Abdominal:      General: Bowel sounds are normal  There is no distension  Palpations: Abdomen is soft  There is no mass  Tenderness: There is no abdominal tenderness  Musculoskeletal:         General: No tenderness   Normal range of motion  Cervical back: Normal range of motion and neck supple  Lymphadenopathy:      Cervical: No cervical adenopathy  Skin:     Findings: No rash  Neurological:      Mental Status: She is alert and oriented to person, place, and time  Cranial Nerves: No cranial nerve deficit  Coordination: Coordination normal    Psychiatric:         Behavior: Behavior normal          Thought Content:  Thought content normal          Judgment: Judgment normal

## 2022-03-02 NOTE — ASSESSMENT & PLAN NOTE
Patient has a URI associated with a mildly productive cough for which I will treat with a Z-Paul and cough syrup

## 2022-03-02 NOTE — PATIENT INSTRUCTIONS
Acute Cough   AMBULATORY CARE:   An acute cough  can last up to 3 weeks  Common causes of an acute cough include a cold, allergies, or a lung infection  Seek care immediately if:   · You have trouble breathing or feel short of breath  · You cough up blood, or you see blood in your mucus  · You faint or feel weak or dizzy  · You have chest pain when you cough or take a deep breath  · You have new wheezing  Contact your healthcare provider if:   · You have a fever  · Your cough lasts longer than 4 weeks  · Your symptoms do not improve with treatment  · You have questions or concerns about your condition or care  Treatment:  An acute cough usually goes away on its own  Ask your healthcare provider about medicines you can take to decrease your cough  You may need medicine to stop the cough, decrease swelling in your airways, or help open your airways  Medicine may also be given to help you cough up mucus  If you have an infection caused by bacteria, you may need antibiotics  Manage your symptoms:   · Do not smoke and stay away from others who smoke  Nicotine and other chemicals in cigarettes and cigars can cause lung damage and make your cough worse  Ask your healthcare provider for information if you currently smoke and need help to quit  E-cigarettes or smokeless tobacco still contain nicotine  Talk to your healthcare provider before you use these products  · Drink extra liquids as directed  Liquids will help thin and loosen mucus so you can cough it up  Liquids will also help prevent dehydration  Examples of good liquids to drink include water, fruit juice, and broth  Do not drink liquids that contain caffeine  Caffeine can increase your risk for dehydration  Ask your healthcare provider how much liquid to drink each day  · Rest as directed  Do not do activities that make your cough worse, such as exercise  · Use a humidifier or vaporizer    Use a cool mist humidifier or a vaporizer to increase air moisture in your home  This may make it easier for you to breathe and help decrease your cough  · Eat 2 to 5 mL of honey 2 times each day  Honey can help thin mucus and decrease your cough  · Use cough drops or lozenges  These can help decrease throat irritation and your cough  Follow up with your healthcare provider as directed:  Write down your questions so you remember to ask them during your visits  © Copyright [employer] 2022 Information is for End User's use only and may not be sold, redistributed or otherwise used for commercial purposes  All illustrations and images included in CareNotes® are the copyrighted property of A D A M , Inc  or [facility]   The above information is an  only  It is not intended as medical advice for individual conditions or treatments  Talk to your doctor, nurse or pharmacist before following any medical regimen to see if it is safe and effective for you

## 2022-03-22 DIAGNOSIS — B37.3 YEAST VAGINITIS: Primary | ICD-10-CM

## 2022-03-22 RX ORDER — FLUCONAZOLE 150 MG/1
TABLET ORAL
Qty: 2 TABLET | Refills: 0 | Status: SHIPPED | OUTPATIENT
Start: 2022-03-22 | End: 2022-03-24

## 2022-03-22 NOTE — TELEPHONE ENCOUNTER
Patient called and stated that she is having a bad yeast infection  Patient stated that she gets recurrent yeast infections  Patient stated that she took monistat 3 day and it did not help  She stated that the only thing that normally helps is 2 pills of the diflucan

## 2022-03-23 DIAGNOSIS — F41.9 ANXIETY AND DEPRESSION: ICD-10-CM

## 2022-03-23 DIAGNOSIS — F32.A ANXIETY AND DEPRESSION: ICD-10-CM

## 2022-03-23 RX ORDER — ESCITALOPRAM OXALATE 20 MG/1
TABLET ORAL
Qty: 30 TABLET | Refills: 2 | Status: SHIPPED | OUTPATIENT
Start: 2022-03-23 | End: 2022-07-08

## 2022-03-27 ENCOUNTER — HOSPITAL ENCOUNTER (EMERGENCY)
Facility: HOSPITAL | Age: 47
Discharge: HOME | End: 2022-03-27
Attending: EMERGENCY MEDICINE
Payer: COMMERCIAL

## 2022-03-27 VITALS
HEART RATE: 78 BPM | HEIGHT: 66 IN | RESPIRATION RATE: 18 BRPM | BODY MASS INDEX: 26.2 KG/M2 | WEIGHT: 163 LBS | SYSTOLIC BLOOD PRESSURE: 130 MMHG | OXYGEN SATURATION: 98 % | TEMPERATURE: 97.9 F | DIASTOLIC BLOOD PRESSURE: 66 MMHG

## 2022-03-27 DIAGNOSIS — M62.830 LUMBAR PARASPINAL MUSCLE SPASM: Primary | ICD-10-CM

## 2022-03-27 DIAGNOSIS — M54.32 LEFT SCIATIC NERVE PAIN: ICD-10-CM

## 2022-03-27 PROCEDURE — 96372 THER/PROPH/DIAG INJ SC/IM: CPT

## 2022-03-27 PROCEDURE — 99283 EMERGENCY DEPT VISIT LOW MDM: CPT | Performed by: EMERGENCY MEDICINE

## 2022-03-27 PROCEDURE — 99283 EMERGENCY DEPT VISIT LOW MDM: CPT

## 2022-03-27 RX ORDER — KETOROLAC TROMETHAMINE 30 MG/ML
15 INJECTION, SOLUTION INTRAMUSCULAR; INTRAVENOUS ONCE
Status: COMPLETED | OUTPATIENT
Start: 2022-03-27 | End: 2022-03-27

## 2022-03-27 RX ORDER — DIAZEPAM 5 MG/1
5 TABLET ORAL ONCE
Status: COMPLETED | OUTPATIENT
Start: 2022-03-27 | End: 2022-03-27

## 2022-03-27 RX ORDER — DIAZEPAM 5 MG/1
5 TABLET ORAL EVERY 8 HOURS PRN
Qty: 15 TABLET | Refills: 0 | Status: SHIPPED | OUTPATIENT
Start: 2022-03-27 | End: 2022-04-25

## 2022-03-27 RX ORDER — CYCLOBENZAPRINE HCL 10 MG
10 TABLET ORAL 3 TIMES DAILY PRN
Qty: 20 TABLET | Refills: 0 | Status: SHIPPED | OUTPATIENT
Start: 2022-03-27

## 2022-03-27 RX ORDER — NAPROXEN 500 MG/1
500 TABLET ORAL 2 TIMES DAILY PRN
Qty: 20 TABLET | Refills: 0 | Status: SHIPPED | OUTPATIENT
Start: 2022-03-27

## 2022-03-27 RX ADMIN — KETOROLAC TROMETHAMINE 15 MG: 30 INJECTION, SOLUTION INTRAMUSCULAR at 22:55

## 2022-03-27 RX ADMIN — DIAZEPAM 5 MG: 5 TABLET ORAL at 22:55

## 2022-03-27 NOTE — Clinical Note
Byron Jaeger was seen and treated in our emergency department on 3/27/2022  No restrictions            Diagnosis:     Tracy Paz  may return to work on return date  She may return on this date: 03/31/2022         If you have any questions or concerns, please don't hesitate to call        Jeremy Jameson DO    ______________________________           _______________          _______________  Hospital Representative                              Date                                Time

## 2022-03-28 NOTE — ED PROVIDER NOTES
History  Chief Complaint   Patient presents with    Back Pain     Bent down to pick something up and felt "excruciating pain" between 1930 and 2000  -Hx of hip bursitis, -surgery  History provided by:  Patient and spouse  Back Pain  Location:  Lumbar spine  Quality:  Aching  Radiates to: Left buttocks left lower leg  Pain severity:  Moderate  Pain is:  Same all the time  Duration:  3 hours  Timing:  Constant  Progression:  Worsening  Chronicity:  New  Context comment:  Patient bent down to  a plate on the floor, had sudden-onset pain  Severe, prompting ED visit  Relieved by:  Being still  Worsened by:  Bending, ambulation and twisting  Ineffective treatments:  None tried  Associated symptoms: no abdominal pain, no abdominal swelling, no bladder incontinence, no bowel incontinence, no chest pain, no fever, no headaches, no leg pain, no numbness, no paresthesias, no tingling, no weakness and no weight loss        Prior to Admission Medications   Prescriptions Last Dose Informant Patient Reported? Taking? Probiotic Product (PRO-BIOTIC BLEND PO)   Yes No   Sig: Take by mouth   butalbital-acetaminophen-caffeine (Esgic) -40 mg per tablet   No No   Sig: Take 1 tablet by mouth every 4 (four) hours as needed for headaches   escitalopram (LEXAPRO) 20 mg tablet   No No   Sig: TAKE 1 TABLET BY MOUTH EVERY DAY   gabapentin (NEURONTIN) 300 mg capsule   No No   Sig: TAKE 2 CAPSULES BY MOUTH 2 TIMES A DAY     hydrOXYzine HCL (ATARAX) 25 mg tablet   Yes No   Sig: hydroxyzine HCl 25 mg tablet   TAKE 1 TABLET NIGHTLY AS NEEDED FOR ITCH   latanoprost (XALATAN) 0 005 % ophthalmic solution   Yes No   Sig: INSTILL 1 DROP IN AFFECTED EYE AT BEDTIME   mometasone (ELOCON) 0 1 % cream   Yes No   pantoprazole (PROTONIX) 40 mg tablet   No No   Sig: TAKE 1 TABLET BY MOUTH EVERY DAY   zolpidem (AMBIEN) 5 mg tablet   No No   Sig: Take 1 tablet (5 mg total) by mouth daily at bedtime as needed for sleep Facility-Administered Medications: None       Past Medical History:   Diagnosis Date    Abnormal Pap smear of cervix     RAMIREZ positive     Anxiety     Basal cell carcinoma     Depression     Migraine     Migraine     MRSA colonization 2016    Neck pain     Pregnancy     Vertebral artery dissection (HCC)     Vulvovaginitis     Yeast infection        Past Surgical History:   Procedure Laterality Date     SECTION, LOW TRANSVERSE  2008    COLONOSCOPY      DILATION AND CURETTAGE OF UTERUS  2007    GYNECOLOGIC CRYOSURGERY      cervix    LASIK      SKIN LESION EXCISION      basal cell carcinoma of left cheek       Family History   Problem Relation Age of Onset    Hypertension Mother     Lung cancer Mother 64    Brain cancer Mother 64    Breast cancer Maternal Aunt         dx in 42's     Multiple sclerosis Father     No Known Problems Daughter     No Known Problems Maternal Grandmother     No Known Problems Maternal Grandfather     No Known Problems Paternal Grandmother     No Known Problems Paternal Grandfather     No Known Problems Maternal Aunt      I have reviewed and agree with the history as documented  E-Cigarette/Vaping    E-Cigarette Use Never User      E-Cigarette/Vaping Substances     Social History     Tobacco Use    Smoking status: Never Smoker    Smokeless tobacco: Never Used   Vaping Use    Vaping Use: Never used   Substance Use Topics    Alcohol use: No     Alcohol/week: 0 0 standard drinks    Drug use: No       Review of Systems   Constitutional: Negative for fever and weight loss  Respiratory: Negative for chest tightness and shortness of breath  Cardiovascular: Negative for chest pain  Gastrointestinal: Negative for abdominal pain and bowel incontinence  Genitourinary: Negative for bladder incontinence  Musculoskeletal: Positive for back pain  Skin: Negative for rash     Neurological: Negative for dizziness, tingling, weakness, light-headedness, numbness, headaches and paresthesias  Physical Exam  Physical Exam  Vitals reviewed  Constitutional:       Appearance: She is well-developed  HENT:      Head: Atraumatic  Eyes:      General: No scleral icterus  Right eye: No discharge  Left eye: No discharge  Conjunctiva/sclera: Conjunctivae normal    Neck:      Trachea: No tracheal deviation  Pulmonary:      Effort: Pulmonary effort is normal  No respiratory distress  Breath sounds: No stridor  Musculoskeletal:         General: No deformity  Cervical back: Neck supple  Comments: No spinous process tenderness, hypertonicity paraspinal musculature consistent with acute muscle spasm , +2 patella and Achilles reflex bilaterally  Normal sensation normal muscle strength bilateral lower extremities     Skin:     General: Skin is warm and dry  Coloration: Skin is not pale  Findings: No erythema or rash  Neurological:      Mental Status: She is alert  Motor: No abnormal muscle tone        Coordination: Coordination normal          Vital Signs  ED Triage Vitals   Temperature Pulse Respirations Blood Pressure SpO2   03/27/22 2216 03/27/22 2216 03/27/22 2216 03/27/22 2216 03/27/22 2216   97 9 °F (36 6 °C) 78 18 130/66 98 %      Temp Source Heart Rate Source Patient Position - Orthostatic VS BP Location FiO2 (%)   03/27/22 2216 03/27/22 2216 03/27/22 2216 03/27/22 2216 --   Oral Monitor Sitting Left arm       Pain Score       03/27/22 2255       10 - Worst Possible Pain           Vitals:    03/27/22 2216   BP: 130/66   Pulse: 78   Patient Position - Orthostatic VS: Sitting         Visual Acuity  Visual Acuity      Most Recent Value   L Pupil Size (mm) 3   R Pupil Size (mm) 3          ED Medications  Medications   diazepam (VALIUM) tablet 5 mg (5 mg Oral Given 3/27/22 2255)   ketorolac (TORADOL) injection 15 mg (15 mg Intramuscular Given 3/27/22 2255)       Diagnostic Studies  Results Reviewed None                 No orders to display              Procedures  Procedures         ED Course                               SBIRT 20yo+      Most Recent Value   SBIRT (22 yo +)    In order to provide better care to our patients, we are screening all of our patients for alcohol and drug use  Would it be okay to ask you these screening questions? Unable to answer at this time Filed at: 03/27/2022 2309                    MDM  Number of Diagnoses or Management Options  Left sciatic nerve pain: new and requires workup  Lumbar paraspinal muscle spasm: new and requires workup  Diagnosis management comments: Denies history of severe or worsening lower extremity weakness/numbness  Denies history of saddle anesthesia/perineal anesthesia  Denies bowel or bladder incontinence/retention  History does not suggest diagnosis of cauda equina syndrome  Patient denies history of IVDA, denies history of fevers, no recent surgeries or any procedures to suggest a transient bacteremia leading to a diagnosis of epidural abscess  Denies history of blood thinner use with recent history of lumbar puncture or any violation of the epidural space to suggest history of epidural hematoma  Therefore these above diagnoses (cauda equina syndrome, epidural abscess, epidural hematoma) were not pursued with diagnostic imaging            Amount and/or Complexity of Data Reviewed  Decide to obtain previous medical records or to obtain history from someone other than the patient: yes  Obtain history from someone other than the patient: yes  Review and summarize past medical records: yes        Disposition  Final diagnoses:   Lumbar paraspinal muscle spasm   Left sciatic nerve pain     Time reflects when diagnosis was documented in both MDM as applicable and the Disposition within this note     Time User Action Codes Description Comment    3/27/2022 10:54 PM Divina Media Add [I47 019] Lumbar paraspinal muscle spasm     3/27/2022 10:54 PM Bushra Morales Sabina Saskia Add [F98 03] Left sciatic nerve pain       ED Disposition     ED Disposition Condition Date/Time Comment    Discharge Stable Sun Mar 27, 2022 10:48 PM Pecteresa Oar discharge to home/self care              Follow-up Information     Follow up With Specialties Details Why Contact Info Additional [address] Emergency Department Emergency Medicine Go to  If symptoms worsen [address] Emergency Department, [address], [place], [place], [address] [place] [address] Spine [facility] Physical Therapy Call today To arrange for the next available appointment           Discharge Medication List as of 3/27/2022 10:56 PM      START taking these medications    Details   cyclobenzaprine (FLEXERIL) 10 mg tablet Take 1 tablet (10 mg total) by mouth 3 (three) times a day as needed for muscle spasms, Starting Sun 3/27/2022, Normal      diazepam (VALIUM) 5 mg tablet Take 1 tablet (5 mg total) by mouth every 8 (eight) hours as needed for muscle spasms for up to 10 days, Starting Sun 3/27/2022, Until Wed 4/6/2022 at 2359, Normal      naproxen (NAPROSYN) 500 mg tablet Take 1 tablet (500 mg total) by mouth 2 (two) times a day as needed for mild pain, Starting Sun 3/27/2022, Normal         CONTINUE these medications which have NOT CHANGED    Details   butalbital-acetaminophen-caffeine (Esgic) -40 mg per tablet Take 1 tablet by mouth every 4 (four) hours as needed for headaches, Starting Fri 1/21/2022, Normal      escitalopram (LEXAPRO) 20 mg tablet TAKE 1 TABLET BY MOUTH EVERY DAY, Normal      gabapentin (NEURONTIN) 300 mg capsule TAKE 2 CAPSULES BY MOUTH 2 TIMES A DAY , Normal      hydrOXYzine HCL (ATARAX) 25 mg tablet hydroxyzine HCl 25 mg tablet   TAKE 1 TABLET NIGHTLY AS NEEDED FOR ITCH, Historical Med      latanoprost (XALATAN) 0 005 % ophthalmic solution INSTILL 1 DROP IN AFFECTED EYE AT BEDTIME, Historical Med      mometasone (ELOCON) 0 1 % cream Starting Mon 11/15/2021, Historical Med      pantoprazole (PROTONIX) 40 mg tablet TAKE 1 TABLET BY MOUTH EVERY DAY, Normal      Probiotic Product (PRO-BIOTIC BLEND PO) Take by mouth, Historical Med      zolpidem (AMBIEN) 5 mg tablet Take 1 tablet (5 mg total) by mouth daily at bedtime as needed for sleep, Starting Wed 3/2/2022, Normal                 PDMP Review       Value Time User    PDMP Reviewed  Yes 3/2/2022  1:56 PM William Cevallos MD          ED Provider  Electronically Signed by           Tano Sweeney DO  03/28/22 0032

## 2022-03-30 ENCOUNTER — TELEPHONE (OUTPATIENT)
Dept: PHYSICAL THERAPY | Facility: OTHER | Age: 47
End: 2022-03-30

## 2022-03-30 NOTE — TELEPHONE ENCOUNTER
Message left for patient regarding referral entered for  [facility]  Contact information, hours of operation and VM instructions left  Nurse requested patient to CB if needed and leave Full Name &  on VM       Referral deferred for f/u

## 2022-04-12 ENCOUNTER — TELEPHONE (OUTPATIENT)
Dept: PHYSICAL THERAPY | Facility: OTHER | Age: 47
End: 2022-04-12

## 2022-04-13 DIAGNOSIS — G47.09 OTHER INSOMNIA: ICD-10-CM

## 2022-04-15 RX ORDER — ZOLPIDEM TARTRATE 5 MG/1
TABLET ORAL
Qty: 30 TABLET | Refills: 0 | Status: SHIPPED | OUTPATIENT
Start: 2022-04-15 | End: 2022-05-17 | Stop reason: SDUPTHER

## 2022-04-25 ENCOUNTER — OFFICE VISIT (OUTPATIENT)
Dept: FAMILY MEDICINE CLINIC | Facility: CLINIC | Age: 47
End: 2022-04-25
Payer: COMMERCIAL

## 2022-04-25 VITALS
WEIGHT: 179.38 LBS | OXYGEN SATURATION: 97 % | SYSTOLIC BLOOD PRESSURE: 104 MMHG | HEART RATE: 79 BPM | RESPIRATION RATE: 16 BRPM | DIASTOLIC BLOOD PRESSURE: 66 MMHG | TEMPERATURE: 98.3 F | BODY MASS INDEX: 28.83 KG/M2 | HEIGHT: 66 IN

## 2022-04-25 DIAGNOSIS — Z00.00 HEALTHCARE MAINTENANCE: Primary | ICD-10-CM

## 2022-04-25 PROCEDURE — 99396 PREV VISIT EST AGE 40-64: CPT | Performed by: FAMILY MEDICINE

## 2022-04-25 RX ORDER — PHENTERMINE HYDROCHLORIDE 37.5 MG/1
37.5 CAPSULE ORAL EVERY MORNING
Qty: 30 CAPSULE | Refills: 0 | Status: SHIPPED | OUTPATIENT
Start: 2022-04-25 | End: 2022-06-03

## 2022-04-25 NOTE — ASSESSMENT & PLAN NOTE
It was discussed about low carb diet and regular exercise  I am going to start her on phentermine 37 5 mg daily  Was discussed about possible side effects  Come back in 1 month for follow-up

## 2022-04-25 NOTE — PROGRESS NOTES
Assessment/Plan:  Healthcare maintenance  It was discussed about immunizations, diet, exercise and safety measures  BMI 28 0-28 9,adult  It was discussed about low carb diet and regular exercise  I am going to start her on phentermine 37 5 mg daily  Was discussed about possible side effects  Come back in 1 month for follow-up  Diagnoses and all orders for this visit:    Healthcare maintenance  -     CBC and differential; Future  -     Comprehensive metabolic panel; Future  -     Lipid Panel with Direct LDL reflex; Future  -     TSH, 3rd generation with Free T4 reflex; Future    BMI 28 0-28 9,adult  -     phentermine 37 5 MG capsule; Take 1 capsule (37 5 mg total) by mouth every morning        There are no Patient Instructions on file for this visit  Return in about 1 month (around 5/25/2022)  Subjective:      Patient ID: Radha Johnson is a 55 y o  female  Chief Complaint   Patient presents with    Physical Exam   [facility]        She is here today as a new patient to establish and for wellness exam   She has been taking her medications  She stated Ambien help with her insomnia  She continues on Protonix for her GERD symptoms  She takes gabapentin for her neuropathy and pain in her lower extremity and that seems to help  She has been trying to lose weight but no success  The following portions of the patient's history were reviewed and updated as appropriate: allergies, current medications, past family history, past medical history, past social history, past surgical history and problem list     Review of Systems   Constitutional: Negative for chills and fever  HENT: Negative for trouble swallowing  Eyes: Negative for visual disturbance  Respiratory: Negative for cough and shortness of breath  Cardiovascular: Negative for chest pain, palpitations and leg swelling  Gastrointestinal: Negative for abdominal pain, constipation and diarrhea     Endocrine: Negative for cold intolerance and heat intolerance  Genitourinary: Negative for difficulty urinating and dysuria  Musculoskeletal: Negative for gait problem  Skin: Negative for rash  Neurological: Negative for dizziness, tremors, seizures and headaches  Hematological: Negative for adenopathy  Psychiatric/Behavioral: Negative for behavioral problems  Current Outpatient Medications   Medication Sig Dispense Refill    butalbital-acetaminophen-caffeine (Esgic) -40 mg per tablet Take 1 tablet by mouth every 4 (four) hours as needed for headaches 30 tablet 0    escitalopram (LEXAPRO) 20 mg tablet TAKE 1 TABLET BY MOUTH EVERY DAY 30 tablet 2    gabapentin (NEURONTIN) 300 mg capsule TAKE 2 CAPSULES BY MOUTH 2 TIMES A DAY  120 capsule 1    hydrOXYzine HCL (ATARAX) 25 mg tablet Take 25 mg by mouth in the morning        latanoprost (XALATAN) 0 005 % ophthalmic solution INSTILL 1 DROP IN AFFECTED EYE AT BEDTIME  2    naproxen (NAPROSYN) 500 mg tablet Take 1 tablet (500 mg total) by mouth 2 (two) times a day as needed for mild pain 20 tablet 0    pantoprazole (PROTONIX) 40 mg tablet TAKE 1 TABLET BY MOUTH EVERY DAY 30 tablet 4    Probiotic Product (PRO-BIOTIC BLEND PO) Take by mouth      zolpidem (AMBIEN) 5 mg tablet TAKE 1 TABLET BY MOUTH DAILY AT BEDTIME AS NEEDED FOR SLEEP 30 tablet 0    cyclobenzaprine (FLEXERIL) 10 mg tablet Take 1 tablet (10 mg total) by mouth 3 (three) times a day as needed for muscle spasms 20 tablet 0    diazepam (VALIUM) 5 mg tablet Take 1 tablet (5 mg total) by mouth every 8 (eight) hours as needed for muscle spasms for up to 10 days 15 tablet 0    mometasone (ELOCON) 0 1 % cream  (Patient not taking: Reported on 4/25/2022 )      phentermine 37 5 MG capsule Take 1 capsule (37 5 mg total) by mouth every morning 30 capsule 0     No current facility-administered medications for this visit         Objective:    /66 (BP Location: Left arm, Patient Position: Sitting, Cuff Size: Adult)   Pulse 79   Temp 98 3 °F (36 8 °C) (Tympanic)   Resp 16   Ht 5' 6" (1 676 m)   Wt 81 4 kg (179 lb 6 oz)   SpO2 97%   BMI 28 95 kg/m²        Physical Exam  Vitals and nursing note reviewed  Constitutional:       Appearance: She is well-developed  HENT:      Head: Normocephalic and atraumatic  Eyes:      Pupils: Pupils are equal, round, and reactive to light  Cardiovascular:      Rate and Rhythm: Normal rate and regular rhythm  Heart sounds: Normal heart sounds  Pulmonary:      Effort: Pulmonary effort is normal       Breath sounds: Normal breath sounds  Abdominal:      General: Bowel sounds are normal       Palpations: Abdomen is soft  Musculoskeletal:         General: Normal range of motion  Cervical back: Normal range of motion and neck supple  Lymphadenopathy:      Cervical: No cervical adenopathy  Skin:     General: Skin is warm  Neurological:      Mental Status: She is alert and oriented to person, place, and time  Cranial Nerves: No cranial nerve deficit                  Danis Flores MD

## 2022-05-17 DIAGNOSIS — G47.09 OTHER INSOMNIA: ICD-10-CM

## 2022-05-17 DIAGNOSIS — K21.9 GASTROESOPHAGEAL REFLUX DISEASE: ICD-10-CM

## 2022-05-17 NOTE — TELEPHONE ENCOUNTER
Phone call from pt, refill Pantoprazole 40 mg 1 tab daily & Zolpidem 5mg 1 tab at bedtime as needed  Call to [facility]  Also, pt states she is on Phentermine & is feeling exhausted  She did not take her pill today, she does have a follow up appt sched for 5/27   Please advise pt

## 2022-05-19 NOTE — TELEPHONE ENCOUNTER
Pt called to see when her meds will be called in  Also, she did have a question regarding her Phentermine   Please see previous message

## 2022-05-20 RX ORDER — ZOLPIDEM TARTRATE 5 MG/1
5 TABLET ORAL
Qty: 30 TABLET | Refills: 0 | Status: SHIPPED | OUTPATIENT
Start: 2022-05-20 | End: 2022-07-05 | Stop reason: SDUPTHER

## 2022-05-20 RX ORDER — PANTOPRAZOLE SODIUM 40 MG/1
40 TABLET, DELAYED RELEASE ORAL DAILY
Qty: 30 TABLET | Refills: 4 | Status: SHIPPED | OUTPATIENT
Start: 2022-05-20

## 2022-05-23 NOTE — TELEPHONE ENCOUNTER
Pt seen 4/25/22  pantorazole and Ambien refilled 5/20/22  request sent to provider to deny  PMED;          [place]      Select Patient Id Name OPHELIA Thurston 106   [] 165 [facility] [address]- [address] [place], [place] [id number] [address] [place] [address]           Search Criteria    Name Date of Birth Date Range   Milka Lan 52- [birth year]- To [birth year]-     Requester Name Requested Date   Mary Downs May 23 2022 12:15:43 GMT-0400 Dair Furrow Daylight Time)     Summary   Prescriptions  17  Prescribers  5  Pharmacies  1  View Map    Drug Classes   Benzodiazepines  3  Stimulants  1  Opioids  1  Muscle Relaxants  0    Opioid Dosage   Total MME for Active Prescriptions  0    Average MME  37 50  MME Graph   MME Calculator       · Prescriptions  · Notifications    · Prescribers  · Pharmacies  · MME Graph      [] PA   Drug Categories:      [] Stimulants       [] Benzodiazepines       [] Opioids       [] Others       Show  entries  Search:  Patient Id Prescription #  Filled Written Drug Label Qty Days Strength MME** Prescriber Pharmacy Payment REFILL #/Auth State Detail   1  [id number] 05/20/2022 05/20/2022 Zolpidem Tartrate (Tablet)  30 0 30 5 MG NA JEREL(MD) [facility]  Medicaid 0 / 0 [place]    1  [id number] 04/25/2022 04/25/2022 Phentermine Hcl (Capsule)  30 0 30 37 5 MG NA SHWETAM(MD) [facility]  Private Pay 00 / 0 [place]    1  [id number] 04/15/2022  04/15/2022 Zolpidem Tartrate (Tablet)  30 0 30 5 MG NA LEROY GANNON  [facility]  Medicaid 00 / 0 [place]    1  [id number] 03/28/2022 03/27/2022 diazePAM (Tablet)  15 0 5 5 MG NA LUKE CRUZ  [facility]    Medicaid 00 / 0 [place]    1  [id number] 03/08/2022 03/02/2022 Zolpidem Tartrate (Tablet)

## 2022-06-03 ENCOUNTER — OFFICE VISIT (OUTPATIENT)
Dept: FAMILY MEDICINE CLINIC | Facility: CLINIC | Age: 47
End: 2022-06-03
Payer: COMMERCIAL

## 2022-06-03 VITALS
OXYGEN SATURATION: 97 % | DIASTOLIC BLOOD PRESSURE: 72 MMHG | TEMPERATURE: 97.3 F | WEIGHT: 174 LBS | RESPIRATION RATE: 16 BRPM | HEART RATE: 75 BPM | SYSTOLIC BLOOD PRESSURE: 114 MMHG | HEIGHT: 66 IN | BODY MASS INDEX: 27.97 KG/M2

## 2022-06-03 DIAGNOSIS — G47.09 OTHER INSOMNIA: ICD-10-CM

## 2022-06-03 DIAGNOSIS — Z12.31 ENCOUNTER FOR SCREENING MAMMOGRAM FOR MALIGNANT NEOPLASM OF BREAST: Primary | ICD-10-CM

## 2022-06-03 PROCEDURE — 99213 OFFICE O/P EST LOW 20 MIN: CPT | Performed by: FAMILY MEDICINE

## 2022-06-03 RX ORDER — PHENTERMINE HYDROCHLORIDE 37.5 MG/1
37.5 TABLET ORAL DAILY
Qty: 30 TABLET | Refills: 0 | Status: SHIPPED | OUTPATIENT
Start: 2022-06-03

## 2022-06-03 NOTE — ASSESSMENT & PLAN NOTE
Improving  Discussed about low carb diet and regular exercise  I am going to change her phentermine to tablet  She was told to try to take half tablet and change the time she takes it to mid day  Discussed about possible side effects  Come back in 5-6 weeks

## 2022-06-03 NOTE — PROGRESS NOTES
Assessment/Plan:  BMI 28 0-28 9,adult  Improving  Discussed about low carb diet and regular exercise  I am going to change her phentermine to tablet  She was told to try to take half tablet and change the time she takes it to mid day  Discussed about possible side effects  Come back in 5-6 weeks  Other insomnia  Fair control on Ambien  Continue same  Will continue to monitor  Diagnoses and all orders for this visit:    Encounter for screening mammogram for malignant neoplasm of breast  -     Mammo screening bilateral w 3d & cad; Future    BMI 28 0-28 9,adult  -     phentermine (ADIPEX-P) 37 5 MG tablet; Take 1 tablet (37 5 mg total) by mouth in the morning    Other insomnia        There are no Patient Instructions on file for this visit  Return in about 5 weeks (around 7/11/2022)  Subjective:      Patient ID: Zee Chirinos is a 55 y o  female  Chief Complaint   Patient presents with    Obesity     Follow up        She is here today for follow-up for weight management  She was taking her phentermine in the morning and was helping with weight loss but it was making her tired and she has been crashing and falling asleep in the afternoon  She has been having a lot of stress at home dealing with her 15 year daughter and her father who was hospitalized recently for sepsis  She is going on a vacation on June 20th  The following portions of the patient's history were reviewed and updated as appropriate: allergies, current medications, past family history, past medical history, past social history, past surgical history and problem list     Review of Systems   Constitutional: Negative for chills and fever  HENT: Negative for trouble swallowing  Eyes: Negative for visual disturbance  Respiratory: Negative for cough and shortness of breath  Cardiovascular: Negative for chest pain, palpitations and leg swelling     Gastrointestinal: Negative for abdominal pain, constipation and diarrhea  Endocrine: Negative for cold intolerance and heat intolerance  Genitourinary: Negative for difficulty urinating and dysuria  Musculoskeletal: Negative for gait problem  Skin: Negative for rash  Neurological: Negative for dizziness, tremors, seizures and headaches  Hematological: Negative for adenopathy  Psychiatric/Behavioral: Positive for sleep disturbance  Negative for behavioral problems  Current Outpatient Medications   Medication Sig Dispense Refill    butalbital-acetaminophen-caffeine (Esgic) -40 mg per tablet Take 1 tablet by mouth every 4 (four) hours as needed for headaches 30 tablet 0    escitalopram (LEXAPRO) 20 mg tablet TAKE 1 TABLET BY MOUTH EVERY DAY 30 tablet 2    gabapentin (NEURONTIN) 300 mg capsule TAKE 2 CAPSULES BY MOUTH 2 TIMES A DAY  120 capsule 1    hydrOXYzine HCL (ATARAX) 25 mg tablet Take 25 mg by mouth in the morning        latanoprost (XALATAN) 0 005 % ophthalmic solution INSTILL 1 DROP IN AFFECTED EYE AT BEDTIME  2    naproxen (NAPROSYN) 500 mg tablet Take 1 tablet (500 mg total) by mouth 2 (two) times a day as needed for mild pain 20 tablet 0    pantoprazole (PROTONIX) 40 mg tablet Take 1 tablet (40 mg total) by mouth in the morning   30 tablet 4    phentermine (ADIPEX-P) 37 5 MG tablet Take 1 tablet (37 5 mg total) by mouth in the morning 30 tablet 0    Probiotic Product (PRO-BIOTIC BLEND PO) Take by mouth      zolpidem (AMBIEN) 5 mg tablet Take 1 tablet (5 mg total) by mouth daily at bedtime as needed for sleep 30 tablet 0    cyclobenzaprine (FLEXERIL) 10 mg tablet Take 1 tablet (10 mg total) by mouth 3 (three) times a day as needed for muscle spasms (Patient not taking: Reported on 6/3/2022) 20 tablet 0    diazepam (VALIUM) 5 mg tablet Take 1 tablet (5 mg total) by mouth every 8 (eight) hours as needed for muscle spasms for up to 10 days (Patient not taking: Reported on 6/3/2022) 15 tablet 0    mometasone (ELOCON) 0 1 % cream (Patient not taking: No sig reported)       No current facility-administered medications for this visit  Objective:    /72 (BP Location: Left arm, Patient Position: Sitting, Cuff Size: Adult)   Pulse 75   Temp (!) 97 3 °F (36 3 °C) (Tympanic)   Resp 16   Ht 5' 6" (1 676 m)   Wt 78 9 kg (174 lb)   SpO2 97%   BMI 28 08 kg/m²        Physical Exam  Vitals and nursing note reviewed  Constitutional:       Appearance: She is well-developed  HENT:      Head: Normocephalic and atraumatic  Eyes:      Pupils: Pupils are equal, round, and reactive to light  Cardiovascular:      Rate and Rhythm: Normal rate and regular rhythm  Heart sounds: Normal heart sounds  Pulmonary:      Effort: Pulmonary effort is normal       Breath sounds: Normal breath sounds  Abdominal:      General: Bowel sounds are normal       Palpations: Abdomen is soft  Musculoskeletal:         General: Normal range of motion  Cervical back: Normal range of motion and neck supple  Lymphadenopathy:      Cervical: No cervical adenopathy  Skin:     General: Skin is warm  Neurological:      Mental Status: She is alert and oriented to person, place, and time  Cranial Nerves: No cranial nerve deficit                  Beckie Lawrence MD

## 2022-07-05 DIAGNOSIS — G47.09 OTHER INSOMNIA: ICD-10-CM

## 2022-07-05 RX ORDER — ZOLPIDEM TARTRATE 5 MG/1
5 TABLET ORAL
Qty: 30 TABLET | Refills: 0 | Status: SHIPPED | OUTPATIENT
Start: 2022-07-05 | End: 2022-08-02

## 2022-07-05 NOTE — TELEPHONE ENCOUNTER
Refill request sent to provider  pmed        Patients      Select Patient Id Name D O B  R Bertrand 106   [] 165 [facility] 84- F [address] [facility] [id number] 66 [facility]           Search Criteria    Name Date of Birth Date Range   Georgia Mask 89- 07- To 07-     Requester Name Requested Date   Emily Godoy Jul 05 2022 10:10:54 T-0400 Daiva Timi Daylight Time)     Summary   Prescriptions  16  Prescribers  5  Pharmacies  1  View Map    Drug Classes   Benzodiazepines  3  Stimulants  2  Opioids  1  Muscle Relaxants  0    Opioid Dosage   Total MME for Active Prescriptions  0    Average MME  37 50  MME Graph   MME Calculator       · Prescriptions  · Notifications    · Prescribers  · Pharmacies  · MME Graph      [] PA   Drug Categories:      [] Stimulants       [] Benzodiazepines       [] Opioids       [] Others       Show  entries  Search:  Patient Id Prescription #  Filled Written Drug Label Qty Days Strength MME** Prescriber Pharmacy Payment REFILL #/Auth State Detail   1  [id number] 06/03/2022 06/03/2022 Phentermine Hcl (Tablet)  30 0 30 37 5 MG MARIN PHAM) [facility]  Private Pay 0 / 0 [place]    1  [id number] 05/20/2022 05/20/2022 Zolpidem Tartrate (Tablet)  30 0 30 5 MG MARIN PHAM) [facility]    Medicaid 0 / 0 [place]    1  [id number] 04/25/2022 04/25/2022 Phentermine Hcl (Capsule)

## 2022-07-05 NOTE — TELEPHONE ENCOUNTER
Please reorder her ambien and lexapro  Pt had appt on 7/6 but tested positive for covid so will callback and reschedule after she feels better and gets back from vacation

## 2022-07-06 ENCOUNTER — OFFICE VISIT (OUTPATIENT)
Dept: FAMILY MEDICINE CLINIC | Facility: CLINIC | Age: 47
End: 2022-07-06
Payer: COMMERCIAL

## 2022-07-06 VITALS
RESPIRATION RATE: 16 BRPM | WEIGHT: 173 LBS | DIASTOLIC BLOOD PRESSURE: 70 MMHG | OXYGEN SATURATION: 96 % | SYSTOLIC BLOOD PRESSURE: 112 MMHG | TEMPERATURE: 100.7 F | HEIGHT: 66 IN | BODY MASS INDEX: 27.8 KG/M2 | HEART RATE: 84 BPM

## 2022-07-06 DIAGNOSIS — G43.709 CHRONIC MIGRAINE WITHOUT AURA WITHOUT STATUS MIGRAINOSUS, NOT INTRACTABLE: ICD-10-CM

## 2022-07-06 DIAGNOSIS — U07.1 COVID-19 VIRUS INFECTION: Primary | ICD-10-CM

## 2022-07-06 DIAGNOSIS — R05.9 COUGH: ICD-10-CM

## 2022-07-06 DIAGNOSIS — J01.00 ACUTE NON-RECURRENT MAXILLARY SINUSITIS: ICD-10-CM

## 2022-07-06 PROCEDURE — 99214 OFFICE O/P EST MOD 30 MIN: CPT | Performed by: FAMILY MEDICINE

## 2022-07-06 RX ORDER — AZITHROMYCIN 250 MG/1
TABLET, FILM COATED ORAL
Qty: 6 TABLET | Refills: 0 | Status: SHIPPED | OUTPATIENT
Start: 2022-07-06 | End: 2022-07-10

## 2022-07-06 RX ORDER — PREDNISONE 50 MG/1
50 TABLET ORAL DAILY
Qty: 5 TABLET | Refills: 0 | Status: SHIPPED | OUTPATIENT
Start: 2022-07-06 | End: 2022-07-11

## 2022-07-06 RX ORDER — BUTALBITAL, ACETAMINOPHEN AND CAFFEINE 50; 325; 40 MG/1; MG/1; MG/1
1 TABLET ORAL EVERY 4 HOURS PRN
Qty: 30 TABLET | Refills: 0 | Status: SHIPPED | OUTPATIENT
Start: 2022-07-06

## 2022-07-06 RX ORDER — GUAIFENESIN AND CODEINE PHOSPHATE 100; 10 MG/5ML; MG/5ML
5 SOLUTION ORAL 3 TIMES DAILY PRN
Qty: 180 ML | Refills: 0 | Status: SHIPPED | OUTPATIENT
Start: 2022-07-06 | End: 2022-07-18 | Stop reason: SDUPTHER

## 2022-07-06 NOTE — ASSESSMENT & PLAN NOTE
Slight improvement  She stopped phentermine  She was told to come back for follow-up to discuss other options  Continue with low carb diet and regular exercise

## 2022-07-06 NOTE — ASSESSMENT & PLAN NOTE
This is day 4 since symptoms started  She was given prednisone  Increase oral hydration use humidifier at home  Her respiratory status stable  If symptoms worse call or come back

## 2022-07-06 NOTE — ASSESSMENT & PLAN NOTE
She was given prescriptions for Z-Paul and prednisone  Increase oral hydration use humidifier at home  Use Flonase nasal spray  If symptoms worse call or come back

## 2022-07-06 NOTE — PROGRESS NOTES
Assessment/Plan:  Chronic migraine without aura  Worsening  Most probably due to COVID  She was given refill for Fioricet  Cough  She was given prescription for Robitussin with codeine  Discussed about possible side effects  COVID-19 virus infection  This is day 4 since symptoms started  She was given prednisone  Increase oral hydration use humidifier at home  Her respiratory status stable  If symptoms worse call or come back  Acute non-recurrent maxillary sinusitis  She was given prescriptions for Z-Paul and prednisone  Increase oral hydration use humidifier at home  Use Flonase nasal spray  If symptoms worse call or come back  BMI 27 0-27 9,adult  Slight improvement  She stopped phentermine  She was told to come back for follow-up to discuss other options  Continue with low carb diet and regular exercise  Diagnoses and all orders for this visit:    COVID-19 virus infection  -     predniSONE 50 mg tablet; Take 1 tablet (50 mg total) by mouth daily for 5 days    Chronic migraine without aura without status migrainosus, not intractable  -     butalbital-acetaminophen-caffeine (Esgic) -40 mg per tablet; Take 1 tablet by mouth every 4 (four) hours as needed for headaches    Acute non-recurrent maxillary sinusitis  -     predniSONE 50 mg tablet; Take 1 tablet (50 mg total) by mouth daily for 5 days  -     azithromycin (ZITHROMAX) 250 mg tablet; Take 2 tablets today then 1 tablet daily x 4 days    Cough  -     guaifenesin-codeine (GUAIFENESIN AC) 100-10 MG/5ML liquid; Take 5 mL by mouth 3 (three) times a day as needed for cough    BMI 27 0-27 9,adult        There are no Patient Instructions on file for this visit  Return if symptoms worsen or fail to improve  Subjective:      Patient ID: Margy Case is a 55 y o  female      Chief Complaint   Patient presents with    COVID-19     Symptoms started Sunday  Tested positive 7/5       She is here today for follow-up for weight management  She stated she stopped taking phentermine because it triggers her anxiety  She was tested positive 4 days ago for COVID  Stated her symptoms started on Sunday  She has been having fever, body ache, chills, fatigue, cough, sore throat and sinus pressure  She denies any shortness of breath  The following portions of the patient's history were reviewed and updated as appropriate: allergies, current medications, past family history, past medical history, past social history, past surgical history and problem list     Review of Systems   Constitutional: Positive for chills, fatigue and fever  Negative for activity change  HENT: Positive for congestion, postnasal drip, rhinorrhea and sinus pressure  Respiratory: Positive for cough  Negative for apnea  Gastrointestinal: Negative for diarrhea and vomiting  Musculoskeletal: Positive for myalgias  Skin: Negative for rash  Neurological: Negative for dizziness  Current Outpatient Medications   Medication Sig Dispense Refill    azithromycin (ZITHROMAX) 250 mg tablet Take 2 tablets today then 1 tablet daily x 4 days 6 tablet 0    butalbital-acetaminophen-caffeine (Esgic) -40 mg per tablet Take 1 tablet by mouth every 4 (four) hours as needed for headaches 30 tablet 0    escitalopram (LEXAPRO) 20 mg tablet TAKE 1 TABLET BY MOUTH EVERY DAY 30 tablet 2    gabapentin (NEURONTIN) 300 mg capsule TAKE 2 CAPSULES BY MOUTH 2 TIMES A DAY   120 capsule 1    guaifenesin-codeine (GUAIFENESIN AC) 100-10 MG/5ML liquid Take 5 mL by mouth 3 (three) times a day as needed for cough 180 mL 0    hydrOXYzine HCL (ATARAX) 25 mg tablet Take 25 mg by mouth in the morning        latanoprost (XALATAN) 0 005 % ophthalmic solution INSTILL 1 DROP IN AFFECTED EYE AT BEDTIME  2    naproxen (NAPROSYN) 500 mg tablet Take 1 tablet (500 mg total) by mouth 2 (two) times a day as needed for mild pain 20 tablet 0    pantoprazole (PROTONIX) 40 mg tablet Take 1 tablet (40 mg total) by mouth in the morning  30 tablet 4    phentermine (ADIPEX-P) 37 5 MG tablet Take 1 tablet (37 5 mg total) by mouth in the morning 30 tablet 0    predniSONE 50 mg tablet Take 1 tablet (50 mg total) by mouth daily for 5 days 5 tablet 0    Probiotic Product (PRO-BIOTIC BLEND PO) Take by mouth      zolpidem (AMBIEN) 5 mg tablet Take 1 tablet (5 mg total) by mouth daily at bedtime as needed for sleep 30 tablet 0    cyclobenzaprine (FLEXERIL) 10 mg tablet Take 1 tablet (10 mg total) by mouth 3 (three) times a day as needed for muscle spasms (Patient not taking: No sig reported) 20 tablet 0    diazepam (VALIUM) 5 mg tablet Take 1 tablet (5 mg total) by mouth every 8 (eight) hours as needed for muscle spasms for up to 10 days (Patient not taking: Reported on 6/3/2022) 15 tablet 0    mometasone (ELOCON) 0 1 % cream  (Patient not taking: No sig reported)       No current facility-administered medications for this visit  Objective:    /70 (BP Location: Left arm, Patient Position: Sitting, Cuff Size: Large)   Pulse 84   Temp (!) 100 7 °F (38 2 °C) (Tympanic)   Resp 16   Ht 5' 6" (1 676 m)   Wt 78 5 kg (173 lb)   SpO2 96%   BMI 27 92 kg/m²        Physical Exam  Vitals and nursing note reviewed  Constitutional:       Appearance: She is well-developed  HENT:      Head: Normocephalic and atraumatic  Right Ear: A middle ear effusion is present  Left Ear: A middle ear effusion is present  Mouth/Throat:      Pharynx: Posterior oropharyngeal erythema present  Eyes:      Pupils: Pupils are equal, round, and reactive to light  Cardiovascular:      Rate and Rhythm: Normal rate and regular rhythm  Heart sounds: Normal heart sounds  Pulmonary:      Effort: Pulmonary effort is normal       Breath sounds: Normal breath sounds  Abdominal:      General: Bowel sounds are normal       Palpations: Abdomen is soft     Musculoskeletal:         General: Normal range of motion  Cervical back: Normal range of motion and neck supple  Lymphadenopathy:      Cervical: No cervical adenopathy  Skin:     General: Skin is warm and dry  Neurological:      Mental Status: She is alert and oriented to person, place, and time  Cranial Nerves: No cranial nerve deficit                  Kamran Mariee MD

## 2022-07-07 DIAGNOSIS — F32.A ANXIETY AND DEPRESSION: ICD-10-CM

## 2022-07-07 DIAGNOSIS — F41.9 ANXIETY AND DEPRESSION: ICD-10-CM

## 2022-07-08 RX ORDER — ESCITALOPRAM OXALATE 20 MG/1
TABLET ORAL
Qty: 30 TABLET | Refills: 2 | Status: SHIPPED | OUTPATIENT
Start: 2022-07-08 | End: 2022-10-12

## 2022-07-18 ENCOUNTER — TELEPHONE (OUTPATIENT)
Dept: FAMILY MEDICINE CLINIC | Facility: CLINIC | Age: 47
End: 2022-07-18

## 2022-07-18 DIAGNOSIS — R05.9 COUGH: Primary | ICD-10-CM

## 2022-07-18 NOTE — TELEPHONE ENCOUNTER
Pt called again about the medication  She is aware  is here later tonight and she said if she doesn't get these meds tonight she will not be able to sleep or go to work tomorrow

## 2022-07-18 NOTE — TELEPHONE ENCOUNTER
Patient called and said she was just here, and had covid, was given cough medicine, zpak and prednisone but her cough is horrendous    She said the cough medicine helped a little but she cannot even take a breath without coughing

## 2022-07-18 NOTE — TELEPHONE ENCOUNTER
Pt still has cough  Pt is  Out of guiafensin-codeine   she is returning to work tomorrow  Per Dr Peyman Steve, chest x ray, medrol dose pack  and  guiafensin-codeine   Pt aware and agrees  pmed; Patient Prescription Report        Patients      Select Patient Id Name OPHELIA Thurston 106   [] 165 [facility] 99- F [address]           Search Criteria    Name Date of Birth Date Range   Tuan Daugherty 80- 07- To 07-     Requester Name Requested Date   Arnold Sainz Jul 18 2022 10:08:08 GMT-0400 Daiva Timi Daylight Time)     Summary   Prescriptions  19  Prescribers  5  Pharmacies  1  View Map    Drug Classes   Benzodiazepines  3  Stimulants  2  Opioids  2  Muscle Relaxants  0    Opioid Dosage   Total MME for Active Prescriptions  0    Average MME  23 65  MME Graph   MME Calculator       · Prescriptions  · Notifications    · Prescribers  · Pharmacies  · MME Graph      [] PA   Drug Categories:      [] Opioids       [] Stimulants       [] Benzodiazepines       [] Others       Show  entries  Search:  Patient Id Prescription #  Filled Written Drug Label Qty Days Strength MME** Prescriber Pharmacy Payment REFILL #/Auth State Detail   1  [id number] 07/06/2022 07/06/2022 CODEINE PHOSPHATE/guaiFENesin (Solution)  180 0 12 10 MG/5 ML-100 MG/5  22 50 ANKIT) [facility]  Private Pay 0 / 0 [place]    1  [id number] 07/06/2022 07/06/2022 Butalbital-acetaminophen-caffeine (Tablet)  30 0 5 325 MG-50 MG-40 MG MARIN PHAM) [facility]  Medicaid 0 / 0 [place]    1  [id number] 07/05/2022 07/05/2022 Zolpidem Tartrate (Tablet)  30 0 30 5 MG NA ANKIT) [facility]    Medicaid 0 / 0 [place]    1  [id number] 06/03/2022 06/03/2022 Phentermine Hcl (Tablet)  30 0 30 37 5 MG

## 2022-07-19 ENCOUNTER — APPOINTMENT (OUTPATIENT)
Dept: RADIOLOGY | Age: 47
End: 2022-07-19
Payer: COMMERCIAL

## 2022-07-19 DIAGNOSIS — R05.9 COUGH: ICD-10-CM

## 2022-07-19 PROCEDURE — 71046 X-RAY EXAM CHEST 2 VIEWS: CPT

## 2022-07-19 RX ORDER — GUAIFENESIN AND CODEINE PHOSPHATE 100; 10 MG/5ML; MG/5ML
5 SOLUTION ORAL 3 TIMES DAILY PRN
Qty: 180 ML | Refills: 0 | Status: SHIPPED | OUTPATIENT
Start: 2022-07-19

## 2022-07-19 RX ORDER — METHYLPREDNISOLONE 4 MG/1
TABLET ORAL
Qty: 21 EACH | Refills: 0 | Status: SHIPPED | OUTPATIENT
Start: 2022-07-19

## 2022-07-26 ENCOUNTER — TELEPHONE (OUTPATIENT)
Dept: FAMILY MEDICINE CLINIC | Facility: CLINIC | Age: 47
End: 2022-07-26

## 2022-08-02 DIAGNOSIS — G47.09 OTHER INSOMNIA: ICD-10-CM

## 2022-08-02 RX ORDER — ZOLPIDEM TARTRATE 5 MG/1
TABLET ORAL
Qty: 30 TABLET | Refills: 0 | Status: SHIPPED | OUTPATIENT
Start: 2022-08-02 | End: 2022-09-02

## 2022-08-02 NOTE — TELEPHONE ENCOUNTER
Last seen 7/6/22 and I copied past refills [facility] and sent for provider approval      [address] [facility] 12- F 63 [facility] [facility] [place]           Search Criteria    NAME DATE OF BIRTH DATE RANGE   Damon Kumar 47- 08- To 08-     REQUESTER NAME REQUESTED DATE   Dennise Godoy Aug 02 2022 14:18:27 GMT-0400 [place] Daylight Time)     Summary  Prescriptions  19  Prescribers  5  Pharmacies  1  View Map    Drug Classes  Benzodiazepines  2  Stimulants  2  Opioids  3  Muscle Relaxants  0    Opioid Dosage  Total MME for Active Prescriptions  0    Average MME  23 10  MME Graph   MME Calculator       · Prescriptions  · Notifications    · Prescribers  · Pharmacies  · MME Graph      [] [place] Drug Categories:     [] Opioids      [] Stimulants      [] Benzodiazepines      [] Others      Showentries  Search:  PATIENT ID PRESCRIPTION # FILLED WRITTEN DRUG LABEL QTY DAYS STRENGTH MME** PRESCRIBER PHARMACY PAYMENT REFILL #/AUTH STATE DETAIL   1 [id number] 07/19/2022 07/19/2022 CODEINE PHOSPHATE/guaiFENesin (Solution) 180 0 12 10 MG/5 ML-100 MG/5 22 50 JEREL(MD) [facility]  [place] 0 / 0 [place]    1 [id number] 07/06/2022 07/06/2022 CODEINE PHOSPHATE/guaiFENesin (Solution) 180 0 12 10 MG/5 ML-100 MG/5 22 50 ANKIT) [facility]  [place] 0 / 0 [place]    1 [id number] 07/06/2022 07/06/2022 Butalbital-acetaminophen-caffeine (Tablet) 30 0 5 325 MG-50 MG-40 MG NA JEREL(MD) [facility]  [place] 0 / 0 [place]    1 [id number] 07/05/2022 07/05/2022 Zolpidem Tartrate (Tablet) 30 0 30 5 MG NA JEREL(MD) [facility]  [place] 0 / 0 [place]    1 [id number] 06/03/2022 06/03/2022 Phentermine Hcl (Tablet) 30 0 30 37 5 MG NA [facility]KIT) [facility]   [place] 0 / 0 [place]    1 [id number] 05/20/2022 05/20/2022 Zolpidem Tartrate (Tablet) 30 0 30 5 MG NA JEREL(MD) [facility]  [place]icaid 0 / 0 [place]    1 [id number] 04/25/2022 04/25/2022 Phentermine Hcl (Capsule) 30 0 30 37 5 MG NA [facility]KIT[facility]  [place] 00 / 0 [place]    1 [id number] 04/15/2022 04/15/2022 Zolpidem Tartrate (Tablet) 30 0 30 5 MG NA [facility]  [place] 00 / 0 [place]    1 [id number] 03/28/2022 03/27/2022 diazePAM (Tablet) 15 0 5 5 MG NA [facility]  [place] 00 / 0 [place]    1 [id number] 03/08/2022 03/02/2022 Zolpidem Tartrate (Tablet) 30 0 30 5 MG NA [facility]  [place] 00 / 0 [place]    Showing 1 to 10 of 19 entries  [id number]       * Per CDC guidance, the conversion factors and associated daily morphine milligram equivalents for drugs prescribed as part of medication-assisted treatment for opioid use disorder should not be used to benchmark against dosage thresholds meant for opioids prescribed for pain  Report Disclaimer:  Information from the [place] [employer] Program (PDMP) database is protected health information and any information accessed must be treated as confidential  Any person who knowingly or intentionally makes an unauthorized disclosure of information from the [address] [place] [address] will be subject to civil and criminal penalties as set forth in the ABC-MAP Act 2014-191, Act of Oct  27, 2014, P L  2911  The information in the [address] [place] [address] is submitted by pharmacies and may contain errors and omissions  Independent verification of prescription information with pharmacies and prescribers may sometimes be prudent or necessary        Educational content  CDC MME calculation guidelines  [place] Prescribing Guidelines  Letter Regarding the Misapplication of Prescribing Guidelines   Guide for Appropriate Tapering or Discontinuation of Long-Term Opioid Use   #[id number]

## 2022-08-03 ENCOUNTER — TELEPHONE (OUTPATIENT)
Dept: FAMILY MEDICINE CLINIC | Facility: CLINIC | Age: 47
End: 2022-08-03

## 2022-08-03 DIAGNOSIS — R05.9 COUGH: Primary | ICD-10-CM

## 2022-08-03 NOTE — TELEPHONE ENCOUNTER
PT saw you in beginning of July for covid and has finished all medication and still has a lingering cough  Can we call her in a prescription cough medicine, she needs it during the day while working  Please call pt to advise

## 2022-08-03 NOTE — TELEPHONE ENCOUNTER
PT  Was prescribed codeine phosphate /guaifenesin  On 7/6/22 and 7/19/22  Pt is still having a cough  She Is looking for a cough med to take while at work  Please advise  pmed; Patient Prescription Report        Patients      Select Patient Id Name OPHELIA O VIPIN Thurston 89   [] 165 [facility] 37- F 63 [address]           Search Criteria    Name Date of Birth Date Range   Maria Ines Nassar 87- 08- To 08-     Requester Name Requested Date   Olaf Ontiveros [facility] Aug 03 2022 16:22:44 GMT-0400 [facility]     Summary   Prescriptions  19  Prescribers  5  Pharmacies  1  View Map    Drug Classes   Benzodiazepines  2  Stimulants  2  Opioids  3  Muscle Relaxants  0    Opioid Dosage   Total MME for Active Prescriptions  0    Average MME  23 10  MME Graph   MME Calculator       · Prescriptions  · Notifications    · Prescribers  · Pharmacies  · MME Graph      [] [place]   Drug Categories:      [] Opioids       [] Stimulants       [] Benzodiazepines       [] Others       Show  entries  Search:  Patient Id Prescription #  Filled Written Drug Label Qty Days Strength MME** Prescriber Pharmacy Payment REFILL #/Auth State Detail   1  [id number] 07/19/2022 07/19/2022 CODEINE PHOSPHATE/guaiFENesin (Solution)  180 0 12 10 MG/5 ML-100 MG/5  22 50 ANKIT) [facility]  [place] 0 / 0 [place]    1  [id number] 07/06/2022 07/06/2022 CODEINE PHOSPHATE/guaiFENesin (Solution)  180 0 12 10 MG/5 ML-100 MG/5  22 50 ANKIT) [facility]  [place] 0 / 0 [place]    1  [id number] 07/06/2022 07/06/2022 Butalbital-acetaminophen-caffeine (Tablet)  30 0 5 325 MG-50 MG-40 MG MARIN PHAM) [facility]  [place] 0 / 0 [place]    1  [id number] 07/05/2022 07/05/2022 Zolpidem Tartrate (Tablet)  30 0 30 5 MG MARIN PHAM) [facility]    [place] 0 / 0 [place]    1  [id number] 06/03/2022 06/03/2022 Phentermine Hcl (Tablet)  30 0 30 37 5 MG

## 2022-08-04 RX ORDER — BENZONATATE 200 MG/1
200 CAPSULE ORAL 3 TIMES DAILY PRN
Qty: 20 CAPSULE | Refills: 0 | Status: SHIPPED | OUTPATIENT
Start: 2022-08-04

## 2022-08-04 NOTE — TELEPHONE ENCOUNTER
Patient called and asked about her message yesterday and her terrible cough  She said something was to be called in yesterday  She said the cough medicine with codeine did not help  Has trouble working  Asking for something to be called in as soon as possible

## 2022-08-12 ENCOUNTER — TELEPHONE (OUTPATIENT)
Dept: FAMILY MEDICINE CLINIC | Facility: CLINIC | Age: 47
End: 2022-08-12

## 2022-08-12 DIAGNOSIS — G25.81 RESTLESS LEGS SYNDROME (RLS): ICD-10-CM

## 2022-08-12 RX ORDER — GABAPENTIN 300 MG/1
600 CAPSULE ORAL 2 TIMES DAILY
Qty: 120 CAPSULE | Refills: 1 | Status: SHIPPED | OUTPATIENT
Start: 2022-08-12 | End: 2022-10-20

## 2022-09-01 DIAGNOSIS — G47.09 OTHER INSOMNIA: ICD-10-CM

## 2022-09-02 RX ORDER — ZOLPIDEM TARTRATE 5 MG/1
TABLET ORAL
Qty: 30 TABLET | Refills: 0 | Status: SHIPPED | OUTPATIENT
Start: 2022-09-02 | End: 2022-10-07

## 2022-09-02 NOTE — TELEPHONE ENCOUNTER
Last seen 7/6/22 and past refills copied into chart from pdmp web site  Sent to provider for approval        1 Angel Suarez 89- F [address] [id number] Cali LUCERO           Search Criteria    NAME DATE OF BIRTH DATE RANGE   Quinn Baker 33- 09- To 09-     REQUESTER NAME REQUESTED DATE   Maurice Brunner [facility] Sep 02 2022 07:54:43 GMT-0400 ([place] Daylight [place])     Summary  Prescriptions  19  Prescribers  5  Pharmacies  1  View Map    Drug Classes  Benzodiazepines  2  Stimulants  2  Opioids  3  Muscle Relaxants  0    Opioid Dosage  Total MME for Active Prescriptions  0    Average MME  23 10  MME Graph   MME Calculator       · Prescriptions  · Notifications    · Prescribers  · Pharmacies  · MME Graph      [] [place] Drug Categories:     [] Opioids      [] Stimulants      [] Benzodiazepines      [] Others      Showentries  Search:  PATIENT ID PRESCRIPTION # FILLED WRITTEN DRUG LABEL QTY DAYS STRENGTH MME** PRESCRIBER PHARMACY PAYMENT REFILL #/AUTH STATE DETAIL   1 [id number] 08/02/2022 08/02/2022 Zolpidem Tartrate (Tablet) 30 0 30 5 MG MARIN PHAM) [facility]  [place] 0 / 0 [place]    1 [id number] 07/19/2022 07/19/2022 CODEINE PHOSPHATE/guaiFENesin (Solution) 180 0 12 10 MG/5 ML-100 MG/5 22 50 ANKIT) [facility]  Private [place] 0 / 0 [place]    1 [id number] 07/06/2022 07/06/2022 CODEINE PHOSPHATE/guaiFENesin (Solution) 180 0 12 10 MG/5 ML-100 MG/5 22 50 ANKIT) [facility]  Private [place] 0 / 0 [place]    1 [id number] 07/06/2022 07/06/2022 Butalbital-acetaminophen-caffeine (Tablet) 30 0 5 325 MG-50 MG-40 MG MARIN PHAM) [facility]  [place] 0 / 0 [place]    1 [id number] 07/05/2022 07/05/2022 Zolpidem Tartrate (Tablet) 30 0 30 5 MG MARIN PHAM) [facility]   [place]icaid 0 / 0 [place]    1 [id number] 06/03/2022 06/03/2022 Phentermine Hcl (Tablet) 30 0 30 37 5 MG NA WASJOHN(MD) JOHN [facility]  [place] 0 / 0 [place]    1 [id number] 05/20/2022 05/20/2022 Zolpidem Tartrate (Tablet) 30 0 30 5 MG NA JEREL(MD) [facility]  [place]icaid 0 / 0 [place]    1 [id number] 04/25/2022 04/25/2022 Phentermine Hcl (Capsule) 30 0 30 37 5 MG NA JEREL(MD) [facility]  Private Pay 00 / 0 [place]    1 [id number] 04/15/2022 04/15/2022 Zolpidem Tartrate (Tablet) 30 0 30 5 MG NA LEROY GANNON [facility]  [place]icaid 00 / 0 [place]    1 [id number] 03/28/2022 03/27/2022 diazePAM (Tablet) 15 0 5 5 MG NA LUKE CRUZ [facility]  Medicaid 00 / 0 [place]    Showing 1 to 10 of 19 entries  [id number]       * Per CDC guidance, the conversion factors and associated daily morphine milligram equivalents for drugs prescribed as part of medication-assisted treatment for opioid use disorder should not be used to benchmark against dosage thresholds meant for opioids prescribed for pain  Report Disclaimer:  Information from the [place] [employer] Program (PDMP) database is protected health information and any information accessed must be treated as confidential  Any person who knowingly or intentionally makes an unauthorized disclosure of information from the [address] [place] [address] will be subject to civil and criminal penalties as set forth in the ABC-MAP Act 2014-191, Act of Oct  27, 2014, P L  2911  The information in the [address] [place] [address] is submitted by pharmacies and may contain errors and omissions  Independent verification of prescription information with pharmacies and prescribers may sometimes be prudent or necessary        Educational content  CDC MME calculation guidelines  [address] [facility] Prescribing Guidelines  Letter Regarding the Misapplication of Prescribing Guidelines   Guide for Appropriate Tapering or Discontinuation of Long-Term Opioid Use   [id number]

## 2022-10-05 ENCOUNTER — EVALUATION (OUTPATIENT)
Dept: PHYSICAL THERAPY | Facility: CLINIC | Age: 47
End: 2022-10-05
Payer: COMMERCIAL

## 2022-10-05 DIAGNOSIS — M70.62 TROCHANTERIC BURSITIS OF BOTH HIPS: Primary | ICD-10-CM

## 2022-10-05 DIAGNOSIS — M70.61 TROCHANTERIC BURSITIS OF BOTH HIPS: Primary | ICD-10-CM

## 2022-10-05 PROCEDURE — 97112 NEUROMUSCULAR REEDUCATION: CPT

## 2022-10-05 PROCEDURE — 97161 PT EVAL LOW COMPLEX 20 MIN: CPT

## 2022-10-05 NOTE — PROGRESS NOTES
PT Evaluation     Today's date: 10/5/2022  Patient name: Ruthie Valeinte  : 1975  MRN: [id number]  Referring provider: No ref  provider found  Dx:   Encounter Diagnosis     ICD-10-CM    1  Trochanteric bursitis of both hips  M70 61     M70 62                   Assessment  Assessment details: Ruthie Valiente is a 52 y o  female who presents with chronic posterior and lateral bilateral hip pain  She has received injections for the bursa which have not been beneficial and has tried massage, PT, and chiropractic care  Patient presents with increased hip mobility, decreased hip strength of abductors and extensors, decreased TA endurance, and tenderness to palpation along TFL and ITB as well as piriformis B  Due to these impairments, patient has difficulty performing activities such as being on her feet for prolonged periods of time, sleeping, or returning to her desired exercise routine  Plan to focus on glute med and glute max strength without compensatory strategies as well as TA and core stability  Patient would benefit from skilled physical therapy to address the impairments, improve their level of function, and to improve their overall quality of life  Impairments: abnormal gait, abnormal movement, activity intolerance, impaired physical strength, pain with function, weight-bearing intolerance and poor body mechanics  Understanding of Dx/Px/POC: excellent   Prognosis: good  Prognosis details: Positive prognostic indicators include: absence of observed red flags, positive attitude towards recovery, good understanding of diagnosis and treatment plan   Negative prognostic indicators include: chronicity of sx     Goals  Short Term Goals: to be achieved by 4 weeks  1) Patient to be independent with basic HEP  2) Decrease pain to 3/10 at its worst   3) 40% improvement in sleeping symptoms   4) Increase LE strength by 1/2 MMT grade in all deficient planes      Long Term Goals: to be achieved by discharge  1) FOTO equal to or greater than expected  2) Ambulation to improve to maximal level of function  3) Able to return to desired exercise routine   4) 75% improvement in sleep capacity  5) Hip MMT > 4+/5 B     Plan  Patient would benefit from: skilled physical therapy  Planned therapy interventions: manual therapy, neuromuscular re-education, patient education, therapeutic activities and therapeutic exercise  Frequency: 2x/week for 4-6 weeks  Treatment plan discussed with: patient        Subjective Evaluation    History of Present Illness  Mechanism of injury: History: VIPIN presents with B hip pain for the last two years  She believes that it began when she was pregnant ~14 years ago and has had flare ups on/off since  She has pain down the lateral aspects of both of her legs which she describes as throbbing  She has received multiple injections with the last being 8/30/22 which did not help  She is a side sleeper; L is worse than R and she has to sleep on ice packs  She also saw a chiro who recommended her to ortho and she has also been getting massages  She just started day two of a Prednisone taper which has been helping a lot  Denies tingling or radiating pain distal to the knee  Good Tamayo: strengthening, stretching, pelvic   No help     Aggravating: sleeping, sitting, being on her feet for > 1 hour  Alleviating: ice packs, Prednisone, stretching short-term  24 hours: okay in the morning, worse at the end of the day after being on her feet   Functional limitations: can't exercise to the extent that she wants to   Exercising: wants to get back to the gym - walked on TM, stairmaster but feels pain   Social support: friends nearby; boyfriend is 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   Imaging: n/a     Pain  Current pain rating: 3  At best pain rating: 3  At worst pain rating: 10  Location: bilateral hips, lateral leg, into calves   Quality: throbbing      Diagnostic Tests  EMG: abnormal        Objective     Palpation   Left   Tenderness of the gluteus medius, piriformis and TFL  Right   Tenderness of the gluteus medius, piriformis and TFL  Tenderness     Left Hip   Tenderness in the greater trochanter  Right Hip   Tenderness in the greater trochanter  Active Range of Motion     Lumbar   Flexion:  Restriction level: minimal  Extension:  Restriction level: minimal  Left lateral flexion:  Restriction level: minimal  Right lateral flexion:  Restriction level: minimal  Left rotation:  Restriction level: moderate  Right rotation:  Restriction level: moderate    Passive Range of Motion   Left Hip   Normal passive range of motion    Right Hip   Normal passive range of motion    Additional Passive Range of Motion Details  Excessive hip flexion and ER noted, WFL hip IR B     Joint Play     Hypermobile: L1, L2, L3, L4 and L5     Strength/Myotome Testing     Left Hip   Planes of Motion   Flexion: 4  Extension: 4  Abduction: 4+  External rotation: 4  Internal rotation: 4-    Right Hip   Planes of Motion   Flexion: 4+  Extension: 4  Abduction: 4  External rotation: 4  Internal rotation: 4-    Tests     Lumbar     Left   Negative passive SLR and slump test      Right   Negative passive SLR and slump test      Left Hip   Negative MICHAEL, FADIR and sign of the buttock  Right Hip   Negative MICHAEL, FADIR and sign of the buttock       General Comments:      Hip Comments   R SLS 3 sec L 5 sec              Precautions: none       Manuals 10/5                                                                Neuro Re-Ed             TA HEP             TA + march HEP             TA + leg ext              Clamshell Pillow b/w knee HEP            Sidelying hip abd              Band walks              Dead bug             Bird dog                                        Ther Ex             Bridge HEP             SL bridge             Plank             Side plank             Bike active warmup Standing hip abd             Standing hip ext                           Ther Activity                                       Gait Training                                       Modalities

## 2022-10-05 NOTE — LETTER
2022      No Recipients    Patient: Avis Beach   YOB: 1975   Date of Visit: 10/5/2022     Encounter Diagnosis     ICD-10-CM    1  Trochanteric bursitis of both hips  M70 61     M70 62        Dear Dr Elida Moncada Recipients: Thank you for your recent referral of Avis Beach  Please review the attached evaluation summary from [facility] 76 recent visit  Please verify that you agree with the plan of care by signing the attached order  If you have any questions or concerns, please do not hesitate to call  I sincerely appreciate the opportunity to share in the care of one of your patients and hope to have another opportunity to work with you in the near future  Sincerely,    Padmaja Cruz, PT      Referring Provider:      I certify that I have read the below Plan of Care and certify the need for these services furnished under this plan of treatment while under my care  No Recipients          PT Evaluation     Today's date: 10/5/2022  Patient name: Avis Beach  : 1975  MRN: [id number]  Referring provider: No ref  provider found  Dx:   Encounter Diagnosis     ICD-10-CM    1  Trochanteric bursitis of both hips  M70 61     M70 62                   Assessment  Assessment details: Avis Beach is a 52 y o  female who presents with chronic posterior and lateral bilateral hip pain  She has received injections for the bursa which have not been beneficial and has tried massage, PT, and chiropractic care  Patient presents with increased hip mobility, decreased hip strength of abductors and extensors, decreased TA endurance, and tenderness to palpation along TFL and ITB as well as piriformis B  Due to these impairments, patient has difficulty performing activities such as being on her feet for prolonged periods of time, sleeping, or returning to her desired exercise routine   Plan to focus on glute med and glute max strength without compensatory strategies as well as TA and core stability  Patient would benefit from skilled physical therapy to address the impairments, improve their level of function, and to improve their overall quality of life  Impairments: abnormal gait, abnormal movement, activity intolerance, impaired physical strength, pain with function, weight-bearing intolerance and poor body mechanics  Understanding of Dx/Px/POC: excellent   Prognosis: good  Prognosis details: Positive prognostic indicators include: absence of observed red flags, positive attitude towards recovery, good understanding of diagnosis and treatment plan   Negative prognostic indicators include: chronicity of sx     Goals  Short Term Goals: to be achieved by 4 weeks  1) Patient to be independent with basic HEP  2) Decrease pain to 3/10 at its worst   3) 40% improvement in sleeping symptoms   4) Increase LE strength by 1/2 MMT grade in all deficient planes  Long Term Goals: to be achieved by discharge  1) FOTO equal to or greater than expected  2) Ambulation to improve to maximal level of function  3) Able to return to desired exercise routine   4) 75% improvement in sleep capacity  5) Hip MMT > 4+/5 B     Plan  Patient would benefit from: skilled physical therapy  Planned therapy interventions: manual therapy, neuromuscular re-education, patient education, therapeutic activities and therapeutic exercise  Frequency: 2x/week for 4-6 weeks  Treatment plan discussed with: patient        Subjective Evaluation    History of Present Illness  Mechanism of injury: History: B presents with B hip pain for the last two years  She believes that it began when she was pregnant ~14 years ago and has had flare ups on/off since  She has pain down the lateral aspects of both of her legs which she describes as throbbing  She has received multiple injections with the last being 8/30/22 which did not help  She is a side sleeper; L is worse than R and she has to sleep on ice packs   She also saw a chiro who recommended her to ortho and she has also been getting massages  She just started day two of a Prednisone taper which has been helping a lot  Denies tingling or radiating pain distal to the knee  Good Tamayo: strengthening, stretching, pelvic  No help     Aggravating: sleeping, sitting, being on her feet for > 1 hour  Alleviating: ice packs, Prednisone, stretching short-term  24 hours: okay in the morning, worse at the end of the day after being on her feet   Functional limitations: can't exercise to the extent that she wants to   Exercising: wants to get back to the gym - walked on TM, stairmaster but feels pain   Social support: friends nearby; boyfriend is PTA for 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   Imaging: n/a     Pain  Current pain rating: 3  At best pain rating: 3  At worst pain rating: 10  Location: bilateral hips, lateral leg, into calves   Quality: throbbing      Diagnostic Tests  EMG: abnormal        Objective     Palpation   Left   Tenderness of the gluteus medius, piriformis and TFL  Right   Tenderness of the gluteus medius, piriformis and TFL  Tenderness     Left Hip   Tenderness in the greater trochanter  Right Hip   Tenderness in the greater trochanter       Active Range of Motion     Lumbar   Flexion:  Restriction level: minimal  Extension:  Restriction level: minimal  Left lateral flexion:  Restriction level: minimal  Right lateral flexion:  Restriction level: minimal  Left rotation:  Restriction level: moderate  Right rotation:  Restriction level: moderate    Passive Range of Motion   Left Hip   Normal passive range of motion    Right Hip   Normal passive range of motion    Additional Passive Range of Motion Details  Excessive hip flexion and ER noted, WFL hip IR B     Joint Play     Hypermobile: L1, L2, L3, L4 and L5     Strength/Myotome Testing     Left Hip   Planes of Motion   Flexion: 4  Extension: 4  Abduction: 4+  External rotation: 4  Internal rotation: 4-    Right Hip   Planes of Motion   Flexion: 4+  Extension: 4  Abduction: 4  External rotation: 4  Internal rotation: 4-    Tests     Lumbar     Left   Negative passive SLR and slump test      Right   Negative passive SLR and slump test      Left Hip   Negative MICHAEL, FADIR and sign of the buttock  Right Hip   Negative MICHAEL, FADIR and sign of the buttock       General Comments:      Hip Comments   R SLS 3 sec L 5 sec              Precautions: none       Manuals 10/5                                                                Neuro Re-Ed             TA HEP             TA + march HEP             TA + leg ext              Clamshell Pillow b/w knee HEP            Sidelying hip abd              Band walks              Dead bug             Bird dog                                        Ther Ex             Bridge HEP             SL bridge             Plank             Side plank             Bike active warmup             Standing hip abd             Standing hip ext                           Ther Activity                                       Gait Training                                       Modalities

## 2022-10-06 DIAGNOSIS — G47.09 OTHER INSOMNIA: ICD-10-CM

## 2022-10-07 RX ORDER — ZOLPIDEM TARTRATE 5 MG/1
TABLET ORAL
Qty: 30 TABLET | Refills: 0 | Status: SHIPPED | OUTPATIENT
Start: 2022-10-07

## 2022-10-07 NOTE — TELEPHONE ENCOUNTER
Pharmacy requesting refill on Ambien  Last seen 7/6/22       1 LESLI OSORIO [address]-  [address] [facility]-[address] [place]           Search Criteria    Name Date of Birth Date Range   Kwasi Milian 85- 10- To 10-     Requester Name Requested Date   Mayra Astudillo Fri Oct 07 2022 09:16:37 GMT-0400 [place] Daylight Time)     Summary   Prescriptions  19  Prescribers  6  Pharmacies  1  View Map    Drug Classes   Benzodiazepines  2  Stimulants  2  Opioids  3  Muscle Relaxants  0    Opioid Dosage   Total MME for Active Prescriptions  0    Average MME  23 10  MME Graph   MME Calculator       · Prescriptions  · Notifications    · Prescribers  · Pharmacies  · MME Graph      [] [place]   Drug Categories:      [] Opioids       [] Stimulants       [] Benzodiazepines       [] Others       Show  entries  Search:  Patient Id Prescription #  Filled Written Drug Label Qty Days Strength MME** Prescriber Pharmacy Payment REFILL #/Auth State Detail   1  [id number] 09/02/2022 09/02/2022 Zolpidem Tartrate (Tablet)  30 0 30 5 MG NA OLESYA GREEN  [facility]  [place]    1  [id number] 08/02/2022 08/02/2022 Zolpidem Tartrate (Tablet)  30 0 30 5 MG MARIN PHAM[facility]  [place]    1  [id number] 07/19/2022 07/19/2022 CODEINE PHOSPHATE/guaiFENesin (Solution)  180 0 12 10 MG/5 ML-100 MG/5  22 50 ANKIT) [facility]  [place]    1  [id number] 07/06/2022 07/06/2022 CODEINE PHOSPHATE/guaiFENesin (Solution)  180 0 12 10 MG/5 ML-100 MG/5  22 50 ANKIT) [facility]  [place]    1  [id number] 07/06/2022 07/06/2022 Butalbital-acetaminophen-caffeine (Tablet)  30 0 5 325 MG-50 MG-40 MG MARIN PHAM[facility]    [place]    1  [id number] 07/05/2022 07/05/2022 Zolpidem Tartrate (Tablet)  30 0 30 5 MG NA [facility]KIT[facility]  [place]    1  [id number] 06/03/2022 06/03/2022 Phentermine Hcl (Tablet)  30 0 30 37 5 MG NA JEREL(MD) [facility]  [place] 0 / 0 [place]    1  [id number] 05/20/2022 05/20/2022 Zolpidem Tartrate (Tablet)  30 0 30 5 MG NA JEREL(MD) [facility]  [place] / 0 [place]    1  [id number] 04/25/2022 04/25/2022 Phentermine Hcl (Capsule)  30 0 30 37 5 MG NA JEREL(MD) [facility]    [place] 00 / 0 [place]    1  [id number] 04/15/2022  04/15/2022 Zolpidem Tartrate (Tablet)  30 0 30 5 MG NA LEROY GANNON

## 2022-10-11 ENCOUNTER — OFFICE VISIT (OUTPATIENT)
Dept: PHYSICAL THERAPY | Facility: CLINIC | Age: 47
End: 2022-10-11
Payer: COMMERCIAL

## 2022-10-11 DIAGNOSIS — M70.61 TROCHANTERIC BURSITIS OF BOTH HIPS: Primary | ICD-10-CM

## 2022-10-11 DIAGNOSIS — M70.62 TROCHANTERIC BURSITIS OF BOTH HIPS: Primary | ICD-10-CM

## 2022-10-11 PROBLEM — R05.9 COUGH: Status: RESOLVED | Noted: 2022-03-02 | Resolved: 2022-10-11

## 2022-10-11 PROBLEM — J01.00 ACUTE NON-RECURRENT MAXILLARY SINUSITIS: Status: RESOLVED | Noted: 2022-07-06 | Resolved: 2022-10-11

## 2022-10-11 PROBLEM — Z00.00 HEALTHCARE MAINTENANCE: Status: RESOLVED | Noted: 2022-04-25 | Resolved: 2022-10-11

## 2022-10-11 PROCEDURE — 97110 THERAPEUTIC EXERCISES: CPT

## 2022-10-11 PROCEDURE — 97112 NEUROMUSCULAR REEDUCATION: CPT

## 2022-10-11 NOTE — PROGRESS NOTES
Daily Note     Today's date: 10/11/2022  Patient name: Vista Closs  : 1975  MRN: [id number]  Referring provider: No ref  provider found  Dx:   Encounter Diagnosis     ICD-10-CM    1  Trochanteric bursitis of both hips  M70 61     M70 62                   Subjective: Patient reports being more active yesterday walking up and down bleachers and sitting on hard surfaces with some increased pain after, today reports her pain has subsided  Objective: See treatment diary below      Assessment: Patient did well with new TE added today, sore and fatigued post tx session  Plan: Continue per plan of care        Precautions: none       Manuals 10/5 10/11                                                               Neuro Re-Ed             TA HEP  5"x20           TA + march HEP  2x10           TA + leg ext   2x10           Clamshell Pillow b/w knee HEP 3" 2x10           Sidelying hip abd              Band walks              Dead bug             Bird dog                                        Ther Ex             Bridge HEP  5" x20           SL bridge             Plank             Side plank             Bike active warmup  6'            Standing hip abd  x10 ea           Standing hip ext   x10 ea                        Ther Activity                                       Gait Training                                       Modalities

## 2022-10-12 DIAGNOSIS — F32.A ANXIETY AND DEPRESSION: ICD-10-CM

## 2022-10-12 DIAGNOSIS — K21.9 GASTROESOPHAGEAL REFLUX DISEASE: ICD-10-CM

## 2022-10-12 DIAGNOSIS — F41.9 ANXIETY AND DEPRESSION: ICD-10-CM

## 2022-10-12 RX ORDER — ESCITALOPRAM OXALATE 20 MG/1
TABLET ORAL
Qty: 30 TABLET | Refills: 2 | Status: SHIPPED | OUTPATIENT
Start: 2022-10-12

## 2022-10-12 RX ORDER — PANTOPRAZOLE SODIUM 40 MG/1
40 TABLET, DELAYED RELEASE ORAL DAILY
Qty: 30 TABLET | Refills: 4 | Status: SHIPPED | OUTPATIENT
Start: 2022-10-12

## 2022-10-17 ENCOUNTER — APPOINTMENT (OUTPATIENT)
Dept: PHYSICAL THERAPY | Facility: CLINIC | Age: 47
End: 2022-10-17

## 2022-10-19 ENCOUNTER — OFFICE VISIT (OUTPATIENT)
Dept: PHYSICAL THERAPY | Facility: CLINIC | Age: 47
End: 2022-10-19
Payer: COMMERCIAL

## 2022-10-19 DIAGNOSIS — M70.61 TROCHANTERIC BURSITIS OF BOTH HIPS: Primary | ICD-10-CM

## 2022-10-19 DIAGNOSIS — M70.62 TROCHANTERIC BURSITIS OF BOTH HIPS: Primary | ICD-10-CM

## 2022-10-19 DIAGNOSIS — G25.81 RESTLESS LEGS SYNDROME (RLS): ICD-10-CM

## 2022-10-19 PROCEDURE — 97110 THERAPEUTIC EXERCISES: CPT

## 2022-10-19 PROCEDURE — 97140 MANUAL THERAPY 1/> REGIONS: CPT

## 2022-10-19 PROCEDURE — 97112 NEUROMUSCULAR REEDUCATION: CPT

## 2022-10-19 NOTE — PROGRESS NOTES
Daily Note     Today's date: 10/19/2022  Patient name: Brad Kolb  : 1975  MRN: [id number]  Referring provider: No ref  provider found  Dx:   Encounter Diagnosis     ICD-10-CM    1  Trochanteric bursitis of both hips  M70 61     M70 62                   Subjective: Pt reports that the steroid really helped but she still has soreness along the ITB       Objective: See treatment diary below      Assessment: Switched out sidelying clamshells for standing clamshell with the foot against the wall which patient had improved activation and less pain with  Still significantly TTP along ITB but utilized instrument assisted DTM with the theragun which patient enjoyed and felt relief from  Plan: Continue per plan of care        Precautions: none     1:1 with PT 1220-1  Manuals 10/5 10/11 10/19          ITB B STM    5' + instrument assisted with TG                                                 Neuro Re-Ed             TA HEP  5"x20           TA + march HEP  2x10 2x10          TA + leg ext   2x10 2x10          Clamshell Pillow b/w knee HEP 3" 2x10 Standing 2x10          Sidelying hip abd              Band walks    RTB 4x10ft          Dead bug             Bird dog                                        Ther Ex             Bridge HEP  5" x20 10x reg, 5x2 march          SL bridge             Plank             Side plank             Bike active warmup  6'  5'          Standing hip abd  x10 ea           Standing hip ext   x10 ea           Self TG massage instruction   5'          Ther Activity                                       Gait Training                                       Modalities

## 2022-10-20 RX ORDER — GABAPENTIN 300 MG/1
CAPSULE ORAL
Qty: 120 CAPSULE | Refills: 1 | Status: SHIPPED | OUTPATIENT
Start: 2022-10-20

## 2022-10-25 ENCOUNTER — OFFICE VISIT (OUTPATIENT)
Dept: PHYSICAL THERAPY | Facility: CLINIC | Age: 47
End: 2022-10-25
Payer: COMMERCIAL

## 2022-10-25 DIAGNOSIS — M70.61 TROCHANTERIC BURSITIS OF BOTH HIPS: Primary | ICD-10-CM

## 2022-10-25 DIAGNOSIS — M70.62 TROCHANTERIC BURSITIS OF BOTH HIPS: Primary | ICD-10-CM

## 2022-10-25 PROCEDURE — 97140 MANUAL THERAPY 1/> REGIONS: CPT

## 2022-10-25 PROCEDURE — 97112 NEUROMUSCULAR REEDUCATION: CPT

## 2022-10-25 PROCEDURE — 97110 THERAPEUTIC EXERCISES: CPT

## 2022-10-25 NOTE — PROGRESS NOTES
Daily Note     Today's date: 10/25/2022  Patient name: Sienna Au  : 1975  MRN: [id number]  Referring provider: No ref  provider found  Dx:   Encounter Diagnosis     ICD-10-CM    1  Trochanteric bursitis of both hips  M70 61     M70 62                   Subjective: Pt states that she feels about the same       Objective: See treatment diary below      Assessment: Patient had difficulty isolating glutes during standing clamshells today but tolerated all other exercises fairly  Focused on TA activation with hip extension which patient felt okay with a well as prone IR/ER  Continues to feel relief with the theragun  Plan: Continue per plan of care        Precautions: none     1:1 with PT 1011-11  Manuals 10/5 10/11 10/19 10/25         ITB B STM    5' + instrument assisted with TG 8' + instrument assisted with TG                                                Neuro Re-Ed             TA HEP  5"x20           TA + march HEP  2x10 2x10 2x10         TA + leg ext   2x10 2x10 2x10          Clamshell Pillow b/w knee HEP 3" 2x10 Standing 2x10 Standing 10x         Sidelying hip abd              Band walks    RTB 4x10ft          Dead bug             Bird dog                                        Ther Ex             Bridge HEP  5" x20 10x reg, 5x2 march 3x10 reg         SL bridge             Plank             Side plank             Bike active warmup  6'  5' 7'          Standing hip abd  x10 ea           Standing hip ext   x10 ea           Self TG massage instruction   5'          Prone hip extension    3x10         Prone IR/ER     Bolster, YTB 3x10 ea                                    Ther Activity                                       Gait Training                                       Modalities

## 2022-10-28 ENCOUNTER — APPOINTMENT (OUTPATIENT)
Dept: PHYSICAL THERAPY | Facility: CLINIC | Age: 47
End: 2022-10-28

## 2022-11-08 DIAGNOSIS — G47.09 OTHER INSOMNIA: ICD-10-CM

## 2022-11-09 RX ORDER — ZOLPIDEM TARTRATE 5 MG/1
TABLET ORAL
Qty: 30 TABLET | Refills: 0 | Status: SHIPPED | OUTPATIENT
Start: 2022-11-09

## 2022-11-09 NOTE — TELEPHONE ENCOUNTER
Refill request sent to provider  [facility];   Patient Prescription Report        Patients      Select Patient Id Name D O B  R Bertrand 106   [] 165 [facility] 51- F [address] [facility] [facility] [place]           Search Criteria    Name Date of Birth Date Range   Stan Matter 15- 11- To 11-     Requester Name Requested Date   Leoma Gaucher Nov 09 2022 08:44:41 GMT-0500 [place] Standard Time)     Summary   Prescriptions  19  Prescribers  6  Pharmacies  1  View Map    Drug Classes   Benzodiazepines  2  Stimulants  2  Opioids  3  Muscle Relaxants  0    Opioid Dosage   Total MME for Active Prescriptions  0    Average MME  23 75  MME Graph   MME Calculator       · Prescriptions  · Notifications    · Prescribers  · Pharmacies  · MME Graph      [] [place]   Drug Categories:      [] Opioids       [] Stimulants       [] Benzodiazepines       [] Others       Show  entries  Search:  Patient Id Prescription #  Filled Written Drug Label Qty Days Strength MME** Prescriber Pharmacy Payment REFILL #/Auth State Detail   1  [id number] 10/07/2022  10/07/2022 Zolpidem Tartrate (Tablet)  30 0 30 5 MG NA ANKIT) [facility]  [place] / [place]    1  [id number] 09/02/2022 09/02/2022 Zolpidem Tartrate (Tablet)  30 0 30 5 MG NA OLESYA GREEN  [facility]  [place] / 0 [place]    1  [id number] 08/02/2022 08/02/2022 Zolpidem Tartrate (Tablet)  30 0 30 5 MG NA ANKIT) [facility]  [place] / [place]    1  [id number] 07/19/2022 07/19/2022 CODEINE PHOSPHATE/guaiFENesin (Solution)  180 0 12 10 MG/5 ML-100 MG/5  22 50 ANKIT) [facility]    [place] 0 / 0 [place]    1  [id number] 07/06/2022 07/06/2022 CODEINE PHOSPHATE

## 2022-11-28 ENCOUNTER — OFFICE VISIT (OUTPATIENT)
Dept: FAMILY MEDICINE CLINIC | Facility: CLINIC | Age: 47
End: 2022-11-28

## 2022-11-28 VITALS
HEIGHT: 66 IN | BODY MASS INDEX: 28.93 KG/M2 | TEMPERATURE: 98.6 F | WEIGHT: 180 LBS | HEART RATE: 104 BPM | SYSTOLIC BLOOD PRESSURE: 108 MMHG | OXYGEN SATURATION: 99 % | DIASTOLIC BLOOD PRESSURE: 74 MMHG | RESPIRATION RATE: 16 BRPM

## 2022-11-28 DIAGNOSIS — G25.81 RESTLESS LEGS SYNDROME (RLS): ICD-10-CM

## 2022-11-28 DIAGNOSIS — Z13.6 SCREENING FOR CARDIOVASCULAR, RESPIRATORY, AND GENITOURINARY DISEASES: ICD-10-CM

## 2022-11-28 DIAGNOSIS — G43.709 CHRONIC MIGRAINE WITHOUT AURA WITHOUT STATUS MIGRAINOSUS, NOT INTRACTABLE: ICD-10-CM

## 2022-11-28 DIAGNOSIS — G47.09 OTHER INSOMNIA: Primary | ICD-10-CM

## 2022-11-28 DIAGNOSIS — Z13.89 SCREENING FOR CARDIOVASCULAR, RESPIRATORY, AND GENITOURINARY DISEASES: ICD-10-CM

## 2022-11-28 DIAGNOSIS — K21.9 GASTROESOPHAGEAL REFLUX DISEASE, UNSPECIFIED WHETHER ESOPHAGITIS PRESENT: ICD-10-CM

## 2022-11-28 DIAGNOSIS — Z13.83 SCREENING FOR CARDIOVASCULAR, RESPIRATORY, AND GENITOURINARY DISEASES: ICD-10-CM

## 2022-11-28 DIAGNOSIS — R20.2 PARESTHESIA: ICD-10-CM

## 2022-11-28 DIAGNOSIS — E53.8 VITAMIN B12 DEFICIENCY: ICD-10-CM

## 2022-11-28 DIAGNOSIS — E55.9 VITAMIN D INSUFFICIENCY: ICD-10-CM

## 2022-11-28 DIAGNOSIS — F41.9 ANXIETY: ICD-10-CM

## 2022-11-28 RX ORDER — BUTALBITAL, ACETAMINOPHEN AND CAFFEINE 50; 325; 40 MG/1; MG/1; MG/1
1 TABLET ORAL EVERY 4 HOURS PRN
Qty: 30 TABLET | Refills: 0 | Status: SHIPPED | OUTPATIENT
Start: 2022-11-28

## 2022-11-28 RX ORDER — ALPRAZOLAM 0.25 MG/1
0.25 TABLET ORAL
Qty: 20 TABLET | Refills: 0 | Status: SHIPPED | OUTPATIENT
Start: 2022-11-28

## 2022-11-28 RX ORDER — BUTALBITAL, ACETAMINOPHEN AND CAFFEINE 50; 325; 40 MG/1; MG/1; MG/1
1 TABLET ORAL EVERY 4 HOURS PRN
Qty: 30 TABLET | Refills: 0 | Status: CANCELLED | OUTPATIENT
Start: 2022-11-28

## 2022-11-28 RX ORDER — TRAZODONE HYDROCHLORIDE 50 MG/1
50 TABLET ORAL
Qty: 30 TABLET | Refills: 2 | Status: SHIPPED | OUTPATIENT
Start: 2022-11-28

## 2022-11-28 NOTE — ASSESSMENT & PLAN NOTE
Patient decreasing Gabapentin to 300mg  Patient should increase dose to 600mg if they are still having paresthesias  Referral to Neurology

## 2022-11-28 NOTE — ASSESSMENT & PLAN NOTE
Xanax refilled for patient  Take as needed, do not take with alcohol or driving    Follow up in one month

## 2022-11-28 NOTE — ASSESSMENT & PLAN NOTE
Discontinuing Ambien and prescribing Trazodone 50mg  Take two hours before bed  Patient aware of side effects from medication    Follow up in one month

## 2022-11-28 NOTE — ASSESSMENT & PLAN NOTE
Will evaluate bloodwork for vitamin B12 /D and CBC/CMP for cause of paresthesias  Patient advised to continue taking 600mg of gabapentin to see if paresthesias continue    Follow up in one month

## 2022-11-28 NOTE — PROGRESS NOTES
Name: David Hobson      : 1975      MRN: [id number]  Encounter Provider: Fernando Motley MD  Encounter Date: 2022   Encounter department: [address] [place] [address]  Other insomnia  Assessment & Plan:  Discontinuing Ambien and prescribing Trazodone 50mg  Take two hours before bed  Patient aware of side effects from medication  Follow up in one month    Orders:  -     traZODone (DESYREL) 50 mg tablet; Take 1 tablet (50 mg total) by mouth daily at bedtime    2  Chronic migraine without aura without status migrainosus, not intractable  Assessment & Plan:  Well controlled  Patient should continue Fioricet     Orders:  -     butalbital-acetaminophen-caffeine (Esgic) -40 mg per tablet; Take 1 tablet by mouth every 4 (four) hours as needed for headaches    3  Gastroesophageal reflux disease, unspecified whether esophagitis present  Assessment & Plan:  Continue Protonix 40mg  Patient instructed to take vitamin D and Zinc for metallic taste in mouth, follow up with GI  Referral to GI for potential EGD in the future of watching of esophageal dysphagia    Orders:  -     Ambulatory Referral to Gastroenterology; Future    4  Restless legs syndrome (RLS)  Assessment & Plan:  Patient decreasing Gabapentin to 300mg  Patient should increase dose to 600mg if they are still having paresthesias  Referral to Neurology       5  Paresthesia  Assessment & Plan: Will evaluate bloodwork for vitamin B12 /D and CBC/CMP for cause of paresthesias  Patient advised to continue taking 600mg of gabapentin to see if paresthesias continue  Follow up in one month    Orders:  -     CBC and differential; Future  -     Comprehensive metabolic panel; Future  -     TSH, 3rd generation with Free T4 reflex; Future  -     Lyme Total Antibody Profile with reflex to WB; Future  -     Ambulatory Referral to Neurology; Future    6  Vitamin D insufficiency  Assessment & Plan:   Will evaluate levels with blood work  Follow up in one month    Orders:  -     Vitamin D 25 hydroxy; Future    7  Vitamin B12 deficiency  Assessment & Plan:  Assess with blood work  Follow up in one month    Orders:  -     Vitamin B12; Future    8  Screening for cardiovascular, respiratory, and genitourinary diseases  Assessment & Plan: Will assess with bloodwork  Follow up in one month    Orders:  -     Lipid Panel with Direct LDL reflex; Future    9  Anxiety  Assessment & Plan:  Xanax refilled for patient  Take as needed, do not take with alcohol or driving  Follow up in one month    Orders:  -     ALPRAZolam (XANAX) 0 25 mg tablet; Take 1 tablet (0 25 mg total) by mouth daily at bedtime as needed for anxiety           Luiz Tran is a 52 yr old female with PMH of GERD, chronic migraine without aura, anxiety/depression, insomnia, restless leg syndrome is presenting to the office for complaints of wanting to switch medications from Ambien to Trazodone  She states that her psychologist believes the Ambien is addictive and she would like to switch  She states that she is interested in trazodone for controlling her insomnia  She states that she is also worried about a metallic taste in her mouth for the past couple of days which she states she tried protonix and pepcid together with no relief and also decreasing the amount of gabapentin from 600mg to 300mg  Then she noticed numbness and tingling throughout her body in all different areas  She states that her restless leg syndrome did not come back and she has been doing well decreasing  She states that her father has MS and she is nervous that she may have this as well although it is not genetic  She states that she is compliant with all of her medications and has no other questions or concerns at this time  She does note to have increased stressors throughout her daily life which may contribute to increased itching she says as well   This is well controlled by her dermatologist at this point with Atarax      Review of Systems   Constitutional: Negative for chills and fever  HENT: Negative for ear pain and sore throat  Eyes: Negative for pain and visual disturbance  Respiratory: Negative for cough and shortness of breath  Cardiovascular: Negative for chest pain and palpitations  Gastrointestinal: Negative for abdominal pain and vomiting  Endocrine: Negative for cold intolerance and heat intolerance  Genitourinary: Negative for dysuria and hematuria  Musculoskeletal: Positive for myalgias  Negative for arthralgias and back pain  Skin: Negative for color change and rash  Neurological: Positive for numbness  Negative for seizures and syncope  Hematological: Negative for adenopathy  Does not bruise/bleed easily  Psychiatric/Behavioral: Negative for confusion and self-injury  The patient is nervous/anxious  All other systems reviewed and are negative        Past Medical History:   Diagnosis Date   • Abnormal Pap smear of cervix    • RAMIREZ positive    • Anxiety    • Basal cell carcinoma    • Depression    • Migraine    • Migraine    • MRSA colonization 2016   • Neck pain    • Pregnancy    • Vertebral artery dissection (HCC)    • Vulvovaginitis    • Yeast infection      Past Surgical History:   Procedure Laterality Date   •  SECTION, LOW TRANSVERSE     • COLONOSCOPY     • DILATION AND CURETTAGE OF UTERUS     • GYNECOLOGIC CRYOSURGERY      cervix   • LASIK     • SKIN LESION EXCISION      basal cell carcinoma of left cheek     Family History   Problem Relation Age of Onset   • Hypertension Mother    • Lung cancer Mother 64   • Brain cancer Mother 64   • Breast cancer Maternal Aunt         dx in 42's    • Multiple sclerosis Father    • No Known Problems Daughter    • No Known Problems Maternal Grandmother    • No Known Problems Maternal Grandfather    • No Known Problems Paternal Grandmother    • No Known Problems Paternal Grandfather    • No Known Problems Maternal Aunt      Social History     Socioeconomic History   • Marital status:      Spouse name: None   • Number of children: None   • Years of education: None   • Highest education level: None   Occupational History   • None   Tobacco Use   • Smoking status: Never   • Smokeless tobacco: Never   Vaping Use   • Vaping Use: Never used   Substance and Sexual Activity   • Alcohol use: No     Alcohol/week: 0 0 standard drinks   • Drug use: No   • Sexual activity: Yes     Partners: Male     Birth control/protection: Male Sterilization     Comment: Wants std testing    Other Topics Concern   • None   Social History Narrative    Feels safe at home      Social Determinants of Health     Financial Resource Strain: Not on file   Food Insecurity: Not on file   Transportation Needs: Not on file   Physical Activity: Not on file   Stress: Not on file   Social Connections: Not on file   Intimate Partner Violence: Not on file   Housing Stability: Not on file     Current Outpatient Medications on File Prior to Visit   Medication Sig   • escitalopram (LEXAPRO) 20 mg tablet TAKE 1 TABLET BY MOUTH EVERY DAY   • gabapentin (NEURONTIN) 300 mg capsule TAKE 2 CAPSULES BY MOUTH 2 TIMES A DAY  • hydrOXYzine HCL (ATARAX) 25 mg tablet Take 25 mg by mouth in the morning     • latanoprost (XALATAN) 0 005 % ophthalmic solution INSTILL 1 DROP IN AFFECTED EYE AT BEDTIME   • pantoprazole (PROTONIX) 40 mg tablet TAKE 1 TABLET (40 MG TOTAL) BY MOUTH IN THE MORNING  • Probiotic Product (PRO-BIOTIC BLEND PO) Take by mouth   • zolpidem (AMBIEN) 5 mg tablet TAKE 1 TABLET BY MOUTH DAILY AT BEDTIME AS NEEDED FOR SLEEP     • [DISCONTINUED] butalbital-acetaminophen-caffeine (Esgic) -40 mg per tablet Take 1 tablet by mouth every 4 (four) hours as needed for headaches   • benzonatate (TESSALON) 200 MG capsule Take 1 capsule (200 mg total) by mouth 3 (three) times a day as needed for cough   • cyclobenzaprine (FLEXERIL) 10 mg tablet Take 1 tablet (10 mg total) by mouth 3 (three) times a day as needed for muscle spasms (Patient not taking: Reported on 6/3/2022)   • diazepam (VALIUM) 5 mg tablet Take 1 tablet (5 mg total) by mouth every 8 (eight) hours as needed for muscle spasms for up to 10 days (Patient not taking: Reported on 6/3/2022)   • guaifenesin-codeine (GUAIFENESIN AC) 100-10 MG/5ML liquid Take 5 mL by mouth 3 (three) times a day as needed for cough   • methylPREDNISolone 4 MG tablet therapy pack Use as directed on package (Patient not taking: Reported on 11/28/2022)   • mometasone (ELOCON) 0 1 % cream  (Patient not taking: Reported on 4/25/2022)   • naproxen (NAPROSYN) 500 mg tablet Take 1 tablet (500 mg total) by mouth 2 (two) times a day as needed for mild pain (Patient not taking: Reported on 11/28/2022)   • phentermine (ADIPEX-P) 37 5 MG tablet Take 1 tablet (37 5 mg total) by mouth in the morning (Patient not taking: Reported on 11/28/2022)     Allergies   Allergen Reactions   • Reglan [Metoclopramide] Confusion     Pt reports Reglan "makes me lose my mind and go crazy "     Immunization History   Administered Date(s) Administered   • COVID-19 MODERNA VACC 0 5 ML IM 03/25/2021, 04/22/2021, 12/05/2021       Objective     /74 (BP Location: Left arm, Patient Position: Sitting, Cuff Size: Large)   Pulse 104   Temp 98 6 °F (37 °C) (Tympanic)   Resp 16   Ht 5' 6" (1 676 m)   Wt 81 6 kg (180 lb)   SpO2 99%   BMI 29 05 kg/m²     Physical Exam  Vitals and nursing note reviewed  Constitutional:       General: She is not in acute distress  Appearance: Normal appearance  She is not ill-appearing or toxic-appearing  HENT:      Head: Normocephalic and atraumatic  Right Ear: Tympanic membrane, ear canal and external ear normal       Left Ear: Tympanic membrane, ear canal and external ear normal       Nose: Nose normal  No congestion        Mouth/Throat:      Mouth: Mucous membranes are moist       Pharynx: Oropharynx is clear  No oropharyngeal exudate or posterior oropharyngeal erythema  Eyes:      Conjunctiva/sclera: Conjunctivae normal       Pupils: Pupils are equal, round, and reactive to light  Cardiovascular:      Rate and Rhythm: Normal rate and regular rhythm  Pulses: Normal pulses  Heart sounds: Normal heart sounds  No murmur heard  No friction rub  No gallop  Pulmonary:      Effort: Pulmonary effort is normal  No respiratory distress  Breath sounds: Normal breath sounds  No stridor  No wheezing, rhonchi or rales  Chest:      Chest wall: No tenderness  Abdominal:      General: Abdomen is flat  Bowel sounds are normal  There is no distension  Palpations: Abdomen is soft  Tenderness: There is no abdominal tenderness  There is no guarding  Musculoskeletal:         General: No swelling, tenderness or deformity  Normal range of motion  Cervical back: Normal range of motion and neck supple  Skin:     General: Skin is warm and dry  Coloration: Skin is not jaundiced or pale  Neurological:      General: No focal deficit present  Mental Status: She is alert and oriented to person, place, and time  Mental status is at baseline  Cranial Nerves: No cranial nerve deficit  Sensory: No sensory deficit  Motor: No weakness  Psychiatric:         Mood and Affect: Mood normal          Behavior: Behavior normal          Thought Content:  Thought content normal          Judgment: Judgment normal        Aranza Chan MD

## 2022-11-28 NOTE — ASSESSMENT & PLAN NOTE
Continue Protonix 40mg    Patient instructed to take vitamin D and Zinc for metallic taste in mouth, follow up with GI  Referral to GI for potential EGD in the future of watching of esophageal dysphagia

## 2022-11-29 ENCOUNTER — CONSULT (OUTPATIENT)
Dept: GASTROENTEROLOGY | Facility: CLINIC | Age: 47
End: 2022-11-29

## 2022-11-29 ENCOUNTER — TELEPHONE (OUTPATIENT)
Dept: GASTROENTEROLOGY | Facility: CLINIC | Age: 47
End: 2022-11-29

## 2022-11-29 VITALS
SYSTOLIC BLOOD PRESSURE: 100 MMHG | HEART RATE: 91 BPM | DIASTOLIC BLOOD PRESSURE: 73 MMHG | BODY MASS INDEX: 29.16 KG/M2 | WEIGHT: 175 LBS | HEIGHT: 65 IN

## 2022-11-29 DIAGNOSIS — K21.9 GASTROESOPHAGEAL REFLUX DISEASE, UNSPECIFIED WHETHER ESOPHAGITIS PRESENT: ICD-10-CM

## 2022-11-29 DIAGNOSIS — K59.02 CONSTIPATION DUE TO OUTLET DYSFUNCTION: ICD-10-CM

## 2022-11-29 DIAGNOSIS — Z12.11 COLON CANCER SCREENING: Primary | ICD-10-CM

## 2022-11-29 DIAGNOSIS — R43.2 TASTE SENSE ALTERED: ICD-10-CM

## 2022-11-29 NOTE — TELEPHONE ENCOUNTER
Patient is here for office visit  Dr Eleonora Peng is requesting previous egd and colon biopsies and reports  Please obtain   Thank you

## 2022-11-29 NOTE — TELEPHONE ENCOUNTER
Scheduled date of EGD/colonoscopy (as of today):1/18/23  Physician performing EGD/colonoscopy:Singh  Location of EGD/colonoscopy:  Desired bowel prep reviewed with patient:Dejan  Instructions reviewed with patient by:Dominique SANTIAGO  Clearances:  None

## 2022-11-29 NOTE — PROGRESS NOTES
[facility] - Outpatient Consultation  Esequiel Castellanos 52 y o  female MRN: [id number]  Encounter: [id number]          ASSESSMENT AND PLAN:      1  Gastroesophageal reflux disease, unspecified whether esophagitis present  For 3 years  She has been on Protonix since that time  Miss have an upper endoscopy some 3-4 years ago  Antral biopsies failed to show H pylori I   - Ambulatory Referral to Gastroenterology  - EGD; Future    2  Taste sense altered  Is been going on for several months  She has a sense of colon being in the mouth  No new medications   - EGD; Future    3  Colon cancer screening  Colonoscopy about 3 or 4 years ago believe she had polyps  At the time of this dictation results not available  - Colonoscopy; Future    4  Constipation due to outlet dysfunction  She will continue to drink plenty of water use a probiotic she uses a substance called plexus  Does have difficulty forcing and she does use a squatty potty  No melena bright red blood per rectum  She will otherwise follow-up in 4 months     ______________________________________________________________________    HPI:  Very pleasant white female presents for for evaluation of GERD  Her biggest issue is it change of taste like clone is in her mouth  This been going on for several months  She does have a history of gastroesophageal reflux has been on Protonix for about 4 years  Her chart indicates B12 deficiency she is not on any supplementation  She does have  labs pending for TSH CMP CBC and B12  She is not on any new medications  She has seen the dentist   She denies any current dysphagia but prior to being on Prilosec did have dysphagia she had does have chronic pruritus some unspecified allergies  Admits to having colonoscopy 4 years ago saw Colorectal   This consultation was prompted by chronic constipation  We discussed that at some length  Thinks she had a polyp    Family history of “throat cancer in her mother otherwise no gastrointestinal malignancies no celiac disease, IBD, pancreatitis, hepatitis  REVIEW OF SYSTEMS:    CONSTITUTIONAL: Denies any fever, chills, rigors, and weight loss  HEENT: No earache or tinnitus  Denies hearing loss or visual disturbances  CARDIOVASCULAR: No chest pain or palpitations  RESPIRATORY: Denies any cough, hemoptysis, shortness of breath or dyspnea on exertion  GASTROINTESTINAL: As noted in the History of Present Illness  GENITOURINARY: No problems with urination  Denies any hematuria or dysuria  NEUROLOGIC: No dizziness or vertigo, denies headaches  MUSCULOSKELETAL: Denies any muscle or joint pain  SKIN: Denies skin rashes or itching  ENDOCRINE: Denies excessive thirst  Denies intolerance to heat or cold  PSYCHOSOCIAL: Denies depression or anxiety  Denies any recent memory loss         Historical Information   Past Medical History:   Diagnosis Date   • Abnormal Pap smear of cervix    • RAMIREZ positive    • Anxiety    • Basal cell carcinoma    • Depression    • Migraine    • Migraine    • MRSA colonization 2016   • Neck pain    • Pregnancy    • Vertebral artery dissection (HCC)    • Vulvovaginitis    • Yeast infection      Past Surgical History:   Procedure Laterality Date   •  SECTION, LOW TRANSVERSE     • COLONOSCOPY     • DILATION AND CURETTAGE OF UTERUS     • GYNECOLOGIC CRYOSURGERY      cervix   • LASIK     • SKIN LESION EXCISION      basal cell carcinoma of left cheek     Social History   Social History     Substance and Sexual Activity   Alcohol Use No   • Alcohol/week: 0 0 standard drinks     Social History     Substance and Sexual Activity   Drug Use No     Social History     Tobacco Use   Smoking Status Never   Smokeless Tobacco Never     Family History   Problem Relation Age of Onset   • Hypertension Mother    • Lung cancer Mother 64   • Brain cancer Mother 64   • Breast cancer Maternal Aunt         dx in 42's    • Multiple sclerosis Father    • No Known Problems Daughter    • No Known Problems Maternal Grandmother    • No Known Problems Maternal Grandfather    • No Known Problems Paternal Grandmother    • No Known Problems Paternal Grandfather    • No Known Problems Maternal Aunt        Meds/Allergies       Current Outpatient Medications:   •  ALPRAZolam (XANAX) 0 25 mg tablet  •  butalbital-acetaminophen-caffeine (Esgic) -40 mg per tablet  •  escitalopram (LEXAPRO) 20 mg tablet  •  gabapentin (NEURONTIN) 300 mg capsule  •  hydrOXYzine HCL (ATARAX) 25 mg tablet  •  latanoprost (XALATAN) 0 005 % ophthalmic solution  •  pantoprazole (PROTONIX) 40 mg tablet  •  Probiotic Product (PRO-BIOTIC BLEND PO)  •  traZODone (DESYREL) 50 mg tablet  •  benzonatate (TESSALON) 200 MG capsule  •  cyclobenzaprine (FLEXERIL) 10 mg tablet  •  diazepam (VALIUM) 5 mg tablet  •  guaifenesin-codeine (GUAIFENESIN AC) 100-10 MG/5ML liquid  •  methylPREDNISolone 4 MG tablet therapy pack  •  mometasone (ELOCON) 0 1 % cream  •  naproxen (NAPROSYN) 500 mg tablet  •  phentermine (ADIPEX-P) 37 5 MG tablet  •  zolpidem (AMBIEN) 5 mg tablet    Allergies   Allergen Reactions   • Reglan [Metoclopramide] Confusion     Pt reports Reglan "makes me lose my mind and go crazy "           Objective     Blood pressure 100/73, pulse 91, height 5' 5" (1 651 m), weight 79 4 kg (175 lb), not currently breastfeeding  Body mass index is 29 12 kg/m²  PHYSICAL EXAM:      General Appearance:   Alert, cooperative, no distress   HEENT:   Normocephalic, atraumatic, anicteric      Neck:  Supple, symmetrical, trachea midline   Lungs:   Clear to auscultation bilaterally; no rales, rhonchi or wheezing; respirations unlabored    Heart[de-identified]   Regular rate and rhythm; no murmur, rub, or gallop     Abdomen:   Soft, non-tender, non-distended; normal bowel sounds; no masses, no organomegaly    Genitalia:   Deferred    Rectal:   Deferred    Extremities:  No cyanosis, clubbing or edema  Pulses:  2+ and symmetric    Skin:  No jaundice, rashes, or lesions    Lymph nodes:  No palpable cervical lymphadenopathy        Lab Results:   No visits with results within 1 Day(s) from this visit  Latest known visit with results is:   Clinical Support on 01/04/2022   Component Date Value   • SARS-CoV-2 01/04/2022 Negative          Radiology Results:   No results found

## 2022-11-30 LAB
25(OH)D3 SERPL-MCNC: 44 NG/ML (ref 30–100)
ALBUMIN SERPL-MCNC: 4 G/DL (ref 3.6–5.1)
ALBUMIN/GLOB SERPL: 1.4 (CALC) (ref 1–2.5)
ALP SERPL-CCNC: 68 U/L (ref 31–125)
ALT SERPL-CCNC: 12 U/L (ref 6–29)
AST SERPL-CCNC: 15 U/L (ref 10–35)
B BURGDOR AB SER IA-ACNC: <0.9 INDEX
BASOPHILS # BLD AUTO: 45 CELLS/UL (ref 0–200)
BASOPHILS NFR BLD AUTO: 0.4 %
BILIRUB SERPL-MCNC: 0.6 MG/DL (ref 0.2–1.2)
BUN SERPL-MCNC: 16 MG/DL (ref 7–25)
BUN/CREAT SERPL: NORMAL (CALC) (ref 6–22)
CALCIUM SERPL-MCNC: 9.3 MG/DL (ref 8.6–10.2)
CHLORIDE SERPL-SCNC: 102 MMOL/L (ref 98–110)
CHOLEST SERPL-MCNC: 185 MG/DL
CHOLEST/HDLC SERPL: 2.5 (CALC)
CO2 SERPL-SCNC: 29 MMOL/L (ref 20–32)
CREAT SERPL-MCNC: 0.82 MG/DL (ref 0.5–0.99)
EOSINOPHIL # BLD AUTO: 215 CELLS/UL (ref 15–500)
EOSINOPHIL NFR BLD AUTO: 1.9 %
ERYTHROCYTE [DISTWIDTH] IN BLOOD BY AUTOMATED COUNT: 12.5 % (ref 11–15)
GFR/BSA.PRED SERPLBLD CYS-BASED-ARV: 89 ML/MIN/1.73M2
GLOBULIN SER CALC-MCNC: 2.9 G/DL (CALC) (ref 1.9–3.7)
GLUCOSE SERPL-MCNC: 94 MG/DL (ref 65–99)
HCT VFR BLD AUTO: 41.1 % (ref 35–45)
HDLC SERPL-MCNC: 74 MG/DL
HGB BLD-MCNC: 13.6 G/DL (ref 11.7–15.5)
LDLC SERPL CALC-MCNC: 92 MG/DL (CALC)
LYMPHOCYTES # BLD AUTO: 2192 CELLS/UL (ref 850–3900)
LYMPHOCYTES NFR BLD AUTO: 19.4 %
MCH RBC QN AUTO: 28.9 PG (ref 27–33)
MCHC RBC AUTO-ENTMCNC: 33.1 G/DL (ref 32–36)
MCV RBC AUTO: 87.4 FL (ref 80–100)
MONOCYTES # BLD AUTO: 644 CELLS/UL (ref 200–950)
MONOCYTES NFR BLD AUTO: 5.7 %
NEUTROPHILS # BLD AUTO: 8204 CELLS/UL (ref 1500–7800)
NEUTROPHILS NFR BLD AUTO: 72.6 %
NONHDLC SERPL-MCNC: 111 MG/DL (CALC)
PLATELET # BLD AUTO: 238 THOUSAND/UL (ref 140–400)
PMV BLD REES-ECKER: 9.3 FL (ref 7.5–12.5)
POTASSIUM SERPL-SCNC: 4.1 MMOL/L (ref 3.5–5.3)
PROT SERPL-MCNC: 6.9 G/DL (ref 6.1–8.1)
RBC # BLD AUTO: 4.7 MILLION/UL (ref 3.8–5.1)
SODIUM SERPL-SCNC: 139 MMOL/L (ref 135–146)
TRIGL SERPL-MCNC: 93 MG/DL
TSH SERPL-ACNC: 1.43 MIU/L
VIT B12 SERPL-MCNC: 385 PG/ML (ref 200–1100)
WBC # BLD AUTO: 11.3 THOUSAND/UL (ref 3.8–10.8)

## 2022-12-12 ENCOUNTER — TELEPHONE (OUTPATIENT)
Dept: FAMILY MEDICINE CLINIC | Facility: CLINIC | Age: 47
End: 2022-12-12

## 2022-12-12 NOTE — TELEPHONE ENCOUNTER
----- Message from Partha Hinds MD sent at 12/11/2022  4:31 PM EST -----    Blood work result came back normal

## 2022-12-18 ENCOUNTER — HOSPITAL ENCOUNTER (EMERGENCY)
Facility: HOSPITAL | Age: 47
Discharge: HOME | End: 2022-12-18
Attending: EMERGENCY MEDICINE | Admitting: EMERGENCY MEDICINE

## 2022-12-18 ENCOUNTER — APPOINTMENT (EMERGENCY)
Dept: CT IMAGING | Facility: HOSPITAL | Age: 47
End: 2022-12-18

## 2022-12-18 VITALS
SYSTOLIC BLOOD PRESSURE: 124 MMHG | DIASTOLIC BLOOD PRESSURE: 58 MMHG | TEMPERATURE: 97.7 F | RESPIRATION RATE: 20 BRPM | OXYGEN SATURATION: 100 % | HEART RATE: 81 BPM

## 2022-12-18 DIAGNOSIS — G43.909 MIGRAINE HEADACHE: Primary | ICD-10-CM

## 2022-12-18 LAB
ANION GAP SERPL CALCULATED.3IONS-SCNC: 4 MMOL/L (ref 4–13)
BASOPHILS # BLD AUTO: 0.02 THOUSANDS/ÂΜL (ref 0–0.1)
BASOPHILS NFR BLD AUTO: 0 % (ref 0–1)
BUN SERPL-MCNC: 18 MG/DL (ref 5–25)
CALCIUM SERPL-MCNC: 8.3 MG/DL (ref 8.4–10.2)
CHLORIDE SERPL-SCNC: 107 MMOL/L (ref 96–108)
CO2 SERPL-SCNC: 26 MMOL/L (ref 21–32)
CREAT SERPL-MCNC: 0.7 MG/DL (ref 0.6–1.3)
EOSINOPHIL # BLD AUTO: 0.13 THOUSAND/ÂΜL (ref 0–0.61)
EOSINOPHIL NFR BLD AUTO: 2 % (ref 0–6)
ERYTHROCYTE [DISTWIDTH] IN BLOOD BY AUTOMATED COUNT: 12.5 % (ref 11.6–15.1)
GFR SERPL CREATININE-BSD FRML MDRD: 103 ML/MIN/1.73SQ M
GLUCOSE SERPL-MCNC: 90 MG/DL (ref 65–140)
HCT VFR BLD AUTO: 35.6 % (ref 34.8–46.1)
HGB BLD-MCNC: 11.9 G/DL (ref 11.5–15.4)
IMM GRANULOCYTES # BLD AUTO: 0.02 THOUSAND/UL (ref 0–0.2)
IMM GRANULOCYTES NFR BLD AUTO: 0 % (ref 0–2)
LYMPHOCYTES # BLD AUTO: 1.95 THOUSANDS/ÂΜL (ref 0.6–4.47)
LYMPHOCYTES NFR BLD AUTO: 36 % (ref 14–44)
MCH RBC QN AUTO: 30.3 PG (ref 26.8–34.3)
MCHC RBC AUTO-ENTMCNC: 33.4 G/DL (ref 31.4–37.4)
MCV RBC AUTO: 91 FL (ref 82–98)
MONOCYTES # BLD AUTO: 0.41 THOUSAND/ÂΜL (ref 0.17–1.22)
MONOCYTES NFR BLD AUTO: 8 % (ref 4–12)
NEUTROPHILS # BLD AUTO: 2.92 THOUSANDS/ÂΜL (ref 1.85–7.62)
NEUTS SEG NFR BLD AUTO: 54 % (ref 43–75)
NRBC BLD AUTO-RTO: 0 /100 WBCS
PLATELET # BLD AUTO: 168 THOUSANDS/UL (ref 149–390)
PMV BLD AUTO: 8.3 FL (ref 8.9–12.7)
POTASSIUM SERPL-SCNC: 3.9 MMOL/L (ref 3.5–5.3)
RBC # BLD AUTO: 3.93 MILLION/UL (ref 3.81–5.12)
SODIUM SERPL-SCNC: 137 MMOL/L (ref 135–147)
WBC # BLD AUTO: 5.45 THOUSAND/UL (ref 4.31–10.16)

## 2022-12-18 RX ORDER — KETOROLAC TROMETHAMINE 30 MG/ML
30 INJECTION, SOLUTION INTRAMUSCULAR; INTRAVENOUS ONCE
Status: COMPLETED | OUTPATIENT
Start: 2022-12-18 | End: 2022-12-18

## 2022-12-18 RX ORDER — MAGNESIUM SULFATE HEPTAHYDRATE 40 MG/ML
2 INJECTION, SOLUTION INTRAVENOUS ONCE
Status: COMPLETED | OUTPATIENT
Start: 2022-12-18 | End: 2022-12-18

## 2022-12-18 RX ORDER — PROMETHAZINE HYDROCHLORIDE 25 MG/ML
12.5 INJECTION, SOLUTION INTRAMUSCULAR; INTRAVENOUS ONCE
Status: COMPLETED | OUTPATIENT
Start: 2022-12-18 | End: 2022-12-18

## 2022-12-18 RX ORDER — DEXAMETHASONE SODIUM PHOSPHATE 4 MG/ML
10 INJECTION, SOLUTION INTRA-ARTICULAR; INTRALESIONAL; INTRAMUSCULAR; INTRAVENOUS; SOFT TISSUE ONCE
Status: COMPLETED | OUTPATIENT
Start: 2022-12-18 | End: 2022-12-18

## 2022-12-18 RX ORDER — GABAPENTIN 300 MG/1
300 CAPSULE ORAL ONCE
Status: COMPLETED | OUTPATIENT
Start: 2022-12-18 | End: 2022-12-18

## 2022-12-18 RX ORDER — DIPHENHYDRAMINE HYDROCHLORIDE 50 MG/ML
25 INJECTION INTRAMUSCULAR; INTRAVENOUS ONCE
Status: COMPLETED | OUTPATIENT
Start: 2022-12-18 | End: 2022-12-18

## 2022-12-18 RX ADMIN — KETOROLAC TROMETHAMINE 30 MG: 30 INJECTION, SOLUTION INTRAMUSCULAR; INTRAVENOUS at 07:55

## 2022-12-18 RX ADMIN — DIPHENHYDRAMINE HYDROCHLORIDE 25 MG: 50 INJECTION, SOLUTION INTRAMUSCULAR; INTRAVENOUS at 07:46

## 2022-12-18 RX ADMIN — IOHEXOL 85 ML: 350 INJECTION, SOLUTION INTRAVENOUS at 09:07

## 2022-12-18 RX ADMIN — PROMETHAZINE HYDROCHLORIDE 12.5 MG: 25 INJECTION INTRAMUSCULAR; INTRAVENOUS at 07:49

## 2022-12-18 RX ADMIN — GABAPENTIN 300 MG: 300 CAPSULE ORAL at 09:25

## 2022-12-18 RX ADMIN — DEXAMETHASONE SODIUM PHOSPHATE 10 MG: 4 INJECTION INTRA-ARTICULAR; INTRALESIONAL; INTRAMUSCULAR; INTRAVENOUS; SOFT TISSUE at 07:58

## 2022-12-18 RX ADMIN — MAGNESIUM SULFATE HEPTAHYDRATE 2 G: 40 INJECTION, SOLUTION INTRAVENOUS at 08:05

## 2022-12-18 NOTE — ED PROVIDER NOTES
History  Chief Complaint   Patient presents with   • Migraine     Pt reports seven days of migraine, took fiorcet @ 2100 last evening without relief, and 5mg valium @0000  Hx of migraines  51 yo F h/o vertebral artery dissection and migraine headaches who presents migraine headache x7 days  Took Fiorcet last PM without relief  Associated nausea, photophobia, R sided neck pain  Pt sts this feels similar to previous migraine headaches in past  Pt sts she cannot remember what the pain felt like for her vertebral artery stenosis  No fever, chills, CP, SOB, abdominal pain, vomiting, focal weakness, paresthesias, bowel/bladder changes, and any other sxs  History provided by:  Patient  Headache - Recurrent or Known Dx Migraines  Pain location:  L parietal  Quality:  Stabbing and dull  Radiates to:  L neck  Severity currently:  10/10  Severity at highest:  10/10  Onset quality:  Gradual  Duration:  7 days  Timing:  Constant  Progression:  Unchanged  Chronicity:  Chronic  Similar to prior headaches: yes    Context: bright light    Relieved by:  Nothing  Worsened by:  Nothing  Ineffective treatments:  Prescription medications and resting in a darkened room  Associated symptoms: nausea, neck pain and photophobia    Associated symptoms: no abdominal pain, no back pain, no blurred vision, no cough, no diarrhea, no dizziness, no ear pain, no eye pain, no fever, no focal weakness, no neck stiffness, no paresthesias, no seizures, no sore throat, no visual change, no vomiting and no weakness        Prior to Admission Medications   Prescriptions Last Dose Informant Patient Reported? Taking?    ALPRAZolam (XANAX) 0 25 mg tablet   No No   Sig: Take 1 tablet (0 25 mg total) by mouth daily at bedtime as needed for anxiety   Probiotic Product (PRO-BIOTIC BLEND PO)   Yes No   Sig: Take by mouth   benzonatate (TESSALON) 200 MG capsule   No No   Sig: Take 1 capsule (200 mg total) by mouth 3 (three) times a day as needed for cough Patient not taking: Reported on 11/29/2022   butalbital-acetaminophen-caffeine (Esgic) -40 mg per tablet   No No   Sig: Take 1 tablet by mouth every 4 (four) hours as needed for headaches   cyclobenzaprine (FLEXERIL) 10 mg tablet   No No   Sig: Take 1 tablet (10 mg total) by mouth 3 (three) times a day as needed for muscle spasms   Patient not taking: Reported on 6/3/2022   diazepam (VALIUM) 5 mg tablet   No No   Sig: Take 1 tablet (5 mg total) by mouth every 8 (eight) hours as needed for muscle spasms for up to 10 days   Patient not taking: Reported on 6/3/2022   escitalopram (LEXAPRO) 20 mg tablet   No No   Sig: TAKE 1 TABLET BY MOUTH EVERY DAY   gabapentin (NEURONTIN) 300 mg capsule   No No   Sig: TAKE 2 CAPSULES BY MOUTH 2 TIMES A DAY     guaifenesin-codeine (GUAIFENESIN AC) 100-10 MG/5ML liquid   No No   Sig: Take 5 mL by mouth 3 (three) times a day as needed for cough   Patient not taking: Reported on 11/29/2022   hydrOXYzine HCL (ATARAX) 25 mg tablet   Yes No   Sig: Take 25 mg by mouth in the morning     latanoprost (XALATAN) 0 005 % ophthalmic solution   Yes No   Sig: INSTILL 1 DROP IN AFFECTED EYE AT BEDTIME   methylPREDNISolone 4 MG tablet therapy pack   No No   Sig: Use as directed on package   Patient not taking: Reported on 11/28/2022   mometasone (ELOCON) 0 1 % cream   Yes No   Patient not taking: Reported on 4/25/2022   naproxen (NAPROSYN) 500 mg tablet   No No   Sig: Take 1 tablet (500 mg total) by mouth 2 (two) times a day as needed for mild pain   Patient not taking: Reported on 11/28/2022   pantoprazole (PROTONIX) 40 mg tablet   No No   Sig: TAKE 1 TABLET (40 MG TOTAL) BY MOUTH IN THE MORNING    phentermine (ADIPEX-P) 37 5 MG tablet   No No   Sig: Take 1 tablet (37 5 mg total) by mouth in the morning   Patient not taking: Reported on 11/28/2022   traZODone (DESYREL) 50 mg tablet   No No   Sig: Take 1 tablet (50 mg total) by mouth daily at bedtime   zolpidem (AMBIEN) 5 mg tablet   No No Sig: TAKE 1 TABLET BY MOUTH DAILY AT BEDTIME AS NEEDED FOR SLEEP  Patient not taking: Reported on 2022      Facility-Administered Medications: None       Past Medical History:   Diagnosis Date   • Abnormal Pap smear of cervix    • RAMIREZ positive    • Anxiety    • Basal cell carcinoma    • Depression    • Migraine    • Migraine    • MRSA colonization 2016   • Neck pain    • Pregnancy    • Vertebral artery dissection (HCC)    • Vulvovaginitis    • Yeast infection        Past Surgical History:   Procedure Laterality Date   •  SECTION, LOW TRANSVERSE     • COLONOSCOPY     • DILATION AND CURETTAGE OF UTERUS     • GYNECOLOGIC CRYOSURGERY      cervix   • LASIK     • SKIN LESION EXCISION      basal cell carcinoma of left cheek       Family History   Problem Relation Age of Onset   • Hypertension Mother    • Lung cancer Mother 64   • Brain cancer Mother 64   • Breast cancer Maternal Aunt         dx in 42's    • Multiple sclerosis Father    • No Known Problems Daughter    • No Known Problems Maternal Grandmother    • No Known Problems Maternal Grandfather    • No Known Problems Paternal Grandmother    • No Known Problems Paternal Grandfather    • No Known Problems Maternal Aunt      I have reviewed and agree with the history as documented  E-Cigarette/Vaping   • E-Cigarette Use Never User      E-Cigarette/Vaping Substances     Social History     Tobacco Use   • Smoking status: Never   • Smokeless tobacco: Never   Vaping Use   • Vaping Use: Never used   Substance Use Topics   • Alcohol use: No     Alcohol/week: 0 0 standard drinks   • Drug use: No        Review of Systems   Constitutional: Negative for chills and fever  HENT: Negative for ear pain and sore throat  Eyes: Positive for photophobia  Negative for blurred vision, pain and visual disturbance  Respiratory: Negative for cough and shortness of breath  Cardiovascular: Negative for chest pain and palpitations     Gastrointestinal: Positive for nausea  Negative for abdominal pain, constipation, diarrhea and vomiting  Genitourinary: Negative for dysuria and hematuria  Musculoskeletal: Positive for neck pain  Negative for arthralgias, back pain and neck stiffness  Skin: Negative for color change and rash  Neurological: Positive for headaches  Negative for dizziness, focal weakness, seizures, syncope, speech difficulty, weakness, light-headedness and paresthesias  All other systems reviewed and are negative  Physical Exam  ED Triage Vitals   Temperature Pulse Respirations Blood Pressure SpO2   12/18/22 0706 12/18/22 0706 12/18/22 0706 12/18/22 0706 12/18/22 0706   97 7 °F (36 5 °C) 81 20 124/58 100 %      Temp Source Heart Rate Source Patient Position - Orthostatic VS BP Location FiO2 (%)   12/18/22 0706 12/18/22 0706 12/18/22 0706 12/18/22 0706 --   Oral Monitor Lying Right arm       Pain Score       12/18/22 0715       10 - Worst Possible Pain             Orthostatic Vital Signs  Vitals:    12/18/22 0706   BP: 124/58   Pulse: 81   Patient Position - Orthostatic VS: Lying       Physical Exam  Vitals and nursing note reviewed  Constitutional:       General: She is not in acute distress  Appearance: She is well-developed  HENT:      Head: Normocephalic and atraumatic  Mouth/Throat:      Mouth: Mucous membranes are moist       Pharynx: Oropharynx is clear  No pharyngeal swelling, oropharyngeal exudate, posterior oropharyngeal erythema or uvula swelling  Eyes:      General: Lids are normal       Extraocular Movements: Extraocular movements intact  Conjunctiva/sclera: Conjunctivae normal       Pupils: Pupils are equal, round, and reactive to light  Cardiovascular:      Rate and Rhythm: Normal rate and regular rhythm  Pulses: Normal pulses  Heart sounds: No murmur heard  Pulmonary:      Effort: Pulmonary effort is normal  No respiratory distress  Breath sounds: Normal breath sounds     Abdominal: Palpations: Abdomen is soft  Tenderness: There is no abdominal tenderness  Musculoskeletal:         General: No swelling  Cervical back: Normal range of motion and neck supple  Skin:     General: Skin is warm and dry  Capillary Refill: Capillary refill takes less than 2 seconds  Neurological:      Mental Status: She is alert and oriented to person, place, and time  Cranial Nerves: Cranial nerves 2-12 are intact  Sensory: Sensation is intact  Motor: Motor function is intact     Psychiatric:         Mood and Affect: Mood normal          ED Medications  Medications   diphenhydrAMINE (BENADRYL) injection 25 mg (25 mg Intravenous Given 12/18/22 0746)   dexamethasone (DECADRON) injection 10 mg (10 mg Intravenous Given 12/18/22 0758)   magnesium sulfate 2 g/50 mL IVPB (premix) 2 g (0 g Intravenous Stopped 12/18/22 0905)   ketorolac (TORADOL) injection 30 mg (30 mg Intravenous Given 12/18/22 0755)   promethazine (PHENERGAN) injection 12 5 mg (12 5 mg Intramuscular Given 12/18/22 0749)   iohexol (OMNIPAQUE) 350 MG/ML injection (SINGLE-DOSE) 85 mL (85 mL Intravenous Given 12/18/22 0907)   gabapentin (NEURONTIN) capsule 300 mg (300 mg Oral Given 12/18/22 0925)       Diagnostic Studies  Results Reviewed     Procedure Component Value Units Date/Time    Basic metabolic panel [[id number]]  (Abnormal) Collected: 12/18/22 0810    Lab Status: Final result Specimen: Blood from Arm, Left Updated: 12/18/22 0831     Sodium 137 mmol/L      Potassium 3 9 mmol/L      Chloride 107 mmol/L      CO2 26 mmol/L      ANION GAP 4 mmol/L      BUN 18 mg/dL      Creatinine 0 70 mg/dL      Glucose 90 mg/dL      Calcium 8 3 mg/dL      eGFR 103 ml/min/1 73sq m     Narrative:      [place] guidelines for Chronic Kidney Disease (CKD):   •  Stage 1 with normal or high GFR (GFR > 90 mL/min/1 73 square meters)  •  Stage 2 Mild CKD (GFR = 60-89 mL/min/1 73 square meters)  •  Stage 3A Moderate CKD (GFR = 45-59 mL/min/1 73 square meters)  •  Stage 3B Moderate CKD (GFR = 30-44 mL/min/1 73 square meters)  •  Stage 4 Severe CKD (GFR = 15-29 mL/min/1 73 square meters)  •  Stage 5 End Stage CKD (GFR <15 mL/min/1 73 square meters)  Note: GFR calculation is accurate only with a steady state creatinine    CBC and differential [[id number]]  (Abnormal) Collected: 12/18/22 0810    Lab Status: Final result Specimen: Blood from Arm, Left Updated: 12/18/22 0816     WBC 5 45 Thousand/uL      RBC 3 93 Million/uL      Hemoglobin 11 9 g/dL      Hematocrit 35 6 %      MCV 91 fL      MCH 30 3 pg      MCHC 33 4 g/dL      RDW 12 5 %      MPV 8 3 fL      Platelets 863 Thousands/uL      nRBC 0 /100 WBCs      Neutrophils Relative 54 %      Immat GRANS % 0 %      Lymphocytes Relative 36 %      Monocytes Relative 8 %      Eosinophils Relative 2 %      Basophils Relative 0 %      Neutrophils Absolute 2 92 Thousands/µL      Immature Grans Absolute 0 02 Thousand/uL      Lymphocytes Absolute 1 95 Thousands/µL      Monocytes Absolute 0 41 Thousand/µL      Eosinophils Absolute 0 13 Thousand/µL      Basophils Absolute 0 02 Thousands/µL                  CTA head and neck with and without contrast   Final Result by Delmer Barba DO (12/18 0920)      No acute intracranial abnormality  Normal CTA of the neck and brain  Workstation performed: [id number]               Procedures  Procedures      ED Course  ED Course as of 12/18/22 1446   Sun Dec 18, 2022   1060 Blood Pressure: 124/58   0714 Temperature: 97 7 °F (36 5 °C)   0714 Pulse: 81   0714 Respirations: 20   0714 SpO2: 100 %   0715 51 yo F h/o vertebral artery dissection and migraine headaches who presents migraine headache x7 days  Took rx migraine medications without relief  Associated nausea, photophobia, R sided neck pain   Pt sts this feels similar to previous migraine headaches in past  Pt sts she cannot remember what the pain felt like for her vertebral artery stenosis  No fever, chills, CP, SOB, abdominal pain, vomiting, focal weakness, paresthesias, bowel/bladder changes, and any other sxs  PE: CNII-XII intact, 5/5 strength in all 4 extremities, sensation intact thorough, heart RRR, lung CTAB    Concerns for: vertebral artery stenosis vs migraine headache vs ICH   0816 CBC and differential(!)  unremarkable   6939 Basic metabolic panel(!)  unremarkable   0919 H/o restless leg, on 300 mg gabapentin BID  Reporting restless legs, will give home dose   0921 Notes improvement in headache from 10/10 to 3/10   0922 CTA head and neck with and without contrast  No acute intracranial abnormality      Normal CTA of the neck and brain  2648 Results discussed with patient  Pt expressed understanding  Discussed plan: d/c home with instructions to follow-up with PCP and instructed to RTED with any new or worsening symptoms  Pt expresses understanding and is agreeable with plan  Pt was given the opportunity to ask questions  All questions and concerns where addressed in  ER                                        MDM  Number of Diagnoses or Management Options  Migraine headache: established and worsening     Amount and/or Complexity of Data Reviewed  Clinical lab tests: ordered and reviewed  Review and summarize past medical records: yes  Independent visualization of images, tracings, or specimens: yes    Patient Progress  Patient progress: stable      Disposition  Final diagnoses:   Migraine headache     Time reflects when diagnosis was documented in both MDM as applicable and the Disposition within this note     Time User Action Codes Description Comment    12/18/2022  9:31 AM Christy Ward Add [G43 909] Migraine headache       ED Disposition     ED Disposition   Discharge    Condition   Stable    Date/Time   Sun Dec 18, 2022  9:31 AM    Chris Govea discharge to home/self care                 Follow-up Information     Follow up With Specialties Details Why Contact Info Additional Information    Benja Wisdom MD Family Medicine Call in 1 day You are seen in the emergency department for migraine headache   [address] [facility] [id number] [phone]       [place] [address] Emergency Department Emergency Medicine Go to  As needed, If symptoms worsen [address] [facility]  [facility] Emergency Department, [address], [place], [place], [address]          Discharge Medication List as of 12/18/2022  9:39 AM      CONTINUE these medications which have NOT CHANGED    Details   ALPRAZolam (XANAX) 0 25 mg tablet Take 1 tablet (0 25 mg total) by mouth daily at bedtime as needed for anxiety, Starting Mon 11/28/2022, Normal      benzonatate (TESSALON) 200 MG capsule Take 1 capsule (200 mg total) by mouth 3 (three) times a day as needed for cough, Starting Thu 8/4/2022, Normal      butalbital-acetaminophen-caffeine (Esgic) -40 mg per tablet Take 1 tablet by mouth every 4 (four) hours as needed for headaches, Starting Mon 11/28/2022, Normal      cyclobenzaprine (FLEXERIL) 10 mg tablet Take 1 tablet (10 mg total) by mouth 3 (three) times a day as needed for muscle spasms, Starting Sun 3/27/2022, Normal      diazepam (VALIUM) 5 mg tablet Take 1 tablet (5 mg total) by mouth every 8 (eight) hours as needed for muscle spasms for up to 10 days, Starting Sun 3/27/2022, Until Mon 4/25/2022 at 2359, Normal      escitalopram (LEXAPRO) 20 mg tablet TAKE 1 TABLET BY MOUTH EVERY DAY, Normal      gabapentin (NEURONTIN) 300 mg capsule TAKE 2 CAPSULES BY MOUTH 2 TIMES A DAY , Normal      guaifenesin-codeine (GUAIFENESIN AC) 100-10 MG/5ML liquid Take 5 mL by mouth 3 (three) times a day as needed for cough, Starting Tue 7/19/2022, Normal      hydrOXYzine HCL (ATARAX) 25 mg tablet Take 25 mg by mouth in the morning  , Historical Med      latanoprost (XALATAN) 0 005 % ophthalmic solution INSTILL 1 DROP IN AFFECTED EYE AT BEDTIME, Historical Med      methylPREDNISolone 4 MG tablet therapy pack Use as directed on package, Normal      mometasone (ELOCON) 0 1 % cream Starting Mon 11/15/2021, Historical Med      naproxen (NAPROSYN) 500 mg tablet Take 1 tablet (500 mg total) by mouth 2 (two) times a day as needed for mild pain, Starting Sun 3/27/2022, Normal      pantoprazole (PROTONIX) 40 mg tablet TAKE 1 TABLET (40 MG TOTAL) BY MOUTH IN THE MORNING , Starting Wed 10/12/2022, Normal      phentermine (ADIPEX-P) 37 5 MG tablet Take 1 tablet (37 5 mg total) by mouth in the morning, Starting Fri 6/3/2022, Normal      Probiotic Product (PRO-BIOTIC BLEND PO) Take by mouth, Historical Med      traZODone (DESYREL) 50 mg tablet Take 1 tablet (50 mg total) by mouth daily at bedtime, Starting Mon 11/28/2022, Normal      zolpidem (AMBIEN) 5 mg tablet TAKE 1 TABLET BY MOUTH DAILY AT BEDTIME AS NEEDED FOR SLEEP , Normal           No discharge procedures on file  PDMP Review       Value Time User    PDMP Reviewed  Yes 11/28/2022 12:56 PM Giuseppe Smith MD           ED Provider  Attending physically available and evaluated Deepthi Duffy I managed the patient along with the ED Attending      Electronically Signed by         America Krabbe, DO  12/18/22 2152

## 2022-12-18 NOTE — DISCHARGE INSTRUCTIONS
You were seen evaluated emergency department for migraine headache  You are given Decadron, Benadryl, Toradol, magnesium, and Phenergan  With improvement of migraine  Had lab work that did not show any electrolyte abnormalities or concern for infection  CT head and neck did not show any temporal artery dissection or any intracranial bleeding  Take Tylenol Motrin as needed for pain  Please call your primary care doctor schedule follow-up appointment after being evaluated in the emergency department  Return to the emergency department for any new or worsening symptoms, including but not limited to: Headache, weakness on one side of your body, numbness/tingling on one side of your body, fever, or any new or worsening symptoms

## 2022-12-18 NOTE — ED ATTENDING ATTESTATION
12/18/2022  IBelkis MD, saw and evaluated the patient  I have discussed the patient with the resident/non-physician practitioner and agree with the resident's/non-physician practitioner's findings, Plan of Care, and MDM as documented in the resident's/non-physician practitioner's note, except where noted  All available labs and Radiology studies were reviewed  I was present for key portions of any procedure(s) performed by the resident/non-physician practitioner and I was immediately available to provide assistance  At this point I agree with the current assessment done in the Emergency Department  I have conducted an independent evaluation of this patient a history and physical is as follows:    26-year-old female presenting to emergency department with migraine  History of it  Not getting better  Photophobia  Nausea  History of vertebral artery dissection similar to her migraine presentation  No recent neck manipulation  No trauma      Normal neurological exam      Agree with symptomatic treatment, CTA head and neck      ED Course         Critical Care Time  Procedures

## 2023-01-15 DIAGNOSIS — F41.9 ANXIETY AND DEPRESSION: ICD-10-CM

## 2023-01-15 DIAGNOSIS — F32.A ANXIETY AND DEPRESSION: ICD-10-CM

## 2023-01-16 ENCOUNTER — TELEPHONE (OUTPATIENT)
Dept: GASTROENTEROLOGY | Facility: CLINIC | Age: 48
End: 2023-01-16

## 2023-01-16 DIAGNOSIS — Z12.11 COLON CANCER SCREENING: Primary | ICD-10-CM

## 2023-01-16 RX ORDER — SODIUM CHLORIDE, SODIUM LACTATE, POTASSIUM CHLORIDE, CALCIUM CHLORIDE 600; 310; 30; 20 MG/100ML; MG/100ML; MG/100ML; MG/100ML
50 INJECTION, SOLUTION INTRAVENOUS CONTINUOUS
Status: CANCELLED | OUTPATIENT
Start: 2023-01-16

## 2023-01-16 RX ORDER — ESCITALOPRAM OXALATE 20 MG/1
TABLET ORAL
Qty: 30 TABLET | Refills: 2 | Status: SHIPPED | OUTPATIENT
Start: 2023-01-16

## 2023-01-16 NOTE — TELEPHONE ENCOUNTER
Patients GI provider:  Dr Ulysses Duff    Number to return call: [phone]    Reason for call: Pt calling stating her Golytely bowel prep has not been sent to her [facility] yet  Please send bowel prep as pt I scheduled for 1/18/2023      Scheduled procedure/appointment date if applicable: Procedure: 7/67/6668

## 2023-01-17 ENCOUNTER — ANESTHESIA (OUTPATIENT)
Dept: ANESTHESIOLOGY | Facility: HOSPITAL | Age: 48
End: 2023-01-17

## 2023-01-17 ENCOUNTER — ANESTHESIA EVENT (OUTPATIENT)
Dept: ANESTHESIOLOGY | Facility: HOSPITAL | Age: 48
End: 2023-01-17

## 2023-01-18 ENCOUNTER — ANESTHESIA EVENT (OUTPATIENT)
Dept: GASTROENTEROLOGY | Facility: HOSPITAL | Age: 48
End: 2023-01-18

## 2023-01-18 ENCOUNTER — ANESTHESIA (OUTPATIENT)
Dept: GASTROENTEROLOGY | Facility: HOSPITAL | Age: 48
End: 2023-01-18

## 2023-01-18 ENCOUNTER — HOSPITAL ENCOUNTER (OUTPATIENT)
Dept: GASTROENTEROLOGY | Facility: HOSPITAL | Age: 48
Setting detail: OUTPATIENT SURGERY
Discharge: HOME | End: 2023-01-18
Attending: INTERNAL MEDICINE

## 2023-01-18 VITALS
HEART RATE: 61 BPM | BODY MASS INDEX: 30.06 KG/M2 | WEIGHT: 180.4 LBS | RESPIRATION RATE: 16 BRPM | DIASTOLIC BLOOD PRESSURE: 64 MMHG | TEMPERATURE: 97.2 F | OXYGEN SATURATION: 100 % | SYSTOLIC BLOOD PRESSURE: 104 MMHG | HEIGHT: 65 IN

## 2023-01-18 DIAGNOSIS — Z12.11 COLON CANCER SCREENING: ICD-10-CM

## 2023-01-18 DIAGNOSIS — R43.2 TASTE SENSE ALTERED: ICD-10-CM

## 2023-01-18 DIAGNOSIS — K21.9 GASTROESOPHAGEAL REFLUX DISEASE, UNSPECIFIED WHETHER ESOPHAGITIS PRESENT: ICD-10-CM

## 2023-01-18 RX ORDER — PROPOFOL 10 MG/ML
INJECTION, EMULSION INTRAVENOUS AS NEEDED
Status: DISCONTINUED | OUTPATIENT
Start: 2023-01-18 | End: 2023-01-18

## 2023-01-18 RX ORDER — LIDOCAINE HYDROCHLORIDE 10 MG/ML
INJECTION, SOLUTION EPIDURAL; INFILTRATION; INTRACAUDAL; PERINEURAL AS NEEDED
Status: DISCONTINUED | OUTPATIENT
Start: 2023-01-18 | End: 2023-01-18

## 2023-01-18 RX ORDER — SODIUM CHLORIDE, SODIUM LACTATE, POTASSIUM CHLORIDE, CALCIUM CHLORIDE 600; 310; 30; 20 MG/100ML; MG/100ML; MG/100ML; MG/100ML
INJECTION, SOLUTION INTRAVENOUS CONTINUOUS PRN
Status: DISCONTINUED | OUTPATIENT
Start: 2023-01-18 | End: 2023-01-18

## 2023-01-18 RX ORDER — SODIUM CHLORIDE, SODIUM LACTATE, POTASSIUM CHLORIDE, CALCIUM CHLORIDE 600; 310; 30; 20 MG/100ML; MG/100ML; MG/100ML; MG/100ML
50 INJECTION, SOLUTION INTRAVENOUS CONTINUOUS
Status: DISCONTINUED | OUTPATIENT
Start: 2023-01-18 | End: 2023-01-22 | Stop reason: HOSPADM

## 2023-01-18 RX ADMIN — PROPOFOL 50 MG: 10 INJECTION, EMULSION INTRAVENOUS at 07:53

## 2023-01-18 RX ADMIN — PROPOFOL 50 MG: 10 INJECTION, EMULSION INTRAVENOUS at 07:51

## 2023-01-18 RX ADMIN — PROPOFOL 50 MG: 10 INJECTION, EMULSION INTRAVENOUS at 07:41

## 2023-01-18 RX ADMIN — SODIUM CHLORIDE, SODIUM LACTATE, POTASSIUM CHLORIDE, AND CALCIUM CHLORIDE 50 ML/HR: .6; .31; .03; .02 INJECTION, SOLUTION INTRAVENOUS at 07:11

## 2023-01-18 RX ADMIN — PROPOFOL 50 MG: 10 INJECTION, EMULSION INTRAVENOUS at 07:57

## 2023-01-18 RX ADMIN — PROPOFOL 50 MG: 10 INJECTION, EMULSION INTRAVENOUS at 08:01

## 2023-01-18 RX ADMIN — PROPOFOL 50 MG: 10 INJECTION, EMULSION INTRAVENOUS at 07:48

## 2023-01-18 RX ADMIN — SODIUM CHLORIDE, SODIUM LACTATE, POTASSIUM CHLORIDE, AND CALCIUM CHLORIDE: .6; .31; .03; .02 INJECTION, SOLUTION INTRAVENOUS at 07:30

## 2023-01-18 RX ADMIN — PROPOFOL 50 MG: 10 INJECTION, EMULSION INTRAVENOUS at 07:37

## 2023-01-18 RX ADMIN — PROPOFOL 100 MG: 10 INJECTION, EMULSION INTRAVENOUS at 07:34

## 2023-01-18 RX ADMIN — LIDOCAINE HYDROCHLORIDE 100 MG: 10 INJECTION, SOLUTION EPIDURAL; INFILTRATION; INTRACAUDAL at 07:34

## 2023-01-18 NOTE — ANESTHESIA PREPROCEDURE EVALUATION
Procedure:  EGD  COLONOSCOPY    Relevant Problems   CARDIO   (+) Chronic migraine without aura      GI/HEPATIC   (+) GERD (gastroesophageal reflux disease)      MUSCULOSKELETAL   (+) Piriformis syndrome of right side      NEURO/PSYCH   (+) Anxiety   (+) Chronic migraine without aura   (+) Paresthesia        Physical Exam    Airway    Mallampati score: II  TM Distance: >3 FB  Neck ROM: full     Dental       Cardiovascular  Cardiovascular exam normal    Pulmonary  Pulmonary exam normal     Other Findings        Anesthesia Plan  ASA Score- 2     Anesthesia Type- IV sedation with anesthesia with ASA Monitors  Additional Monitors:   Airway Plan:           Plan Factors-Exercise tolerance (METS): >4 METS  Chart reviewed  Existing labs reviewed  Patient summary reviewed  Patient is not a current smoker  Patient not instructed to abstain from smoking on day of procedure  Patient did not smoke on day of surgery  Induction-     Postoperative Plan-     Informed Consent- Anesthetic plan and risks discussed with patient  I personally reviewed this patient with the CRNA  Discussed and agreed on the Anesthesia Plan with the CRNA  Ben Malhotra           No results found for: HGBA1C    Lab Results   Component Value Date     10/10/2014    K 3 9 12/18/2022     12/18/2022    CO2 26 12/18/2022    ANIONGAP 7 10/10/2014    BUN 18 12/18/2022    CREATININE 0 70 12/18/2022    GLUCOSE 99 10/14/2017    CALCIUM 8 3 (L) 12/18/2022    CORRECTEDCA 9 7 08/16/2021    AST 15 11/29/2022    ALT 12 11/29/2022    ALKPHOS 68 11/29/2022    PROT 6 8 10/10/2014    BILITOT 0 2 10/10/2014    EGFR 103 12/18/2022       Lab Results   Component Value Date    WBC 5 45 12/18/2022    HGB 11 9 12/18/2022    HCT 35 6 12/18/2022    MCV 91 12/18/2022     12/18/2022

## 2023-01-18 NOTE — ANESTHESIA POSTPROCEDURE EVALUATION
Post-Op Assessment Note    CV Status:  Stable  Pain Score: 0    Pain management: adequate     Mental Status:  Alert and awake   Hydration Status:  Euvolemic   PONV Controlled:  Controlled   Airway Patency:  Patent      Post Op Vitals Reviewed: Yes      Staff: Anesthesiologist, CRNA         No notable events documented      BP 99/58 (01/18/23 0810)    Temp     Pulse 66 (01/18/23 0810)   Resp 12 (01/18/23 0810)    SpO2 100 % (01/18/23 0810)

## 2023-01-18 NOTE — H&P
History and Physical - [facility]  Lenora Galicia 52 y o  female MRN: [id number]                  HPI: Lenora Galicia is a 52y o  year old female who presents for EGD and colonoscopy for history of vague dysphagia, altered taste, suspected GERD and colon cancer screening  Last colonoscopy 5 years ago      REVIEW OF SYSTEMS: Per the HPI, and otherwise unremarkable      Historical Information   Past Medical History:   Diagnosis Date   • Abnormal Pap smear of cervix    • RAMIREZ positive    • Anxiety    • Basal cell carcinoma    • Depression    • Migraine    • Migraine    • MRSA colonization 2016   • Neck pain    • Pregnancy    • Vertebral artery dissection (HCC)    • Vulvovaginitis    • Yeast infection      Past Surgical History:   Procedure Laterality Date   •  SECTION, LOW TRANSVERSE     • COLONOSCOPY     • DILATION AND CURETTAGE OF UTERUS     • GYNECOLOGIC CRYOSURGERY      cervix   • LASIK     • SKIN LESION EXCISION      basal cell carcinoma of left cheek     Social History   Social History     Substance and Sexual Activity   Alcohol Use No   • Alcohol/week: 0 0 standard drinks     Social History     Substance and Sexual Activity   Drug Use No     Social History     Tobacco Use   Smoking Status Never   Smokeless Tobacco Never     Family History   Problem Relation Age of Onset   • Hypertension Mother    • Lung cancer Mother 64   • Brain cancer Mother 64   • Breast cancer Maternal Aunt         dx in 42's    • Multiple sclerosis Father    • No Known Problems Daughter    • No Known Problems Maternal Grandmother    • No Known Problems Maternal Grandfather    • No Known Problems Paternal Grandmother    • No Known Problems Paternal Grandfather    • No Known Problems Maternal Aunt        Meds/Allergies       Current Outpatient Medications:   •  butalbital-acetaminophen-caffeine (Esgic) -40 mg per tablet  •  escitalopram (LEXAPRO) 20 mg tablet  •  gabapentin (NEURONTIN) 300 mg capsule  •  hydrOXYzine HCL (ATARAX) 25 mg tablet  •  latanoprost (XALATAN) 0 005 % ophthalmic solution  •  pantoprazole (PROTONIX) 40 mg tablet  •  Probiotic Product (PRO-BIOTIC BLEND PO)  •  traZODone (DESYREL) 50 mg tablet  •  ALPRAZolam (XANAX) 0 25 mg tablet  •  benzonatate (TESSALON) 200 MG capsule  •  cyclobenzaprine (FLEXERIL) 10 mg tablet  •  diazepam (VALIUM) 5 mg tablet  •  guaifenesin-codeine (GUAIFENESIN AC) 100-10 MG/5ML liquid  •  methylPREDNISolone 4 MG tablet therapy pack  •  mometasone (ELOCON) 0 1 % cream  •  naproxen (NAPROSYN) 500 mg tablet  •  phentermine (ADIPEX-P) 37 5 MG tablet  •  polyethylene glycol (GOLYTELY) 4000 mL solution  •  zolpidem (AMBIEN) 5 mg tablet    Current Facility-Administered Medications:   •  lactated ringers infusion, 50 mL/hr, Intravenous, Continuous, 50 mL/hr at 01/18/23 0711    Allergies   Allergen Reactions   • Reglan [Metoclopramide] Confusion     Pt reports Reglan "makes me lose my mind and go crazy "       Objective     /72   Pulse 69   Temp (!) 97 °F (36 1 °C) (Temporal)   Resp 18   Ht 5' 5" (1 651 m)   Wt 81 8 kg (180 lb 6 4 oz)   LMP 01/07/2023   SpO2 97%   BMI 30 02 kg/m²       PHYSICAL EXAM    Gen: NAD  Head: NCAT  CV: RRR  CHEST: Clear  ABD: soft, NT/ND  EXT: no edema      ASSESSMENT/PLAN:  This is a 52y o  year old female here for EGD and colonoscopy, and she is stable and optimized for her procedure

## 2023-01-18 NOTE — ANESTHESIA PREPROCEDURE EVALUATION
Procedure:  PRE-OP ONLY    Relevant Problems   CARDIO   (+) Chronic migraine without aura   (+) Vertebral artery dissection (HCC)      GI/HEPATIC   (+) GERD (gastroesophageal reflux disease)      MUSCULOSKELETAL   (+) Piriformis syndrome of right side      NEURO/PSYCH   (+) Anxiety   (+) Chronic migraine without aura   (+) Paresthesia      Other   (+) Restless legs syndrome (RLS)             Anesthesia Plan  ASA Score- 2     Anesthesia Type- IV sedation with anesthesia with ASA Monitors  Additional Monitors:   Airway Plan:           Plan Factors-    Chart reviewed  Existing labs reviewed  Patient summary reviewed  Induction-     Postoperative Plan-     Informed Consent- Anesthetic plan and risks discussed with patient  I personally reviewed this patient with the CRNA  Discussed and agreed on the Anesthesia Plan with the CRNA  Mag Bishop             No results found for: HGBA1C    Lab Results   Component Value Date     10/10/2014    K 3 9 12/18/2022     12/18/2022    CO2 26 12/18/2022    ANIONGAP 7 10/10/2014    BUN 18 12/18/2022    CREATININE 0 70 12/18/2022    GLUCOSE 99 10/14/2017    CALCIUM 8 3 (L) 12/18/2022    CORRECTEDCA 9 7 08/16/2021    AST 15 11/29/2022    ALT 12 11/29/2022    ALKPHOS 68 11/29/2022    PROT 6 8 10/10/2014    BILITOT 0 2 10/10/2014    EGFR 103 12/18/2022       Lab Results   Component Value Date    WBC 5 45 12/18/2022    HGB 11 9 12/18/2022    HCT 35 6 12/18/2022    MCV 91 12/18/2022     12/18/2022

## 2023-01-27 PROBLEM — Z13.6 SCREENING FOR CARDIOVASCULAR, RESPIRATORY, AND GENITOURINARY DISEASES: Status: RESOLVED | Noted: 2022-11-28 | Resolved: 2023-01-27

## 2023-01-27 PROBLEM — Z13.83 SCREENING FOR CARDIOVASCULAR, RESPIRATORY, AND GENITOURINARY DISEASES: Status: RESOLVED | Noted: 2022-11-28 | Resolved: 2023-01-27

## 2023-01-27 PROBLEM — Z13.89 SCREENING FOR CARDIOVASCULAR, RESPIRATORY, AND GENITOURINARY DISEASES: Status: RESOLVED | Noted: 2022-11-28 | Resolved: 2023-01-27

## 2023-02-08 DIAGNOSIS — G47.09 OTHER INSOMNIA: ICD-10-CM

## 2023-02-08 RX ORDER — TRAZODONE HYDROCHLORIDE 50 MG/1
75 TABLET ORAL
Qty: 45 TABLET | Refills: 2 | Status: SHIPPED | OUTPATIENT
Start: 2023-02-08

## 2023-02-08 NOTE — TELEPHONE ENCOUNTER
Patient is requesting refill of Trazodone but asking if her dose can be from 50mg to 75mg    She said she is not quite due but is asking for increase in strength

## 2023-03-15 DIAGNOSIS — K21.9 GASTROESOPHAGEAL REFLUX DISEASE: ICD-10-CM

## 2023-03-15 RX ORDER — PANTOPRAZOLE SODIUM 40 MG/1
TABLET, DELAYED RELEASE ORAL
Qty: 30 TABLET | Refills: 4 | Status: SHIPPED | OUTPATIENT
Start: 2023-03-15

## 2023-04-17 DIAGNOSIS — K21.9 GASTROESOPHAGEAL REFLUX DISEASE, UNSPECIFIED WHETHER ESOPHAGITIS PRESENT: Primary | ICD-10-CM

## 2023-04-17 RX ORDER — SUCRALFATE 1 G/1
1 TABLET ORAL
Qty: 90 TABLET | Refills: 1 | Status: SHIPPED | OUTPATIENT
Start: 2023-04-17

## 2023-05-10 DIAGNOSIS — G47.09 OTHER INSOMNIA: ICD-10-CM

## 2023-05-11 RX ORDER — TRAZODONE HYDROCHLORIDE 50 MG/1
TABLET ORAL
Qty: 45 TABLET | Refills: 2 | Status: SHIPPED | OUTPATIENT
Start: 2023-05-11

## 2023-05-19 ENCOUNTER — OFFICE VISIT (OUTPATIENT)
Dept: GASTROENTEROLOGY | Facility: CLINIC | Age: 48
End: 2023-05-19

## 2023-05-19 VITALS
BODY MASS INDEX: 30.66 KG/M2 | HEIGHT: 65 IN | WEIGHT: 184 LBS | SYSTOLIC BLOOD PRESSURE: 121 MMHG | DIASTOLIC BLOOD PRESSURE: 63 MMHG | HEART RATE: 70 BPM

## 2023-05-19 DIAGNOSIS — R09.89 GLOBUS SENSATION: ICD-10-CM

## 2023-05-19 DIAGNOSIS — R13.10 DYSPHAGIA, UNSPECIFIED TYPE: Primary | ICD-10-CM

## 2023-05-19 DIAGNOSIS — K59.02 CONSTIPATION DUE TO OUTLET DYSFUNCTION: ICD-10-CM

## 2023-05-19 DIAGNOSIS — K21.9 GASTROESOPHAGEAL REFLUX DISEASE, UNSPECIFIED WHETHER ESOPHAGITIS PRESENT: ICD-10-CM

## 2023-05-19 DIAGNOSIS — Z12.11 COLON CANCER SCREENING: ICD-10-CM

## 2023-05-19 NOTE — PATIENT INSTRUCTIONS
You have been prescribed miralax (polyethylene glycol) for treatment of your CONSTIPATION  Start 1 capful daily  Take this dose x 3 days  If your symptoms are improved, great! Continue this dose daily  If you have diarrhea (stools are loose, associated with accidents, or urgency) with this dose, cut down to 1/2 capful daily  Continue to titrate down until you find the dose for you  This might be 1 tbsp daily  Wait 3 days before making a change  If you have continued constipation with 1 capful daily, you may need a higher dose  Start with 1 5 capfuls daily and titrate upward  Eg might need 1 capful twice daily  There is no maximum dose, whatever works for you  Again, wait 3 days before making a change  High Fiber Diet   WHAT YOU NEED TO KNOW:   A high-fiber diet includes foods that have a high amount of fiber  Fiber is the part of fruits, vegetables, and grains that is not broken down by your body  Fiber keeps your bowel movements regular  Fiber can also help lower your cholesterol level, control blood sugar in people with diabetes, and relieve constipation  Fiber can also help you control your weight because it helps you feel full faster  Most adults should eat 25 to 35 grams of fiber each day  Talk to your dietitian or healthcare provider about the amount of fiber you need    DISCHARGE INSTRUCTIONS:   Good sources of fiber:       Foods with at least 4 grams of fiber per serving:      ? to ½ cup of high-fiber cereal (check the nutrition label on the box)    ½ cup of blackberries or raspberries    4 dried prunes    1 cooked artichoke    ½ cup of cooked legumes, such as lentils, or red, kidney, and schmidt beans    Foods with 1 to 3 grams of fiber per servin slice of whole-wheat, pumpernickel, or rye bread    ½ cup of cooked brown rice    4 whole-wheat crackers    1 cup of oatmeal    ½ cup of cereal with 1 to 3 grams of fiber per serving (check the nutrition label on the box)    1 small piece of fruit, such as an apple, banana, pear, kiwi, or orange    3 dates    ½ cup of canned apricots, fruit cocktail, peaches, or pears    ½ cup of raw or cooked vegetables, such as carrots, cauliflower, cabbage, spinach, squash, or corn  Ways that you can increase fiber in your diet:   Choose brown or wild rice instead of white rice  Use whole wheat flour in recipes instead of white or all-purpose flour  Add beans and peas to casseroles or soups  Choose fresh fruit and vegetables with peels or skins on instead of juices  Other diet guidelines to follow: Add fiber to your diet slowly  You may have abdominal discomfort, bloating, and gas if you add fiber to your diet too quickly  Drink plenty of liquids as you add fiber to your diet  You may have nausea or develop constipation if you do not drink enough water  Ask how much liquid to drink each day and which liquids are best for you  © Copyright Lacie Castano 2022 Information is for End User's use only and may not be sold, redistributed or otherwise used for commercial purposes  The above information is an  only  It is not intended as medical advice for individual conditions or treatments  Talk to your doctor, nurse or pharmacist before following any medical regimen to see if it is safe and effective for you

## 2023-05-19 NOTE — PROGRESS NOTES
Answers for HPI/ROS submitted by the patient on 5/19/2023  Progression since onset: resolved  Pain - numeric: 0/10  anorexia: No  arthralgias: No  belching: No  constipation: No  diarrhea: No  dysuria: No  fever: No  flatus: No  frequency: No  headaches: No  hematochezia: No  hematuria: No  melena: No  myalgias: No  nausea: No  weight loss: No  vomiting: No  Aggravated by: nothing  Relieved by: nothing  Diagnostic workup: lower endoscopy    [facility] - Outpatient Follow-up Note  Armando Ford 52 y o  female MRN: [id number]  Encounter: [id number]          ASSESSMENT AND PLAN:      1  Dysphagia  2  Gastroesophageal reflux disease, unspecified whether esophagitis present  3  Globus sensation  She reports longstanding history of dysphagia and heartburn  Recent EGD in January showed antritis and nonobstructive oropharyngeal dysphagia, some reflux suspected  Biopsies from the esophagus were negative for Robledo's or EOE and antral biopsy benign  She had some fundic gland polyps as well  In April she had an exacerbation of heartburn and difficulty swallowing which improved after the use of Carafate     -Continue pantoprazole  -Use Carafate PRN for breakthrough symptoms  -Obtain barium esophagram for further evaluation of dysphagia  -Consider pH study pending course  -Avoid spicy, acidic, fatty foods, large meals and eating late at night  -     FL barium swallow; Future; Expected date: 05/19/2023    4  Constipation due to outlet dysfunction  She has a longstanding history of constipation currently on a probiotic and just restarted her fiber supplement and is only having 1 small hard bowel movement per week  Discussed taking the fiber supplementation on a regular basis and adding MiraLAX in addition and titrating this to effect  If this is ineffective then at next visit can consider alternative medication such as Linzess, Amitiza or Trulance    She remembers being on 1 of these in the past and recalls it was not effective and she had side effects, but cannot recall which one  She previously saw colorectal in 2019 and had a sitz marker study  5  Colon cancer screening  No polyps on recent colonoscopy  Next colonoscopy due in January 2032  Follow up in 3 months with Dr Nancy Munoz  ______________________________________________________________________    SUBJECTIVE:  Jane Amanda is a 52 y o  female with history of anxiety, depression, migraines, GERD, constipation who presents for follow-up after EGD/colonoscopy  EGD in January showed antritis, nonobstructive oropharyngeal dysphagia, suspect some reflux  Antral biopsy was negative for H  pylori  Gastric polyps were fundic type  Esophageal biopsy negative for eosinophils or intestinal metaplasia  Colonoscopy was normal with small internal hemorrhoids  Repeat recommended in 10 years in January 2033  In April she had a flare of reflux which responded to a short course of Carafate  She reports heartburn once a week now on Pantoprazole 40 mg  She feels something in her throat all the time  She feels like food doesn't go down about one a day and requires a lot of water to push down  This can happen with liquids as well  She remembers taking Linzess in the past but had a side effect and didn't find it helpful to stopped  She's currently taking a probiotic and bio-cleanse by plexus which is a fiber supplement  She hasn't been taking this on a regular basis but does find this helpful  She's currently only having small hard BM once a week  Denies any rectal bleeding  She never took Miralax on a constant basis  She saw colo-rectal [facility] the past at [facility] and had a Sitz marker study  REVIEW OF SYSTEMS IS OTHERWISE NEGATIVE        Historical Information   Past Medical History:   Diagnosis Date   • Abnormal Pap smear of cervix    • RAMIREZ positive    • Anxiety    • Basal cell carcinoma    • Depression    • Migraine    • Migraine    • Miscarriage "   • MRSA colonization 2016   • Neck pain    • Pregnancy    • Vertebral artery dissection (HCC)    • Vulvovaginitis    • Yeast infection      Past Surgical History:   Procedure Laterality Date   •  SECTION, LOW TRANSVERSE     • COLONOSCOPY     • DILATION AND CURETTAGE OF UTERUS     • GYNECOLOGIC CRYOSURGERY      cervix   • LASIK     • SKIN LESION EXCISION      basal cell carcinoma of left cheek     Social History   Social History     Substance and Sexual Activity   Alcohol Use No     Social History     Substance and Sexual Activity   Drug Use No     Social History     Tobacco Use   Smoking Status Never   Smokeless Tobacco Never     Family History   Problem Relation Age of Onset   • Hypertension Mother    • Lung cancer Mother 64   • Brain cancer Mother 64   • Breast cancer Maternal Aunt         dx in 42's    • Multiple sclerosis Father    • No Known Problems Daughter    • No Known Problems Maternal Grandmother    • No Known Problems Maternal Grandfather    • No Known Problems Paternal Grandmother    • No Known Problems Paternal Grandfather    • No Known Problems Maternal Aunt        Meds/Allergies       Current Outpatient Medications:   •  ALPRAZolam (XANAX) 0 25 mg tablet  •  butalbital-acetaminophen-caffeine (Esgic) -40 mg per tablet  •  escitalopram (LEXAPRO) 20 mg tablet  •  gabapentin (NEURONTIN) 300 mg capsule  •  hydrOXYzine HCL (ATARAX) 25 mg tablet  •  latanoprost (XALATAN) 0 005 % ophthalmic solution  •  pantoprazole (PROTONIX) 40 mg tablet  •  Probiotic Product (PRO-BIOTIC BLEND PO)  •  sucralfate (CARAFATE) 1 g tablet  •  traZODone (DESYREL) 50 mg tablet    Allergies   Allergen Reactions   • Reglan [Metoclopramide] Confusion     Pt reports Reglan \"makes me lose my mind and go crazy  \"           Objective     Blood pressure 121/63, pulse 70, height 5' 5\" (1 651 m), weight 83 5 kg (184 lb), not currently breastfeeding  Body mass index is 30 62 kg/m²        PHYSICAL EXAM:  " General Appearance:   Alert, cooperative, no distress   HEENT:   Normocephalic, atraumatic, anicteric  Neck:  Supple, symmetrical, trachea midline   Lungs:   Clear to auscultation bilaterally; no rales, rhonchi or wheezing; respirations unlabored    Heart[de-identified]   Regular rate and rhythm; no murmur  Abdomen:   Soft, non-tender, non-distended; normal bowel sounds; no masses, no organomegaly    Genitalia:   Deferred    Rectal:   Deferred    Extremities:  No cyanosis, clubbing or edema    Skin:  No jaundice, rashes, or lesions    Lymph nodes:  No palpable cervical lymphadenopathy        Lab Results:   No visits with results within 1 Day(s) from this visit  Latest known visit with results is:   Annual Exam on 04/11/2023   Component Date Value   • Case Report 04/11/2023                      Value:Gynecologic Cytology Report                       Case: [id number]                                  Authorizing Provider:  Gauri De Los Santos PA-C       Collected:           04/11/2023 1136              Ordering Location:     [facility]  Received:            04/11/2023 1136                                     OBGYN                                                                        First Screen:          Elayne George                                                                Specimen:    LIQUID-BASED PAP, SCREENING, Cervix, Endocervical                                         • Primary Interpretation 04/11/2023 Negative for intraepithelial lesion or malignancy    • Specimen Adequacy 04/11/2023 Satisfactory for evaluation  Absence of endocervical/transformation zone component  • Additional Information 04/11/2023                      Value: This result contains rich text formatting which cannot be displayed here  • HPV Other HR 04/11/2023 Negative    • HPV16 04/11/2023 Negative    • HPV18 04/11/2023 Negative          Radiology Results:   No results found

## 2023-06-08 ENCOUNTER — TELEPHONE (OUTPATIENT)
Dept: FAMILY MEDICINE CLINIC | Facility: CLINIC | Age: 48
End: 2023-06-08

## 2023-06-08 DIAGNOSIS — K64.9 HEMORRHOIDS, UNSPECIFIED HEMORRHOID TYPE: ICD-10-CM

## 2023-06-08 DIAGNOSIS — K64.9 HEMORRHOIDS, UNSPECIFIED HEMORRHOID TYPE: Primary | ICD-10-CM

## 2023-06-08 RX ORDER — HYDROCORTISONE ACETATE 25 MG/1
25 SUPPOSITORY RECTAL 2 TIMES DAILY PRN
Qty: 12 SUPPOSITORY | Refills: 0 | Status: SHIPPED | OUTPATIENT
Start: 2023-06-08 | End: 2023-06-12

## 2023-06-08 RX ORDER — HYDROCORTISONE 25 MG/G
CREAM TOPICAL 2 TIMES DAILY
Qty: 28 G | Refills: 0 | Status: SHIPPED | OUTPATIENT
Start: 2023-06-08

## 2023-06-08 NOTE — TELEPHONE ENCOUNTER
"Patient said she has a \"very bad hemorrhoid\" and Preparation H is not helping at all so is asking if there is any prescription medication that can be prescribed    "

## 2023-06-09 ENCOUNTER — TELEPHONE (OUTPATIENT)
Dept: FAMILY MEDICINE CLINIC | Facility: CLINIC | Age: 48
End: 2023-06-09

## 2023-06-12 DIAGNOSIS — K64.9 HEMORRHOIDS, UNSPECIFIED HEMORRHOID TYPE: ICD-10-CM

## 2023-06-12 RX ORDER — HYDROCORTISONE ACETATE 25 MG/1
SUPPOSITORY RECTAL
Qty: 12 SUPPOSITORY | Refills: 0 | OUTPATIENT
Start: 2023-06-12

## 2023-06-12 RX ORDER — HYDROCORTISONE ACETATE 25 MG/1
SUPPOSITORY RECTAL
Qty: 12 SUPPOSITORY | Refills: 0 | Status: SHIPPED | OUTPATIENT
Start: 2023-06-12

## 2023-06-15 ENCOUNTER — TELEPHONE (OUTPATIENT)
Dept: FAMILY MEDICINE CLINIC | Facility: CLINIC | Age: 48
End: 2023-06-15

## 2023-06-15 NOTE — TELEPHONE ENCOUNTER
Per [facility] , Hydrocortisone 2% approved  L/m informing [facility]  Pt informed

## 2023-07-28 ENCOUNTER — TELEPHONE (OUTPATIENT)
Dept: GASTROENTEROLOGY | Facility: CLINIC | Age: 48
End: 2023-07-28

## 2023-07-28 DIAGNOSIS — F32.A ANXIETY AND DEPRESSION: ICD-10-CM

## 2023-07-28 DIAGNOSIS — G47.09 OTHER INSOMNIA: ICD-10-CM

## 2023-07-28 DIAGNOSIS — F41.9 ANXIETY AND DEPRESSION: ICD-10-CM

## 2023-07-31 ENCOUNTER — APPOINTMENT (OUTPATIENT)
Dept: URGENT CARE | Facility: CLINIC | Age: 48
End: 2023-07-31

## 2023-07-31 DIAGNOSIS — Z00.00 PHYSICAL EXAM: ICD-10-CM

## 2023-07-31 PROCEDURE — 86480 TB TEST CELL IMMUN MEASURE: CPT | Performed by: NURSE PRACTITIONER

## 2023-07-31 RX ORDER — TRAZODONE HYDROCHLORIDE 50 MG/1
TABLET ORAL
Qty: 135 TABLET | Refills: 0 | Status: SHIPPED | OUTPATIENT
Start: 2023-07-31

## 2023-07-31 RX ORDER — ESCITALOPRAM OXALATE 20 MG/1
TABLET ORAL
Qty: 90 TABLET | Refills: 1 | Status: SHIPPED | OUTPATIENT
Start: 2023-07-31

## 2023-07-31 NOTE — TELEPHONE ENCOUNTER
Pharmacy requesting refill on lexapro and trazodone   Last seen 11/28/22  Sent [employer] message to pt to make follow up appt

## 2023-08-02 LAB
GAMMA INTERFERON BACKGROUND BLD IA-ACNC: 0.02 IU/ML
M TB IFN-G BLD-IMP: NEGATIVE
M TB IFN-G CD4+ BCKGRND COR BLD-ACNC: 0 IU/ML
M TB IFN-G CD4+ BCKGRND COR BLD-ACNC: 0 IU/ML
MITOGEN IGNF BCKGRD COR BLD-ACNC: 6.16 IU/ML

## 2023-08-05 DIAGNOSIS — K21.9 GASTROESOPHAGEAL REFLUX DISEASE: ICD-10-CM

## 2023-08-07 ENCOUNTER — TELEPHONE (OUTPATIENT)
Dept: FAMILY MEDICINE CLINIC | Facility: CLINIC | Age: 48
End: 2023-08-07

## 2023-08-07 RX ORDER — PANTOPRAZOLE SODIUM 40 MG/1
40 TABLET, DELAYED RELEASE ORAL EVERY MORNING
Qty: 30 TABLET | Refills: 0 | Status: SHIPPED | OUTPATIENT
Start: 2023-08-07

## 2023-08-07 NOTE — LETTER
08/14/2023    Rosa Banda      To whom it may concern,     It is possible that [employer] may be at a higher risk for Rondall Escort Syndrome since a family member was diagnosed with Rondall Escort Syndrome after receiving the influenza vaccine. Please contact the office with any questions.            Jolly Najjar MD

## 2023-08-07 NOTE — TELEPHONE ENCOUNTER
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     The patient has never received the flu vaccine since her father had Guillain Dimock Syndrome from the flu vaccine and she was told she might carry the gene    She is asking if we can write a letter of exemption so she does not have to get the flu vaccine    Call [phone]

## 2023-08-07 NOTE — TELEPHONE ENCOUNTER
Pharmacy requesting refill on pantoprazole   Last seen 11/28/22  msg sent to pt on mychart to make follow up appt   Medication sent to pharmacy

## 2023-08-15 ENCOUNTER — OFFICE VISIT (OUTPATIENT)
Dept: FAMILY MEDICINE CLINIC | Facility: CLINIC | Age: 48
End: 2023-08-15
Payer: COMMERCIAL

## 2023-08-15 VITALS
SYSTOLIC BLOOD PRESSURE: 100 MMHG | HEIGHT: 65 IN | DIASTOLIC BLOOD PRESSURE: 68 MMHG | OXYGEN SATURATION: 99 % | BODY MASS INDEX: 30.66 KG/M2 | WEIGHT: 184 LBS | HEART RATE: 69 BPM | TEMPERATURE: 97.4 F | RESPIRATION RATE: 16 BRPM

## 2023-08-15 DIAGNOSIS — F41.9 ANXIETY: Primary | ICD-10-CM

## 2023-08-15 DIAGNOSIS — Z00.00 HEALTHCARE MAINTENANCE: ICD-10-CM

## 2023-08-15 DIAGNOSIS — K21.9 GASTROESOPHAGEAL REFLUX DISEASE, UNSPECIFIED WHETHER ESOPHAGITIS PRESENT: ICD-10-CM

## 2023-08-15 DIAGNOSIS — F32.A DEPRESSION, UNSPECIFIED DEPRESSION TYPE: ICD-10-CM

## 2023-08-15 DIAGNOSIS — G47.09 OTHER INSOMNIA: ICD-10-CM

## 2023-08-15 DIAGNOSIS — E55.9 VITAMIN D DEFICIENCY: ICD-10-CM

## 2023-08-15 PROCEDURE — 99214 OFFICE O/P EST MOD 30 MIN: CPT | Performed by: NURSE PRACTITIONER

## 2023-08-15 RX ORDER — ALPRAZOLAM 0.25 MG/1
0.25 TABLET ORAL
Qty: 30 TABLET | Refills: 0 | Status: SHIPPED | OUTPATIENT
Start: 2023-08-15

## 2023-08-15 RX ORDER — ONABOTULINUMTOXINA 200 [USP'U]/1
INJECTION, POWDER, LYOPHILIZED, FOR SOLUTION INTRADERMAL; INTRAMUSCULAR
COMMUNITY
Start: 2023-07-31

## 2023-08-15 NOTE — ASSESSMENT & PLAN NOTE
- Continue Lexapro 20 mg daily. - Continue routine visits with therapist.  - Will continue to monitor.

## 2023-08-15 NOTE — PROGRESS NOTES
Name: Jose F Jacobs      : 1975      MRN: [id number]  Encounter Provider: BARB Melendez  Encounter Date: 8/15/2023   Encounter department: [facility]     1. Anxiety  Assessment & Plan:  - Continue Lexapro 20 mg daily. - Xanax as needed. - Continue routine visits with therapist.  - Will continue to monitor. Orders:  -     ALPRAZolam (XANAX) 0.25 mg tablet; Take 1 tablet (0.25 mg total) by mouth daily at bedtime as needed for anxiety    2. Depression, unspecified depression type  Assessment & Plan:  - Continue Lexapro 20 mg daily. - Continue routine visits with therapist.  - Will continue to monitor. 3. Gastroesophageal reflux disease, unspecified whether esophagitis present  Assessment & Plan:  - Well controlled on pantoprazole 40 mg daily. Continue same.   - Avoid triggering food and beverage.  - Will continue to monitor. 4. Other insomnia  Assessment & Plan:  - Stable on trazodone as needed at bedtime. Continue same.   - Will continue to monitor. 5. Vitamin D deficiency  -     Vitamin D 25 hydroxy; Future    6. Healthcare maintenance  -     Comprehensive metabolic panel; Future  -     Lipid Panel with Direct LDL reflex; Future  -     TSH, 3rd generation with Free T4 reflex; Future        Depression Screening and Follow-up Plan: Patient's depression screening was positive with a PHQ-2 score of 3. Their PHQ-9 score was 16. Subjective     Patient with  PMH of GERD, insomnia, migraines, and anxiety presents today for routine follow up. She is taking her prescribed medication and reports no side effects. Currently following with Pain Management regarding chronic back pain. Recently got a job at [employer] and needs paperwork signed. She denies any significant concerns or complaints today.        Jose F Jacobs 48F  Contact the [address] [facility]   YOB: 1975   Recent Address:  [address] [place] [address]   Status of States Queried:  View Details   Status of States Queried                      ANTONETTE Cobian Records (1)    Report Criteria  Linked Records  ? One or more patients do not exactly match the search patient demographic information that was sent during search: Brando Hewitt, 1975. The report displayed is for the next best possible match. Please verify the report is for the intended patient before proceeding.   Status of States Queried                        Tile cannot be moved due to a restriction by the [employer]  Total Prescriptions  25    Total Private Pay  4    Total Prescribers  7    Total Pharmacies  1    Narcotics (excluding Buprenorphine)  Current MME/day  0.00    30 Day Avg MME/day  0.00    Current Qty  0    Buprenorphine   Current mg/day  0.00    30 Day Avg mg/day  0.00    Current Qty  0        Tile cannot be moved due to a restriction by the Admin  Prescriptions   Total: 25   Private Pay: 4   Showing 1-15 of 25 Items   View   15 Items     of 2   Filled  Written  Sold  ID  Drug  QTY  Days  Prescriber  RX #  Dispenser  Refill  Daily Dose*  Pymt Type      07/13/2023 07/13/2023   1  Tobnio-Xfmqaixy-Bypm -40   30.00  7  Ky Kli  [id number]   Pen (5407)  0  2.14 [place]    11/28/2022 11/28/2022   1  Alprazolam 0.25 Mg Tablet   20.00  20  Wa Abo  [id number]   Pen (2423)  0  0.50 [place]    11/28/2022 11/28/2022   1  Ebmnqu-Uodtgwhy-Qqjv -40   30.00  5  Wa Abo  [id number]   Pen (2423)  0  3.00 [place]    11/09/2022 11/09/2022   1  Zolpidem Tartrate 5 Mg Tablet   30.00  30  Wa Abo  [id number]   Pen (2423)  0  0.25 [place]    10/07/2022  10/07/2022   1  Zolpidem Tartrate 5 Mg Tablet   30.00  30  Wa Abo  [id number]   Pen (2423)  0  0.25 [place]    09/02/2022 09/02/2022   1  Zolpidem Tartrate 5 Mg Tablet   30.00  30  Ka Gra  [id number]   Pen (2423)  0  0.25 [place] 2022   1  Zolpidem Tartrate 5 Mg Tablet   30.00  30  Wa Abo  [id number]   Pen (2423)  0  0.25 [place]    2022   1  Guaifen-Codeine 100-10 Mg/5 Ml   180.00  12  Wa Abo  [id number]   Pen (2423)  0  4.50 MME  [place]    2022   1  Bhruks-Uzwqljnt-Clgi -40   30.00  5  Wa Abo  [id number]   Pen (2423)  0  3.00 [place]    2022   1  Guaifen-Codeine 100-10 Mg/5 Ml   180.00  12  Wa Abo  [id number]   Pen (2423)  0  4.50 MME  [place]    2022   1  Zolpidem Tartrate 5 Mg Tablet   30.00  30  Wa Abo  [id number]   Pen (7803)  0               Review of Systems   Constitutional: Negative for fatigue and fever. HENT: Negative for trouble swallowing. Eyes: Negative for visual disturbance. Respiratory: Negative for cough and shortness of breath. Cardiovascular: Negative for chest pain and palpitations. Gastrointestinal: Negative for abdominal pain and blood in stool. Endocrine: Negative for cold intolerance and heat intolerance. Genitourinary: Negative for difficulty urinating and dysuria. Musculoskeletal: Negative for gait problem. Skin: Negative for rash. Neurological: Negative for dizziness, syncope and headaches. Hematological: Negative for adenopathy. Psychiatric/Behavioral: Negative for behavioral problems.        Past Medical History:   Diagnosis Date   • Abnormal Pap smear of cervix    • RAMIREZ positive    • Anxiety    • Basal cell carcinoma    • Depression    • Migraine    • Migraine    • Miscarriage    • MRSA colonization 2016   • Neck pain    • Pregnancy    • Vertebral artery dissection (HCC)    • Vulvovaginitis    • Yeast infection      Past Surgical History:   Procedure Laterality Date   •  SECTION, LOW TRANSVERSE     • COLONOSCOPY     • DILATION AND CURETTAGE OF UTERUS     • GYNECOLOGIC CRYOSURGERY      cervix   • LASIK     • SKIN LESION EXCISION      basal cell carcinoma of left cheek     Family History   Problem Relation Age of Onset   • Hypertension Mother    • Lung cancer Mother 64   • Brain cancer Mother 64   • Breast cancer Maternal Aunt         dx in 42's    • Multiple sclerosis Father    • No Known Problems Daughter    • No Known Problems Maternal Grandmother    • No Known Problems Maternal Grandfather    • No Known Problems Paternal Grandmother    • No Known Problems Paternal Grandfather    • No Known Problems Maternal Aunt      Social History     Socioeconomic History   • Marital status:      Spouse name: None   • Number of children: None   • Years of education: None   • Highest education level: None   Occupational History   • None   Tobacco Use   • Smoking status: Never   • Smokeless tobacco: Never   Vaping Use   • Vaping Use: Never used   Substance and Sexual Activity   • Alcohol use: No   • Drug use: No   • Sexual activity: Yes     Partners: Male     Birth control/protection: Male Sterilization   Other Topics Concern   • None   Social History Narrative    Feels safe at home      Social Determinants of Health     Financial Resource Strain: Not on file   Food Insecurity: Not on file   Transportation Needs: Not on file   Physical Activity: Not on file   Stress: Not on file   Social Connections: Not on file   Intimate Partner Violence: Not on file   Housing Stability: Not on file     Current Outpatient Medications on File Prior to Visit   Medication Sig   • Botox 200 units SOLR    • butalbital-acetaminophen-caffeine (Esgic) -40 mg per tablet Take 1 tablet by mouth every 4 (four) hours as needed for headaches   • escitalopram (LEXAPRO) 20 mg tablet TAKE 1 TABLET BY MOUTH EVERY DAY   • gabapentin (NEURONTIN) 300 mg capsule TAKE 2 CAPSULES BY MOUTH TWICE A DAY   • hydrOXYzine HCL (ATARAX) 25 mg tablet Take 25 mg by mouth in the morning     • latanoprost (XALATAN) 0.005 % ophthalmic solution INSTILL 1 DROP IN AFFECTED EYE AT BEDTIME   • pantoprazole (PROTONIX) 40 mg tablet TAKE 1 TABLET BY MOUTH EVERY DAY IN THE MORNING   • Probiotic Product (PRO-BIOTIC BLEND PO) Take by mouth   • traZODone (DESYREL) 50 mg tablet TAKE 1.5 TABLTS (75 MG TOTAL) BY MOUTH DAILY AT BEDTIME   • [DISCONTINUED] ALPRAZolam (XANAX) 0.25 mg tablet Take 1 tablet (0.25 mg total) by mouth daily at bedtime as needed for anxiety   • hydrocortisone (ANUSOL-HC) 2.5 % rectal cream Apply topically 2 (two) times a day (Patient not taking: Reported on 8/15/2023)   • hydrocortisone (ANUSOL-HC) 25 mg suppository INSERT 1 SUPPOSITORY INTO THE RECTUM 2 TIMES A DAY AS NEEDED FOR HEMORRHOIDS. (Patient not taking: Reported on 8/15/2023)   • sucralfate (CARAFATE) 1 g tablet Take 1 tablet (1 g total) by mouth 3 (three) times a day before meals (Patient not taking: Reported on 8/15/2023)     Allergies   Allergen Reactions   • Reglan [Metoclopramide] Confusion     Pt reports Reglan "makes me lose my mind and go crazy."     Immunization History   Administered Date(s) Administered   • COVID-19 MODERNA VACC 0.5 ML IM 03/25/2021, 04/22/2021, 12/05/2021       Objective     /68 (BP Location: Left arm, Patient Position: Sitting, Cuff Size: Large)   Pulse 69   Temp (!) 97.4 °F (36.3 °C) (Tympanic)   Resp 16   Ht 5' 5" (1.651 m)   Wt 83.5 kg (184 lb)   SpO2 99%   BMI 30.62 kg/m²     Physical Exam  Vitals and nursing note reviewed. Constitutional:       General: She is not in acute distress. Appearance: Normal appearance. She is not ill-appearing. HENT:      Head: Normocephalic and atraumatic. Right Ear: External ear normal.      Left Ear: External ear normal.      Nose: Nose normal.   Eyes:      Conjunctiva/sclera: Conjunctivae normal.   Cardiovascular:      Rate and Rhythm: Normal rate and regular rhythm. Heart sounds: Normal heart sounds. Pulmonary:      Effort: Pulmonary effort is normal.      Breath sounds: Normal breath sounds.    Musculoskeletal:         General: Normal range of motion. Cervical back: Normal range of motion. Skin:     General: Skin is warm and dry. Neurological:      Mental Status: She is alert and oriented to person, place, and time. Cranial Nerves: No cranial nerve deficit.    Psychiatric:         Mood and Affect: Mood normal.         Behavior: Behavior normal.       BARB Coronado

## 2023-08-15 NOTE — ASSESSMENT & PLAN NOTE
- Well controlled on pantoprazole 40 mg daily. Continue same.   - Avoid triggering food and beverage.  - Will continue to monitor.

## 2023-08-15 NOTE — ASSESSMENT & PLAN NOTE
- Continue Lexapro 20 mg daily. - Xanax as needed. - Continue routine visits with therapist.  - Will continue to monitor.

## 2023-09-06 DIAGNOSIS — K21.9 GASTROESOPHAGEAL REFLUX DISEASE: ICD-10-CM

## 2023-09-06 RX ORDER — PANTOPRAZOLE SODIUM 40 MG/1
40 TABLET, DELAYED RELEASE ORAL EVERY MORNING
Qty: 30 TABLET | Refills: 0 | Status: SHIPPED | OUTPATIENT
Start: 2023-09-06

## 2023-09-20 ENCOUNTER — CONSULT (OUTPATIENT)
Dept: FAMILY MEDICINE CLINIC | Facility: CLINIC | Age: 48
End: 2023-09-20
Payer: COMMERCIAL

## 2023-09-20 VITALS
DIASTOLIC BLOOD PRESSURE: 70 MMHG | RESPIRATION RATE: 16 BRPM | SYSTOLIC BLOOD PRESSURE: 110 MMHG | HEIGHT: 65 IN | WEIGHT: 180 LBS | HEART RATE: 77 BPM | OXYGEN SATURATION: 99 % | TEMPERATURE: 98.7 F | BODY MASS INDEX: 29.99 KG/M2

## 2023-09-20 DIAGNOSIS — G47.09 OTHER INSOMNIA: ICD-10-CM

## 2023-09-20 DIAGNOSIS — M51.36 DDD (DEGENERATIVE DISC DISEASE), LUMBAR: Primary | ICD-10-CM

## 2023-09-20 DIAGNOSIS — M54.50 CHRONIC MIDLINE LOW BACK PAIN WITHOUT SCIATICA: ICD-10-CM

## 2023-09-20 DIAGNOSIS — G25.81 RESTLESS LEGS SYNDROME (RLS): ICD-10-CM

## 2023-09-20 DIAGNOSIS — Z01.818 PRE-OP EXAMINATION: ICD-10-CM

## 2023-09-20 DIAGNOSIS — G89.29 CHRONIC MIDLINE LOW BACK PAIN WITHOUT SCIATICA: ICD-10-CM

## 2023-09-20 PROBLEM — M51.369 DDD (DEGENERATIVE DISC DISEASE), LUMBAR: Status: ACTIVE | Noted: 2023-09-20

## 2023-09-20 PROCEDURE — 99214 OFFICE O/P EST MOD 30 MIN: CPT | Performed by: FAMILY MEDICINE

## 2023-09-20 RX ORDER — KETOROLAC TROMETHAMINE 10 MG/1
10 TABLET, FILM COATED ORAL EVERY 6 HOURS PRN
Qty: 20 TABLET | Refills: 0 | Status: SHIPPED | OUTPATIENT
Start: 2023-09-20

## 2023-09-20 RX ORDER — TRAZODONE HYDROCHLORIDE 50 MG/1
TABLET ORAL
Qty: 60 TABLET | Refills: 2 | Status: SHIPPED | OUTPATIENT
Start: 2023-09-20

## 2023-09-20 RX ORDER — GABAPENTIN 300 MG/1
CAPSULE ORAL
Qty: 120 CAPSULE | Refills: 1 | Status: SHIPPED | OUTPATIENT
Start: 2023-09-20

## 2023-09-20 RX ORDER — KETOROLAC TROMETHAMINE 10 MG/1
TABLET, FILM COATED ORAL
COMMUNITY
Start: 2023-09-07 | End: 2023-09-20 | Stop reason: SDUPTHER

## 2023-10-04 DIAGNOSIS — K21.9 GASTROESOPHAGEAL REFLUX DISEASE: ICD-10-CM

## 2023-10-04 RX ORDER — PANTOPRAZOLE SODIUM 40 MG/1
40 TABLET, DELAYED RELEASE ORAL EVERY MORNING
Qty: 30 TABLET | Refills: 0 | Status: SHIPPED | OUTPATIENT
Start: 2023-10-04

## 2023-10-04 NOTE — TELEPHONE ENCOUNTER
Pharmacy requested  pantoprazole (PROTONIX) 40 mg tablet. Pt last seen 8/15/23. Refill sent to pharmacy.

## 2023-10-31 DIAGNOSIS — K21.9 GASTROESOPHAGEAL REFLUX DISEASE: ICD-10-CM

## 2023-10-31 RX ORDER — PANTOPRAZOLE SODIUM 40 MG/1
40 TABLET, DELAYED RELEASE ORAL EVERY MORNING
Qty: 30 TABLET | Refills: 0 | Status: SHIPPED | OUTPATIENT
Start: 2023-10-31

## 2023-11-02 DIAGNOSIS — F41.9 ANXIETY: ICD-10-CM

## 2023-11-02 RX ORDER — ALPRAZOLAM 0.25 MG/1
TABLET ORAL
Qty: 30 TABLET | Refills: 0 | Status: SHIPPED | OUTPATIENT
Start: 2023-11-02

## 2023-11-02 NOTE — TELEPHONE ENCOUNTER
Pharmacy requesting refill on Xanax      [phone] [id number] Claudene Dupre [place]      Search Criteria  NAME DATE OF BIRTH DATE RANGE  Shena Zamora 37- 11- To 11-  REQUESTER NAME REQUESTED DATE  Isiah Castle Nov 02 2023 09:54:21 GMT-0400 [facility]  Summary  Prescriptions  8  Prescribers  5  Pharmacies  1  Drug Classes  Benzodiazepines  2  Stimulants  0  Opioids  3  Muscle Relaxants  0  Opioid Dosage  Total MME for Active Prescriptions  30.62    Average MME  31.24         Prescriptions  Notifications    Prescribers  Pharmacies  MME Graph    Show All     [place]   Drug Categories:      Opioids       Benzodiazepines       Others     Show  10  entries  Search:  PATIENT ID PRESCRIPTION # FILLED WRITTEN DRUG LABEL QTY DAYS STRENGTH MME** PRESCRIBER PHARMACY PAYMENT REFILL #/AUTH STATE DETAIL  1 [id number] 10/23/2023 10/23/2023 oxyCODONE HCL (Tablet) 49.0 12 5 MG 30.62 [facility] Private Pay 0 / 0 [place]   1 [id number] 10/10/2023 10/10/2023 oxyCODONE HCL (Tablet) 42.0 10 5 MG 31.50 [facility] Private Pay 0 / 0 [place]   1 [id number] 10/05/2023 10/05/2023 oxyCODONE HCL (Tablet) 25.0 7 5 MG 26.79 SERGO HALL [facility] Medicaid 0 / 0 [place]   1 [id number] 08/15/2023 08/15/2023 ALPRAZolam (Tablet) 30.0 30 0.25 MG NA OLESYA GREEN [facility] Medicaid 0 / 0 [place]   1 [id number] 07/13/2023 07/13/2023 Acetaminophen 325 Mg / Caffeine 40 Mg Oral Tablet/ Butalbital 50 Mg (Tablet) 30.0 7 40.0 MG/50.0 MG/325.0 MG NA NUNO, [address] [facility] Medicaid 0 / 1 [place]   1 [id number] 11/28/2022 11/28/2022 ALPRAZolam (Tablet) 20.0 20 0.25 MG NA ANKIT) [facility] Medicaid 0 / 0 [place]   1 [id number] 11/28/2022 11/28/2022 Acetaminophen 325 Mg / Caffeine 40 Mg Oral Tablet/ Butalbital 50 Mg (Tablet) 30.0 5 40.0 MG/50.0 MG/325.0 MG NA ANKIT) [address] [place] 0 / 0 [place]   1 [id number] 11/09/2022 11/09/2022 Zolpidem Tartrate (Tablet) 30.0 30 5 MG

## 2023-11-29 DIAGNOSIS — K21.9 GASTROESOPHAGEAL REFLUX DISEASE: ICD-10-CM

## 2023-11-29 RX ORDER — PANTOPRAZOLE SODIUM 40 MG/1
40 TABLET, DELAYED RELEASE ORAL EVERY MORNING
Qty: 30 TABLET | Refills: 0 | Status: SHIPPED | OUTPATIENT
Start: 2023-11-29

## 2023-12-12 DIAGNOSIS — F41.9 ANXIETY: ICD-10-CM

## 2023-12-12 RX ORDER — ALPRAZOLAM 0.25 MG/1
TABLET ORAL
Qty: 30 TABLET | Refills: 0 | Status: SHIPPED | OUTPATIENT
Start: 2023-12-12

## 2023-12-12 NOTE — TELEPHONE ENCOUNTER
Last seen 9/20/23 and I copied past refills into chart from [address] web site and sent for proivder approval    [facility] Criteria    NAME [id number]- 12- To 12-     REQUESTER NAME REQUESTED DATE   Mcintosh Heading Tue Dec 12 2023 12:24:53 GMT-0500 Ajnette Heap Standard Time)     Summary  Prescriptions  9  Prescribers  4  Pharmacies  1  View Map  Drug Classes  Benzodiazepines  2  Stimulants  0  Opioids  6  Muscle Relaxants  0  Opioid Dosage  Total MME for Active Prescriptions  28.27    Average MME  31.37  MME Graph   MME Calculator     Prescriptions  Notifications  1  Prescribers  Pharmacies  MME Graph    [] [place] Drug Categories:     [] Opioids      [] Benzodiazepines      [] Others      Showentries  Search:  PATIENT ID PRESCRIPTION # FILLED WRITTEN DRUG LABEL QTY DAYS STRENGTH MME** PRESCRIBER PHARMACY PAYMENT REFILL #/AUTH STATE DETAIL   1 [id number] 12/05/2023 12/05/2023 oxyCODONE HCL (Tablet) 49.0 13 5 MG 28.27 [facility] Private Pay 0 / 0 [place]    1 [id number] 11/15/2023 11/15/2023 oxyCODONE HCL (Tablet) 49.0 12 5 MG 30.62 [facility] Private Pay 0 / 0 [place]    1 [id number] 11/03/2023 11/03/2023 oxyCODONE HCL (Tablet) 49.0 12 5 MG 30.62 [facility] Private Pay 0 / 0 [place]    1 [id number] 11/02/2023 11/02/2023 ALPRAZolam (Tablet) 30.0 30 0.25 MG NA OLESYA GREEN [facility] Medicaid 0 / 0 [place]    1 [id number] 10/23/2023 10/23/2023 oxyCODONE HCL (Tablet) 49.0 12 5 MG 30.62 [facility] Private Pay 0 / 0 [place]    1 [id number] 10/10/2023 10/10/2023 oxyCODONE HCL (Tablet) 42.0 10 5 MG 31.50 [facility] Private Pay 0 / 0 [place]    1 [id number] 10/05/2023 10/05/2023 oxyCODONE HCL (Tablet) 25.0 7 5 MG 26.79 SERGO HALL [facility]  Medicaid 0 / 0 [place]    1 [id number] 08/15/2023 08/15/2023 ALPRAZolam (Tablet) 30.0 30 0.25 MG NA OLESYA GREEN [facility] Medicaid 0 / 0 [place]    1 [id number] 07/13/2023 07/13/2023 Acetaminophen 325 Mg / Caffeine 40 Mg Oral Tablet/ Butalbital 50 Mg (Tablet) 30.0 7 40.0 MG/50.0 MG/325.0 MG NA NUNO, [address] [facility] Medicaid 0 / 1 [place]    Showing 1 to 9 of 9 entries  [id number]     * Per CDC guidance, the conversion factors and associated daily morphine milligram equivalents for drugs prescribed as part of medication-assisted treatment for opioid use disorder should not be used to benchmark against dosage thresholds meant for opioids prescribed for pain. Report Disclaimer:  Information from the [address] [place] [address] Dr Program (PDMP) database is protected health information and any information accessed must be treated as confidential. Any person who knowingly or intentionally makes an unauthorized disclosure of information from the [address] will be subject to civil and criminal penalties as set forth in the ABC-MAP Act 2014-191, Act of Oct. 27, 2014, P.L. 2911. The information in the [address] is submitted by pharmacies and may contain errors and omissions. Independent verification of prescription information with pharmacies and prescribers may sometimes be prudent or necessary.   Educational content  CDC MME calculation guidelines  [place] Prescribing Guidelines  Letter Regarding the Misapplication of Prescribing Guidelines   Guide for Appropriate Tapering or Discontinuation of Long-Term Opioid Use   #[id number]

## 2023-12-28 DIAGNOSIS — K21.9 GASTROESOPHAGEAL REFLUX DISEASE: ICD-10-CM

## 2023-12-29 RX ORDER — PANTOPRAZOLE SODIUM 40 MG/1
40 TABLET, DELAYED RELEASE ORAL EVERY MORNING
Qty: 30 TABLET | Refills: 0 | Status: SHIPPED | OUTPATIENT
Start: 2023-12-29

## 2024-01-10 DIAGNOSIS — G47.09 OTHER INSOMNIA: ICD-10-CM

## 2024-01-10 RX ORDER — TRAZODONE HYDROCHLORIDE 50 MG/1
TABLET ORAL
Qty: 60 TABLET | Refills: 5 | Status: SHIPPED | OUTPATIENT
Start: 2024-01-10

## 2024-01-17 ENCOUNTER — OFFICE VISIT (OUTPATIENT)
Dept: FAMILY MEDICINE CLINIC | Facility: CLINIC | Age: 49
End: 2024-01-17
Payer: COMMERCIAL

## 2024-01-17 VITALS
RESPIRATION RATE: 16 BRPM | OXYGEN SATURATION: 97 % | DIASTOLIC BLOOD PRESSURE: 60 MMHG | SYSTOLIC BLOOD PRESSURE: 126 MMHG | HEART RATE: 90 BPM | HEIGHT: 65 IN | BODY MASS INDEX: 30.16 KG/M2 | WEIGHT: 181 LBS | TEMPERATURE: 98.6 F

## 2024-01-17 DIAGNOSIS — G43.709 CHRONIC MIGRAINE WITHOUT AURA WITHOUT STATUS MIGRAINOSUS, NOT INTRACTABLE: ICD-10-CM

## 2024-01-17 DIAGNOSIS — E55.9 VITAMIN D INSUFFICIENCY: ICD-10-CM

## 2024-01-17 DIAGNOSIS — Z00.01 ABNORMAL WELLNESS EXAM: ICD-10-CM

## 2024-01-17 DIAGNOSIS — Z98.1 S/P LUMBAR FUSION: ICD-10-CM

## 2024-01-17 DIAGNOSIS — R06.83 SNORING: ICD-10-CM

## 2024-01-17 DIAGNOSIS — M25.50 ARTHRALGIA, UNSPECIFIED JOINT: ICD-10-CM

## 2024-01-17 DIAGNOSIS — Z13.220 SCREENING FOR HYPERLIPIDEMIA: ICD-10-CM

## 2024-01-17 DIAGNOSIS — F41.9 ANXIETY: ICD-10-CM

## 2024-01-17 DIAGNOSIS — F32.A DEPRESSION, UNSPECIFIED DEPRESSION TYPE: ICD-10-CM

## 2024-01-17 DIAGNOSIS — K21.9 GASTROESOPHAGEAL REFLUX DISEASE WITHOUT ESOPHAGITIS: ICD-10-CM

## 2024-01-17 DIAGNOSIS — G25.81 RESTLESS LEGS SYNDROME (RLS): Primary | ICD-10-CM

## 2024-01-17 PROCEDURE — 99396 PREV VISIT EST AGE 40-64: CPT | Performed by: FAMILY MEDICINE

## 2024-01-17 PROCEDURE — 99214 OFFICE O/P EST MOD 30 MIN: CPT | Performed by: FAMILY MEDICINE

## 2024-01-17 RX ORDER — OXYCODONE HYDROCHLORIDE 5 MG/1
5 TABLET ORAL EVERY 6 HOURS PRN
COMMUNITY
Start: 2023-12-15

## 2024-01-17 RX ORDER — MOMETASONE FUROATE 1 MG/G
CREAM TOPICAL DAILY
COMMUNITY
Start: 2023-11-29

## 2024-01-17 RX ORDER — METHOCARBAMOL 750 MG/1
750 TABLET, FILM COATED ORAL
COMMUNITY
Start: 2023-12-12

## 2024-01-17 RX ORDER — AMOXICILLIN 250 MG
1 CAPSULE ORAL 2 TIMES DAILY
COMMUNITY
Start: 2023-10-05

## 2024-01-17 RX ORDER — PRAMIPEXOLE DIHYDROCHLORIDE 0.5 MG/1
0.5 TABLET ORAL 3 TIMES DAILY
Qty: 90 TABLET | Refills: 1 | Status: SHIPPED | OUTPATIENT
Start: 2024-01-17

## 2024-01-17 NOTE — PROGRESS NOTES
Name: Gracia Chirinos      : 1975      MRN: [id number]  Encounter Provider: Ricardo David MD  Encounter Date: 2024   Encounter department: [facility]    Assessment & Plan     1. Restless legs syndrome (RLS)  Assessment & Plan:  Worsening restless leg syndrome after surgery. Gabapentin is not helping. Advised patient to take 2 gabapentin at a time and not 3, and that she can take it up to TID. Will check CBC with iron panel, electrolytes and TSH. Prescribing Mirapex which has helped patient in the past. Additionally, sending referral to sleep medicine.  Follow-up in 3 months.    Orders:  -     CBC and differential; Future  -     Iron Panel (Includes Ferritin, Iron Sat%, Iron, and TIBC); Future  -     Comprehensive metabolic panel; Future  -     TSH, 3rd generation with Free T4 reflex; Future  -     Ambulatory Referral to Sleep Medicine; Future  -     pramipexole (MIRAPEX) 0.5 mg tablet; Take 1 tablet (0.5 mg total) by mouth 3 (three) times a day    2. Gastroesophageal reflux disease without esophagitis  Assessment & Plan:  Controlled on pantoprazole.       3. Vitamin D insufficiency  Assessment & Plan:  History of vitamin D insufficiency, will check.    Orders:  -     Vitamin D 25 hydroxy; Future    4. Depression, unspecified depression type  Assessment & Plan:  Stable on Lexapro daily.      5. Chronic migraine without aura without status migrainosus, not intractable  Assessment & Plan:  Well-controlled, has Esgic for rescue medication.      6. Anxiety  Assessment & Plan:  Stable on Lexapro daily, Xanax as needed. Continue to monitor.      7. S/P lumbar fusion  Assessment & Plan:  Follows with neurosurgery.       8. Screening for hyperlipidemia  Assessment & Plan:  Will check lipid panel.    Orders:  -     Lipid Panel with Direct LDL reflex; Future    9. Snoring  Assessment & Plan:  Patient's partner reporting that patient is snoring loudly. Sending to sleep medicine for  further evaluation.    Orders:  -     Ambulatory Referral to Sleep Medicine; Future    10. Arthralgia, unspecified joint  Assessment & Plan:  Generalized joint pain in elbows and knees for the past couple months. Pain in big toe since last year. ROM is not limited. Patient takes gabapentin which does not help. Will check inflammatory markers, rheumatoid factor and RAMIREZ. Will also assess uric acid for possibility of gout.    Orders:  -     C-reactive protein; Future  -     Rheumatoid Factor; Future  -     RAMIREZ Screen, IFA, with Reflex to Titer and Pattern/Lupus Panel 1; Future  -     Uric acid; Future    11. Abnormal wellness exam  Assessment & Plan:  It was discussed about immunizations, diet, exercise and safety measures.             Luiz Fagan is a 48 year old female with a past medical history of GERD, anxiety, depression, migraines, restless leg syndrome and s/p lumbar fusion presenting to the office with multiple complaints. Firstly, her restless leg syndrome has worsened. She states that it got worse after her surgery in October. She has taken gabapentin for restless leg syndrome previously but it is no longer helping. She started taking 3 in the morning and 3 at night and still has no relief. It occurs at all times of the day, worse when at rest; watching tv or before bed. Additionally her right toe is bothering her. She has had pain there since last summer. She has not noticed any skin changes, erythema, or swelling. Her ROM is preserved. Lastly, she has had joint pain bilaterally in her elbows, knees, and fingers for the past few months. It is worst in the mornings. Nothing makes it better.    Toe Pain       Review of Systems   Constitutional:  Negative for chills and fever.   HENT:  Negative for ear pain and sore throat.    Eyes:  Negative for pain and visual disturbance.   Respiratory:  Negative for cough and shortness of breath.    Cardiovascular:  Negative for chest pain and palpitations.    Gastrointestinal:  Negative for abdominal pain and vomiting.   Genitourinary:  Negative for dysuria and hematuria.   Musculoskeletal:  Positive for arthralgias (bilateral elbows, knees, fingers).   Skin:  Negative for color change and rash.   Neurological:  Negative for light-headedness and headaches.   Psychiatric/Behavioral:  The patient is nervous/anxious.    All other systems reviewed and are negative.      Past Medical History:   Diagnosis Date   • Abnormal Pap smear of cervix    • RAMIREZ positive    • Anxiety    • Basal cell carcinoma    • Depression    • Migraine    • Migraine    • Miscarriage    • MRSA colonization 2016   • Neck pain    • Pregnancy    • Vertebral artery dissection (HCC)    • Vulvovaginitis    • Yeast infection      Past Surgical History:   Procedure Laterality Date   •  SECTION, LOW TRANSVERSE     • COLONOSCOPY     • DILATION AND CURETTAGE OF UTERUS     • GYNECOLOGIC CRYOSURGERY      cervix   • LASIK     • SKIN LESION EXCISION      basal cell carcinoma of left cheek     Family History   Problem Relation Age of Onset   • Hypertension Mother    • Lung cancer Mother 56   • Brain cancer Mother 56   • Breast cancer Maternal Aunt         dx in 40's    • Multiple sclerosis Father    • No Known Problems Daughter    • No Known Problems Maternal Grandmother    • No Known Problems Maternal Grandfather    • No Known Problems Paternal Grandmother    • No Known Problems Paternal Grandfather    • No Known Problems Maternal Aunt      Social History     Socioeconomic History   • Marital status:      Spouse name: None   • Number of children: None   • Years of education: None   • Highest education level: None   Occupational History   • None   Tobacco Use   • Smoking status: Never   • Smokeless tobacco: Never   Vaping Use   • Vaping status: Never Used   Substance and Sexual Activity   • Alcohol use: No   • Drug use: No   • Sexual activity: Yes     Partners: Male     Birth  control/protection: Male Sterilization   Other Topics Concern   • None   Social History Narrative    Feels safe at home      Social Determinants of Health     Financial Resource Strain: Not on file   Food Insecurity: Not on file   Transportation Needs: Not on file   Physical Activity: Not on file   Stress: Not on file   Social Connections: Not on file   Intimate Partner Violence: Not At Risk (10/4/2023)    Received from [facility]    Humiliation, Afraid, Rape, and Kick questionnaire    • Fear of Current or Ex-Partner: No    • Emotionally Abused: No    • Physically Abused: No    • Sexually Abused: No   Housing Stability: Not on file     Current Outpatient Medications on File Prior to Visit   Medication Sig   • ALPRAZolam (XANAX) 0.25 mg tablet TAKE 1 TABLET BY MOUTH DAILY AT BEDTIME AS NEEDED FOR ANXIETY.   • Botox 200 units SOLR    • butalbital-acetaminophen-caffeine (Esgic) -40 mg per tablet Take 1 tablet by mouth every 4 (four) hours as needed for headaches   • escitalopram (LEXAPRO) 20 mg tablet TAKE 1 TABLET BY MOUTH EVERY DAY   • gabapentin (NEURONTIN) 300 mg capsule Take 1 capsule in the morning and 2 at bedtime (Patient taking differently: Take 3 capsules in the morning and 3 at bedtime)   • hydrOXYzine HCL (ATARAX) 25 mg tablet Take 25 mg by mouth in the morning     • latanoprost (XALATAN) 0.005 % ophthalmic solution INSTILL 1 DROP IN AFFECTED EYE AT BEDTIME   • methocarbamol (ROBAXIN) 750 mg tablet Take 750 mg by mouth daily at bedtime   • mometasone (ELOCON) 0.1 % cream Apply topically daily   • oxyCODONE (ROXICODONE) 5 immediate release tablet Take 5 mg by mouth every 6 (six) hours as needed   • pantoprazole (PROTONIX) 40 mg tablet TAKE 1 TABLET BY MOUTH EVERY DAY IN THE MORNING   • Probiotic Product (PRO-BIOTIC BLEND PO) Take by mouth   • senna-docusate sodium (SENOKOT S) 8.6-50 mg per tablet Take 1 tablet by mouth 2 (two) times a day   • traZODone (DESYREL) 50 mg tablet TAKE 2  "TABLETS BY MOUTH EVERY DAY AT BEDTIME   • hydrocortisone (ANUSOL-HC) 2.5 % rectal cream Apply topically 2 (two) times a day (Patient not taking: Reported on 8/15/2023)   • hydrocortisone (ANUSOL-HC) 25 mg suppository INSERT 1 SUPPOSITORY INTO THE RECTUM 2 TIMES A DAY AS NEEDED FOR HEMORRHOIDS. (Patient not taking: Reported on 8/15/2023)   • ketorolac (TORADOL) 10 mg tablet Take 1 tablet (10 mg total) by mouth every 6 (six) hours as needed for moderate pain (Patient not taking: Reported on 1/17/2024)   • sucralfate (CARAFATE) 1 g tablet Take 1 tablet (1 g total) by mouth 3 (three) times a day before meals (Patient not taking: Reported on 8/15/2023)     Allergies   Allergen Reactions   • Reglan [Metoclopramide] Confusion     Pt reports Reglan \"makes me lose my mind and go crazy.\"     Immunization History   Administered Date(s) Administered   • COVID-19 MODERNA VACC 0.5 ML IM 03/25/2021, 04/22/2021, 12/05/2021       Objective     /60 (BP Location: Left arm, Patient Position: Sitting, Cuff Size: Large)   Pulse 90   Temp 98.6 °F (37 °C) (Tympanic)   Resp 16   Ht 5' 5\" (1.651 m)   Wt 82.1 kg (181 lb)   SpO2 97%   BMI 30.12 kg/m²     Physical Exam  Vitals reviewed.   Constitutional:       General: She is not in acute distress.     Appearance: Normal appearance.   HENT:      Head: Normocephalic and atraumatic.      Nose: Nose normal.   Eyes:      Extraocular Movements: Extraocular movements intact.   Cardiovascular:      Rate and Rhythm: Normal rate and regular rhythm.      Pulses: Normal pulses.      Heart sounds: Normal heart sounds. No murmur heard.     No friction rub. No gallop.   Pulmonary:      Effort: Pulmonary effort is normal. No respiratory distress.      Breath sounds: Normal breath sounds. No wheezing, rhonchi or rales.   Musculoskeletal:         General: Normal range of motion.      Cervical back: Normal range of motion.      Right lower leg: No edema.      Left lower leg: No edema.   Skin:     " General: Skin is warm and dry.      Capillary Refill: Capillary refill takes less than 2 seconds.   Neurological:      General: No focal deficit present.      Mental Status: She is alert.   Psychiatric:         Mood and Affect: Mood normal.       Ricardo David MD

## 2024-01-17 NOTE — ASSESSMENT & PLAN NOTE
Worsening restless leg syndrome after surgery. Gabapentin is not helping. Advised patient to take 2 gabapentin at a time and not 3, and that she can take it up to TID. Will check CBC with iron panel, electrolytes and TSH. Prescribing Mirapex which has helped patient in the past. Additionally, sending referral to sleep medicine.  Follow-up in 3 months.

## 2024-01-17 NOTE — ASSESSMENT & PLAN NOTE
Generalized joint pain in elbows and knees for the past couple months. Pain in big toe since last year. ROM is not limited. Patient takes gabapentin which does not help. Will check inflammatory markers, rheumatoid factor and RAMIREZ. Will also assess uric acid for possibility of gout.

## 2024-01-17 NOTE — ASSESSMENT & PLAN NOTE
Patient's partner reporting that patient is snoring loudly. Sending to sleep medicine for further evaluation.

## 2024-02-03 DIAGNOSIS — K21.9 GASTROESOPHAGEAL REFLUX DISEASE: ICD-10-CM

## 2024-02-03 RX ORDER — PANTOPRAZOLE SODIUM 40 MG/1
40 TABLET, DELAYED RELEASE ORAL EVERY MORNING
Qty: 30 TABLET | Refills: 0 | Status: SHIPPED | OUTPATIENT
Start: 2024-02-03

## 2024-02-08 LAB
25(OH)D3 SERPL-MCNC: 38 NG/ML (ref 30–100)
ALBUMIN SERPL-MCNC: 4 G/DL (ref 3.6–5.1)
ALBUMIN/GLOB SERPL: 1.4 (CALC) (ref 1–2.5)
ALP SERPL-CCNC: 73 U/L (ref 31–125)
ALT SERPL-CCNC: 9 U/L (ref 6–29)
ANA PAT SER IF-IMP: ABNORMAL
ANA PAT SER IF-IMP: ABNORMAL
ANA PAT SER-IMP: ABNORMAL
ANA SER QL IF: POSITIVE
ANA TITR SER IF: ABNORMAL TITER
AST SERPL-CCNC: 17 U/L (ref 10–35)
BASOPHILS # BLD AUTO: 41 CELLS/UL (ref 0–200)
BASOPHILS NFR BLD AUTO: 0.8 %
BILIRUB SERPL-MCNC: 0.3 MG/DL (ref 0.2–1.2)
BUN SERPL-MCNC: 15 MG/DL (ref 7–25)
BUN/CREAT SERPL: ABNORMAL (CALC) (ref 6–22)
CALCIUM SERPL-MCNC: 9 MG/DL (ref 8.6–10.2)
CHLORIDE SERPL-SCNC: 106 MMOL/L (ref 98–110)
CHOLEST SERPL-MCNC: 191 MG/DL
CHOLEST/HDLC SERPL: 3.4 (CALC)
CO2 SERPL-SCNC: 29 MMOL/L (ref 20–32)
CREAT SERPL-MCNC: 0.79 MG/DL (ref 0.5–0.99)
CRP SERPL-MCNC: 3.2 MG/L
DSDNA AB SER-ACNC: 7 IU/ML
ENA SM AB SER IA-ACNC: NORMAL AI
EOSINOPHIL # BLD AUTO: 199 CELLS/UL (ref 15–500)
EOSINOPHIL NFR BLD AUTO: 3.9 %
ERYTHROCYTE [DISTWIDTH] IN BLOOD BY AUTOMATED COUNT: 13.3 % (ref 11–15)
GFR/BSA.PRED SERPLBLD CYS-BASED-ARV: 92 ML/MIN/1.73M2
GLOBULIN SER CALC-MCNC: 2.8 G/DL (CALC) (ref 1.9–3.7)
GLUCOSE SERPL-MCNC: 103 MG/DL (ref 65–99)
HCT VFR BLD AUTO: 36.5 % (ref 35–45)
HDLC SERPL-MCNC: 56 MG/DL
HGB BLD-MCNC: 12.1 G/DL (ref 11.7–15.5)
LDLC SERPL CALC-MCNC: 108 MG/DL (CALC)
LYMPHOCYTES # BLD AUTO: 1698 CELLS/UL (ref 850–3900)
LYMPHOCYTES NFR BLD AUTO: 33.3 %
MCH RBC QN AUTO: 27 PG (ref 27–33)
MCHC RBC AUTO-ENTMCNC: 33.2 G/DL (ref 32–36)
MCV RBC AUTO: 81.5 FL (ref 80–100)
MONOCYTES # BLD AUTO: 321 CELLS/UL (ref 200–950)
MONOCYTES NFR BLD AUTO: 6.3 %
NEUTROPHILS # BLD AUTO: 2841 CELLS/UL (ref 1500–7800)
NEUTROPHILS NFR BLD AUTO: 55.7 %
NONHDLC SERPL-MCNC: 135 MG/DL (CALC)
NUCLEOSOME AB SER IA-ACNC: NORMAL AI
PLATELET # BLD AUTO: 215 THOUSAND/UL (ref 140–400)
PMV BLD REES-ECKER: 9.5 FL (ref 7.5–12.5)
POTASSIUM SERPL-SCNC: 4 MMOL/L (ref 3.5–5.3)
PROT SERPL-MCNC: 6.8 G/DL (ref 6.1–8.1)
RBC # BLD AUTO: 4.48 MILLION/UL (ref 3.8–5.1)
RHEUMATOID FACT SERPL-ACNC: <14 IU/ML
SODIUM SERPL-SCNC: 142 MMOL/L (ref 135–146)
TRIGL SERPL-MCNC: 154 MG/DL
TSH SERPL-ACNC: 0.78 MIU/L
URATE SERPL-MCNC: 4.5 MG/DL (ref 2.5–7)
WBC # BLD AUTO: 5.1 THOUSAND/UL (ref 3.8–10.8)

## 2024-02-15 ENCOUNTER — TELEPHONE (OUTPATIENT)
Dept: FAMILY MEDICINE CLINIC | Facility: CLINIC | Age: 49
End: 2024-02-15

## 2024-02-15 DIAGNOSIS — R76.8 POSITIVE ANA (ANTINUCLEAR ANTIBODY): Primary | ICD-10-CM

## 2024-02-15 NOTE — TELEPHONE ENCOUNTER
----- Message from Ricarda Fang RN sent at 2/15/2024 11:27 AM EST -----  Regarding: FW: Blood work   Contact: [phone]  I did not see a result note from the office.  ----- Message -----  From: Gracia Chirinos  Sent: 2/15/2024   9:44 AM EST  To: Primary Care [facility]  Subject: Blood work                                       Hello- i read the results of my blood work and to my uneducated eye some of it didn’t look very good. Can someone please call me to discuss this? Also, I thought we were going to check my iron levels? I didn’t see that, but I could be wrong. Thank you so much.

## 2024-02-16 NOTE — TELEPHONE ENCOUNTER
Labs show slightly elevated cholesterol and fasting glucose. Recommend healthy diet and regular exercise. I do see that the iron panel was ordered but for some reason wasn't collected. Her CBC looks good though so no anemia. Her RAMIREZ which is used to check for autoimmune disorders appears positive. I recommend referral to Rheumatologist who can explain in more detail.

## 2024-02-20 ENCOUNTER — TELEPHONE (OUTPATIENT)
Age: 49
End: 2024-02-20

## 2024-02-20 NOTE — TELEPHONE ENCOUNTER
Shreya from [facility] called in regards to the referral we sent, stating they do not accept [facility] .

## 2024-02-20 NOTE — TELEPHONE ENCOUNTER
I checked chart we never placed a referral for orthopedic. I called pt and she never requested this. I am not sure if this was the right patients chart but no need for new referral to particpating orthopedic

## 2024-02-20 NOTE — TELEPHONE ENCOUNTER
Patient called stating she needed the ambulatory referral faxed over to [facility].  Referral was faxed to [phone].

## 2024-02-21 ENCOUNTER — TELEPHONE (OUTPATIENT)
Age: 49
End: 2024-02-21

## 2024-02-21 PROBLEM — Z13.220 SCREENING FOR HYPERLIPIDEMIA: Status: RESOLVED | Noted: 2019-08-13 | Resolved: 2024-02-21

## 2024-02-21 PROBLEM — Z01.419 ENCOUNTER FOR GYNECOLOGICAL EXAMINATION WITHOUT ABNORMAL FINDING: Status: RESOLVED | Noted: 2019-04-15 | Resolved: 2024-02-21

## 2024-02-21 NOTE — TELEPHONE ENCOUNTER
Ronel called from [facility] stating the referral was missing the patient's . I advised her of the patient's missing demographic.

## 2024-02-24 DIAGNOSIS — F41.9 ANXIETY: ICD-10-CM

## 2024-02-26 RX ORDER — ALPRAZOLAM 0.25 MG/1
TABLET ORAL
Qty: 30 TABLET | Refills: 0 | Status: SHIPPED | OUTPATIENT
Start: 2024-02-26

## 2024-02-26 NOTE — TELEPHONE ENCOUNTER
Last seen 1/27/24 and I copied past refills into chart from pdmp website and sent to provider for approval      1 LESLI MARIE 1975 F [id number] ADELINE LUCERO           Search Criteria    NAME DATE OF BIRTH DATE RANGE   lesli marie 1975 02- To 02-     REQUESTER NAME REQUESTED DATE   JEREL JOHNSONSRIRAM Mon Feb 26 2024 09:13:38 GMT-0500 (Eastern Standard Time)     Summary  Prescriptions  11  Prescribers  5  Pharmacies  1  View Map  Drug Classes  Benzodiazepines  3  Stimulants  0  Opioids  7  Muscle Relaxants  0  Opioid Dosage  Total MME for Active Prescriptions  0    Average MME  31.73  MME Graph   MME Calculator     Prescriptions  Notifications  1  Prescribers  Pharmacies  MME Graph    [] [place] Drug Categories:     [] Opioids      [] Benzodiazepines      [] Others      Showentries  Search:  PATIENT ID PRESCRIPTION # FILLED WRITTEN DRUG LABEL QTY DAYS STRENGTH MME** PRESCRIBER PHARMACY PAYMENT REFILL #/AUTH STATE DETAIL   1 [id number] 12/15/2023 12/15/2023 oxyCODONE HCL (Tablet) 49.0 13 5 MG 28.27 [facility] Private Pay 0 / 0 [place]    1 [id number] 12/12/2023 12/12/2023 ALPRAZolam (Tablet) 30.0 30 0.25 MG MARIN PHAM) [facility] Medicaid 0 / 0 [place]    1 [id number] 12/05/2023 12/05/2023 oxyCODONE HCL (Tablet) 49.0 13 5 MG 28.27 [facility] Private [place] 0 / 0 [place]    1 [id number] 11/15/2023 11/15/2023 oxyCODONE HCL (Tablet) 49.0 12 5 MG 30.62 [facility] Private Pay 0 / 0 [place]    1 [id number] 11/03/2023 11/03/2023 oxyCODONE HCL (Tablet) 49.0 12 5 MG 30.62 [facility] Private Pay 0 / 0 [place]    1 [id number] 11/02/2023 11/02/2023 ALPRAZolam (Tablet) 30.0 30 0.25 MG NA OLESYA GREEN [facility] Medicaid 0 / 0 [place]    1 [id number] 10/23/2023 10/23/2023 oxyCODONE HCL (Tablet) 49.0 12 5 MG 30.62 RIK SIERRA [facility] Private Pay 0 / 0 [place]    1 [id number] 10/10/2023 10/10/2023 oxyCODONE HCL (Tablet) 42.0 10 5 MG 31.50 RIK SIERRA [facility] Private Pay 0 / 0 [place]    1 [id number] 10/05/2023 10/05/2023 oxyCODONE HCL (Tablet) 25.0 7 5 MG 26.79 SERGO HALL [facility] [place] 0 / 0 [place]    1 [id number] 08/15/2023 08/15/2023 ALPRAZolam (Tablet) 30.0 30 0.25 MG NA OLESYA GREEN [facility] Medicaid 0 / 0 [place]    Showing 1 to 10 of 11 entries  [id number]     * Per CDC guidance, the conversion factors and associated daily morphine milligram equivalents for drugs prescribed as part of medication-assisted treatment for opioid use disorder should not be used to benchmark against dosage thresholds meant for opioids prescribed for pain.  Report Disclaimer:  Information from the [place] Prescription Drug Monitoring Program (PDMP) database is protected health information and any information accessed must be treated as confidential. Any person who knowingly or intentionally makes an unauthorized disclosure of information from the PDMP database will be subject to civil and criminal penalties as set forth in the ABC-MAP Act 191 of 2014; Act of Oct. 27, 2014, P.L. 2911, No. 191.    The information in the PDMP database is submitted by pharmacies and may contain errors and omissions. Additionally, pharmacies may submit extraneous non-controlled substance dispensations to the PDMP database; if a non-controlled substance is present on one patient’s Prescription Report, it should not be assumed that this same medication will be reported on another patient’s Prescription Report. Independent verification of prescription information with pharmacies and prescribers may sometimes be prudent or necessary.  Educational content  CDC MME calculation guidelines  [place] Prescribing Guidelines  Letter Regarding the Misapplication of Prescribing Guidelines   Guide for  Appropriate Tapering or Discontinuation of Long-Term Opioid Use   #[id number]

## 2024-03-04 DIAGNOSIS — K21.9 GASTROESOPHAGEAL REFLUX DISEASE: ICD-10-CM

## 2024-03-04 RX ORDER — PANTOPRAZOLE SODIUM 40 MG/1
40 TABLET, DELAYED RELEASE ORAL EVERY MORNING
Qty: 30 TABLET | Refills: 0 | Status: SHIPPED | OUTPATIENT
Start: 2024-03-04

## 2024-03-06 DIAGNOSIS — G25.81 RESTLESS LEGS SYNDROME (RLS): ICD-10-CM

## 2024-03-07 RX ORDER — PRAMIPEXOLE DIHYDROCHLORIDE 0.5 MG/1
0.5 TABLET ORAL 3 TIMES DAILY
Qty: 90 TABLET | Refills: 1 | Status: SHIPPED | OUTPATIENT
Start: 2024-03-07

## 2024-03-08 ENCOUNTER — TELEPHONE (OUTPATIENT)
Dept: FAMILY MEDICINE CLINIC | Facility: CLINIC | Age: 49
End: 2024-03-08

## 2024-03-08 NOTE — LETTER
2024    Re: Gracia Chirinos  : 1975      [facility]      Gracia is being treated by our office for anxiety and depression with daily Lexapro and as needed Xanax. She is also being treated for GERD with Pantoprazole and chronic migraines with Esgic.   Gracia was referred to sleep medicine for snoring and rheumatology for arthralgia.   Please contact the office with any questions.       Ricardo David MD  nk

## 2024-03-08 NOTE — TELEPHONE ENCOUNTER
Pt states she need a letter stating her diagnosis and medications.   Letter sent through my chart.

## 2024-04-03 DIAGNOSIS — K21.9 GASTROESOPHAGEAL REFLUX DISEASE: ICD-10-CM

## 2024-04-03 RX ORDER — PANTOPRAZOLE SODIUM 40 MG/1
40 TABLET, DELAYED RELEASE ORAL EVERY MORNING
Qty: 30 TABLET | Refills: 5 | Status: SHIPPED | OUTPATIENT
Start: 2024-04-03

## 2024-04-08 ENCOUNTER — HOSPITAL ENCOUNTER (OUTPATIENT)
Dept: RADIOLOGY | Age: 49
Discharge: HOME | End: 2024-04-08
Payer: COMMERCIAL

## 2024-04-08 VITALS — HEIGHT: 65 IN | WEIGHT: 183 LBS | BODY MASS INDEX: 30.49 KG/M2

## 2024-04-08 DIAGNOSIS — Z12.31 ENCOUNTER FOR SCREENING MAMMOGRAM FOR MALIGNANT NEOPLASM OF BREAST: ICD-10-CM

## 2024-04-08 PROCEDURE — 77067 SCR MAMMO BI INCL CAD: CPT

## 2024-04-08 PROCEDURE — 77063 BREAST TOMOSYNTHESIS BI: CPT

## 2024-04-17 ENCOUNTER — OFFICE VISIT (OUTPATIENT)
Dept: SLEEP CENTER | Facility: CLINIC | Age: 49
End: 2024-04-17
Payer: COMMERCIAL

## 2024-04-17 VITALS
SYSTOLIC BLOOD PRESSURE: 108 MMHG | BODY MASS INDEX: 30.49 KG/M2 | WEIGHT: 183 LBS | DIASTOLIC BLOOD PRESSURE: 70 MMHG | RESPIRATION RATE: 18 BRPM | HEIGHT: 65 IN | HEART RATE: 81 BPM | OXYGEN SATURATION: 99 %

## 2024-04-17 DIAGNOSIS — G47.19 EXCESSIVE DAYTIME SLEEPINESS: Primary | ICD-10-CM

## 2024-04-17 DIAGNOSIS — G47.09 OTHER INSOMNIA: ICD-10-CM

## 2024-04-17 DIAGNOSIS — G25.81 RESTLESS LEGS SYNDROME (RLS): ICD-10-CM

## 2024-04-17 DIAGNOSIS — R06.83 SNORING: ICD-10-CM

## 2024-04-17 PROCEDURE — 99244 OFF/OP CNSLTJ NEW/EST MOD 40: CPT | Performed by: INTERNAL MEDICINE

## 2024-04-17 RX ORDER — METHOTREXATE 2.5 MG/1
TABLET ORAL
COMMUNITY
Start: 2024-03-29

## 2024-04-17 RX ORDER — FOLIC ACID 1 MG/1
1 TABLET ORAL DAILY
COMMUNITY
Start: 2024-03-29

## 2024-04-17 NOTE — PATIENT INSTRUCTIONS
Change taking Mirapex to 0.25 at night only for 2 weeks if symptoms stay good with no recurrence of RLS, then can switch to as needed

## 2024-04-17 NOTE — PROGRESS NOTES
Sleep Consultation   Gracia Chirinos 48 y.o. female MRN: [id number]      Reason for consultation: RLS, ANETTE, and hypersomnia concerns     Requesting physician: Dr. David     Assessment/Plan    1. Excessive daytime sleepiness    Multifactorial, some of the medications Gracia is taking could be sedative effect such as gabapentin, Atarax, trazodone and alprazolam.  She reports significant history of snoring and witnessed apnea since she gained some weight since she had the lumbar fusion surgery raising the concern for sleep-disordered breathing, I would like to rule out obstructive sleep apnea.  I explained to the patient in details the pathophysiology of obstructive sleep apnea.  I also explained the importance of treatment, and the consequences of untreated obstructive sleep apnea on underlying cardiovascular and cerebrovascular risk, morbidity, and mortality.      - Diagnostic Sleep Study; Future    2. Restless legs syndrome (RLS)  So she has been having symptoms of RLS that comes in bouts about once to twice per year in the past it was not bothering her as much but more recently they have been more concerning with severe symptoms that could be affecting her legs and also her urogenital systems with feelings of urge.  She was started on gabapentin initially that had temporary improvement of her symptoms however after a while she felt that the effect was not as robust so she was started on dopamine agonist Mirapex 0.25 mg twice daily with improvement of her symptoms.  We had a very lengthy discussion about restless leg syndrome nature and pathology the importance of ruling out iron deficiency mainly ferritin which was ordered by Dr. David however she did not have it done, I discussed with her and the importance of doing that and getting the IV iron infusions if needed.  I discussed with her also with the nature of her very occasional RLS symptoms I would favor weaning her off the Mirapex so I discussed with her  cutting down the dose to 2.25 only at night for few weeks and then after that we could essentially stop it completely and just keep it on as needed bases    - Ambulatory Referral to Sleep Medicine  - Diagnostic Sleep Study; Future    3. Snoring  R/O ANETTE  - Ambulatory Referral to Sleep Medicine  - Diagnostic Sleep Study; Future    4. Other insomnia  Has been on Trazodone for long time and she feels it's been helping her           History of Present Illness   HPI:  Gracia Chirinos is a 48 y.o. female with PMHx as below who comes in for evaluation of multi concerns from sleep medicine perspective including possible sleep disordered breathing, snoring, excessive daytime sleepiness, paradoxically with insomnia at night, probably secondary to anxiety and restless leg syndrome.  She works part-time day shifts and she feels sleepy and very tired during her work hours, at night she falls asleep between 7 to 8 PM sometimes is hard to fall asleep few times a month however trazodone has been helping with that she wakes up 1-2 times per night, she feels sleepy during the day and she feels most of the time that she could take a nap and if she is given an opportunity would be 1 hour or longer.  She drinks 1 cup of coffee per day and she has been witnessed to snore loudly stop breathing and gasp for air while she is asleep.  She does have significant history of restless leg syndrome symptoms that comes and bouts described as urge to move legs and urogenital urgency.  She has also history of bruxism's, chronic pain and possible TMJ.      Sitting and reading: Would never doze  Watching TV: Slight chance of dozing  Sitting, inactive in a public place (e.g. a theatre or a meeting): Slight chance of dozing  As a passenger in a car for an hour without a break: Slight chance of dozing  Lying down to rest in the afternoon when circumstances permit: High chance of dozing  Sitting and talking to someone: Would never doze  Sitting quietly  after a lunch without alcohol: High chance of dozing  In a car, while stopped for a few minutes in traffic: Would never doze  Total score: 9        Historical Information   Past Medical History:   Diagnosis Date    Abnormal Pap smear of cervix     RAMIREZ positive     Anxiety     Basal cell carcinoma     Depression     Migraine     Migraine     Miscarriage 2007    MRSA colonization 2016    Neck pain     Pregnancy     Vertebral artery dissection (HCC)     Vulvovaginitis     Yeast infection      Past Surgical History:   Procedure Laterality Date     SECTION, LOW TRANSVERSE  2008    COLONOSCOPY      DILATION AND CURETTAGE OF UTERUS  2007    GYNECOLOGIC CRYOSURGERY      cervix    LASIK      SKIN LESION EXCISION      basal cell carcinoma of left cheek     Family History   Problem Relation Age of Onset    Hypertension Mother     Lung cancer Mother 56    Brain cancer Mother 56    Breast cancer Maternal Aunt         dx in 40's     Multiple sclerosis Father     No Known Problems Daughter     No Known Problems Maternal Grandmother     No Known Problems Maternal Grandfather     No Known Problems Paternal Grandmother     No Known Problems Paternal Grandfather     No Known Problems Maternal Aunt      Social History     Socioeconomic History    Marital status:      Spouse name: Not on file    Number of children: Not on file    Years of education: Not on file    Highest education level: Not on file   Occupational History    Not on file   Tobacco Use    Smoking status: Never    Smokeless tobacco: Never   Vaping Use    Vaping status: Never Used   Substance and Sexual Activity    Alcohol use: No    Drug use: No    Sexual activity: Yes     Partners: Male     Birth control/protection: Male Sterilization   Other Topics Concern    Not on file   Social History Narrative    Feels safe at home      Social Determinants of Health     Financial Resource Strain: Not on file   Food Insecurity: Not on file   Transportation  "Needs: Not on file   Physical Activity: Not on file   Stress: Not on file   Social Connections: Not on file   Intimate Partner Violence: Not At Risk (10/4/2023)    Received from [facility]    Humiliation, Afraid, Rape, and Kick questionnaire     Fear of Current or Ex-Partner: No     Emotionally Abused: No     Physically Abused: No     Sexually Abused: No   Housing Stability: Not on file           Meds/Allergies   Allergies   Allergen Reactions    Reglan [Metoclopramide] Confusion     Pt reports Reglan \"makes me lose my mind and go crazy.\"       Home medications:  Prior to Admission medications    Medication Sig Start Date End Date Taking? Authorizing Provider   ALPRAZolam (XANAX) 0.25 mg tablet TAKE 1 TABLET BY MOUTH DAILY AT BEDTIME AS NEEDED FOR ANXIETY. 2/26/24  Yes Ricardo David MD   Botox 200 units SOLR  7/31/23  Yes Historical Provider, MD   butalbital-acetaminophen-caffeine (Esgic) -40 mg per tablet Take 1 tablet by mouth every 4 (four) hours as needed for headaches 11/28/22  Yes Ricardo David MD   escitalopram (LEXAPRO) 20 mg tablet TAKE 1 TABLET BY MOUTH EVERY DAY 7/31/23  Yes Ricardo David MD   folic acid (FOLVITE) 1 mg tablet Take 1 tablet by mouth daily 3/29/24  Yes Historical Provider, MD   gabapentin (NEURONTIN) 300 mg capsule Take 1 capsule in the morning and 2 at bedtime  Patient taking differently: Take 3 capsules in the morning and 3 at bedtime 9/20/23  Yes Ricardo David MD   hydrOXYzine HCL (ATARAX) 25 mg tablet Take 25 mg by mouth in the morning     Yes Historical Provider, MD   latanoprost (XALATAN) 0.005 % ophthalmic solution INSTILL 1 DROP IN AFFECTED EYE AT BEDTIME 1/14/19  Yes Historical Provider, MD   methocarbamol (ROBAXIN) 750 mg tablet Take 750 mg by mouth daily at bedtime 12/12/23  Yes Historical Provider, MD   methotrexate 2.5 MG tablet PLEASE SEE ATTACHED FOR DETAILED DIRECTIONS 3/29/24  Yes Historical Provider, MD   pantoprazole (PROTONIX) 40 mg " "tablet TAKE 1 TABLET BY MOUTH EVERY DAY IN THE MORNING 4/3/24  Yes Ricardo David MD   pramipexole (MIRAPEX) 0.5 mg tablet TAKE 1 TABLET BY MOUTH 3 TIMES A DAY. 3/7/24  Yes Ricardo David MD   Probiotic Product (PRO-BIOTIC BLEND PO) Take by mouth   Yes Historical Provider, MD   traZODone (DESYREL) 50 mg tablet TAKE 2 TABLETS BY MOUTH EVERY DAY AT BEDTIME 1/10/24  Yes Ricardo David MD   hydrocortisone (ANUSOL-HC) 2.5 % rectal cream Apply topically 2 (two) times a day  Patient not taking: Reported on 8/15/2023 6/8/23   BARB Pham   hydrocortisone (ANUSOL-HC) 25 mg suppository INSERT 1 SUPPOSITORY INTO THE RECTUM 2 TIMES A DAY AS NEEDED FOR HEMORRHOIDS.  Patient not taking: Reported on 8/15/2023 6/12/23   BARB Pham   ketorolac (TORADOL) 10 mg tablet Take 1 tablet (10 mg total) by mouth every 6 (six) hours as needed for moderate pain  Patient not taking: Reported on 1/17/2024 9/20/23   Ricardo David MD   mometasone (ELOCON) 0.1 % cream Apply topically daily 11/29/23   Historical Provider, MD   oxyCODONE (ROXICODONE) 5 immediate release tablet Take 5 mg by mouth every 6 (six) hours as needed 12/15/23   Historical Provider, MD   senna-docusate sodium (SENOKOT S) 8.6-50 mg per tablet Take 1 tablet by mouth 2 (two) times a day 10/5/23   Historical Provider, MD   sucralfate (CARAFATE) 1 g tablet Take 1 tablet (1 g total) by mouth 3 (three) times a day before meals  Patient not taking: Reported on 8/15/2023 4/17/23   Terri Park PA-C       Vitals:   Blood pressure 108/70, pulse 81, resp. rate 18, height 5' 5\" (1.651 m), weight 83 kg (183 lb), last menstrual period 03/20/2024, SpO2 99%, not currently breastfeeding.,  Body mass index is 30.45 kg/m².  Neck Circumference: 14    Physical Exam  General:  Awake alert and oriented x 3, conversant without conversational dyspnea, NAD, normal affect  HEENT:   Sclera noninjected, nonicteric OU  NECK:  Trachea midline, no accessory muscle use, no " "stridor,  JVP not elevated  CARDIAC: Reg, single s1/S2, no m/r/g  PULM: CTA bilaterally no wheezing, rhonchi or rales  EXT: No cyanosis, no clubbing, no edema  NEURO: no focal neurologic deficits, AAOx3, moving all extremities appropriately    Labs: I have personally reviewed pertinent lab results., ABG: No results found for: \"PHART\", \"YLX8JXX\", \"PO2ART\", \"JOT4VRD\", \"V1QFMESU\", \"BEART\", \"SOURCE\", BNP: No results found for: \"BNP\", CBC: No results found for: \"WBC\", \"HGB\", \"HCT\", \"MCV\", \"PLT\", \"ADJUSTEDWBC\", \"RBC\", \"MCH\", \"MCHC\", \"RDW\", \"MPV\", \"NRBC\", CMP: No results found for: \"SODIUM\", \"K\", \"CL\", \"CO2\", \"ANIONGAP\", \"BUN\", \"CREATININE\", \"GLUCOSE\", \"CALCIUM\", \"AST\", \"ALT\", \"ALKPHOS\", \"PROT\", \"BILITOT\", \"EGFR\", PT/INR: No results found for: \"PT\", \"INR\", Troponin: No results found for: \"TROPONINI\"  Lab Results   Component Value Date    WBC 5.1 01/29/2024    HGB 12.1 01/29/2024    HCT 36.5 01/29/2024    MCV 81.5 01/29/2024     01/29/2024      Lab Results   Component Value Date    GLUCOSE 99 10/14/2017    CALCIUM 9.0 03/28/2024     10/10/2014    K 4.3 03/28/2024    CO2 29 03/28/2024     03/28/2024    BUN 11 03/28/2024    CREATININE 0.89 03/28/2024     No results found for: \"IRON\", \"TIBC\", \"FERRITIN\"  Lab Results   Component Value Date    GNSDHXSG77 398 03/28/2024     No results found for: \"FOLATE\"          Neto Townsend MD  [facility]       Portions of the record may have been created with voice recognition software. Occasional wrong word or \"sound a like\" substitutions may have occurred due to the inherent limitations of voice recognition software. Read the chart carefully and recognize, using context, where substitutions have occurred.  "

## 2024-04-19 LAB
FERRITIN SERPL-MCNC: 19 NG/ML (ref 16–232)
IRON SATN MFR SERPL: 40 % (CALC) (ref 16–45)
IRON SERPL-MCNC: 146 MCG/DL (ref 40–190)
TIBC SERPL-MCNC: 364 MCG/DL (CALC) (ref 250–450)

## 2024-04-23 ENCOUNTER — HOSPITAL ENCOUNTER (OUTPATIENT)
Dept: SLEEP CENTER | Facility: CLINIC | Age: 49
Discharge: HOME | End: 2024-04-23
Payer: COMMERCIAL

## 2024-04-23 DIAGNOSIS — R06.83 SNORING: ICD-10-CM

## 2024-04-23 DIAGNOSIS — G47.19 EXCESSIVE DAYTIME SLEEPINESS: ICD-10-CM

## 2024-04-23 DIAGNOSIS — G25.81 RESTLESS LEGS SYNDROME (RLS): ICD-10-CM

## 2024-04-23 PROCEDURE — 95810 POLYSOM 6/> YRS 4/> PARAM: CPT

## 2024-04-23 PROCEDURE — 95810 POLYSOM 6/> YRS 4/> PARAM: CPT | Performed by: INTERNAL MEDICINE

## 2024-04-24 PROBLEM — G47.33 OBSTRUCTIVE SLEEP APNEA (ADULT) (PEDIATRIC): Status: ACTIVE | Noted: 2024-04-24

## 2024-04-24 NOTE — PROGRESS NOTES
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  Caffeine use:NO    Alcohol:Dayshift workers after 5PM: Alcohol use:NO    Typical day for patient:YES       Study Documentation    Sleep Study Indications: G25.81, R06.83, G47.19    Sleep Study: Diagnostic   Snore:Mild  Supplemental O2: no    O2 flow rate (L/min) range   O2 flow rate (L/min) final   Minimum SaO2 89  Baseline SaO2 96    Mode of Therapy:    EKG abnormalities: no     EEG abnormalities: no    Were abnormal behaviors in sleep observed:NO    Is Total Sleep Study Recording Time < 2 hours: N/A    Is Total Sleep Study Recording Time > 2 hours but study is incomplete: N/A    Is Total Sleep Study Recording Time 6 hours or more but sleep was not obtained: NO    Patient classification: employed       Post-Sleep Study    Medication used at bedtime or during sleep study:YES prescription sleep aid and other prescription medications    Patient reports time it took to fall asleep:less than 20 minutes    Patient reports waking up during study:3 or more times.  Patient reports returning to sleep without difficulty.    Patient reports sleeping 6 to 8 hours with dreaming.    Does the Patient feel this is a typical night of sleep:worse than usual    Patient rated sleepiness: Very sleepy or tired    PAP treatment:no.

## 2024-04-26 ENCOUNTER — TELEPHONE (OUTPATIENT)
Dept: SLEEP CENTER | Facility: CLINIC | Age: 49
End: 2024-04-26

## 2024-04-26 DIAGNOSIS — G47.33 OBSTRUCTIVE SLEEP APNEA (ADULT) (PEDIATRIC): Primary | ICD-10-CM

## 2024-05-06 DIAGNOSIS — F32.A ANXIETY AND DEPRESSION: ICD-10-CM

## 2024-05-06 DIAGNOSIS — F41.9 ANXIETY AND DEPRESSION: ICD-10-CM

## 2024-05-06 RX ORDER — ESCITALOPRAM OXALATE 20 MG/1
20 TABLET ORAL DAILY
Qty: 90 TABLET | Refills: 1 | Status: SHIPPED | OUTPATIENT
Start: 2024-05-06

## 2024-05-30 ENCOUNTER — TELEPHONE (OUTPATIENT)
Dept: SLEEP CENTER | Facility: CLINIC | Age: 49
End: 2024-05-30

## 2024-05-30 NOTE — TELEPHONE ENCOUNTER
Sleep study resulted and shows mild sleep apnea.  AHI - 11.0. CPAP ordered.     Called patient and left message advising I will send a ZeaKal[employer] message with the sleep study results and next steps.  Provided nurse line number to call if any questions.     Compliance follow up appointment scheduled for 11/25/2024 with Dr. Townsend. This will need to be adjusted once equipment is ordered.

## 2024-06-01 DIAGNOSIS — K21.9 GASTROESOPHAGEAL REFLUX DISEASE: ICD-10-CM

## 2024-06-01 RX ORDER — PANTOPRAZOLE SODIUM 40 MG/1
40 TABLET, DELAYED RELEASE ORAL EVERY MORNING
Qty: 90 TABLET | Refills: 2 | Status: SHIPPED | OUTPATIENT
Start: 2024-06-01

## 2024-06-04 ENCOUNTER — TELEPHONE (OUTPATIENT)
Dept: SLEEP CENTER | Facility: CLINIC | Age: 49
End: 2024-06-04

## 2024-06-12 LAB

## 2024-06-17 LAB
DME PARACHUTE DELIVERY DATE ACTUAL: NORMAL
DME PARACHUTE DELIVERY DATE EXPECTED: NORMAL
DME PARACHUTE DELIVERY DATE REQUESTED: NORMAL
DME PARACHUTE ITEM DESCRIPTION: NORMAL
DME PARACHUTE ORDER STATUS: NORMAL
DME PARACHUTE SUPPLIER NAME: NORMAL
DME PARACHUTE SUPPLIER PHONE: NORMAL

## 2024-06-25 DIAGNOSIS — G47.09 OTHER INSOMNIA: ICD-10-CM

## 2024-06-26 RX ORDER — TRAZODONE HYDROCHLORIDE 50 MG/1
TABLET ORAL
Qty: 180 TABLET | Refills: 1 | Status: SHIPPED | OUTPATIENT
Start: 2024-06-26

## 2024-08-02 ENCOUNTER — ANNUAL EXAM (OUTPATIENT)
Dept: GYNECOLOGY | Facility: CLINIC | Age: 49
End: 2024-08-02
Payer: COMMERCIAL

## 2024-08-02 VITALS
HEIGHT: 65 IN | DIASTOLIC BLOOD PRESSURE: 80 MMHG | WEIGHT: 185 LBS | SYSTOLIC BLOOD PRESSURE: 130 MMHG | BODY MASS INDEX: 30.82 KG/M2

## 2024-08-02 DIAGNOSIS — F41.9 ANXIETY AND DEPRESSION: ICD-10-CM

## 2024-08-02 DIAGNOSIS — R10.2 PELVIC PRESSURE IN FEMALE: ICD-10-CM

## 2024-08-02 DIAGNOSIS — F32.A ANXIETY AND DEPRESSION: ICD-10-CM

## 2024-08-02 DIAGNOSIS — Z12.39 ENCOUNTER FOR SCREENING BREAST EXAMINATION: ICD-10-CM

## 2024-08-02 DIAGNOSIS — Z01.419 ENCOUNTER FOR WELL WOMAN EXAM: Primary | ICD-10-CM

## 2024-08-02 DIAGNOSIS — Z12.31 SCREENING MAMMOGRAM FOR BREAST CANCER: ICD-10-CM

## 2024-08-02 DIAGNOSIS — N30.01 ACUTE CYSTITIS WITH HEMATURIA: ICD-10-CM

## 2024-08-02 LAB
SL AMB  POCT GLUCOSE, UA: NEGATIVE
SL AMB LEUKOCYTE ESTERASE,UA: ABNORMAL
SL AMB POCT BILIRUBIN,UA: NEGATIVE
SL AMB POCT BLOOD,UA: ABNORMAL
SL AMB POCT CLARITY,UA: ABNORMAL
SL AMB POCT COLOR,UA: YELLOW
SL AMB POCT KETONES,UA: NEGATIVE
SL AMB POCT NITRITE,UA: NEGATIVE
SL AMB POCT PH,UA: 5
SL AMB POCT SPECIFIC GRAVITY,UA: 1000
SL AMB POCT URINE PROTEIN: NEGATIVE
SL AMB POCT UROBILINOGEN: NORMAL

## 2024-08-02 PROCEDURE — S0610 ANNUAL GYNECOLOGICAL EXAMINA: HCPCS | Performed by: PHYSICIAN ASSISTANT

## 2024-08-02 PROCEDURE — 81002 URINALYSIS NONAUTO W/O SCOPE: CPT | Performed by: PHYSICIAN ASSISTANT

## 2024-08-02 RX ORDER — NITROFURANTOIN 25; 75 MG/1; MG/1
100 CAPSULE ORAL 2 TIMES DAILY
Qty: 14 CAPSULE | Refills: 1 | Status: SHIPPED | OUTPATIENT
Start: 2024-08-02 | End: 2024-08-09

## 2024-08-02 RX ORDER — NAPROXEN 500 MG/1
500 TABLET ORAL 2 TIMES DAILY WITH MEALS
COMMUNITY
Start: 2024-07-26 | End: 2025-07-26

## 2024-08-02 RX ORDER — UPADACITINIB 15 MG/1
15 TABLET, EXTENDED RELEASE ORAL DAILY
COMMUNITY
Start: 2024-06-27

## 2024-08-02 NOTE — PROGRESS NOTES
Assessment/Plan:      Diagnoses and all orders for this visit:    Encounter for well woman exam    Encounter for screening breast examination    Pelvic pressure in female  -     POCT urine dip  -     GP Urine Culture    Acute cystitis with hematuria  -     GP Urine Culture  -     nitrofurantoin (MACROBID) 100 mg capsule; Take 1 capsule (100 mg total) by mouth 2 (two) times a day for 7 days    Screening mammogram for breast cancer  -     Mammo screening bilateral w 3d & cad; Future    Other orders  -     Rinvoq 15 MG TB24; Take 15 mg by mouth daily  -     naproxen (NAPROSYN) 500 mg tablet; Take 500 mg by mouth 2 (two) times a day with meals          Subjective:     Patient ID: Gracia Chirinos is a 48 y.o. female.    Pt presents as a new patient for her annual exam today--  She has no complaints except has been having a strange sensation in her vaginal/pelvic region off and on x several months  Pt had spine surgery in 1/24, and sxs started shortly after that  Pt has h/o RLS and the pelvic sxs often, but not always coincide with that  Mirapex helps a bit  Pt is currently also being worked up for possible RA  She has regular cycles   No actual pelvic pain  Bowel and bladder are mostly regular   Sometimes feels like she can't completely empty  No dyuria  No back pain  No fever, chills  Colonoscopy--23  No breast concerns today  Last mammo--24    No pap today.    Rx mamm  UA+  Rx macrobid 100mg bid x 7days  hydrate  Add valerian root 800mg and fish oil 1000mg  If sxs persist, call for pelvic us        Review of Systems   Constitutional:  Negative for chills, fever and unexpected weight change.   HENT:  Negative for ear pain and sore throat.    Eyes:  Negative for pain and visual disturbance.   Respiratory:  Negative for cough and shortness of breath.    Cardiovascular:  Negative for chest pain and palpitations.   Gastrointestinal:  Negative for abdominal pain, blood in stool, constipation, diarrhea and vomiting.    Genitourinary:  Positive for vaginal pain. Negative for dysuria, hematuria, vaginal bleeding and vaginal discharge.   Musculoskeletal:  Negative for arthralgias and back pain.   Skin:  Negative for color change and rash.   Neurological:  Negative for seizures and syncope.   All other systems reviewed and are negative.        Objective:     Physical Exam  Vitals and nursing note reviewed.   Constitutional:       Appearance: Normal appearance. She is well-developed.   HENT:      Head: Normocephalic and atraumatic.   Chest:   Breasts:     Right: No inverted nipple, mass, nipple discharge or skin change.      Left: No inverted nipple, mass, nipple discharge or skin change.   Abdominal:      Palpations: Abdomen is soft.   Genitourinary:     General: Normal vulva.      Exam position: Supine.      Labia:         Right: No rash, tenderness or lesion.         Left: No rash, tenderness or lesion.       Vagina: Normal.      Cervix: No cervical motion tenderness, discharge or friability.      Adnexa:         Right: No mass, tenderness or fullness.          Left: No mass, tenderness or fullness.     Musculoskeletal:      Cervical back: Normal range of motion.   Lymphadenopathy:      Lower Body: No right inguinal adenopathy. No left inguinal adenopathy.   Neurological:      Mental Status: She is alert.

## 2024-08-04 RX ORDER — ESCITALOPRAM OXALATE 20 MG/1
TABLET ORAL
Qty: 90 TABLET | Refills: 1 | Status: SHIPPED | OUTPATIENT
Start: 2024-08-04

## 2024-08-05 LAB — BACTERIA UR CULT: ABNORMAL

## 2024-08-16 DIAGNOSIS — R30.0 DYSURIA: ICD-10-CM

## 2024-08-16 DIAGNOSIS — R35.0 URINARY FREQUENCY: Primary | ICD-10-CM

## 2024-08-19 DIAGNOSIS — F41.9 ANXIETY: ICD-10-CM

## 2024-08-20 RX ORDER — ALPRAZOLAM 0.25 MG
TABLET ORAL
Qty: 30 TABLET | Refills: 0 | Status: SHIPPED | OUTPATIENT
Start: 2024-08-20

## 2024-08-20 NOTE — TELEPHONE ENCOUNTER
Pharmacy requesting refill on xanax  Last seen 1/17/24  Sent Enabled Employment message for pt to make follow up appointment      1 LESLI CHIRINOS 1975 F [id number] ADELINE LUCERO      Search Criteria  Name Date of Birth Date Range  Lesli Chirinos 1975 08- To 08-  Requester Name Requested Date  [facility] 08- 13:44:14 ([facility])  Summary  Prescriptions  11  Prescribers  5  Pharmacies  1  Drug Classes  Benzodiazepines  3  Stimulants  0  Opioids  7  Muscle Relaxants  0  Opioid Dosage  Total MME for Active Prescriptions  0    Average MME  31.73         Prescriptions  Notifications    Prescribers  Pharmacies  MME Graph    Show All     PA   Drug Categories:      Benzodiazepines       Opioids       Others     Show  10  entries  Search:  Patient Id Prescription # Filled Written Drug Label Qty Days Strength MME** Prescriber Pharmacy Payment REFILL #/Auth State Detail  1 [id number] 04/10/2024 04/10/2024 Butalbital-acetaminophen-caffeine (Tablet) 30.0 8 325 MG-50 MG-40 MG NA SLY REINA [facility] Medicaid 0 / 1 [place]   1 [id number] 02/26/2024 02/26/2024 ALPRAZolam (Tablet) 30.0 30 0.25 MG MARIN PHAM) [facility] Medicaid 0 / 0 PA   1 [id number] 12/15/2023 12/15/2023 oxyCODONE HCL (Tablet) 49.0 13 5 MG 28.27 [facility] Private Pay 0 / 0 PA   1 [id number] 12/12/2023 12/12/2023 ALPRAZolam (Tablet) 30.0 30 0.25 MG MARIN PHAM) [facility] Medicaid 0 / 0 PA   1 [id number] 12/05/2023 12/05/2023 oxyCODONE HCL (Tablet) 49.0 13 5 MG 28.27 [facility] Private Pay 0 / 0 PA   1 [id number] 11/15/2023 11/15/2023 oxyCODONE HCL (Tablet) 49.0 12 5 MG 30.62 RIK SIERRA [facility] Private Pay 0 / 0 PA   1 [id number] 11/03/2023 11/03/2023 oxyCODONE HCL (Tablet) 49.0 12 5 MG 30.62 [facility] Private Pay 0 /  0 PA   1 [id number] 11/02/2023 11/02/2023 ALPRAZolam (Tablet) 30.0 30 0.25 MG NA OLESYA GREEN [facility] Medicaid 0 / 0 [place]   1 [id number] 10/23/2023 10/23/2023 oxyCODONE HCL (Tablet) 49.0 12 5 MG 30.62 RIK SIERRA [facility] Private Pay 0 / 0 PA   1 [id number] 10/10/2023 10/10/2023 oxyCODONE HCL (Tablet) 42.0 10 5 MG 31.50 RIK FLORERS

## 2024-09-03 ENCOUNTER — APPOINTMENT (OUTPATIENT)
Dept: LAB | Facility: CLINIC | Age: 49
End: 2024-09-03
Payer: COMMERCIAL

## 2024-09-03 DIAGNOSIS — E55.9 VITAMIN D INSUFFICIENCY: ICD-10-CM

## 2024-09-03 DIAGNOSIS — R30.0 DYSURIA: ICD-10-CM

## 2024-09-03 DIAGNOSIS — R35.0 URINARY FREQUENCY: ICD-10-CM

## 2024-09-03 DIAGNOSIS — M25.50 ARTHRALGIA, UNSPECIFIED JOINT: ICD-10-CM

## 2024-09-03 DIAGNOSIS — Z13.220 SCREENING FOR HYPERLIPIDEMIA: ICD-10-CM

## 2024-09-03 DIAGNOSIS — G25.81 RESTLESS LEGS SYNDROME (RLS): ICD-10-CM

## 2024-09-03 LAB
BACTERIA UR QL AUTO: ABNORMAL /HPF
BILIRUB UR QL STRIP: NEGATIVE
CLARITY UR: CLEAR
COLOR UR: ABNORMAL
GLUCOSE UR STRIP-MCNC: NEGATIVE MG/DL
HGB UR QL STRIP.AUTO: NEGATIVE
KETONES UR STRIP-MCNC: NEGATIVE MG/DL
LEUKOCYTE ESTERASE UR QL STRIP: ABNORMAL
MUCOUS THREADS UR QL AUTO: ABNORMAL
NITRITE UR QL STRIP: NEGATIVE
NON-SQ EPI CELLS URNS QL MICRO: ABNORMAL /HPF
PH UR STRIP.AUTO: 6 [PH]
PROT UR STRIP-MCNC: NEGATIVE MG/DL
RBC #/AREA URNS AUTO: ABNORMAL /HPF
SP GR UR STRIP.AUTO: 1.02 (ref 1–1.03)
UROBILINOGEN UR STRIP-ACNC: <2 MG/DL
WBC #/AREA URNS AUTO: ABNORMAL /HPF

## 2024-09-03 PROCEDURE — 87077 CULTURE AEROBIC IDENTIFY: CPT

## 2024-09-03 PROCEDURE — 87186 SC STD MICRODIL/AGAR DIL: CPT

## 2024-09-03 PROCEDURE — 81001 URINALYSIS AUTO W/SCOPE: CPT

## 2024-09-03 PROCEDURE — 87086 URINE CULTURE/COLONY COUNT: CPT

## 2024-09-04 ENCOUNTER — NURSE TRIAGE (OUTPATIENT)
Age: 49
End: 2024-09-04

## 2024-09-04 NOTE — TELEPHONE ENCOUNTER
"Call back to pt. States that she feels her bladder is not being fully emptied, has urinary frequency/urgency and has slight pain when she finishes urinating. Symptoms started early August, was given antibiotics and symptoms cleared up but came right back after completing antibiotics. Denies dysuria, back pain, fever, hematuria. States she missed her period last month, denies chance of pregnancy. Pt had urine testing done yesterday, wondering if she can get treatment. Advised will review with provider, pt thankful.       Reason for Disposition   Urinating more frequently than usual (i.e., frequency)    Answer Assessment - Initial Assessment Questions  1. SYMPTOM: \"What's the main symptom you're concerned about?\" (e.g., frequency, incontinence)      Urinary frequency/urgency, feeling like she cannot empty her bladder and slight pain after she finishes urinating  2. ONSET: \"When did the  symptoms  start?\"      Early august  3. PAIN: \"Is there any pain?\" If Yes, ask: \"How bad is it?\" (Scale: 1-10; mild, moderate, severe)      Slight pain after urination  4. CAUSE: \"What do you think is causing the symptoms?\"      UTI  5. OTHER SYMPTOMS: \"Do you have any other symptoms?\" (e.g., fever, flank pain, blood in urine, pain with urination)      denies  6. PREGNANCY: \"Is there any chance you are pregnant?\" \"When was your last menstrual period?\"      denies    Protocols used: Urinary Symptoms-ADULT-[place]    "

## 2024-09-04 NOTE — TELEPHONE ENCOUNTER
Regarding: UTI  ----- Message from Amena BHATIA sent at 9/4/2024 11:15 AM EDT -----  Pt called back due to still possibly having a UTI and requesting to speak with a nurse about getting more meds to treat UTI. Pt stated she did reach out to the provider about the issue and hadn't heard anything back and is feeling really uncomfortable. No CTS available please call pt back.

## 2024-09-05 DIAGNOSIS — N30.00 ACUTE CYSTITIS WITHOUT HEMATURIA: Primary | ICD-10-CM

## 2024-09-05 LAB
BACTERIA UR CULT: ABNORMAL
BACTERIA UR CULT: ABNORMAL

## 2024-09-05 RX ORDER — CEPHALEXIN 500 MG/1
500 CAPSULE ORAL 2 TIMES DAILY
Qty: 14 CAPSULE | Refills: 0 | Status: SHIPPED | OUTPATIENT
Start: 2024-09-05 | End: 2024-09-12

## 2024-09-11 ENCOUNTER — TELEPHONE (OUTPATIENT)
Age: 49
End: 2024-09-11

## 2024-09-11 ENCOUNTER — OFFICE VISIT (OUTPATIENT)
Dept: SLEEP CENTER | Facility: CLINIC | Age: 49
End: 2024-09-11
Payer: COMMERCIAL

## 2024-09-11 VITALS
DIASTOLIC BLOOD PRESSURE: 70 MMHG | BODY MASS INDEX: 30.82 KG/M2 | HEIGHT: 65 IN | WEIGHT: 185 LBS | HEART RATE: 68 BPM | SYSTOLIC BLOOD PRESSURE: 120 MMHG | OXYGEN SATURATION: 99 %

## 2024-09-11 DIAGNOSIS — G25.81 RESTLESS LEGS SYNDROME (RLS): ICD-10-CM

## 2024-09-11 DIAGNOSIS — F51.04 CHRONIC INSOMNIA: ICD-10-CM

## 2024-09-11 DIAGNOSIS — G47.33 OSA (OBSTRUCTIVE SLEEP APNEA): Primary | ICD-10-CM

## 2024-09-11 PROCEDURE — 99214 OFFICE O/P EST MOD 30 MIN: CPT | Performed by: STUDENT IN AN ORGANIZED HEALTH CARE EDUCATION/TRAINING PROGRAM

## 2024-09-11 RX ORDER — TRAZODONE HYDROCHLORIDE 50 MG/1
50 TABLET, FILM COATED ORAL
Qty: 180 TABLET | Refills: 0 | Status: SHIPPED | OUTPATIENT
Start: 2024-09-11

## 2024-09-11 NOTE — PROGRESS NOTES
[facility]  Sleep Medicine Follow up/ Established Patient Visit      Assessment/Plan:  1. ANETTE (obstructive sleep apnea)  PAP DME Resupply/Reorder    PAP DME Pressure Change      2. Restless legs syndrome (RLS)        3. Chronic insomnia  traZODone (DESYREL) 50 mg tablet    Ambulatory Referral to Psych Services          Gracia is a very pleasant 49-year-old woman with a PMHx of chronic migraine, GERD, anxiety, depression, DDD status post lumbar spinal fusion, chronic midline low back pain, vitamin D deficiency, vitamin B12 deficiency who presents in follow up for EDS, ANETTE (AHI 11, O2 maritza 89% 4/2024), RLS, and insomnia.  She does endorse benefit from PAP therapy, and, she even though it is taken some time to get used to it notes that it is going better.  Upon review of her compliance report, it does appear to be therapeutically controlling her ANETTE, although I wonder if raising her pressure minimum may help with comfort and the subconscious mask removals that she is describing.  Her RLS is well-controlled for now on her current medication regimen, although it is worth noting that on her iron studies, while her iron saturation was 40%, her ferritin level was very low at 19.  She continues trazodone for her insomnia, which is effective, however she did endorse a desire to come off of medication for insomnia in the future if possible.    Continue AutoPap; will adjust settings to 8-15 cm H2O  Prescription for new supplies ordered today  Reviewed CMS/insurance requirements and resupply guidelines  Information provided on the above as well as general maintenance steps  Also reviewed desensitization techniques at length with the patient.  Reviewed my trepidation surrounding dopamine agonists for management of RLS, however I am okay with her continuing current medication regimen, provided she watches very closely for any degree of augmentation.  Should this occur, we may trial increasing her  gabapentin instead of the Mirapex, then trying to come off of the Mirapex.  She is to let me know when she next needs refills of these medications.  I also reviewed the importance of proper iron levels and controlling RLS, and provided recommendations for supplementation.  Discussed the pathophysiology behind chronic insomnia, including the 3-P model  Discussed that CBT-I is first-line for treatment of chronic insomnia, and has the best data supporting its efficacy  Reviewed the combination of sleep restriction along with stimulus control extensively with the patient.  Referral placed for formal Cognitive Behavioral Therapy for Insomnia (CBT-I).  Will continue trazodone 25mg nightly for insomnia.  However did recommend considering weaning the trazodone if able, per the recommendations of the behavioral sleep medicine provider.  Recommended that she establish good sleep hygiene as a bedrock for CBT-I to build upon.  She will Return in about 6 months (around 3/11/2025).      ________________________________________________________________________________________________    Per Last Visit Note (Date: 4/17/2024):  Assessment/Plan     1. Excessive daytime sleepiness     Multifactorial, some of the medications Gracia is taking could be sedative effect such as gabapentin, Atarax, trazodone and alprazolam.  She reports significant history of snoring and witnessed apnea since she gained some weight since she had the lumbar fusion surgery raising the concern for sleep-disordered breathing, I would like to rule out obstructive sleep apnea.  I explained to the patient in details the pathophysiology of obstructive sleep apnea.  I also explained the importance of treatment, and the consequences of untreated obstructive sleep apnea on underlying cardiovascular and cerebrovascular risk, morbidity, and mortality.       - Diagnostic Sleep Study; Future     2. Restless legs syndrome (RLS)  So she has been having symptoms of RLS that  comes in bouts about once to twice per year in the past it was not bothering her as much but more recently they have been more concerning with severe symptoms that could be affecting her legs and also her urogenital systems with feelings of urge.  She was started on gabapentin initially that had temporary improvement of her symptoms however after a while she felt that the effect was not as robust so she was started on dopamine agonist Mirapex 0.25 mg twice daily with improvement of her symptoms.  We had a very lengthy discussion about restless leg syndrome nature and pathology the importance of ruling out iron deficiency mainly ferritin which was ordered by Dr. David however she did not have it done, I discussed with her and the importance of doing that and getting the IV iron infusions if needed.  I discussed with her also with the nature of her very occasional RLS symptoms I would favor weaning her off the Mirapex so I discussed with her cutting down the dose to 2.25 only at night for few weeks and then after that we could essentially stop it completely and just keep it on as needed bases     - Ambulatory Referral to Sleep Medicine  - Diagnostic Sleep Study; Future     3. Snoring  R/O ANETTE  - Ambulatory Referral to Sleep Medicine  - Diagnostic Sleep Study; Future     4. Other insomnia  Has been on Trazodone for long time and she feels it's been helping her       Sleep Studies:  -PSG 4/23/2024: BMI 30.4.   minutes, sleep efficiency 93.3%.  Sleep onset latency 16.7 minutes, REM sleep latency 130.5 minutes.  AHI 11, supine AHI 18.4, REM AHI 9.9.  O2 maritza 89%, 0.1 minutes spent below 90%.  PLM index 11.2.     ________________________________________________________________________________________________      Interval History: Gracia Chirinos is a 49 y.o. female with a PMHx of chronic migraine, GERD, anxiety, depression, DDD status post lumbar spinal fusion, chronic midline low back pain, vitamin D  "deficiency, vitamin B12 deficiency who presents in follow up for EDS, ANETTE (AHI 11, O2 maritza 89% 4/2024), RLS, and insomnia.    Oxycodone? No longer on that; it was for back surgery.    SDB:  -Current experience with PAP Therapy: She says \"it's going better.\" Does note a lot of difficulty at first, stating \"it was very foreign.\" Had created a lot of anxiety. Has however started using more consistently.  -Mask type: Nasal ; had switched from nasal to FFM; had issues with mask leak with large FFM, while waiting for change to medium, has restarted nasal and been more comfortable.    -Note: Did have COVID beginning of August.  -Difficulties with mask: See above; also endorses subconscious mask removal.  -Device: ResMed AirSense 11; received 6/2024.  -Difficulties with device: Denies  -DME: [employer] [facility]  -Compliance:        RLS:  -Starting: Early 2000's  -Current symptom description: Urge to move legs, also an uncomfortable pressure in her bladder and an \"arousal feeling\" in her inguinal region.  -Frequency: Currently not happening. Tried reducing Mirapex, and RLS symptoms came back.  -Current Medications:   -Mirapex 0.25mg in the morning, 0.25mg at night.   -Gabapentin 600 mg in the morning and 600 mg at bedtime  -Previous Medications:   -Lyrica (\"A reaction to it;\" does not recall what it was)  -Iron studies:  4/18/2024:  -Iron: 146  -TIBC: 364  -Iron saturation: 40%  -Ferritin: 19      Insomnia:  -Current Symptom Severity/Description: Well-controlled on trazodone. Now on 25mg due to script running out   -Been a long time since she has been without trazodone.  -CBT-I?  -Dneies  -Current Treatments:   -Trazodone 50mg (see above, currently taking 25mg).  -Prior Treatments Tried:   -Ambien (Did help)      Holman Sleepiness Scale:  What are your chances of dozing?   0= no chance  1= slight chance  2= moderate chance  3= high chance    Sitting and reading:3   Watching TV: 2  Sitting, inactive in a public place (e.g. a " "theatre or a meeting):1  As a passenger in a car for an hour without a break: 1  Lying down to rest in the afternoon when circumstances permit: 3   Sitting and talking to someone: 0  Sitting quietly after a lunch without alcohol: 0  In a car, while stopped for a few minutes in the traffic: 0       TOTAL  10/24  Greater or equal to 10 is positive for excessive daytime sleepiness        SLEEP HYGIENE QUESTIONS:  Bedtime: 2000-2045   Time it takes to fall sleep: 16-30 minutes  Wake up Time: 0530   Number of times patient wakes up per night: 1-2  Reason (s) why patient wakes up during the night: Dog   Naps: Sometimes  Estimated total sleep time ( in a 24 hour period of time): ~8-9 hours       Changes to PMH, PSH, SH: See above     SLEEP RELATED ROS  Review of Systems  Allergies   Allergen Reactions    Reglan [Metoclopramide] Confusion     Pt reports Reglan \"makes me lose my mind and go crazy.\"       CURRENT MEDICATIONS:  Current Outpatient Medications   Medication Instructions    ALPRAZolam (XANAX) 0.25 mg tablet TAKE 1 TABLET BY MOUTH DAILY AT BEDTIME AS NEEDED FOR ANXIETY.    Botox 200 units SOLR No dose, route, or frequency recorded.    butalbital-acetaminophen-caffeine (Esgic) -40 mg per tablet 1 tablet, Oral, Every 4 hours PRN    cephalexin (KEFLEX) 500 mg, Oral, 2 times daily    escitalopram (LEXAPRO) 20 mg tablet TAKE 1 TABLET BY MOUTH EVERY DAY. **NEED UPDATED INSURANCE**    folic acid (FOLVITE) 1 mg tablet 1 tablet, Oral, Daily    gabapentin (NEURONTIN) 300 mg capsule Take 1 capsule in the morning and 2 at bedtime    hydrocortisone (ANUSOL-HC) 2.5 % rectal cream Topical, 2 times daily    hydrocortisone (ANUSOL-HC) 25 mg suppository INSERT 1 SUPPOSITORY INTO THE RECTUM 2 TIMES A DAY AS NEEDED FOR HEMORRHOIDS.    hydrOXYzine HCL (ATARAX) 25 mg, Oral, Daily    ketorolac (TORADOL) 10 mg, Oral, Every 6 hours PRN    latanoprost (XALATAN) 0.005 % ophthalmic solution INSTILL 1 DROP IN AFFECTED EYE AT BEDTIME    " "methocarbamol (ROBAXIN) 750 mg, Oral, Daily at bedtime    methotrexate 2.5 MG tablet PLEASE SEE ATTACHED FOR DETAILED DIRECTIONS    mometasone (ELOCON) 0.1 % cream Topical, Daily    naproxen (NAPROSYN) 500 mg, Oral, 2 times daily with meals    pantoprazole (PROTONIX) 40 mg, Oral, Every morning    pramipexole (MIRAPEX) 0.5 mg, Oral, 3 times daily    Probiotic Product (PRO-BIOTIC BLEND PO) Oral    Rinvoq 15 mg, Oral, Daily    senna-docusate sodium (SENOKOT S) 8.6-50 mg per tablet 1 tablet, Oral, 2 times daily    sucralfate (CARAFATE) 1 g, Oral, 3 times daily before meals    traZODone (DESYREL) 50 mg, Oral, Daily at bedtime, TAKE 2 TABLETS BY MOUTH EVERY DAY AT BEDTIME           PHYSICAL EXAMINATION:  Vital Signs: /70   Pulse 68   Ht 5' 5\" (1.651 m)   Wt 83.9 kg (185 lb)   SpO2 99%   BMI 30.79 kg/m²     Constitutional: NAD, well appearing   Mental Status: AAOx3  Skin: Warm, dry, no rashes noted   Eyes: PERRL, normal conjunctiva  ENT: Nasal congestion absent  Posterior Airspace:   [place] Tongue Position: 4  Retrognathia: absent  Overbite: absent  Chest: No evidence of respiratory distress, no accessory muscle use; no evidence of peripheral cyanosis  Abdomen: Soft, NT/ND  Extremities: No digital clubbing or pedal edema  Neuro: Strength 5/5 throughout, sensation grossly intact          Electronically signed by:    Erickson Molina DO  Board-Certified Neurology and Sleep Medicine  [facility]  09/11/24      "

## 2024-09-11 NOTE — PATIENT INSTRUCTIONS
Continue PAP Therapy  Continue AutoPAP at adjusted settings of 8-15 cmH2O  Remember to clean your mask and equipment regularly, as directed.  Recommend the desensitization techniques we discussed today (wearing during the day, during sedate activities such as watching TV or reading, to help your body adjust and get used to the mask) to help with subconscious nighttime mask removal  You should be eligible for new supplies approximately every 3-6 months, depending on your insurance coverage. Contact your Durable Medical Equipment (DME) company for new supplies as needed.  Practice good Sleep Hygiene, as outlined below.  I am okay with continuing the Mirapex at current dosing for now, with cautionary watching for augmentation, as we discussed. Also recommend continuing the gabapentin.  Your ferritin levels on your iron studies came back lower than we prefer in treatment of RLS. Levels less than 75ng/dl can exacerbate and play a major role in RLS symptoms; yours was 19. I recommend adding an iron supplement (ferrous sulfate 325 mg) every other day, with orange juice or another source of vitamin C to help with absorption (Some patients use a product called Vitron-C with good effect). This may be obtained over the counter, or we can place a prescription.   See For your Insomnia below  Follow up in 6 months.      Care and Maintenance  Headgear should be washed as needed. Daily inspection and weekly washings are recommended. Do not disassemble the straps. Machine wash in warm water, making sure to attach Velcro hooks and tabs before washing. Line dry or machine dry on a low setting.  Masks should be washed every day. Daily inspection is recommended. Leave the mask and tubing attached. Gently wash the mask with a soft cloth using warm water and mild detergent, concentrating on the mask cushion flaps. DO NOT use alcohol or bleach. Rinse thoroughly and air dry.  Tubing and headgear should be washed weekly. Daily inspection is  recommended. Wash in warm water and mild detergent and rinse thoroughly. Hook the tubing to the machine and blow until dry.  Humidifier should be washed daily and filled with DISTILLED water before use. Wash with warm water and mild detergent. Disinfect weekly by soaking with a solution of 1 part white vinegar and 3 parts water for 30 minutes. Rinse thoroughly and air dry.  Disposable filters should be replaced once a month. Wash reusable foam filters with warm water and mild detergent at least once a month. Rinse thoroughly and dry with paper towels.  Avoid  that contain fragrance or conditioners, as these will leave a residue.  NEVER iron any soft goods.      CMS Requirements    Your insurance requires a face-to-face follow up visit within a 31-90 day period after starting CPAP.  Your insurance requires compliance with CPAP, which is at least 4 hours per night for 70% of the time. This must be done over a 30 day period and must occur within the initial 31-90 day period after starting CPAP.  Your insurance also requires at least yearly follow ups to continue to pay for CPAP supplies.       PAP Supply Guidelines    Below are the guidelines for reordering your supplies. You will be responsible for your deductible, co payments, and out of pocket expenses.    Item Frequency   Nasal Mask (no headgear) 1 every 3 months   Nasal Mask Cushion 1 every 2 weeks   Full Face Mask (no headgear) 1 every 3 months   Full Face Mask Cushion 1 every month   Nasal Pillows 1 every 2 weeks   Headgear 1 every 6 months   hin Strap 1 every 6 months   silva 1 every 3 months   Filters: Reusable 1 every 6 months   Filters: Disposable 1 every 2 weeks   Humidifier Chamber(disposable) 1 every 6 months         For Your Insomnia:     The gold standard of treatment for insomnia is cognitive behavioral therapy for insomnia (CBT-I). Medications come with side effects and once stopped, may no longer help the underlying sleep problem. Data on CBTI  shows lasting effects of this intervention.      PLAN:  Practice good Sleep Hygiene, as outlined below. For you, focus on:  Keeping bed for sleep and intimacy only  Avoiding daytime naps  Avoiding screens ~1 hour before bed and overnight  I am also placing a referral for Cognitive Behavioral Therapy for Insomnia (CBT-I).  Continue Trazodone for your insomnia for now.  When/As you undergo CBT-I, recommend weaning of of the sleep medication per the direction of the Behavioral Sleep Medicine provider  As a preview of what to expect with CBT-I: to help re-train your brain and allow it to re-associate your bed with sleep, we typically utilize an approach known as Sleep Restriction coupled with Stimulus Control:  Sleep Restriction: Keep the same wake time every day, and do not go to bed until you're tired. The idea behind this, particularly in conjunction with Stimulus control therapy, is to get your body to re-recognize your drive for sleep.  At the same time, practice Stimulus Control as outlined below:  Do not go to bed until you are sleepy  Keep bed primarily for sleep and intimacy ONLY (not for reading, watching television, eating, or worrying).   You should not spend more than 20 minutes in bed awake. If you are awake after 20 minutes, leave the bedroom and engage in a relaxing activity, such as reading or listening to soothing music. DO NOT engage in activities that stimulate you or reward you for being awake in the middle of the night, such as eating or watching television.   Do not return to bed until you are tired and feel ready to sleep.   If you return to bed and still cannot sleep within 20 minutes, the process should be repeated.   An alarm clock should be set to wake the patient at the same time every morning (see Sleep Restriction above), including weekends.  The idea behind this is that patients with insomnia commonly associate their bed and bedroom with the fear of not sleeping, or it has simply become a  stimulating environment and their brain no longer associates it with sleep. The longer one stays in bed trying to sleep, the stronger the association becomes, which then perpetuates the difficulty falling asleep.  Stimulus control therapy then is a strategy whose purpose is to disrupt this association and re-train your brain to associate your bedroom with sleep, thus enhancing the likelihood of sleep.        Good Sleep Hygiene  Wake up at the same time every day, even on the weekends.  Use your bed for sleep and intimacy only.  If you have been in bed awake for 30 minutes, get up and leave the bedroom. Choose a dull activity not involving a blue screen (TV, computer, handheld devices). Go back to bed when you feel sleepy.  Avoid caffeine, nicotine and alcohol before you go to bed.  Avoid large meals before you go to bed.  Avoid using screens (computers, tablets, smartphones, etc.) for at least 1 hour before bedtime  Exercise regularly, but do not exercise right before you go to bed.  Avoid daytime naps. If you do take a nap, sleep for 20-40 minutes, and not after dinner.

## 2024-09-11 NOTE — TELEPHONE ENCOUNTER
"Behavioral Health Integration Screening Questionnaire     Are you aware of the referred from your [facility] Provider  : Yes     Please advise interviewee that they need to answer all questions truthfully to allow for best care, and any misrepresentations of information may affect their ability to be seen at this clinic   => Was this discussed? Yes     If Minor Child (under age 18)    Who is/are the legal guardian(s) of the child?     Is there a custody agreement? No     If \"YES\"- Custody orders must be obtained prior to scheduling the first appointment  In addition, Consent to Treatment must be signed by all legal guardians prior to scheduling the first appointment    If \"NO\"- Consent to Treatment must be signed by all legal guardians prior to scheduling the first appointment    Behavioral Health Outpatient Intake History -     Presenting Problem (in patient's own words): trouble falling asleep    Are there any communication barriers for this patient?     No                                               If yes, please describe barriers:       Are you taking any psychiatric medications? Yes     If \"YES\" -What are they Lexapro,        If \"YES\" -Who prescribes?     Has the Patient abused alcohol or other substances in the last 6 months ? No  No concerns of substance abuse are reported.     If \"YES\" -What substance, How much, How often?     If illegal substance: Refer to [facility] (for WINSOME) or [facility] Offices.   If Alcohol in excess of 10 drinks per week:  Refer to [facility] (for WINS[facility]) or [facility] Offices    ACCEPTED as a patient Yes  If \"Yes\" Appointment Date: 9/27/2024 at 12:00 pm    Referred Elsewhere? No  If “Yes” - (Where? Ex: Therapy Anywhere; [facility];  [facility], etc.)       Name of Insurance Co:[facility] [employer]  Insurance ID# [id number]  Insurance Phone # [phone]  If ins is primary or secondary?Primary  If patient is a minor, parents information such as " Name, GISSELL of guarantor.

## 2024-09-20 ENCOUNTER — TELEPHONE (OUTPATIENT)
Dept: SLEEP CENTER | Facility: CLINIC | Age: 49
End: 2024-09-20

## 2024-09-20 LAB

## 2024-09-25 LAB
DME PARACHUTE DELIVERY DATE REQUESTED: NORMAL
DME PARACHUTE ITEM DESCRIPTION: NORMAL
DME PARACHUTE ORDER STATUS: NORMAL
DME PARACHUTE SUPPLIER NAME: NORMAL
DME PARACHUTE SUPPLIER PHONE: NORMAL

## 2024-09-26 DIAGNOSIS — G25.81 RESTLESS LEGS SYNDROME (RLS): ICD-10-CM

## 2024-09-27 DIAGNOSIS — G25.81 RESTLESS LEGS SYNDROME (RLS): Primary | ICD-10-CM

## 2024-09-27 RX ORDER — PRAMIPEXOLE DIHYDROCHLORIDE 0.5 MG/1
0.5 TABLET ORAL 3 TIMES DAILY
Qty: 90 TABLET | Refills: 1 | Status: SHIPPED | OUTPATIENT
Start: 2024-09-27

## 2024-09-27 RX ORDER — FOLIC ACID 1 MG/1
1000 TABLET ORAL DAILY
Qty: 30 TABLET | Refills: 1 | Status: CANCELLED | OUTPATIENT
Start: 2024-09-27

## 2024-09-27 RX ORDER — PRAMIPEXOLE DIHYDROCHLORIDE 0.5 MG/1
0.5 TABLET ORAL 3 TIMES DAILY
Qty: 90 TABLET | Refills: 0 | Status: CANCELLED | OUTPATIENT
Start: 2024-09-27

## 2024-09-27 NOTE — TELEPHONE ENCOUNTER
Patient called and advised she sent a portal message on 9/18, but it was not responded to.    Per message, her sleep medicine doctor is taking over the following prescriptions:  Gabapentin, Miripex and Trazodone.  She also stated she had the folic acid refilled already as well.  Please cancel this order for both, per patient.    She did schedule the follow up for 10/28, to keep any other prescriptions current.

## 2024-09-27 NOTE — TELEPHONE ENCOUNTER
Last seen 1/17/24 refill sent to provider for approval. I also sent a message to pt that she is due for visit

## 2024-09-30 ENCOUNTER — TELEPHONE (OUTPATIENT)
Dept: PSYCHIATRY | Facility: CLINIC | Age: 49
End: 2024-09-30

## 2024-09-30 NOTE — TELEPHONE ENCOUNTER
Patient canceled her NP appt on 9/224.  Referral was back in [facility]Q, writer reached out to patient and at this time, she wants to hold off on services.

## 2024-10-08 DIAGNOSIS — G25.81 RESTLESS LEGS SYNDROME (RLS): ICD-10-CM

## 2024-10-08 RX ORDER — GABAPENTIN 300 MG/1
600 CAPSULE ORAL 2 TIMES DAILY
Qty: 120 CAPSULE | Refills: 5 | Status: SHIPPED | OUTPATIENT
Start: 2024-10-08 | End: 2025-04-06

## 2024-10-08 NOTE — TELEPHONE ENCOUNTER
Per Wendie message:  Good morning, can you please send in a refill of my gabapentin. I take 600 mg in the morning and 600 mg at night. Thank you so much.   -------------------------------------------    Per last office visit 9/11/2024:  Reviewed my trepidation surrounding dopamine agonists for management of RLS, however I am okay with her continuing current medication regimen, provided she watches very closely for any degree of augmentation.  Should this occur, we may trial increasing her gabapentin instead of the Mirapex, then trying to come off of the Mirapex.  She is to let me know when she next needs refills of these medications.  ------------------------------------------------    Dr. Molina, please review Rx request and sign if agreeable.  Thank you.

## 2024-10-23 ENCOUNTER — OFFICE VISIT (OUTPATIENT)
Dept: FAMILY MEDICINE CLINIC | Facility: CLINIC | Age: 49
End: 2024-10-23
Payer: COMMERCIAL

## 2024-10-23 VITALS
HEIGHT: 65 IN | WEIGHT: 184 LBS | RESPIRATION RATE: 16 BRPM | TEMPERATURE: 97.5 F | DIASTOLIC BLOOD PRESSURE: 60 MMHG | OXYGEN SATURATION: 98 % | BODY MASS INDEX: 30.66 KG/M2 | HEART RATE: 72 BPM | SYSTOLIC BLOOD PRESSURE: 108 MMHG

## 2024-10-23 DIAGNOSIS — E53.8 B12 DEFICIENCY: ICD-10-CM

## 2024-10-23 DIAGNOSIS — M06.00 SERONEGATIVE RHEUMATOID ARTHRITIS (HCC): Primary | ICD-10-CM

## 2024-10-23 DIAGNOSIS — F32.A DEPRESSION, UNSPECIFIED DEPRESSION TYPE: ICD-10-CM

## 2024-10-23 DIAGNOSIS — K21.9 GASTROESOPHAGEAL REFLUX DISEASE: ICD-10-CM

## 2024-10-23 DIAGNOSIS — F41.9 ANXIETY: ICD-10-CM

## 2024-10-23 DIAGNOSIS — G47.33 OSA (OBSTRUCTIVE SLEEP APNEA): ICD-10-CM

## 2024-10-23 DIAGNOSIS — R41.3 MEMORY PROBLEM: ICD-10-CM

## 2024-10-23 DIAGNOSIS — L29.9 ITCHY SKIN: ICD-10-CM

## 2024-10-23 PROCEDURE — 99214 OFFICE O/P EST MOD 30 MIN: CPT | Performed by: NURSE PRACTITIONER

## 2024-10-23 RX ORDER — ALPRAZOLAM 0.25 MG/1
0.25 TABLET ORAL DAILY PRN
Qty: 30 TABLET | Refills: 0 | Status: SHIPPED | OUTPATIENT
Start: 2024-10-23

## 2024-10-23 RX ORDER — METHOTREXATE 25 MG/ML
25 INJECTION, SOLUTION INTRA-ARTERIAL; INTRAMUSCULAR; INTRAVENOUS WEEKLY
COMMUNITY
Start: 2024-09-17

## 2024-10-23 RX ORDER — ESCITALOPRAM OXALATE 20 MG/1
20 TABLET ORAL DAILY
Qty: 90 TABLET | Refills: 1 | Status: SHIPPED | OUTPATIENT
Start: 2024-10-23

## 2024-10-23 RX ORDER — HYDROXYZINE HYDROCHLORIDE 25 MG/1
25 TABLET, FILM COATED ORAL DAILY
Qty: 90 TABLET | Refills: 1 | Status: SHIPPED | OUTPATIENT
Start: 2024-10-23

## 2024-10-23 RX ORDER — PANTOPRAZOLE SODIUM 40 MG/1
40 TABLET, DELAYED RELEASE ORAL EVERY MORNING
Qty: 90 TABLET | Refills: 1 | Status: SHIPPED | OUTPATIENT
Start: 2024-10-23

## 2024-10-23 NOTE — ASSESSMENT & PLAN NOTE
- Well controlled on Lexapro 20 mg daily and Xanax as needed. Continue same.   - Will continue to monitor.   Orders:    escitalopram (LEXAPRO) 20 mg tablet; Take 1 tablet (20 mg total) by mouth daily    ALPRAZolam (XANAX) 0.25 mg tablet; Take 1 tablet (0.25 mg total) by mouth daily as needed for anxiety

## 2024-10-23 NOTE — PROGRESS NOTES
Ambulatory Visit  Name: Gracia Chirinos      : 1975      MRN: [id number]  Encounter Provider: BARB Pham  Encounter Date: 10/23/2024   Encounter department: [facility]  PRIMARY CARE    Assessment & Plan  Seronegative rheumatoid arthritis (HCC)  - Continue current medication regimen and routine follow up with Rheumatology.   Orders:    US MSK limited; Future    Gastroesophageal reflux disease  - Well controlled on pantoprazole 40 mg daily. Continue same.   - Avoid triggering food and beverage.  - Will continue to monitor.   Orders:    pantoprazole (PROTONIX) 40 mg tablet; Take 1 tablet (40 mg total) by mouth every morning    Anxiety  - Well controlled on Lexapro 20 mg daily and Xanax as needed. Continue same.   - Will continue to monitor.   Orders:    escitalopram (LEXAPRO) 20 mg tablet; Take 1 tablet (20 mg total) by mouth daily    ALPRAZolam (XANAX) 0.25 mg tablet; Take 1 tablet (0.25 mg total) by mouth daily as needed for anxiety    Depression, unspecified depression type  - Well controlled on Lexapro 20 mg daily. Continue same.   - Will continue to monitor.     Orders:    escitalopram (LEXAPRO) 20 mg tablet; Take 1 tablet (20 mg total) by mouth daily    Memory problem  - Likely multifactorial.   - Reports Vitamin B12 deficiency for which she takes daily supplement. Will obtain updated Vitamin B12 level.  - Discussed her ANETTE and recommended compliance with CPAP.  - Will continue to follow up.        ANETTE (obstructive sleep apnea)  - Recommend consistent use of CPAP.  - Continue follow up with Sleep Medicine.          B12 deficiency    Orders:    Vitamin B12; Future    Itchy skin    Orders:    hydrOXYzine HCL (ATARAX) 25 mg tablet; Take 1 tablet (25 mg total) by mouth in the morning         History of Present Illness     Patient with  PMH of GERD, insomnia, migraines, inflammatory arthritis, and anxiety presents today for routine follow up. She is taking her prescribed medications and  reports no side effects. She is currently following with Rheumatology regarding seronegative rheumatoid arthritis. She was seeing [facility] but now has insurance through [facility] and needs to switch providers. She was supposed to have US performed in November which she canceled. She is wondering if we can order said US so she can have results for when she follows up with new Rheumatologist through [facility]. She reports that she is having issues with her memory and is forgetting things. Unsure if it is related to her autoimmune disorder. Does also have vitamin B12 deficiency for which she takes daily supplement. She does also have a history of sleep apnea. She is supposed to be wearing CPAP but states that she usually takes it off throughout the night. Discussed this may also be playing a role. She denies any other concerns or complaints today.         Review of Systems   Constitutional:  Negative for fatigue and fever.   HENT:  Negative for trouble swallowing.    Eyes:  Negative for visual disturbance.   Respiratory:  Negative for cough and shortness of breath.    Cardiovascular:  Negative for chest pain and palpitations.   Gastrointestinal:  Negative for abdominal pain and blood in stool.   Endocrine: Negative for cold intolerance and heat intolerance.   Genitourinary:  Negative for difficulty urinating and dysuria.   Musculoskeletal:  Negative for gait problem.   Skin:  Negative for rash.   Neurological:  Negative for dizziness, syncope and headaches.        Memory problem    Hematological:  Negative for adenopathy.   Psychiatric/Behavioral:  Negative for behavioral problems.      Past Medical History:   Diagnosis Date    Abnormal Pap smear of cervix     RAMIREZ positive     Anxiety     Basal cell carcinoma     Depression     Migraine     Migraine     Miscarriage 2007    MRSA colonization 07/13/2016    Neck pain     Pregnancy     Rheumatoid arthritis (HCC) 03/2024    Vertebral artery dissection ([facility])     Vulvovaginitis      Yeast infection      Past Surgical History:   Procedure Laterality Date     SECTION, LOW TRANSVERSE      COLONOSCOPY      DILATION AND CURETTAGE OF UTERUS  2007    GYNECOLOGIC CRYOSURGERY      cervix    LASIK      SKIN LESION EXCISION      basal cell carcinoma of left cheek    SPINAL FUSION  10/2023     Family History   Problem Relation Age of Onset    Hypertension Mother     Lung cancer Mother 56    Brain cancer Mother 56    Breast cancer Maternal Aunt         dx in 40's     Multiple sclerosis Father     No Known Problems Daughter     No Known Problems Maternal Grandmother     No Known Problems Maternal Grandfather     No Known Problems Paternal Grandmother     No Known Problems Paternal Grandfather     No Known Problems Maternal Aunt      Social History     Tobacco Use    Smoking status: Never    Smokeless tobacco: Never   Vaping Use    Vaping status: Never Used   Substance and Sexual Activity    Alcohol use: No    Drug use: No    Sexual activity: Yes     Partners: Male     Birth control/protection: Male Sterilization     Current Outpatient Medications on File Prior to Visit   Medication Sig    Botox 200 units SOLR     butalbital-acetaminophen-caffeine (Esgic) -40 mg per tablet Take 1 tablet by mouth every 4 (four) hours as needed for headaches    folic acid (FOLVITE) 1 mg tablet Take 1 tablet by mouth daily    gabapentin (NEURONTIN) 300 mg capsule Take 2 capsules (600 mg total) by mouth 2 (two) times a day Take 2 capsule in the morning and 2 at bedtime    latanoprost (XALATAN) 0.005 % ophthalmic solution INSTILL 1 DROP IN AFFECTED EYE AT BEDTIME    methocarbamol (ROBAXIN) 750 mg tablet Take 750 mg by mouth daily at bedtime    methotrexate 50 MG/2ML injection Inject 25 mg under the skin once a week    naproxen (NAPROSYN) 500 mg tablet Take 500 mg by mouth 2 (two) times a day with meals    pramipexole (MIRAPEX) 0.5 mg tablet Take 1 tablet (0.5 mg total) by mouth 3 (three) times a day    Probiotic  "Product (PRO-BIOTIC BLEND PO) Take by mouth    traZODone (DESYREL) 50 mg tablet Take 1 tablet (50 mg total) by mouth daily at bedtime TAKE 2 TABLETS BY MOUTH EVERY DAY AT BEDTIME    [DISCONTINUED] ALPRAZolam (XANAX) 0.25 mg tablet TAKE 1 TABLET BY MOUTH DAILY AT BEDTIME AS NEEDED FOR ANXIETY.    [DISCONTINUED] escitalopram (LEXAPRO) 20 mg tablet TAKE 1 TABLET BY MOUTH EVERY DAY. **NEED UPDATED INSURANCE**    [DISCONTINUED] hydrOXYzine HCL (ATARAX) 25 mg tablet Take 25 mg by mouth in the morning      [DISCONTINUED] pantoprazole (PROTONIX) 40 mg tablet TAKE 1 TABLET BY MOUTH EVERY DAY IN THE MORNING    [DISCONTINUED] hydrocortisone (ANUSOL-HC) 2.5 % rectal cream Apply topically 2 (two) times a day (Patient not taking: Reported on 8/15/2023)    [DISCONTINUED] hydrocortisone (ANUSOL-HC) 25 mg suppository INSERT 1 SUPPOSITORY INTO THE RECTUM 2 TIMES A DAY AS NEEDED FOR HEMORRHOIDS. (Patient not taking: Reported on 8/15/2023)    [DISCONTINUED] ketorolac (TORADOL) 10 mg tablet Take 1 tablet (10 mg total) by mouth every 6 (six) hours as needed for moderate pain (Patient not taking: Reported on 1/17/2024)    [DISCONTINUED] methotrexate 2.5 MG tablet PLEASE SEE ATTACHED FOR DETAILED DIRECTIONS    [DISCONTINUED] mometasone (ELOCON) 0.1 % cream Apply topically daily    [DISCONTINUED] Rinvoq 15 MG TB24 Take 15 mg by mouth daily    [DISCONTINUED] senna-docusate sodium (SENOKOT S) 8.6-50 mg per tablet Take 1 tablet by mouth 2 (two) times a day    [DISCONTINUED] sucralfate (CARAFATE) 1 g tablet Take 1 tablet (1 g total) by mouth 3 (three) times a day before meals (Patient not taking: Reported on 8/15/2023)     Allergies   Allergen Reactions    Reglan [Metoclopramide] Confusion     Pt reports Reglan \"makes me lose my mind and go crazy.\"     Immunization History   Administered Date(s) Administered    COVID-19 MODERNA VACC 0.5 ML IM 03/25/2021, 04/22/2021, 12/05/2021     Objective     /60 (BP Location: Left arm, Patient " "Position: Sitting, Cuff Size: Adult)   Pulse 72   Temp 97.5 °F (36.4 °C) (Tympanic)   Resp 16   Ht 5' 5\" (1.651 m)   Wt 83.5 kg (184 lb)   SpO2 98%   BMI 30.62 kg/m²     Physical Exam  Vitals and nursing note reviewed.   Constitutional:       Appearance: Normal appearance. She is well-developed.   HENT:      Head: Normocephalic and atraumatic.      Right Ear: External ear normal.      Left Ear: External ear normal.   Eyes:      Conjunctiva/sclera: Conjunctivae normal.   Cardiovascular:      Rate and Rhythm: Normal rate and regular rhythm.      Heart sounds: Normal heart sounds.   Pulmonary:      Effort: Pulmonary effort is normal.      Breath sounds: Normal breath sounds.   Musculoskeletal:         General: Normal range of motion.      Cervical back: Normal range of motion.   Skin:     General: Skin is warm and dry.   Neurological:      Mental Status: She is alert and oriented to person, place, and time.   Psychiatric:         Mood and Affect: Mood normal.         Behavior: Behavior normal.         "

## 2024-10-23 NOTE — ASSESSMENT & PLAN NOTE
- Likely multifactorial.   - Reports Vitamin B12 deficiency for which she takes daily supplement. Will obtain updated Vitamin B12 level.  - Discussed her ANETTE and recommended compliance with CPAP.  - Will continue to follow up.

## 2024-10-23 NOTE — ASSESSMENT & PLAN NOTE
- Well controlled on Lexapro 20 mg daily. Continue same.   - Will continue to monitor.     Orders:    escitalopram (LEXAPRO) 20 mg tablet; Take 1 tablet (20 mg total) by mouth daily

## 2024-10-23 NOTE — ASSESSMENT & PLAN NOTE
- Continue current medication regimen and routine follow up with Rheumatology.   Orders:    US MSK limited; Future

## 2024-10-23 NOTE — ASSESSMENT & PLAN NOTE
- Well controlled on pantoprazole 40 mg daily. Continue same.   - Avoid triggering food and beverage.  - Will continue to monitor.   Orders:    pantoprazole (PROTONIX) 40 mg tablet; Take 1 tablet (40 mg total) by mouth every morning

## 2024-10-23 NOTE — ASSESSMENT & PLAN NOTE
Orders:    hydrOXYzine HCL (ATARAX) 25 mg tablet; Take 1 tablet (25 mg total) by mouth in the morning

## 2024-10-28 ENCOUNTER — APPOINTMENT (OUTPATIENT)
Dept: LAB | Facility: CLINIC | Age: 49
End: 2024-10-28
Payer: COMMERCIAL

## 2024-10-28 DIAGNOSIS — E53.8 B12 DEFICIENCY: ICD-10-CM

## 2024-10-28 LAB
FERRITIN SERPL-MCNC: 17 NG/ML (ref 11–307)
IRON SATN MFR SERPL: 23 % (ref 15–50)
IRON SERPL-MCNC: 82 UG/DL (ref 50–212)
TIBC SERPL-MCNC: 364 UG/DL (ref 250–450)
UIBC SERPL-MCNC: 282 UG/DL (ref 155–355)
VIT B12 SERPL-MCNC: 638 PG/ML (ref 180–914)

## 2024-10-28 PROCEDURE — 82607 VITAMIN B-12: CPT

## 2024-10-31 ENCOUNTER — TELEPHONE (OUTPATIENT)
Dept: FAMILY MEDICINE CLINIC | Facility: CLINIC | Age: 49
End: 2024-10-31

## 2024-11-05 ENCOUNTER — CLINICAL SUPPORT (OUTPATIENT)
Dept: BARIATRICS | Facility: CLINIC | Age: 49
End: 2024-11-05
Payer: COMMERCIAL

## 2024-11-05 VITALS — WEIGHT: 188.8 LBS | BODY MASS INDEX: 31.46 KG/M2 | HEIGHT: 65 IN

## 2024-11-05 DIAGNOSIS — E66.811 CLASS 1 OBESITY DUE TO EXCESS CALORIES WITH SERIOUS COMORBIDITY AND BODY MASS INDEX (BMI) OF 31.0 TO 31.9 IN ADULT: ICD-10-CM

## 2024-11-05 DIAGNOSIS — E66.09 CLASS 1 OBESITY DUE TO EXCESS CALORIES WITH SERIOUS COMORBIDITY AND BODY MASS INDEX (BMI) OF 31.0 TO 31.9 IN ADULT: ICD-10-CM

## 2024-11-05 DIAGNOSIS — R63.5 ABNORMAL WEIGHT GAIN: Primary | ICD-10-CM

## 2024-11-05 PROCEDURE — RECHECK

## 2024-11-05 PROCEDURE — S9470 NUTRITIONAL COUNSELING, DIET: HCPCS

## 2024-11-05 NOTE — PROGRESS NOTES
"Weight Management Medical Nutrition Assessment  Pt is here today for menu planning. Current weight 188.8#. Pt reports that she has always struggled with her weight and when she gets to the high 180's she realizes that changes need to start happening. She reports she has been feeling very lethargic lately and is being worked up for an autoimmune dz. She is currently being treated for RA and is now transitioning her care to [facility] and has an appt with [facility] Rheum on 12/12/24.  She is currently getting injections of  methotrexate.  She also had back surgery about 1 year ago, but still struggles.  Noted labs recently checked and B12 normal, but Ferritin low normal. She reports was started on Ferrous Sulfate every other day, suggested to try to take it everyday since ferritin under 30 and can try Vitron C which may be gentler on the stomach.  Pt was also being worked up for ANETTE and now on CPAP.  She is hoping with the CPAP and iron supplement she will start having more energy.  Due to working long hours and being so tired when she gets home she is having trouble cooking or food prepping.  She appears to graze, crave sweets and rely on convenience foods. She did recently get , but her and her  are not living together yet, so she often doesn't also want to cook for one.  Did some menu planning, coming up with some easy meals to prep and foods to have on hand. Pt will f/u in 2 weeks.     Patient seen by Medical Provider in past 6 months:  no  Requested to schedule appointment with Medical Provider: Yes    Anthropometric Measurements  Current Weight (#): 188.8#  TBW % Change from start weight: -%  IBW: 130# (65\")  Highest Weight (#): 188#      Weight Loss History  Previous weight loss attempts: Commercial Programs (Weight Watchers, Fleecs, etc.)  Counseling with  MD  Exercise  High Protein/Low CHO diets (Atkins, [place], etc.)  Meal Replacements (Medifast, Slim Fast, etc.)  Self Created Diets " (Portion Control, Healthy Food Choices, etc.)    Food and Nutrition Related History  Wake up: 5am  on work days or 6:30am  Bed Time: can be in bed as early as 7pm  Sleep quality: poor, just started with using a CPAP  Works 30hrs per week (mon and Friday off, 6hr Tue, wed, thrus and long sunday)    Dietary Recall  Breakfast: 6:30am-coffee 1 little cream + 2 tsp stevia (recently changed to this) with occ miralax. Bowl of 1 cup cheerios + banana + skim milk OR skips    Snack: 11:30am -vanilla wafers + apple sauce + apple chips   Lunch: yesterday frozen pizza for lunch OR more quick meals  Snack: cinna roll or apple sauce or yogurt   Dinner: perogies for dinner OR Factor meal (calorie smart)  Snack: rice pudding w/ ff cool whip or cinn roll--if likes it then will keep going for it.     Beverages: water with zero harvey flavoring or seltzer  Volume of beverage intake: 40oz     Weekends: same  Cravings: sugar   Trouble area of day: always there but most at night.    Frequency of Eating out: often  Food restrictions: none, dislikes eggs and cheese  Cooking: pt  Food Shopping: pt    Occupation:     Physical Activity  Activity: on feet all day at work, no other exercise too tired.   Frequency: -  Physical limitations/barriers to exercise: joint pain    Estimated Needs  [facility] [employer] Needs (needs at 188#)  BMR: 1479 kcal  Maintenance calories (sedentary): 1774 kcal  1-2#/week loss (sedentary): 774-1274 kcal  1-2#/week loss (light activity): 3365-7924 kcal    Protein: 68-85 grams (1.2-1.5g/kg IBW)  Fluid: 1988 ml (35mL/kg IBW)    Nutrition Diagnosis  Yes;    Overweight/obesity  related to Excess energy intake as evidenced by  BMI more than normative standard for age and sex (obesity-grade I 30-34.9)       Nutrition Intervention    Nutrition Prescription  Calories: 2553-8614 kcal 1# per week w/o exercise  Protein: 75 g  Fluid: 2000 ml    Meal Plan (Harvey/Pro)  Breakfast: 150-200  Snack: --  Lunch: 300  Snack:  150  Dinner: 500-Factor meal  Snack: optinal 100 lalo snack    Nutrition Education  Calorie controlled menu  Lean protein food choices   Healthy snack options  Food journaling tips    Nutrition Counseling  Strategies: meal planning, portion sizes, healthy snack choices, hydration, fiber intake, protein intake, exercise, food logging    Monitoring and Evaluation:    Evaluation criteria  Energy Intake  Meet protein needs  Maintain adequate hydration  Monitor weekly weight  Meal planning/preparation  Food journal   Decreased portions at mealtimes and snacks  Physical activity     Barriers to change:none  Readiness to change: Preparation:  (Getting ready to change)   Comprehension: good  Expected Compliance: good

## 2024-11-11 ENCOUNTER — TRANSCRIBE ORDERS (OUTPATIENT)
Dept: ADMINISTRATIVE | Age: 49
End: 2024-11-11

## 2024-11-11 ENCOUNTER — APPOINTMENT (OUTPATIENT)
Dept: RADIOLOGY | Age: 49
End: 2024-11-11
Payer: COMMERCIAL

## 2024-11-11 DIAGNOSIS — Z98.1 S/P LUMBAR SPINAL FUSION: Primary | ICD-10-CM

## 2024-11-11 DIAGNOSIS — Z98.1 S/P LUMBAR SPINAL FUSION: ICD-10-CM

## 2024-11-11 PROCEDURE — 72100 X-RAY EXAM L-S SPINE 2/3 VWS: CPT

## 2024-11-19 ENCOUNTER — CLINICAL SUPPORT (OUTPATIENT)
Dept: BARIATRICS | Facility: CLINIC | Age: 49
End: 2024-11-19
Payer: COMMERCIAL

## 2024-11-19 VITALS — WEIGHT: 183.6 LBS | HEIGHT: 65 IN | BODY MASS INDEX: 30.59 KG/M2

## 2024-11-19 DIAGNOSIS — R63.5 ABNORMAL WEIGHT GAIN: Primary | ICD-10-CM

## 2024-11-19 DIAGNOSIS — E66.811 CLASS 1 OBESITY DUE TO EXCESS CALORIES WITH BODY MASS INDEX (BMI) OF 30.0 TO 30.9 IN ADULT, UNSPECIFIED WHETHER SERIOUS COMORBIDITY PRESENT: ICD-10-CM

## 2024-11-19 DIAGNOSIS — E66.09 CLASS 1 OBESITY DUE TO EXCESS CALORIES WITH BODY MASS INDEX (BMI) OF 30.0 TO 30.9 IN ADULT, UNSPECIFIED WHETHER SERIOUS COMORBIDITY PRESENT: ICD-10-CM

## 2024-11-19 PROCEDURE — S9470 NUTRITIONAL COUNSELING, DIET: HCPCS

## 2024-11-19 PROCEDURE — RECHECK

## 2024-11-19 NOTE — PROGRESS NOTES
"Weight Management Medical Nutrition Assessment  Pt is here today for 2 of 12 employee menu planning f/u visit. Current weight 183.6# which is a 5.2# weight loss in the past 2 weeks (2.87% TBW).  She is very happy with the weight loss and has implemented some of the changes that we discussed. She has started using a protein shake for breakfast, is being more mindful of her choices for snacks and lunch by adding more protein and continues with the factor meals for dinner. She is feeling more satisfied with the extra protein. She has tried some new foods ie: egg white omelet, but really still didn't like it and was proud of herself for not getting a donut when she went into [employer] don[employer]. She does report the \"food noise\" is often. She was driving home one day and \"really wanted [employer] french fries, but didn't stop, but really wanted it\".    She is awaiting her appt with Rheum with [facility], but did have to reach out to her [facility] rheum since she was in pain/discomfort so was put on a course of Prednisone. She has a few more days on the taper.  Congratulated pt on weight loss, even while on prednisone. Pt will f/u with RD in 2 weeks and [facility] provider on 11/25.    Patient seen by Medical Provider in past 6 months:  no  Requested to schedule appointment with Medical Provider: Yes    Anthropometric Measurements  Start Weight (#): 188.8# (11/5/24)  Current weight:  183.6#  TBW % Change from start weight: -%  IBW: 130# (65\")  Highest Weight (#): 188#    Weight Loss History  Previous weight loss attempts: Commercial Programs (Weight Watchers, [employer]ights, etc.)  Counseling with  MD  Exercise  High Protein/Low CHO diets (Atkins, [place], etc.)  Meal Replacements (Medifast, Slim Fast, etc.)  Self Created Diets (Portion Control, Healthy Food Choices, etc.)    Food and Nutrition Related History  Wake up: 5am  on work days or 6:30am  Bed Time: can be in bed as early as 7pm  Sleep quality: poor, just started with using a " CPAP  Works 30hrs per week (mon and Friday off, 6hr Tue, wed, thrus and long sunday)  On prednisone and has a few more day  Increased the iron to every day, suggested vitron c if finding constipaton.     Dietary Recall  Breakfast: 6:30am-coffee 1 little cream + 2 tsp stevia (recently changed to this) with occ miralax. Was doing a bowl of cheerios + banana + skim milk OR skipped but now doing Fairlife protein shake + 1/2 banana   Snack: 11:30am -Two Good yogurt flip OR protien shake--which ever didn't have at breakfast.   Snacking on carrots, cherry tomatoes.   Lunch: turkey and cheese roll-up  Snack: --  Dinner: Factor meal (calorie smart)  Snack: rice pudding w/ ff cool whip or cinn roll--if likes it then will likely over eat    Beverages: water with zero harvey flavoring or seltzer  Volume of beverage intake: 40oz     Weekends: same  Cravings: sugar   Trouble area of day: always there but most at night.    Frequency of Eating out: often  Food restrictions: none, dislikes eggs and cheese  Cooking: pt  Food Shopping: pt    Occupation:     Physical Activity  Activity: on feet all day at work, no other exercise too tired. Has SI joint dysfunction   Frequency: -  Physical limitations/barriers to exercise: joint pain    Estimated Needs  [employer] Needs (needs at 188#)  BMR: 1479 kcal  Maintenance calories (sedentary): 1774 kcal  1-2#/week loss (sedentary): 774-1274 kcal  1-2#/week loss (light activity): 1692-2264 kcal    Protein: 68-85 grams (1.2-1.5g/kg IBW)  Fluid: 1988 ml (35mL/kg IBW)    Nutrition Diagnosis  Yes;    Overweight/obesity  related to Excess energy intake as evidenced by  BMI more than normative standard for age and sex (obesity-grade I 30-34.9)       Nutrition Intervention    Nutrition Prescription  Calories: 6972-0069 kcal 1# per week w/o exercise  Protein: 75 g  Fluid: 2000 ml    Meal Plan (Harvey/Pro)  Breakfast: 150-200  Snack: --  Lunch: 300  Snack: 150  Dinner: 500-Factor  meal  Snack: optinal 100 lalo snack    Nutrition Education  Calorie controlled menu  Lean protein food choices   Healthy snack options  Food journaling tips    Nutrition Counseling  Strategies: meal planning, portion sizes, healthy snack choices, hydration, fiber intake, protein intake, exercise, food logging    Monitoring and Evaluation:    Evaluation criteria  Energy Intake  Meet protein needs  Maintain adequate hydration  Monitor weekly weight  Meal planning/preparation  Food journal   Decreased portions at mealtimes and snacks  Physical activity     Barriers to change:none  Readiness to change: Preparation:  (Getting ready to change)   Comprehension: good  Expected Compliance: good

## 2024-11-20 ENCOUNTER — PATIENT MESSAGE (OUTPATIENT)
Dept: FAMILY MEDICINE CLINIC | Facility: CLINIC | Age: 49
End: 2024-11-20

## 2024-11-20 DIAGNOSIS — M25.50 ARTHRALGIA, UNSPECIFIED JOINT: Primary | ICD-10-CM

## 2024-11-23 PROBLEM — E66.811 CLASS 1 OBESITY DUE TO EXCESS CALORIES WITH SERIOUS COMORBIDITY AND BODY MASS INDEX (BMI) OF 30.0 TO 30.9 IN ADULT: Status: ACTIVE | Noted: 2024-11-23

## 2024-11-23 PROBLEM — E66.09 CLASS 1 OBESITY DUE TO EXCESS CALORIES WITH SERIOUS COMORBIDITY AND BODY MASS INDEX (BMI) OF 30.0 TO 30.9 IN ADULT: Status: ACTIVE | Noted: 2024-11-23

## 2024-11-24 NOTE — PROGRESS NOTES
Assessment/Plan     Gracia Chirinos is 49 y.o. year old female  who comes in for consultation for assistance with weight management.     - Discussed options of [facility]-Intensive Lifestyle Intervention Program, Very Low Calorie Diet-VLCD, and Conservative Program and the role of weight loss medications.  - Patient is interested in pursuing Conservative Program    Class 1 obesity due to excess calories with serious comorbidity and body mass index (BMI) of 30.0 to 30.9 in adult  Continue to work with the dietitian and follow     nutrition Prescription  Calories: 7719-5534 kcal 1# per week w/o exercise  Protein: 75 g  Fluid: 2000 ml     Meal Plan (Harvey/Pro)  Breakfast: 150-200  Snack: --  Lunch: 300  Snack: 150  Dinner: 500-Factor meal  Snack: optinal 100 harvey snack    Activity as tolerated-consider physical therapy  -Discussed meeting with  due to binge eating and emotional eating.  Would benefit from referral to therapist who specializes in eating disorders  -ANETTE on CPAP ; 8-9 hours of sleep per night  -Class I obesity BMI of 30.5 with binge tendencies.  Patient would like to try injectable medication such as a pound.  Prescription submitted  -When she retried phentermine a few years ago she did not like the way it made her feel.  She does not want to take Topamax as she tried it years ago for migraines and it contributed to tremors.   -Patient is currently on Lexapro for depression.  We could consider using Wellbutrin and naltrexone for emotional eating  -Patient has been on multiple weight promoting medications including steroids gabapentin and hydroxyzine.  -Could also consider metformin in combination with Zepbound    Gracia was seen today for consult.    Diagnoses and all orders for this visit:    Class 1 obesity due to excess calories with serious comorbidity and body mass index (BMI) of 30.0 to 30.9 in adult  -     Hemoglobin A1C; Future  -     Insulin, fasting; Future  -     Vitamin D 25  "hydroxy; Future  -     tirzepatide (Zepbound) 2.5 mg/0.5 mL auto-injector; Inject 0.5 mL (2.5 mg total) under the skin once a week          -In addition, please follow general recommendations below.          Return visit:  6-8 weeks        General Lifestyle recommendations:    Nutrition   -Avoid skipping meals. Avoid sugary beverages. At least 64oz of water daily.  Limit processed food, refined sugars and grain. Encourage  healthy choices for meals and snacks   -Focus on protein goals and non starchy fiber rich vegetables for satiety effect and to help support a calorie deficit.   - Emphasize portion control, well balanced macronutrient's (protein, carbohydrate, fat using MyPlate method )and adequate protein with each meal/snacks and distributing calories equally throughout the day along with.   -Advise starting the day with a protein breakfast   Behavioral/Stress   Food log via carlos or provided paper log (carlos options include www.myfitnesspal.com, sparkpeople.com, loseit.com, calorieking.com, Mavatar). Encouraged mindful eating. Be sure to set aside time to eat, eat slowly, and savor your food. Consider meditation apps and/or taking a few minutes of mindfulness every AM. Understand the role of regarding the role of stress hormone cortisol in promoting weight gain and visceral fat accumulation. Weigh daily or atleast 2-3 times/ week  Physical Activity   Increase physical activity by 10 minutes daily. Gradually increase physical activity to a goal of 5 days per week for 30 minutes of MODERATE intensity ( should be able to pass the \"talk test\" but should not be able to sing. Target 150-300 minutes  PLUS 2 days per week of FULL BODY resistance training. Progression will be addressed at follow up visits. Encouraged contemplation regarding establishing a daily physical activity routine  - Resistance training along with increase protein intake is important to maintain and enhance metabolism  Sleep   Encourage sleep " hygiene and importance of having adequate sleep duration at least > 6 hours to support response in weight loss efforts    Handouts provided :  THRIVE program at [facility]  MyPlate and food quality  Food log resources, phone carlos or paper journal  Antiobesity medications options     - Discussed at length and the role of weight loss medications and medication options   - Explained the importance of making lifestyle changes in addition to starting any anti-obesity medications if the  patient chooses.  -  Initial weight loss goal of 5-10% weight loss for improved health  - Weight loss can improve patient's co-morbid conditions and/or prevent weight-related complications.  - Weight is not at goal and patient has been unable to achieve a meaningful weight loss above 5% using various programs and tools for more than 6 months    Gracia was seen today for consult.    Diagnoses and all orders for this visit:    Class 1 obesity due to excess calories with serious comorbidity and body mass index (BMI) of 30.0 to 30.9 in adult  -     Hemoglobin A1C; Future  -     Insulin, fasting; Future  -     Vitamin D 25 hydroxy; Future  -     tirzepatide (Zepbound) 2.5 mg/0.5 mL auto-injector; Inject 0.5 mL (2.5 mg total) under the skin once a week          Zepbound Instructions:    - Begin Zepbound 2.5 mg subcutaneously once a week. Dose changes may occur after 4 doses if medication is tolerated. You will be assessed prior to each dose change to make sure you are tolerating the medication well.  - Please message me when you have 2 pens left from the prescription so there are no lapses in treatment.  - Visit Zepbound.com for further information/injection instructions.   - Take precautions to avoid dehydration. Aim for 64-80 oz of fluids/day  -Stop eating before you feel full  -Instructed  to administer a missed dose as soon as possible within 4 days. If more than 4 days have passed, skip the missed dose, administer the next dose on the  regularly scheduled day, and resume the regular once-weekly dosing schedule  -Please eat small frequent meals to help reduce nausea. Avoid excessively fatty meals fried foods. Lemon water and saltine crackers may help with this.   - Side effects of Zepbound discussed: nausea, vomiting, diarrhea, and constipation. If you experience fever, nausea/vomiting, and pain radiating to your back this may be a sign of pancreatitis. Please go to the emergency room if this occurs.  - Consider OTC bowel regimen including stool softeners, fiber supplements to regularize bowel movements while on medication. MiraLAX can be used if needed  - - If on oral birth control a 2nd method of birth control is recommended during the 1st 8 weeks of therapy and for 4 weeks after any dosage change.   - Patient understands the side effects of the medication and proper administration. Patient agrees with the treatment plan and all questions were answered.    Subcutaneous injection:  Inject subQ into the thigh, abdomen, or upper arm; rotate injection sites.  Administer at any time of day, with or without meals.  Administer separately from insulin (do not mix the products); may inject both medications in the same body region but not adjacent to each other.  The day of the weekly administration can be changed as long as the time between the 2 doses is at least 3 days (72 hours)  Administer a missed dose as soon as possible within 4 days (96 hours) of missed dose; if more than 4 days have passed, skip the missed dose and administer next dose on regularly scheduled day and resume regular once weekly dosing schedule             Total time spent reviewing chart, interviewing patient, examining patient, discussing plan, answering all questions, and documentin min, with >50% face-to-face time spent counseling patient on nonsurgical interventions for the treatment of excess weight. Discussed in detail nonsurgical options including intensive lifestyle  intervention program, very low-calorie diet program and conservative program.  Discussed the role of weight loss medications.  Counseled patient on diet behavior and exercise modification for weight loss.        History of present illness/ Weight history   Patient reports that she has had trouble with her weight all her life.  She grew up in [place] and states that her mom and dad were not very strict with what she ate.  She states her mother had obesity and had father also gained weight after he was sedentary due to her diagnosis of MS.  He has had bariatric surgery by Dr. Jordan and is a patient at our center.  She states that she has been up and down with her weight all her life.  When she gets to her current weight of 180s she states that she starts implementing lifestyle efforts such as diet and physical activity.  Lately she states her physical activity has been limited due to back surgery last year.  She has also been evaluated for autoimmune disease and has been getting a few rounds of steroids this year which is also significantly contributed to her weight.  She reports 160 was her lowest weight and her highest weight is 188.  She started seeing the dietitian and has lost 5 pain 8 pounds since then.      She would really like to consider medication as she feels like the food noise has been significant.  She describes binge eating where she can eat a whole box of cinnamon rolls.  She does report that she lives alone and if she were living with her  or her daughter it is possible she could hide what she is eating.  She also reports eating until uncomfortably full.  She denies any purging or restrictive behaviors.  She states that she has mentioned the above issue to her therapist but has not been seeing them as frequently.  She has never been formally diagnosed with an eating disorder or been referred to an eating disorder therapist.  She reports binge episodes 1-2 times a month.  She also states  that she could eat at the end of her workday while watching TV and uses food for comfort    She states that she had tried fen-phen in the 90s and had succeeded in losing weight.  When she retried phentermine a few years ago she did not like the way it made her feel.  She does not want to take Topamax as she tried it years ago for migraines and it contributed to tremors.  She also notes low ferritin  Has been seeing CORDELIA Dean and was provided nutrition prescription below.      Nutrition Prescription  Calories: 6758-0696 kcal 1# per week w/o exercise  Protein: 75 g  Fluid: 2000 ml     Meal Plan (Harvey/Pro)  Breakfast: 150-200  Snack: --  Lunch: 300  Snack: 150  Dinner: 500-Factor meal  Snack: optinal 100 harvey snack      Screener Tool for Eating Disorders   (URL: SBIRT-ED - Eatingdisorderscreener.org)     1. Do you make yourself throw up because you feel uncomfortably full?  No     2. Do you worry you have lost control over how much you eat?  Yes     3. Have you recently lost more than 15 pounds in a 3-month period?  No     4. Do you think you are fat even though others say you are too thin?  No     5. Would you say that food dominates your life?   Yes     TOTAL: 2 /5     SCORING GUIDE:  LOW RISK: 1-2/5  MEDIUM RISK: 3/5  HIGH RISK: 4-5/5      Wt Readings from Last 30 Encounters:   11/25/24 83.3 kg (183 lb 9.6 oz)   11/19/24 83.3 kg (183 lb 9.6 oz)   11/05/24 85.6 kg (188 lb 12.8 oz)   10/23/24 83.5 kg (184 lb)   09/11/24 83.9 kg (185 lb)   08/02/24 83.9 kg (185 lb)   04/17/24 83 kg (183 lb)   04/08/24 83 kg (183 lb)   01/17/24 82.1 kg (181 lb)   09/20/23 81.6 kg (180 lb)   08/15/23 83.5 kg (184 lb)   05/19/23 83.5 kg (184 lb)   04/11/23 81 kg (178 lb 9.6 oz)   01/18/23 81.8 kg (180 lb 6.4 oz)   11/29/22 79.4 kg (175 lb)   11/28/22 81.6 kg (180 lb)   07/06/22 78.5 kg (173 lb)   06/03/22 78.9 kg (174 lb)   04/25/22 81.4 kg (179 lb 6 oz)   03/27/22 73.9 kg (163 lb)   03/02/22 80.3 kg (177 lb)   11/29/21 78.8 kg (173 lb 12.8  "oz)   11/16/21 77.7 kg (171 lb 3.2 oz)   08/16/21 76.2 kg (168 lb)   07/29/21 75.8 kg (167 lb)   04/26/21 76.2 kg (168 lb)   02/05/21 80.5 kg (177 lb 6.4 oz)   09/07/20 85 kg (187 lb 6.3 oz)   07/09/20 86.3 kg (190 lb 3.2 oz)   06/30/20 85.1 kg (187 lb 9.6 oz)               Lifestyle questionnaire       Diet recall:  Breakfast: 6:30am-coffee 1 little cream + 2 tsp stevia (recently changed to this) with occ miralax. Was doing a bowl of cheerios + banana + skim milk OR skipped but now doing Fairlife protein shake + 1/2 banana              Snack: 11:30am -Two Good yogurt flip OR protien shake--which ever didn't have at breakfast.   Snacking on carrots, cherry tomatoes.   Lunch: turkey and cheese roll-up  Snack: --  Dinner: Factor meal (calorie smart)  Snack: rice pudding w/ ff cool whip or cinn roll--if likes it then will likely over eat    Frequency Eating out x/ week: 1-2x/ week    Food behaviors\"head\" hunger/cravings, stress/emotional eating, boredom eating, snacking/grazing, eating disorder, and struggle controlling portions    Trouble area of the day:     Beverages  Water-- 48 oz   Caffeine/tea--12  oz   SSB -- no     Alcohol: no drinks/ week   Smoking: no  Drug use: no    Physical Activity --arthritis , on her feet     Sleep --ANETTE on CPAP ; 8-9 hours of sleep per night    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 Patient also denies personal and family history of medullary thyroid cancer, and multiple endocrine neoplasia type 2 (MEN 2 tumor). -Patient denies any history of kidney stones, seizures, or+ glaucoma, diabetic retinopathy, gall bladder " "disease, gastroparesis, hyperthyroidism.  -Denies Hx of CAD, PAD, palpitations, arrhythmia, uncontrolled hypertension  -Denies uncontrolled anxiety or depression, suicidal behavior or thinking , insomnia or sleep disturbance.         Past medical history/past surgical history       Previous notes and records have been reviewed.    The following portions of the patient's history were reviewed and updated as appropriate: allergies, current medications, past family history, past medical history, past social history, past surgical history, and problem list.    Past Medical History:   Diagnosis Date    Abnormal Pap smear of cervix     RAMIREZ positive     Anxiety     Basal cell carcinoma     Depression     Migraine     Migraine     Miscarriage 2007    MRSA colonization 2016    Neck pain     Pregnancy     Rheumatoid arthritis (HCC) 2024    Vertebral artery dissection ([facility])     Vulvovaginitis     Yeast infection          Past Surgical History:   Procedure Laterality Date     SECTION, LOW TRANSVERSE      COLONOSCOPY      DILATION AND CURETTAGE OF UTERUS  2007    GYNECOLOGIC CRYOSURGERY      cervix    LASIK      SKIN LESION EXCISION      basal cell carcinoma of left cheek    SPINAL FUSION  10/2023             Family History   Problem Relation Age of Onset    Hypertension Mother     Lung cancer Mother 56    Brain cancer Mother 56    Breast cancer Maternal Aunt         dx in 40's     Multiple sclerosis Father     No Known Problems Daughter     No Known Problems Maternal Grandmother     No Known Problems Maternal Grandfather     No Known Problems Paternal Grandmother     No Known Problems Paternal Grandfather     No Known Problems Maternal Aunt             Objective     /70 (BP Location: Left arm, Patient Position: Sitting, Cuff Size: Large)   Pulse 88   Ht 5' 5\" (1.651 m)   Wt 83.3 kg (183 lb 9.6 oz)   LMP 2024 (Exact Date)   SpO2 98%   BMI 30.55 kg/m²       Review of Systems "   Constitutional:  Negative for fatigue.   HENT:  Negative for sore throat.    Respiratory:  Negative for cough and shortness of breath.    Cardiovascular:  Negative for chest pain, palpitations and leg swelling.   Gastrointestinal:  Negative for abdominal pain, constipation, diarrhea and nausea.   Genitourinary:  Negative for dysuria.   Musculoskeletal:  Negative for arthralgias and back pain.   Skin:  Negative for rash.   Neurological:  Negative for headaches.   Psychiatric/Behavioral:  Negative for dysphoric mood. The patient is not nervous/anxious.        Physical Exam  Vitals and nursing note reviewed.   Constitutional:       Appearance: Normal appearance.   HENT:      Head: Normocephalic.   Pulmonary:      Effort: Pulmonary effort is normal.   Neurological:      General: No focal deficit present.      Mental Status: She is alert and oriented to person, place, and time.   Psychiatric:         Mood and Affect: Mood normal.         Behavior: Behavior normal.         Thought Content: Thought content normal.         Judgment: Judgment normal.            Medications       Current Outpatient Medications:     ALPRAZolam (XANAX) 0.25 mg tablet, Take 1 tablet (0.25 mg total) by mouth daily as needed for anxiety, Disp: 30 tablet, Rfl: 0    butalbital-acetaminophen-caffeine (Esgic) -40 mg per tablet, Take 1 tablet by mouth every 4 (four) hours as needed for headaches, Disp: 30 tablet, Rfl: 0    escitalopram (LEXAPRO) 20 mg tablet, Take 1 tablet (20 mg total) by mouth daily, Disp: 90 tablet, Rfl: 1    folic acid (FOLVITE) 1 mg tablet, Take 1 tablet by mouth daily, Disp: , Rfl:     gabapentin (NEURONTIN) 300 mg capsule, Take 2 capsules (600 mg total) by mouth 2 (two) times a day Take 2 capsule in the morning and 2 at bedtime, Disp: 120 capsule, Rfl: 5    hydrOXYzine HCL (ATARAX) 25 mg tablet, Take 1 tablet (25 mg total) by mouth in the morning, Disp: 90 tablet, Rfl: 1    latanoprost (XALATAN) 0.005 % ophthalmic  "solution, INSTILL 1 DROP IN AFFECTED EYE AT BEDTIME, Disp: , Rfl: 2    methocarbamol (ROBAXIN) 750 mg tablet, Take 750 mg by mouth daily at bedtime, Disp: , Rfl:     methotrexate 50 MG/2ML injection, Inject 25 mg under the skin once a week, Disp: , Rfl:     naproxen (NAPROSYN) 500 mg tablet, Take 500 mg by mouth 2 (two) times a day with meals, Disp: , Rfl:     pantoprazole (PROTONIX) 40 mg tablet, Take 1 tablet (40 mg total) by mouth every morning, Disp: 90 tablet, Rfl: 1    pramipexole (MIRAPEX) 0.5 mg tablet, Take 1 tablet (0.5 mg total) by mouth 3 (three) times a day, Disp: 90 tablet, Rfl: 1    Probiotic Product (PRO-BIOTIC BLEND PO), Take by mouth, Disp: , Rfl:     tirzepatide (Zepbound) 2.5 mg/0.5 mL auto-injector, Inject 0.5 mL (2.5 mg total) under the skin once a week, Disp: 2 mL, Rfl: 0    traZODone (DESYREL) 50 mg tablet, Take 1 tablet (50 mg total) by mouth daily at bedtime TAKE 2 TABLETS BY MOUTH EVERY DAY AT BEDTIME, Disp: 180 tablet, Rfl: 0    Botox 200 units SOLR, , Disp: , Rfl:            Labs and imaging     Recent labs and imaging have been personally reviewed.  Lab Results   Component Value Date    WBC 5.1 01/29/2024    HGB 12.1 01/29/2024    HCT 36.5 01/29/2024    MCV 81.5 01/29/2024     01/29/2024     Lab Results   Component Value Date     10/10/2014    SODIUM 140 08/29/2024    K 4.2 08/29/2024     08/29/2024    CO2 28 08/29/2024    ANIONGAP 7 10/10/2014    AGAP 7 08/29/2024    BUN 16 08/29/2024    CREATININE 0.86 08/29/2024    GLUC 73 08/29/2024    CALCIUM 8.8 08/29/2024    AST 27 08/29/2024    ALT 17 08/29/2024    ALKPHOS 57 08/29/2024    PROT 6.8 10/10/2014    TP 6.2 (L) 08/29/2024    BILITOT 0.2 10/10/2014    TBILI 0.6 08/29/2024    EGFR 83 08/29/2024     No results found for: \"HGBA1C\"  Lab Results   Component Value Date    TSH 1.40 07/13/2020     Lab Results   Component Value Date    CHOLESTEROL 191 01/29/2024     Lab Results   Component Value Date    HDL 56 01/29/2024 "     Lab Results   Component Value Date    TRIG 154 (H) 01/29/2024     Lab Results   Component Value Date    LDLCALC 108 (H) 01/29/2024

## 2024-11-25 ENCOUNTER — TELEPHONE (OUTPATIENT)
Dept: BARIATRICS | Facility: CLINIC | Age: 49
End: 2024-11-25

## 2024-11-25 ENCOUNTER — OFFICE VISIT (OUTPATIENT)
Dept: BARIATRICS | Facility: CLINIC | Age: 49
End: 2024-11-25
Payer: COMMERCIAL

## 2024-11-25 VITALS
BODY MASS INDEX: 30.59 KG/M2 | DIASTOLIC BLOOD PRESSURE: 70 MMHG | HEIGHT: 65 IN | OXYGEN SATURATION: 98 % | HEART RATE: 88 BPM | SYSTOLIC BLOOD PRESSURE: 120 MMHG | WEIGHT: 183.6 LBS

## 2024-11-25 DIAGNOSIS — E66.811 CLASS 1 OBESITY DUE TO EXCESS CALORIES WITH SERIOUS COMORBIDITY AND BODY MASS INDEX (BMI) OF 30.0 TO 30.9 IN ADULT: Primary | ICD-10-CM

## 2024-11-25 DIAGNOSIS — E66.09 CLASS 1 OBESITY DUE TO EXCESS CALORIES WITH SERIOUS COMORBIDITY AND BODY MASS INDEX (BMI) OF 30.0 TO 30.9 IN ADULT: Primary | ICD-10-CM

## 2024-11-25 PROCEDURE — 99204 OFFICE O/P NEW MOD 45 MIN: CPT | Performed by: INTERNAL MEDICINE

## 2024-11-25 RX ORDER — TIRZEPATIDE 2.5 MG/.5ML
2.5 INJECTION, SOLUTION SUBCUTANEOUS WEEKLY
Qty: 2 ML | Refills: 0 | Status: SHIPPED | OUTPATIENT
Start: 2024-11-25 | End: 2025-01-20

## 2024-11-25 NOTE — ASSESSMENT & PLAN NOTE
Continue to work with the dietitian and follow     nutrition Prescription  Calories: 1551-3945 kcal 1# per week w/o exercise  Protein: 75 g  Fluid: 2000 ml     Meal Plan (Harvey/Pro)  Breakfast: 150-200  Snack: --  Lunch: 300  Snack: 150  Dinner: 500-Factor meal  Snack: optinal 100 harvey snack    Activity as tolerated-consider physical therapy  -Discussed meeting with  due to binge eating and emotional eating.  Would benefit from referral to therapist who specializes in eating disorders  -ANETTE on CPAP ; 8-9 hours of sleep per night  -Class I obesity BMI of 30.5 with binge tendencies.  Patient would like to try injectable medication such as a pound.  Prescription submitted  -When she retried phentermine a few years ago she did not like the way it made her feel.  She does not want to take Topamax as she tried it years ago for migraines and it contributed to tremors.   -Patient is currently on Lexapro for depression.  We could consider using Wellbutrin and naltrexone for emotional eating  -Patient has been on multiple weight promoting medications including steroids gabapentin and hydroxyzine.  -Could also consider metformin in combination with Zepbound

## 2024-11-26 NOTE — TELEPHONE ENCOUNTER
[facility]26[facility]24-11/26/25.  Pharmacy was notified.  Patient notified via [employer].

## 2024-11-27 ENCOUNTER — EVALUATION (OUTPATIENT)
Dept: PHYSICAL THERAPY | Age: 49
End: 2024-11-27
Payer: COMMERCIAL

## 2024-11-27 DIAGNOSIS — M54.50 CHRONIC MIDLINE LOW BACK PAIN WITHOUT SCIATICA: ICD-10-CM

## 2024-11-27 DIAGNOSIS — G89.29 CHRONIC MIDLINE LOW BACK PAIN WITHOUT SCIATICA: ICD-10-CM

## 2024-11-27 DIAGNOSIS — M53.3 SI (SACROILIAC) JOINT DYSFUNCTION: ICD-10-CM

## 2024-11-27 PROCEDURE — 97110 THERAPEUTIC EXERCISES: CPT

## 2024-11-27 PROCEDURE — 97161 PT EVAL LOW COMPLEX 20 MIN: CPT

## 2024-11-27 PROCEDURE — 97140 MANUAL THERAPY 1/> REGIONS: CPT

## 2024-11-27 NOTE — PROGRESS NOTES
PT Evaluation     Today's date: 2024  Patient name: Gracia Chirinos  : 1975  MRN: [id number]  Referring provider: Melissa Crespo CRNP  Dx:   Encounter Diagnosis     ICD-10-CM    1. SI (sacroiliac) joint dysfunction  M53.3 Ambulatory Referral to Physical Therapy      2. Chronic midline low back pain without sciatica  M54.50     G89.29           Start Time: 1524  Stop Time: 1621  Total time in clinic (min): 57 minutes    Assessment  Impairments: abnormal or restricted ROM, impaired physical strength, lacks appropriate home exercise program, pain with function, poor posture , poor body mechanics, participation limitations and activity limitations  Symptom irritability: moderate    Assessment details: Pt is a 48 y/o female who presents with chief complaint of low back/SIJ pain. No further referral is necessary at this time. Pt has a movement impairment diagnosis of decreased core contraction and weakness of core/lumbar and glute musculature. Pt's presentation consistent with pathoanatomical dx of SIJ dysfunction. Pt is experiencing pain and decreased strength. These impairments are impacting their ability to perform functional activities including standing, walking, or sitting for prolonged periods, and squatting/bending to  objects from low surfaces. Pt has a positive prognosis. Pt would benefit from skilled PT intervention to address the aforementioned impairments leading to increased functional capacity and improved quality of life.      Understanding of Dx/Px/POC: good     Prognosis: good    Goals  Short Term Goals: to be achieved by 4 weeks  1) Patient to be independent with basic HEP.  2) Decrease pain to 3/10 at its worst.  3) Increase lumbar spine ROM by 25% in all deficient planes.   4) Pt will demonstrate improved LE strength by 1/2 MMT grade in all deficient planes.    Long Term Goals: to be achieved by discharge  1) FOTO equal to or greater than 65.  2) Patient to be independent with  "comprehensive HEP.  3) Pt will demonstrate lumbar spine ROM WNL in all planes to improve performance of a/iadls.  4) Pt will improve LE strength to 5/5 MMT grade in all planes to improve performance of a/iadls.  5) Patient to report no sleep interruption secondary to pain to allow for improved energy levels and better performance of household and social activities.  6) Pt will be able to stand/walk at least 30min without symptoms to aid in improvement of functional mobility and community ambulation.      Plan  Patient would benefit from: skilled physical therapy  Planned modality interventions: cryotherapy and thermotherapy: hydrocollator packs    Planned therapy interventions: activity modification, balance, functional ROM exercises, graded activity, graded exercise, home exercise program, patient/caregiver education, strengthening, stretching, therapeutic activities, therapeutic exercise, therapeutic training, neuromuscular re-education and manual therapy    Frequency: 2x week  Duration in weeks: 8  Treatment plan discussed with: patient      Subjective Evaluation    History of Present Illness  Mechanism of injury: Gracia is a 48yo female presenting to [facility] for IE with chief complaint of low back pain, which she reports as SI joint pain (typically L>R). Denies numbness/tingling and all red flag signs/symptoms, though does report that the pain does occasionally radiate down her left leg. Pt is s/p lumbar fusion of L4-S1 on 10/3/23, and has PMH significant for RA. She reports the low back pain has been present for several years and has not improved significantly since the surgery. She states she feels as though her SIJ \"keeps coming out\" and that she needs core strengthening. She reports difficulty with going up/down stairs, standing/walking for prolonged periods of time, and squatting/bending forward.  Quality of life: good    Patient Goals  Patient goals for therapy: increased strength, independence with " ADLs/IADLs, decreased pain and return to sport/leisure activities    Pain  Current pain ratin  At best pain ratin  At worst pain rating: 10  Quality: dull ache, sharp and throbbing  Relieving factors: relaxation and rest  Aggravating factors: sitting, stair climbing, standing, walking and running  Progression: no change      Diagnostic Tests  X-ray: normal  Treatments  Previous treatment: physical therapy        Objective     Concurrent Complaints  Negative for night pain, disturbed sleep, bladder dysfunction, bowel dysfunction and saddle (S4) numbness    Static Posture     Pelvis   Pelvis (Left): Elevated.     Tenderness     Left Hip   Tenderness in the ASIS and PSIS.     Right Hip   Tenderness in the ASIS and PSIS.     Active Range of Motion     Lumbar   Flexion:  WFL  Extension:  with pain Restriction level: minimal  Left lateral flexion:  WFL and with pain  Right lateral flexion:  WFL  Left rotation:  WFL  Right rotation:  WFL    Joint Play     Pain: L4, L5 and S1     Strength/Myotome Testing     Left Hip   Planes of Motion   Flexion: 4+  Extension: 4+  Abduction: 4+  Adduction: WFL  External rotation: 5  Internal rotation: 5    Right Hip   Planes of Motion   Flexion: 4+  Extension: 4+  Abduction: 4+  Adduction: WFL  External rotation: 5  Internal rotation: 5    Left Knee   Flexion: 5  Extension: 5    Right Knee   Flexion: 5  Extension: 5    Left Ankle/Foot   Dorsiflexion: 5  Plantar flexion: 4+    Right Ankle/Foot   Dorsiflexion: 5  Plantar flexion: 4+    Tests     Lumbar   Positive sacral spring .   Negative prone instability , SIJ compression, sacroiliac distraction and Gaenslen's .     Left   Negative crossed SLR, passive SLR and slump test.     Right   Negative crossed SLR, passive SLR and slump test.     Left Pelvic Girdle/Sacrum   Negative: active SLR test.     Right Pelvic Girdle/Sacrum   Negative: active SLR test.     Left Hip   Negative MICHAEL and FADIR.     Right Hip   Negative MICHAEL and FADIR.  "    Additional Tests Details  Pain in lumbar spine with resisted trunk rotation bilaterally      Ambulation     Observational Gait   Walking speed and stride length within functional limits.   Base of support: normal    Functional Assessment      Squat    Left within functional limits and right within functional limits.              Precautions: See chart.       Manuals 11/27            Pelvic Rotation MET (RLE into extension, LLE into flexion) ES                                                   Neuro Re-Ed 11/27            Hip Adduction Ball Squeezes 20x5\"                                                                                          Ther Ex 11/27            Clamshells GTB 3x10            Pt Ed ES                                                                                          Ther Activity                                       Gait Training                                       Modalities                                            "

## 2024-12-02 ENCOUNTER — CLINICAL SUPPORT (OUTPATIENT)
Dept: BARIATRICS | Facility: CLINIC | Age: 49
End: 2024-12-02

## 2024-12-02 ENCOUNTER — APPOINTMENT (OUTPATIENT)
Dept: PHYSICAL THERAPY | Age: 49
End: 2024-12-02
Payer: COMMERCIAL

## 2024-12-02 VITALS — HEIGHT: 65 IN | WEIGHT: 190 LBS | BODY MASS INDEX: 31.65 KG/M2

## 2024-12-02 DIAGNOSIS — E66.811 CLASS 1 OBESITY DUE TO EXCESS CALORIES WITH BODY MASS INDEX (BMI) OF 31.0 TO 31.9 IN ADULT, UNSPECIFIED WHETHER SERIOUS COMORBIDITY PRESENT: Primary | ICD-10-CM

## 2024-12-02 DIAGNOSIS — E66.09 CLASS 1 OBESITY DUE TO EXCESS CALORIES WITH BODY MASS INDEX (BMI) OF 31.0 TO 31.9 IN ADULT, UNSPECIFIED WHETHER SERIOUS COMORBIDITY PRESENT: Primary | ICD-10-CM

## 2024-12-02 PROCEDURE — S9470 NUTRITIONAL COUNSELING, DIET: HCPCS

## 2024-12-02 PROCEDURE — RECHECK

## 2024-12-02 NOTE — PROGRESS NOTES
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 Current weight 190# which is a weight gain from her last visit. Pt admitted to [facility] provider during visit on 11/25 and then to  today. As per [facility] provider note pt describes binge eating where she can eat a whole box of cinnamon rolls. She does report that she lives alone and if she were living with her  or her daughter it is possible she could hide what she is eating. She also reports eating until uncomfortably full. She denies any purging or restrictive behaviors. She states that she has mentioned the above issue to her therapist but has not been seeing them as frequently. She has never been formally diagnosed with an eating disorder or been referred to an eating disorder therapist. She reports binge episodes 1-2 times a month. She also states that she could eat at the end of her workday while watching TV and uses food for comfort.  Pt will be meeting with  BRIAN on 12/13.      Pt is hard on herself for falling off track and gaining weight. She did express excitement that she tried a Latvian egg white omelet the other day and liked it this time.  Pt is trying new foods and does well with her choices during the day. She is aware the evening, when she is alone, is the most difficult.  Her and her  will be moving in together in Jan therefore she hopes this habit will decrease some since she won't be alone as much. Did discussed some ways to help distract herself and avoid the night time eating. Suggested drinking herbal tea since it can be a little sweet, not calories, keep her hands occupied and take time to drink.  Pt is willing to give it a try.    Pt was prescribed Zepbound and plans on starting, most likely, Dec 12th after she sees her new Rheumatologist.     Patient seen by Medical Provider in past 6 months:  no  Requested to schedule appointment with Medical Provider:  "Yes    Anthropometric Measurements  Start Weight (#): 188.8# (11/5/24)  Current weight:  190#  TBW % Change from start weight: +1%  IBW: 130# (65\")  Highest Weight (#): 190#    Weight Loss History  Previous weight loss attempts: Commercial Programs (Weight Watchers, [employer], etc.)  Counseling with  MD  Exercise  High Protein/Low CHO diets (Atkins, [place], etc.)  Meal Replacements (Medifast, Slim Fast, etc.)  Self Created Diets (Portion Control, Healthy Food Choices, etc.)    Food and Nutrition Related History  Wake up: 5am  on work days or 6:30am  Bed Time: can be in bed as early as 7pm  Sleep quality: poor, just started with using a CPAP  Works 30hrs per week (mon and Friday off, 6hr Tue, wed, thrus and long sunday)  On prednisone and has a few more day  Increased the iron to every day, suggested vitron c if finding constipaton.     Dietary Recall  Breakfast: 6:30am-coffee 1 little cream + 2 tsp stevia (recently changed to this) with occ miralax. Now doing Talkable protein shake + 1/2 banana, today she tried an egg white Croatian omelet and a few bites of the home fries and enjoyed.    Snack: 11:30am -Two Good yogurt flip OR protien shake--which ever didn't have at breakfast.   Snacking while at work- carrots, 120 bag of nuts, 100 lalo [employer] bar  Lunch: turkey and cheese roll-up  Snack: --  Dinner: Factor meal (calorie smart)  Snack: this is the tough time, finds self \"binging\" at times--usually sweets. (Suggested to have some herbal tea at this time to help distract her.)    Beverages: water with zero lalo flavoring or seltzer  Volume of beverage intake: 50oz     Weekends: same  Cravings: sugar   Trouble area of day: always there but most at night.    Frequency of Eating out: often  Food restrictions: none, dislikes eggs (but working on trying egg white) and cheese  Cooking: pt  Food Shopping: pt    Occupation:     Physical Activity  Activity: on feet all day at work, no other exercise too " tired. Has SI joint dysfunction --started with PT   Frequency: -  Physical limitations/barriers to exercise: joint pain    Estimated Needs  [employer] Needs (needs at 188#)  BMR: 1479 kcal  Maintenance calories (sedentary): 1774 kcal  1-2#/week loss (sedentary): 774-1274 kcal  1-2#/week loss (light activity): 8775-1701 kcal    Protein: 68-85 grams (1.2-1.5g/kg IBW)  Fluid: 1988 ml (35mL/kg IBW)    Nutrition Diagnosis  Yes;    Overweight/obesity  related to Excess energy intake as evidenced by  BMI more than normative standard for age and sex (obesity-grade I 30-34.9)       Nutrition Intervention    Nutrition Prescription  Calories: 4556-0009 kcal 1# per week w/o exercise  Protein: 75 g  Fluid: 2000 ml    Meal Plan (Harvey/Pro)  Breakfast: 150-200  Snack: --  Lunch: 300  Snack: 150  Dinner: 500-Factor meal  Snack: optinal 100 harvey snack    Nutrition Education  Calorie controlled menu  Lean protein food choices   Healthy snack options  Food journaling tips    Nutrition Counseling  Strategies: meal planning, portion sizes, healthy snack choices, hydration, fiber intake, protein intake, exercise, food logging    Monitoring and Evaluation:    Evaluation criteria  Energy Intake  Meet protein needs  Maintain adequate hydration  Monitor weekly weight  Meal planning/preparation  Food journal   Decreased portions at mealtimes and snacks  Physical activity     Barriers to change:none  Readiness to change: Preparation:  (Getting ready to change)   Comprehension: good  Expected Compliance: good

## 2024-12-03 ENCOUNTER — OFFICE VISIT (OUTPATIENT)
Dept: PHYSICAL THERAPY | Age: 49
End: 2024-12-03
Payer: COMMERCIAL

## 2024-12-03 ENCOUNTER — HOSPITAL ENCOUNTER (OUTPATIENT)
Dept: ULTRASOUND IMAGING | Facility: HOSPITAL | Age: 49
Discharge: HOME | End: 2024-12-03
Payer: COMMERCIAL

## 2024-12-03 DIAGNOSIS — M06.00 SERONEGATIVE RHEUMATOID ARTHRITIS (HCC): ICD-10-CM

## 2024-12-03 DIAGNOSIS — G89.29 CHRONIC MIDLINE LOW BACK PAIN WITHOUT SCIATICA: ICD-10-CM

## 2024-12-03 DIAGNOSIS — M54.50 CHRONIC MIDLINE LOW BACK PAIN WITHOUT SCIATICA: ICD-10-CM

## 2024-12-03 DIAGNOSIS — M53.3 SI (SACROILIAC) JOINT DYSFUNCTION: Primary | ICD-10-CM

## 2024-12-03 PROCEDURE — 97110 THERAPEUTIC EXERCISES: CPT

## 2024-12-03 PROCEDURE — 76882 US LMTD JT/FCL EVL NVASC XTR: CPT

## 2024-12-03 PROCEDURE — 97112 NEUROMUSCULAR REEDUCATION: CPT

## 2024-12-03 NOTE — PROGRESS NOTES
"Daily Note     Today's date: 12/3/2024  Patient name: Gracia Chirinos  : 1975  MRN: [id number]  Referring provider: Melissa Crespo CRNP  Dx:   Encounter Diagnosis     ICD-10-CM    1. SI (sacroiliac) joint dysfunction  M53.3       2. Chronic midline low back pain without sciatica  M54.50     G89.29           Start Time: 1530  Stop Time: 1618  Total time in clinic (min): 48 minutes    Subjective: Pt reports she is sore today in multiple areas, which she attributes to a flare-up in her RA. She states that she can't tell if her \"SI joint is out or in right now\" but that she feels more discomfort overall on the right side of her body today.        Objective: See treatment diary below      Assessment: Pt presents today with slight antalgic gait, which she attributes to knee and hip soreness on R side from RA flare-up. Initiated session with gentle  to lumbosacral spine for pain modulation, with pt reporting slight relief afterwards. Tenderness noted over bilateral PSISs as well as central lower lumbar region. Pt able to perform bridges with posterior pelvic tilt, however reported discomfort in low back during the eccentric portion of the intervention. Increased effort required to complete clamshells on LLE compared to RLE. Pt reported fatigue following session, but no increases in pain. Tolerated treatment well. Patient would benefit from continued PT      Plan: Continue per plan of care.  Progress treatment as tolerated.       Precautions: See chart.       Manuals 11/27 12/3           Pelvic Rotation MET (RLE into extension, LLE into flexion) ES            Lumbosacral   ES                                     Neuro Re-Ed 11/27 12/3           Hip Adduction Ball Squeezes 20x5\" 20x5\"           Bird Dogs             Dead Bugs  2x10           Bridges  2x10 w/ PPT                                                  Ther Ex 11/27 12/3           Clamshells GTB 3x10 GTB 2x10ea           Pt Ed ES ES           UB  10' "           Squats  W/ ball squeeze at wall  2x10                                                               Ther Activity                                       Gait Training                                       Modalities

## 2024-12-08 DIAGNOSIS — F51.04 CHRONIC INSOMNIA: ICD-10-CM

## 2024-12-09 ENCOUNTER — OFFICE VISIT (OUTPATIENT)
Dept: PHYSICAL THERAPY | Age: 49
End: 2024-12-09
Payer: COMMERCIAL

## 2024-12-09 DIAGNOSIS — M54.50 CHRONIC MIDLINE LOW BACK PAIN WITHOUT SCIATICA: ICD-10-CM

## 2024-12-09 DIAGNOSIS — G89.29 CHRONIC MIDLINE LOW BACK PAIN WITHOUT SCIATICA: ICD-10-CM

## 2024-12-09 DIAGNOSIS — M53.3 SI (SACROILIAC) JOINT DYSFUNCTION: Primary | ICD-10-CM

## 2024-12-09 DIAGNOSIS — F51.04 CHRONIC INSOMNIA: ICD-10-CM

## 2024-12-09 PROCEDURE — 97140 MANUAL THERAPY 1/> REGIONS: CPT

## 2024-12-09 PROCEDURE — 97110 THERAPEUTIC EXERCISES: CPT

## 2024-12-09 PROCEDURE — 97112 NEUROMUSCULAR REEDUCATION: CPT

## 2024-12-09 RX ORDER — TRAZODONE HYDROCHLORIDE 50 MG/1
TABLET, FILM COATED ORAL
Qty: 180 TABLET | Refills: 1 | Status: SHIPPED | OUTPATIENT
Start: 2024-12-09 | End: 2024-12-09

## 2024-12-09 RX ORDER — TRAZODONE HYDROCHLORIDE 50 MG/1
50 TABLET, FILM COATED ORAL
Qty: 90 TABLET | Refills: 0 | Status: SHIPPED | OUTPATIENT
Start: 2024-12-09 | End: 2025-03-09

## 2024-12-09 NOTE — TELEPHONE ENCOUNTER
Last office visit 9/11/24.  Follow up scheduled 3/19/25.    Patient called sleep center and spoke to practice manager Concepcion Crocker.  Stated she is totally out of medication.     Dr. Molina, please review Rx and sign if appropriate.

## 2024-12-09 NOTE — PROGRESS NOTES
"Daily Note     Today's date: 2024  Patient name: Gracia Chirinos  : 1975  MRN: [id number]  Referring provider: Melissa Crespo CRNP  Dx:   Encounter Diagnosis     ICD-10-CM    1. SI (sacroiliac) joint dysfunction  M53.3       2. Chronic midline low back pain without sciatica  M54.50     G89.29           Start Time: 1531  Stop Time: 1618  Total time in clinic (min): 47 minutes    Subjective: Pt reports she was in [place] on Thursday and Friday and was walking around a lot, which made her very sore. She reports she is still sore today, and feels like her SIJ is out again. She states she has been experiencing radiating symptoms down to the inside of her knee.      Objective: See treatment diary below      Assessment: Began session with manual interventions. Pt's L ASIS appeared to be slightly elevated compared to RLE. Performed pelvic rotation MET, with pt reporting slight relief following. Performed long axis distraction with Gr. 5 mobilization, with pt reporting significant relief and no radiating knee pain for the remainder of the session following. Required VC for technique with dead bugs and was able to correct. Tolerated treatment well. Patient would benefit from continued PT      Plan: Continue per plan of care.  Progress treatment as tolerated.       Precautions: See chart.       Manuals 11/27 12/3 12/9          Pelvic Rotation MET (RLE into extension, LLE into flexion) ES  ES          Lumbosacral   ES ES          LLE LAD and Gr. 5 mob   ES                       Neuro Re-Ed 11/27 12/3 12/9          Hip Adduction Ball Squeezes 20x5\" 20x5\" 20x5\"          Bird Dogs             Dead Bugs  2x10 2x10          Bridges  2x10 w/ PPT 2x10 w/ PPT                                                 Ther Ex 11/27 12/3 12/9          Clamshells GTB 3x10 GTB 2x10ea           Pt Ed ES ES           UB  10' 6' P!          Squats  W/ ball squeeze at wall  2x10 W/ ball squeeze at wall  2x10                            "                                   Ther Activity                                       Gait Training                                       Modalities

## 2024-12-11 NOTE — PROGRESS NOTES
Name: Gracia Chirinos      : 1975      MRN: [id number]  Encounter Provider: Dolly Gilbert PA-C  Encounter Date: 2024   Encounter department: [facility] RHEUMATOLOGY [facility]  :  Assessment & Plan  Seronegative rheumatoid arthritis (HCC)  History of seronegative rheumatoid arthritis.  She is currently taking subcutaneous methotrexate injections.  She has previously been treated with Rinvoq however she did not have any improvement in her symptoms with this.  She continues to have persistent soft tissue swelling with hyperemia noted on her most recent musculoskeletal ultrasound done earlier this month.  She does have a history of a positive RAMIREZ and it is possible she has an overlap connective tissue disease in addition to her inflammatory arthritis.  I would like to order some additional lab work including repeat serologies within [facility] CTD test to further evaluate for connective tissue disease.  Depending on lab results we can discuss additional medications including trying a different biologic.  We did discuss adding prednisone for symptomatic relief however she would like to remain off of it at this time.  If she would develop a flareup or finds that her symptoms are worsening we could add a low-dose of prednisone to take every day to help control her symptoms.  She is currently taking gabapentin which she started for her ongoing back pain and symptoms prior to her surgery.  She does not feel that it is providing any benefit for her now and would like to taper off of it.  I have given her instructions on how to taper off of gabapentin.  I have recommended adding a magnesium glycinate supplement as she does have some difficulty sleeping and I do believe this may help with some of her restless leg symptoms as well.  We also discussed dietary changes.  She is following with weight management and will be starting Zepbound.  Encouraged a whole foods diet and limiting processed foods  and added sugars.  She could also consider doing a trial of a gluten-free diet to see if this helps with any of her inflammatory symptoms.  Plan to follow-up in 4 to 6 weeks or sooner if needed.    Orders:    CBC and differential; Future    Comprehensive metabolic panel; Future    Sedimentation rate, automated; Future    C-reactive protein; Future    MISCELLANEOUS LAB TEST; Future    TSH w/Reflex; Future    Positive RAMIREZ (antinuclear antibody)  History of positive RAMIREZ.  Recommend additional testing including [facility] CTD testing for further evaluation.    Orders:    CBC and differential; Future    Comprehensive metabolic panel; Future    Sedimentation rate, automated; Future    C-reactive protein; Future    MISCELLANEOUS LAB TEST; Future    Ambulatory Referral to Rheumatology    TSH w/Reflex; Future    Vitamin D deficiency  History of low vitamin D.  Recommend adding over-the-counter vitamin D3 supplement with K2.  Will check vitamin D with next labs.  Orders:    Vitamin D 25 hydroxy; Future    Low iron stores  History of low iron stores.  She is currently taking an iron supplement.  Recommend continuing supplementation and we will plan to recheck iron stores in the next 3 to 4 months.           History of Present Illness   Gracia Chirinos is a 49 y.o. female.  She is here for new patient evaluation for history of seronegative rheumatoid arthritis.  She has previously been following with [facility] rheumatology.  In October 2023 she had back surgery (lumbar fusion).  She did well postoperatively however in January 2024 she started experiencing pain in her elbows and shoulders.  She was seen by her PCP who ordered lab work and she was found to have a positive RAMIREZ.  She was referred to rheumatology at that time.  She was initially evaluated in March 2024 and at that time had developed symptoms of pain and swelling in her hands and wrists.  She was diagnosed with seronegative rheumatoid arthritis.  She was treated initially with  prednisone and oral methotrexate was added.  She continued to have persistent joint pain and swelling and in June Rinvoq was added.  She ultimately discontinued this as it did not provide any significant relief for her.  In September she was switched to subcutaneous methotrexate injections.  She continues to have pain as well as swelling in her hands and wrists.  She also has significant fatigue.  She had a musculoskeletal ultrasound done on 12/3/2024 of her bilateral hands and wrists.  This revealed mild synovial hypertrophy and synovial hyperemia in the right first, fifth, and RC/IC joints.  Mild synovial hypertrophy without hyperemia was noted in the left fourth and fifth MCP joints.  She notes pain in her feet, particularly in her right great toe.  She has seen podiatry in the past and did have an injection however this did not provide any significant relief for her.  She will be seeing a new podiatrist in the future for a reevaluation.  She has a history of SI joint dysfunction and continues to have some pain in this area.    She has a history of GERD and takes pantoprazole for this.  She has a history of restless leg syndrome and takes Mirapex.  She also was recently diagnosed with obstructive sleep apnea and is using a CPAP machine.  She has a history of low iron stores and is taking an iron supplement.  She is also working on losing weight and is working with weight management.  She will be starting Zepbound.    She has a family history of lupus in her mother.    She has no history of recurrent fevers or chills however she does note often feeling cold especially in the evening.  She has a history of chronic migraines which has previously been treated with Botox however these have been generally quiescent.  She has more recently been experiencing double vision.  She did see her eye doctor who felt this was related to muscle weakness and she was given a new prescription for her glasses.  She has no dry eye  symptoms however she does note some dry mouth.  She has no frequent sores in her nose or her mouth.  She has no chest pain or shortness of breath.  She has a history of GERD with occasional dysphagia and is taking pantoprazole.  She has had an EGD done in the past.  She also has a history of constipation.  She has no loose stools or blood in her stool.  She still has regular menstrual cycles however she has noted her cycles are longer.  She has no history of psoriasis or eczema however she does have chronically itchy skin.  She does take hydroxyzine for this.  She does note sometimes experiencing a red rash when she goes out in the sun however this does not happen every time.  She has no history of seizures or blood clots.  She has no symptoms consistent with Raynaud's.  She did have a miscarriage at 10 weeks gestation however she had a subsequent pregnancy and was told to take baby aspirin.  She had no complications with this pregnancy.  She does note that at the time of her miscarriage she had lab work done and was told she had a positive RAMIREZ however there was no subsequent follow-up for this at the time.            Review of Systems   Constitutional:  Positive for fatigue. Negative for chills and fever.   HENT:  Negative for hearing loss, sore throat and tinnitus.         Dry mouth   Eyes:  Negative for pain and visual disturbance.   Respiratory:  Negative for cough and shortness of breath.    Cardiovascular:  Negative for chest pain and palpitations.   Gastrointestinal:  Positive for constipation. Negative for abdominal pain, nausea and vomiting.        GERD - on pantoprazole   Genitourinary:  Negative for difficulty urinating.   Musculoskeletal:  Positive for arthralgias, back pain (SI joint pain) and joint swelling. Negative for gait problem, myalgias, neck pain and neck stiffness.   Skin:  Negative for rash.   Neurological:  Positive for headaches (migraines). Negative for dizziness, seizures, weakness and  numbness.   Psychiatric/Behavioral:  Positive for sleep disturbance (sleep apnea). Negative for confusion and decreased concentration.      Current Outpatient Medications on File Prior to Visit   Medication Sig Dispense Refill    ALPRAZolam (XANAX) 0.25 mg tablet Take 1 tablet (0.25 mg total) by mouth daily as needed for anxiety 30 tablet 0    butalbital-acetaminophen-caffeine (Esgic) -40 mg per tablet Take 1 tablet by mouth every 4 (four) hours as needed for headaches 30 tablet 0    escitalopram (LEXAPRO) 20 mg tablet Take 1 tablet (20 mg total) by mouth daily 90 tablet 1    folic acid (FOLVITE) 1 mg tablet Take 1 tablet by mouth daily      gabapentin (NEURONTIN) 300 mg capsule Take 2 capsules (600 mg total) by mouth 2 (two) times a day Take 2 capsule in the morning and 2 at bedtime 120 capsule 5    hydrOXYzine HCL (ATARAX) 25 mg tablet Take 1 tablet (25 mg total) by mouth in the morning 90 tablet 1    latanoprost (XALATAN) 0.005 % ophthalmic solution INSTILL 1 DROP IN AFFECTED EYE AT BEDTIME  2    methocarbamol (ROBAXIN) 750 mg tablet Take 750 mg by mouth daily at bedtime (Patient taking differently: Take 750 mg by mouth as needed for muscle spasms)      methotrexate 50 MG/2ML injection Inject 25 mg under the skin once a week      naproxen (NAPROSYN) 500 mg tablet Take 500 mg by mouth 2 (two) times a day with meals (Patient taking differently: Take 500 mg by mouth as needed for moderate pain)      pantoprazole (PROTONIX) 40 mg tablet Take 1 tablet (40 mg total) by mouth every morning 90 tablet 1    pramipexole (MIRAPEX) 0.5 mg tablet Take 1 tablet (0.5 mg total) by mouth 3 (three) times a day (Patient taking differently: Take 0.5 mg by mouth in the morning Takes 0.25 mg morning and 0.25 mg at night) 90 tablet 1    Probiotic Product (PRO-BIOTIC BLEND PO) Take by mouth      traZODone (DESYREL) 50 mg tablet Take 1 tablet (50 mg total) by mouth daily at bedtime 90 tablet 0    Botox 200 units SOLR        "tirzepatide (Zepbound) 2.5 mg/0.5 mL auto-injector Inject 0.5 mL (2.5 mg total) under the skin once a week 2 mL 0     No current facility-administered medications on file prior to visit.         Objective   /68   Pulse 65   Temp (!) 97.3 °F (36.3 °C) (Tympanic)   Ht 5' 6\" (1.676 m)   Wt 88.2 kg (194 lb 6.4 oz)   LMP 11/18/2024 (Exact Date)   SpO2 98%   BMI 31.38 kg/m²      Physical Exam  Constitutional:       Appearance: Normal appearance.   Cardiovascular:      Rate and Rhythm: Normal rate and regular rhythm.   Pulmonary:      Breath sounds: Normal breath sounds.   Musculoskeletal:         General: Swelling (Soft tissue prominence noted bilateral MCP joints, right greater than left hand; soft tissue prominence right greater than left wrist) and tenderness (MCP joints bilaterally, bilateral elbows, shoulders, knees) present.   Skin:     General: Skin is warm and dry.   Neurological:      General: No focal deficit present.      Mental Status: She is alert.         Dragon Dictation software was used to dictate this note. It may contain errors with dictating incorrect words/spelling. Please contact provider directly for any questions.      "

## 2024-12-12 ENCOUNTER — PATIENT MESSAGE (OUTPATIENT)
Age: 49
End: 2024-12-12

## 2024-12-12 ENCOUNTER — CONSULT (OUTPATIENT)
Age: 49
End: 2024-12-12

## 2024-12-12 VITALS
DIASTOLIC BLOOD PRESSURE: 68 MMHG | SYSTOLIC BLOOD PRESSURE: 114 MMHG | HEIGHT: 66 IN | OXYGEN SATURATION: 98 % | TEMPERATURE: 97.3 F | WEIGHT: 194.4 LBS | BODY MASS INDEX: 31.24 KG/M2 | HEART RATE: 65 BPM

## 2024-12-12 DIAGNOSIS — R79.0 LOW IRON STORES: ICD-10-CM

## 2024-12-12 DIAGNOSIS — R79.0 LOW IRON STORES: Primary | ICD-10-CM

## 2024-12-12 DIAGNOSIS — R76.8 POSITIVE ANA (ANTINUCLEAR ANTIBODY): ICD-10-CM

## 2024-12-12 DIAGNOSIS — M06.00 SERONEGATIVE RHEUMATOID ARTHRITIS (HCC): Primary | ICD-10-CM

## 2024-12-12 DIAGNOSIS — E55.9 VITAMIN D DEFICIENCY: ICD-10-CM

## 2024-12-12 RX ORDER — FERROUS SULFATE 325(65) MG
325 TABLET, DELAYED RELEASE (ENTERIC COATED) ORAL DAILY
Qty: 90 TABLET | Refills: 0 | Status: SHIPPED | OUTPATIENT
Start: 2024-12-12 | End: 2025-03-12

## 2024-12-12 NOTE — ASSESSMENT & PLAN NOTE
History of positive RAMIREZ.  Recommend additional testing including [facility] CTD testing for further evaluation.    Orders:    CBC and differential; Future    Comprehensive metabolic panel; Future    Sedimentation rate, automated; Future    C-reactive protein; Future    MISCELLANEOUS LAB TEST; Future    Ambulatory Referral to Rheumatology    TSH w/Reflex; Future

## 2024-12-12 NOTE — ASSESSMENT & PLAN NOTE
History of low vitamin D.  Recommend adding over-the-counter vitamin D3 supplement with K2.  Will check vitamin D with next labs.  Orders:    Vitamin D 25 hydroxy; Future

## 2024-12-12 NOTE — ASSESSMENT & PLAN NOTE
History of low iron stores.  She is currently taking an iron supplement.  Recommend continuing supplementation and we will plan to recheck iron stores in the next 3 to 4 months.

## 2024-12-12 NOTE — PATIENT INSTRUCTIONS
Gabapentin: 300 mg in the morning, 600 mg in the evening x 5 days, then 300 mg in the morning, 300 mg in the evening x 5 days, then 300 mg in the evening x 5 days, then 300 mg every other day x 5 doses, then 300 mg every 3rd day x 5 doses, then stop    - Magnesium glycinate (300 mg daily)  - Vitamin D3+K2 (2000 IU daily)

## 2024-12-12 NOTE — ASSESSMENT & PLAN NOTE
History of seronegative rheumatoid arthritis.  She is currently taking subcutaneous methotrexate injections.  She has previously been treated with Rinvoq however she did not have any improvement in her symptoms with this.  She continues to have persistent soft tissue swelling with hyperemia noted on her most recent musculoskeletal ultrasound done earlier this month.  She does have a history of a positive RAMIREZ and it is possible she has an overlap connective tissue disease in addition to her inflammatory arthritis.  I would like to order some additional lab work including repeat serologies within [facility] CTD test to further evaluate for connective tissue disease.  Depending on lab results we can discuss additional medications including trying a different biologic.  We did discuss adding prednisone for symptomatic relief however she would like to remain off of it at this time.  If she would develop a flareup or finds that her symptoms are worsening we could add a low-dose of prednisone to take every day to help control her symptoms.  She is currently taking gabapentin which she started for her ongoing back pain and symptoms prior to her surgery.  She does not feel that it is providing any benefit for her now and would like to taper off of it.  I have given her instructions on how to taper off of gabapentin.  I have recommended adding a magnesium glycinate supplement as she does have some difficulty sleeping and I do believe this may help with some of her restless leg symptoms as well.  We also discussed dietary changes.  She is following with weight management and will be starting Zepbound.  Encouraged a whole foods diet and limiting processed foods and added sugars.  She could also consider doing a trial of a gluten-free diet to see if this helps with any of her inflammatory symptoms.  Plan to follow-up in 4 to 6 weeks or sooner if needed.    Orders:    CBC and differential; Future    Comprehensive metabolic panel;  Future    Sedimentation rate, automated; Future    C-reactive protein; Future    MISCELLANEOUS LAB TEST; Future    TSH w/Reflex; Future

## 2024-12-13 ENCOUNTER — APPOINTMENT (OUTPATIENT)
Dept: PHYSICAL THERAPY | Age: 49
End: 2024-12-13
Payer: COMMERCIAL

## 2024-12-13 ENCOUNTER — TELEPHONE (OUTPATIENT)
Dept: BARIATRICS | Facility: CLINIC | Age: 49
End: 2024-12-13

## 2024-12-13 ENCOUNTER — TELEPHONE (OUTPATIENT)
Age: 49
End: 2024-12-13

## 2024-12-13 NOTE — TELEPHONE ENCOUNTER
Left message on machine to call office back to reschedule appointment that was cancelled for Liss.

## 2024-12-16 ENCOUNTER — OFFICE VISIT (OUTPATIENT)
Dept: PHYSICAL THERAPY | Age: 49
End: 2024-12-16
Payer: COMMERCIAL

## 2024-12-16 ENCOUNTER — APPOINTMENT (OUTPATIENT)
Dept: LAB | Age: 49
End: 2024-12-16
Payer: COMMERCIAL

## 2024-12-16 ENCOUNTER — CLINICAL SUPPORT (OUTPATIENT)
Dept: BARIATRICS | Facility: CLINIC | Age: 49
End: 2024-12-16
Payer: COMMERCIAL

## 2024-12-16 VITALS — WEIGHT: 186.2 LBS | BODY MASS INDEX: 31.02 KG/M2 | HEIGHT: 65 IN

## 2024-12-16 DIAGNOSIS — G89.29 CHRONIC MIDLINE LOW BACK PAIN WITHOUT SCIATICA: ICD-10-CM

## 2024-12-16 DIAGNOSIS — M53.3 SI (SACROILIAC) JOINT DYSFUNCTION: Primary | ICD-10-CM

## 2024-12-16 DIAGNOSIS — E66.09 CLASS 1 OBESITY DUE TO EXCESS CALORIES WITH SERIOUS COMORBIDITY AND BODY MASS INDEX (BMI) OF 30.0 TO 30.9 IN ADULT: ICD-10-CM

## 2024-12-16 DIAGNOSIS — E66.811 CLASS 1 OBESITY DUE TO EXCESS CALORIES WITH SERIOUS COMORBIDITY AND BODY MASS INDEX (BMI) OF 30.0 TO 30.9 IN ADULT: ICD-10-CM

## 2024-12-16 DIAGNOSIS — M54.50 CHRONIC MIDLINE LOW BACK PAIN WITHOUT SCIATICA: ICD-10-CM

## 2024-12-16 DIAGNOSIS — E66.811 CLASS 1 OBESITY DUE TO EXCESS CALORIES WITH BODY MASS INDEX (BMI) OF 30.0 TO 30.9 IN ADULT, UNSPECIFIED WHETHER SERIOUS COMORBIDITY PRESENT: Primary | ICD-10-CM

## 2024-12-16 DIAGNOSIS — E55.9 VITAMIN D DEFICIENCY: ICD-10-CM

## 2024-12-16 DIAGNOSIS — E66.09 CLASS 1 OBESITY DUE TO EXCESS CALORIES WITH BODY MASS INDEX (BMI) OF 30.0 TO 30.9 IN ADULT, UNSPECIFIED WHETHER SERIOUS COMORBIDITY PRESENT: Primary | ICD-10-CM

## 2024-12-16 DIAGNOSIS — R76.8 POSITIVE ANA (ANTINUCLEAR ANTIBODY): Primary | ICD-10-CM

## 2024-12-16 DIAGNOSIS — M06.00 SERONEGATIVE RHEUMATOID ARTHRITIS (HCC): ICD-10-CM

## 2024-12-16 LAB
25(OH)D3 SERPL-MCNC: 44.1 NG/ML (ref 30–100)
ALBUMIN SERPL BCG-MCNC: 4.1 G/DL (ref 3.5–5)
ALP SERPL-CCNC: 60 U/L (ref 34–104)
ALT SERPL W P-5'-P-CCNC: 31 U/L (ref 7–52)
ANION GAP SERPL CALCULATED.3IONS-SCNC: 8 MMOL/L (ref 4–13)
AST SERPL W P-5'-P-CCNC: 33 U/L (ref 13–39)
BASOPHILS # BLD AUTO: 0.01 THOUSANDS/ÂΜL (ref 0–0.1)
BASOPHILS NFR BLD AUTO: 0 % (ref 0–1)
BILIRUB SERPL-MCNC: 0.31 MG/DL (ref 0.2–1)
BUN SERPL-MCNC: 20 MG/DL (ref 5–25)
CALCIUM SERPL-MCNC: 9.2 MG/DL (ref 8.4–10.2)
CHLORIDE SERPL-SCNC: 103 MMOL/L (ref 96–108)
CO2 SERPL-SCNC: 26 MMOL/L (ref 21–32)
CREAT SERPL-MCNC: 0.72 MG/DL (ref 0.6–1.3)
CRP SERPL QL: 16 MG/L
EOSINOPHIL # BLD AUTO: 0.14 THOUSAND/ÂΜL (ref 0–0.61)
EOSINOPHIL NFR BLD AUTO: 3 % (ref 0–6)
ERYTHROCYTE [DISTWIDTH] IN BLOOD BY AUTOMATED COUNT: 13.8 % (ref 11.6–15.1)
ERYTHROCYTE [SEDIMENTATION RATE] IN BLOOD: 22 MM/HOUR (ref 0–19)
GFR SERPL CREATININE-BSD FRML MDRD: 98 ML/MIN/1.73SQ M
GLUCOSE SERPL-MCNC: 93 MG/DL (ref 65–140)
HCT VFR BLD AUTO: 38.1 % (ref 34.8–46.1)
HGB BLD-MCNC: 12.1 G/DL (ref 11.5–15.4)
IMM GRANULOCYTES # BLD AUTO: 0.01 THOUSAND/UL (ref 0–0.2)
IMM GRANULOCYTES NFR BLD AUTO: 0 % (ref 0–2)
INSULIN SERPL-ACNC: 3.69 UIU/ML (ref 1.9–23)
LYMPHOCYTES # BLD AUTO: 1.12 THOUSANDS/ÂΜL (ref 0.6–4.47)
LYMPHOCYTES NFR BLD AUTO: 24 % (ref 14–44)
MCH RBC QN AUTO: 28.9 PG (ref 26.8–34.3)
MCHC RBC AUTO-ENTMCNC: 31.8 G/DL (ref 31.4–37.4)
MCV RBC AUTO: 91 FL (ref 82–98)
MONOCYTES # BLD AUTO: 0.49 THOUSAND/ÂΜL (ref 0.17–1.22)
MONOCYTES NFR BLD AUTO: 10 % (ref 4–12)
NEUTROPHILS # BLD AUTO: 2.99 THOUSANDS/ÂΜL (ref 1.85–7.62)
NEUTS SEG NFR BLD AUTO: 63 % (ref 43–75)
NRBC BLD AUTO-RTO: 0 /100 WBCS
PLATELET # BLD AUTO: 179 THOUSANDS/UL (ref 149–390)
PMV BLD AUTO: 9.4 FL (ref 8.9–12.7)
POTASSIUM SERPL-SCNC: 4.5 MMOL/L (ref 3.5–5.3)
PROT SERPL-MCNC: 6.7 G/DL (ref 6.4–8.4)
RBC # BLD AUTO: 4.18 MILLION/UL (ref 3.81–5.12)
SODIUM SERPL-SCNC: 137 MMOL/L (ref 135–147)
TSH SERPL DL<=0.05 MIU/L-ACNC: 1.51 UIU/ML (ref 0.45–4.5)
WBC # BLD AUTO: 4.76 THOUSAND/UL (ref 4.31–10.16)

## 2024-12-16 PROCEDURE — 97110 THERAPEUTIC EXERCISES: CPT

## 2024-12-16 PROCEDURE — 97112 NEUROMUSCULAR REEDUCATION: CPT

## 2024-12-16 PROCEDURE — 85025 COMPLETE CBC W/AUTO DIFF WBC: CPT

## 2024-12-16 PROCEDURE — 86140 C-REACTIVE PROTEIN: CPT

## 2024-12-16 PROCEDURE — 82306 VITAMIN D 25 HYDROXY: CPT

## 2024-12-16 PROCEDURE — 36415 COLL VENOUS BLD VENIPUNCTURE: CPT

## 2024-12-16 PROCEDURE — 84443 ASSAY THYROID STIM HORMONE: CPT

## 2024-12-16 PROCEDURE — 83036 HEMOGLOBIN GLYCOSYLATED A1C: CPT

## 2024-12-16 PROCEDURE — S9470 NUTRITIONAL COUNSELING, DIET: HCPCS

## 2024-12-16 PROCEDURE — 83525 ASSAY OF INSULIN: CPT

## 2024-12-16 PROCEDURE — 80053 COMPREHEN METABOLIC PANEL: CPT

## 2024-12-16 PROCEDURE — 97140 MANUAL THERAPY 1/> REGIONS: CPT

## 2024-12-16 PROCEDURE — 85652 RBC SED RATE AUTOMATED: CPT

## 2024-12-16 PROCEDURE — RECHECK

## 2024-12-16 NOTE — PROGRESS NOTES
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 Current weight 186.2# which is a 3.8# weight loss in the past 2 weeks and down a net wt loss of 2.6# (1%) from start wt.  She took her first shot of zepound on Thursday. In the middle of the night on Saturday she had diarrhea and needed to take Immodium. Continue with a little diarrhea on Sunday and ok today with just some mild nausea. She also gets some burping and gas.  Suggested some  tea or lozenges.  Drinking more water and using 1 pack of liquid IV per day. She report some decrease in her portions and hunger, having some food aversions, and less \"food noise\".  She did meet with a new rheumatologist and is getting a lot of blood work completed for her. She also continues with PT.  She reports the holidays should be manageable.  She will meeting with the SW on 12/23 and f/u with RD in Jan.    Patient seen by Medical Provider in past 6 months:  no  Requested to schedule appointment with Medical Provider: Yes    Anthropometric Measurements  Start Weight (#): 188.8# (11/5/24)  Current weight:  186.2#  TBW % Change from start weight: -1%  IBW: 130# (65\")  Highest Weight (#): 190#    Weight Loss History  Previous weight loss attempts: Commercial Programs (Weight Watchers, [employer], etc.)  Counseling with  MD  Exercise  High Protein/Low CHO diets (Atkins, Sparkill, etc.)  Meal Replacements (Medifast, Slim Fast, etc.)  Self Created Diets (Portion Control, Healthy Food Choices, etc.)    Food and Nutrition Related History  Wake up: 5am  on work days or 6:30am  Bed Time: can be in bed as early as 7pm  Sleep quality: poor, just started with using a CPAP  Works 30hrs per week (mon and Friday off, 6hr Tue, wed, thrus and long sunday)  On prednisone and has a few more day  Increased the iron to every day, suggested vitron c if finding constipaton.     Dietary Recall  Breakfast: 6:30am-coffee 1 little " "cream + 2 tsp stevia (recently changed to this)  Yesterday:  Protein shake and carrots at work (forgot her yogurt), and on the way home stopped at [facility] and got the fugWhale Path apple salad for dinner w/o bread.     Today: protein shake (30gm), carrots, greek yogurt (10gm) --suggested higher pro greek yogurt ie: Pro20.  Tonight is likely salad with chicken. Has cancelled the Factor meals she has started to have an aversion to them.    Snacking at night: this is the tough time, finds self \"binging\" at times--usually sweets. (Suggested to have some herbal tea at this time to help distract her.)--not craving since started the zepbound much less food noise.     Beverages: water with zero harvey flavoring or seltzer  Volume of beverage intake: now up to 64oz fluids per day and adds Liquid IV in one of her bottles     Weekends: same  Cravings: sugar   Trouble area of day: always there but most at night.    Frequency of Eating out: often  Food restrictions: none, dislikes eggs (but working on trying egg white) and cheese  Cooking: pt  Food Shopping: pt    Occupation:     Physical Activity  Activity: on feet all day at work, no other exercise too tired. Has SI joint dysfunction --continues with PT   Frequency: -  Physical limitations/barriers to exercise: joint pain    Estimated Needs  [facility] Energy Needs (needs at 188#)  BMR: 1479 kcal  Maintenance calories (sedentary): 1774 kcal  1-2#/week loss (sedentary): 774-1274 kcal  1-2#/week loss (light activity): 7811-6493 kcal    Protein: 68-85 grams (1.2-1.5g/kg IBW)  Fluid: 1988 ml (35mL/kg IBW)    Nutrition Diagnosis  Yes;    Overweight/obesity  related to Excess energy intake as evidenced by  BMI more than normative standard for age and sex (obesity-grade I 30-34.9)       Nutrition Intervention    Nutrition Prescription  Calories: 0983-9608 kcal 1# per week w/o exercise  Protein: 75 g  Fluid: 2000 ml    Meal Plan (Harvey/Pro)  Breakfast: 150-200  Snack: --  Lunch: " 300  Snack: 150  Dinner: 500-Factor meal  Snack: optinal 100 lalo snack    Nutrition Education  Calorie controlled menu  Lean protein food choices   Healthy snack options  Food journaling tips    Nutrition Counseling  Strategies: meal planning, portion sizes, healthy snack choices, hydration, fiber intake, protein intake, exercise, food logging    Monitoring and Evaluation:    Evaluation criteria  Energy Intake  Meet protein needs  Maintain adequate hydration  Monitor weekly weight  Meal planning/preparation  Food journal   Decreased portions at mealtimes and snacks  Physical activity     Barriers to change:none  Readiness to change: Preparation:  (Getting ready to change)   Comprehension: good  Expected Compliance: good

## 2024-12-16 NOTE — PROGRESS NOTES
"Daily Note     Today's date: 2024  Patient name: Gracia Chirinos  : 1975  MRN: [id number]  Referring provider: Melissa Crespo CRNP  Dx:   Encounter Diagnosis     ICD-10-CM    1. SI (sacroiliac) joint dysfunction  M53.3       2. Chronic midline low back pain without sciatica  M54.50     G89.29             Subjective: Expresses more flared up. Noted increased soreness in her back, possibly attributes possibly to autoimmune disorder.  Went to a new rheumatologist, having bloodwork done.    Objective: See treatment diary below      Assessment: Responds well to LAD. Notes increased soreness today, advised to perform exercises in pain-free range as able. Performed bridges in more limited range as a result. Patient would benefit from continued PT      Plan: Continue per plan of care.  Progress treatment as tolerated.       Precautions: See chart.       Manuals 11/27 12/3 12/9 12/16         Pelvic Rotation MET (RLE into extension, LLE into flexion) ES  ES          Lumbosacral   ES ES          LLE LAD and Gr. 5 mob   ES RLE LAD, [facility]                      Neuro Re-Ed 11/27 12/3 12/9          Hip Adduction Ball Squeezes 20x5\" 20x5\" 20x5\" 20x5\"         Bird Dogs             Dead Bugs  2x10 2x10 2x10         Bridges  2x10 w/ PPT 2x10 w/ PPT Mini, 2x10 w/ PPT                                                Ther Ex 11/27 12/3 12/9          Clamshells GTB 3x10 GTB 2x10ea  GTB 2x10         Pt Ed ES ES           UB  10' 6' P!          Squats  W/ ball squeeze at wall  2x10 W/ ball squeeze at wall  2x10 W/ ball squeeze at wall  2x10         LTR    5'', 10x                                                Ther Activity                                       Gait Training                                       Modalities                                              "

## 2024-12-17 LAB
EST. AVERAGE GLUCOSE BLD GHB EST-MCNC: 114 MG/DL
HBA1C MFR BLD: 5.6 %

## 2024-12-19 ENCOUNTER — PREP FOR PROCEDURE (OUTPATIENT)
Dept: PODIATRY | Facility: CLINIC | Age: 49
End: 2024-12-19

## 2024-12-19 ENCOUNTER — OFFICE VISIT (OUTPATIENT)
Dept: PHYSICAL THERAPY | Age: 49
End: 2024-12-19
Payer: COMMERCIAL

## 2024-12-19 ENCOUNTER — OFFICE VISIT (OUTPATIENT)
Dept: PODIATRY | Facility: CLINIC | Age: 49
End: 2024-12-19
Payer: COMMERCIAL

## 2024-12-19 ENCOUNTER — HOSPITAL ENCOUNTER (OUTPATIENT)
Facility: AMBULARY SURGERY CENTER | Age: 49
Setting detail: OUTPATIENT SURGERY
End: 2024-12-19
Attending: PODIATRIST | Admitting: PODIATRIST
Payer: COMMERCIAL

## 2024-12-19 DIAGNOSIS — M79.671 RIGHT FOOT PAIN: Primary | ICD-10-CM

## 2024-12-19 DIAGNOSIS — G89.29 CHRONIC MIDLINE LOW BACK PAIN WITHOUT SCIATICA: ICD-10-CM

## 2024-12-19 DIAGNOSIS — M54.50 CHRONIC MIDLINE LOW BACK PAIN WITHOUT SCIATICA: ICD-10-CM

## 2024-12-19 DIAGNOSIS — M53.3 SI (SACROILIAC) JOINT DYSFUNCTION: Primary | ICD-10-CM

## 2024-12-19 DIAGNOSIS — M20.21 HALLUX RIGIDUS, RIGHT FOOT: ICD-10-CM

## 2024-12-19 PROCEDURE — 97112 NEUROMUSCULAR REEDUCATION: CPT

## 2024-12-19 PROCEDURE — 97140 MANUAL THERAPY 1/> REGIONS: CPT

## 2024-12-19 PROCEDURE — 97110 THERAPEUTIC EXERCISES: CPT

## 2024-12-19 PROCEDURE — 99204 OFFICE O/P NEW MOD 45 MIN: CPT | Performed by: PODIATRIST

## 2024-12-19 RX ORDER — OXYCODONE AND ACETAMINOPHEN 5; 325 MG/1; MG/1
1 TABLET ORAL EVERY 4 HOURS PRN
Qty: 30 TABLET | Refills: 0 | Status: SHIPPED | OUTPATIENT
Start: 2024-12-19

## 2024-12-19 RX ORDER — CHLORHEXIDINE GLUCONATE ORAL RINSE 1.2 MG/ML
15 SOLUTION DENTAL ONCE
OUTPATIENT
Start: 2024-12-19 | End: 2024-12-19

## 2024-12-19 NOTE — PATIENT INSTRUCTIONS
My recommendation for over-the-counter inserts for you are:    PowerStep Iuka Insoles    These can be obtained directly from the  at [email or web address]    You can also find these online at [employer], [employer] and other retailers.

## 2024-12-19 NOTE — PROGRESS NOTES
Daily Note     Today's date: 2024  Patient name: Gracia Chirinos  : 1975  MRN: [id number]  Referring provider: Melissa Crespo CRNP  Dx:   Encounter Diagnosis     ICD-10-CM    1. SI (sacroiliac) joint dysfunction  M53.3       2. Chronic midline low back pain without sciatica  M54.50     G89.29           Start Time: 174  Stop Time:   Total time in clinic (min): 48 minutes    Subjective: Pt reports significant pain/discomfort in eft low back/glute area since Tuesday evening/Wednesday morning. She reports it has gotten worse since, to the point where she was unable to work today. She states that her  had performed manual technique to relieve SIJ symptoms, and that she felt sore like she typically does afterwards but that the pain increased that night. She states it is similar to her usual pain except much more intense.      Objective: See treatment diary below      Assessment: Pt presents with noticeable antalgic gait. Upon assessment of symptoms, pt's L ASIS was significantly elevated compared to R, more than previous session. Performed MET with reported discomfort during but slight relief of symptoms following. After MET and LLE LAD, pt's gait significantly improved, though she continued to report pain near L side of SIJ and traveling up her lumbar spine with lumbar flexion and with posterior pelvic tilts. Performed lumbar rotational mobs for segment gapping, with several audible cavitations. Slight symptom relief following. Pt Tolerated treatment fair. Patient would benefit from continued PT      Plan: Continue per plan of care.  Progress treatment as tolerated.       Precautions: See chart.       Manuals 11/27 12/3 12/9 12/16 12/19        Pelvic Rotation MET (RLE into extension, LLE into flexion) ES  ES  ES        Lumbosacral   ES ES  ES        LLE LAD and Gr. 5 mob   ES RLE LAD, AMC ES        L Lumbar Rotational Mobs     Sidelying     ES        Neuro Re-Ed 11/27 12/3 12/9  12/19       "  Hip Adduction Ball Squeezes 20x5\" 20x5\" 20x5\" 20x5\" 20x5\"        Bird Dogs             Dead Bugs  2x10 2x10 2x10         Bridges  2x10 w/ PPT 2x10 w/ PPT Mini, 2x10 w/ PPT         PPT     x15                                  Ther Ex 11/27 12/3 12/9  12/19        Clamshells GTB 3x10 GTB 2x10ea  GTB 2x10         Pt Ed ES ES   ES        UB  10' 6' P!          Squats  W/ ball squeeze at wall  2x10 W/ ball squeeze at wall  2x10 W/ ball squeeze at wall  2x10         LTR    5'', 10x 5'', 10x        Assess pt symptoms     ES                                  Ther Activity                                       Gait Training                                       Modalities                                                "

## 2024-12-19 NOTE — LETTER
December 19, 2024     Patient: Gracia Chirinos  YOB: 1975  Date of Visit: 12/19/2024      To Whom it May Concern:    Gracia Chirinos is under my professional care. Gracia was seen in my office on 12/19/2024.    If you have any questions or concerns, please don't hesitate to call.          Sincerely,          Nba Conroy, PT        CC: No Recipients

## 2024-12-19 NOTE — PROGRESS NOTES
Name: Gracia Chirinos      : 1975      MRN: [id number]  Encounter Provider: Valeriano Heard DPM  Encounter Date: 2024   Encounter department: [facility]OD[facility] [place]    Assessment & Plan     1. Right foot pain  -     XR foot 3+ vw right  -     Case request operating room: Right foot cheilectomy; Standing  -     Case request operating room: Right foot cheilectomy  -     oxyCODONE-acetaminophen (Percocet) 5-325 mg per tablet; Take 1 tablet by mouth every 4 (four) hours as needed for moderate pain for up to 30 doses Max Daily Amount: 6 tablets  -     naloxone (NARCAN) 4 mg/0.1 mL nasal spray; Administer 1 spray into a nostril. If no response after 2-3 minutes, give another dose in the other nostril using a new spray.  2. Hallux rigidus, right foot  -     Case request operating room: Right foot cheilectomy; Standing  -     Case request operating room: Right foot cheilectomy  -     oxyCODONE-acetaminophen (Percocet) 5-325 mg per tablet; Take 1 tablet by mouth every 4 (four) hours as needed for moderate pain for up to 30 doses Max Daily Amount: 6 tablets  -     naloxone (NARCAN) 4 mg/0.1 mL nasal spray; Administer 1 spray into a nostril. If no response after 2-3 minutes, give another dose in the other nostril using a new spray.    My personal interpretation today of the x-rays that were taken show X-ray evaluated from today show osteoarthritic changes noted at the level of first metatarsophalangeal joint there is some narrowing of the joint space with osteophyte noted at the joint space of the first metatarsophalangeal joint dorsal spurring noted.  No other adverse changes noted on x-ray examination.    Patient states she is continue to have discomfort and tenderness she is try different types of shoe gear and has continued pain.  Gave her recommendations for inserts.    Discussed with her option of cheilectomy with clean up of the joint removal of bone fragment at the joints.  She does wish to  pursue this given the continued pain.    I explained to patient risks and benefits of the procedure in detail.  Conservative options have been evaluated and exhausted. Complications of the procedure can include but are not limited to infection, prolonged pain, prolonged swelling, prolonged numbness, need for further surgery, wound healing problems, loss of function, painful scar, stiffness, arthritis, medical complications, recurrence, DVT, PE, death, nerve, tendon, ligament or blood vessel injury.   Discussed with patient if she develop significant arthritis in the future she may require fusion or replacement down the line.  Patient understands the procedure and all questions have been answered to the patient satisfaction.  No guarantees have been given to the patient regarding outcome.  Will proceed with surgical intervention.  Patient has signed informed consent and understands the procedure and post operative course.  Will return approximately one week post op for evaluation.     [facility] and there are no obvious contraindications with regard to starting the patient on narcotic pain medication.  I have discussed with the patient risks/benefits of narcotic pain medication use for post operative pain.          Return for post op.    Subjective     Location: Right first MPJ  Type of pain: painful all the time with motion   Duration and Onset: 2 years  Aggravating factors: walking any distance  Treatment so far: injection did not help.    Patient had previously seen Dr. Lord at [facility] had a injection completed previously.  I reviewed the records from their visit.  He additionally had x-rays taken however I was unable to see the images from their visit Report stated there is mild degenerative changes of the first metatarsophalangeal joint consistent with hallux rigidus.  Dorsal first metatarsal osteophyte noted well-corticated fragment dorsal first MTPJ consistent with  chronic avulsion os trigonum noted otherwise good alignment.      Constitutional:  Negative for chills and fever.   Respiratory:  Negative for chest tightness and shortness of breath.    Gastrointestinal:  Negative for nausea and vomiting.     Current Outpatient Medications on File Prior to Visit   Medication Sig   • ALPRAZolam (XANAX) 0.25 mg tablet Take 1 tablet (0.25 mg total) by mouth daily as needed for anxiety   • butalbital-acetaminophen-caffeine (Esgic) -40 mg per tablet Take 1 tablet by mouth every 4 (four) hours as needed for headaches   • escitalopram (LEXAPRO) 20 mg tablet Take 1 tablet (20 mg total) by mouth daily   • ferrous sulfate 325 (65 FE) MG EC tablet Take 1 tablet (325 mg total) by mouth in the morning   • folic acid (FOLVITE) 1 mg tablet Take 1 tablet by mouth daily   • gabapentin (NEURONTIN) 300 mg capsule Take 2 capsules (600 mg total) by mouth 2 (two) times a day Take 2 capsule in the morning and 2 at bedtime   • hydrOXYzine HCL (ATARAX) 25 mg tablet Take 1 tablet (25 mg total) by mouth in the morning   • latanoprost (XALATAN) 0.005 % ophthalmic solution INSTILL 1 DROP IN AFFECTED EYE AT BEDTIME   • methocarbamol (ROBAXIN) 750 mg tablet Take 750 mg by mouth daily at bedtime   • methotrexate 50 MG/2ML injection Inject 25 mg under the skin once a week   • naproxen (NAPROSYN) 500 mg tablet Take 500 mg by mouth 2 (two) times a day with meals   • pantoprazole (PROTONIX) 40 mg tablet Take 1 tablet (40 mg total) by mouth every morning   • pramipexole (MIRAPEX) 0.5 mg tablet Take 1 tablet (0.5 mg total) by mouth 3 (three) times a day   • Probiotic Product (PRO-BIOTIC BLEND PO) Take by mouth   • tirzepatide (Zepbound) 2.5 mg/0.5 mL auto-injector Inject 0.5 mL (2.5 mg total) under the skin once a week   • traZODone (DESYREL) 50 mg tablet Take 1 tablet (50 mg total) by mouth daily at bedtime   • Botox 200 units SOLR        Objective     LMP 11/18/2024 (Exact Date)     Vascular: Intact pedal  pulses bilateral DP and PT.  Neurological: Gross protective sensation intact bilateral  Musculoskeletal: Muscle strength bilateral intact with dorsiflexion, inversion, eversion and plantarflexion.  Tenderness and pain noted with range of motion of the level of the first metatarsophalangeal joint.  There are some osteoarthritic changes noted throughout the joint space with some spurring noted on palpation of the area.    On x-ray examination there appears to be a loose joint piece of the joint space dorsally this causes pain and discomfort.  There is discomfort noted on palpation throughout the joint itself.  Dermatological: No open lesions or ulcerations noted bilateral.

## 2024-12-20 ENCOUNTER — APPOINTMENT (OUTPATIENT)
Dept: PHYSICAL THERAPY | Age: 49
End: 2024-12-20
Payer: COMMERCIAL

## 2024-12-23 ENCOUNTER — OFFICE VISIT (OUTPATIENT)
Dept: PHYSICAL THERAPY | Age: 49
End: 2024-12-23
Payer: COMMERCIAL

## 2024-12-23 ENCOUNTER — APPOINTMENT (OUTPATIENT)
Dept: PHYSICAL THERAPY | Age: 49
End: 2024-12-23
Payer: COMMERCIAL

## 2024-12-23 ENCOUNTER — CLINICAL SUPPORT (OUTPATIENT)
Dept: BARIATRICS | Facility: CLINIC | Age: 49
End: 2024-12-23

## 2024-12-23 VITALS — BODY MASS INDEX: 30.62 KG/M2 | WEIGHT: 184 LBS

## 2024-12-23 DIAGNOSIS — M54.50 CHRONIC MIDLINE LOW BACK PAIN WITHOUT SCIATICA: ICD-10-CM

## 2024-12-23 DIAGNOSIS — G89.29 CHRONIC MIDLINE LOW BACK PAIN WITHOUT SCIATICA: ICD-10-CM

## 2024-12-23 DIAGNOSIS — E66.09 CLASS 1 OBESITY DUE TO EXCESS CALORIES WITH BODY MASS INDEX (BMI) OF 30.0 TO 30.9 IN ADULT, UNSPECIFIED WHETHER SERIOUS COMORBIDITY PRESENT: Primary | ICD-10-CM

## 2024-12-23 DIAGNOSIS — E66.811 CLASS 1 OBESITY DUE TO EXCESS CALORIES WITH BODY MASS INDEX (BMI) OF 30.0 TO 30.9 IN ADULT, UNSPECIFIED WHETHER SERIOUS COMORBIDITY PRESENT: Primary | ICD-10-CM

## 2024-12-23 DIAGNOSIS — M53.3 SI (SACROILIAC) JOINT DYSFUNCTION: Primary | ICD-10-CM

## 2024-12-23 PROCEDURE — 97110 THERAPEUTIC EXERCISES: CPT

## 2024-12-23 PROCEDURE — 97112 NEUROMUSCULAR REEDUCATION: CPT

## 2024-12-23 PROCEDURE — RECHECK

## 2024-12-23 PROCEDURE — 97140 MANUAL THERAPY 1/> REGIONS: CPT

## 2024-12-23 NOTE — PROGRESS NOTES
Patient presents for 1 hour Behavioral Health consult as a part of Medical Weight Management Program, current weight 184lbs.    Eating behaviors/food choices: Patient reports history of emotional eating, mindless snacking and binge eating. She craves sweets usually after dinner, may be able to have one sweet item but 1-2x per month she may binge on an entire package of foods. She is getting three meals a day, focusing on nutrition, recognizes that snacking and binges are emotionally driven. Discussed impact of stress, emotions and lack of self care can have on eating habits, use of food to soothe and benefit of reflecting on contributing factors to binge eating. Encouraged patient to consider the self care she invests in, ways to increase that and supports she can access.    Activity/Exercise:  Patient doesn't currently have a workout regiment due to recent back surgery and rheumatoid arthritis. She is back in PT, has not been doing her exercises after work because she's so tired and in pain. Suggested follow up with her PT about ways to operationalize her exercises, stretches she can be doing to reduce pain.    Sleep/Rest:  Patient has ANETTE, she has read that Zepbound may help with that.     Mental Health/Wellness:  Patient reports ongoing stress, history of binge eating throughout her young life and currently, poor self image. She recognizes poor eating habits she has learned from childhood and wants to make changes. She reports binge eating episodes, she plans them out throughout the day when she knows she will be alone, she is unclear on triggers. She is struggling with family stressors, the holidays, chronic pain and health issues, divorce and recently getting remarried as her stressors. Patient's eating pattern does meet criteria for Binge Eating Disorder in remission since frequency of her binge episodes is 1-2 times per month. Since starting Zepbound she has noticed a marked decrease in food noise, she is  able to evaluate hunger and satiation cues and stop eating when no longer hungry. Discussed the benefit of reflecting on contributing factors to wanting to binge, head vs stomach hunger, finding replacement behaviors, understanding triggers, and discussing with her therapist.     Next Appointment:  with CORDELIA on 1/6

## 2024-12-23 NOTE — PROGRESS NOTES
"Daily Note     Today's date: 2024  Patient name: Gracia Chirinos  : 1975  MRN: [id number]  Referring provider: Melissa Crespo CRNP  Dx:   Encounter Diagnosis     ICD-10-CM    1. SI (sacroiliac) joint dysfunction  M53.3       2. Chronic midline low back pain without sciatica  M54.50     G89.29           Start Time: 1614  Stop Time: 1709  Total time in clinic (min): 55 minutes    Subjective: Pt reports that she feels better than previous session but is still more sore than she had previously been before her flare-up.      Objective: See treatment diary below      Assessment: Initiated session with assessment of pelvic alignment. Pt's ASIS's were much more symmetrical this session compared to previous. Performed LAD with slight symptom relief afterwards. Pt unable to perform posterior pelvic tilt or bridges today due to pain. Added RDLs and step ups to aid in glute strengthening and gentle pelvic rotation, with pt reporting no discomfort during either. Discussed with pt regarding various stretches for groin/adductors following pt reports of hip tightness. Tolerated treatment well. Patient would benefit from continued PT      Plan: Continue per plan of care.  Progress treatment as tolerated.       Precautions: See chart.       Manuals 11/27 12/3 12/9 12/16 12/19 12/23       Pelvic Rotation MET (RLE into extension, LLE into flexion) ES  ES  ES        Lumbosacral   ES ES  ES        LLE LAD and Gr. 5 mob   ES RLE LAD, AMC ES ES       L Lumbar Rotational Mobs     Sidelying     ES        Neuro Re-Ed 11/27 12/3 12/9  12/19 12/23       Hip Adduction Ball Squeezes 20x5\" 20x5\" 20x5\" 20x5\" 20x5\" 20x5\"       Bird Dogs             Dead Bugs  2x10 2x10 2x10         Bridges  2x10 w/ PPT 2x10 w/ PPT Mini, 2x10 w/ PPT         PPT     x15        RDLs      W/ cane 2x10                    Ther Ex 11/27 12/3 12/9  12/19 12/23       Clamshells GTB 3x10 GTB 2x10ea  GTB 2x10  GTB 2x10       Pt Ed ES ES   ES ES       UB  " "10' 6' P!          Squats  W/ ball squeeze at wall  2x10 W/ ball squeeze at wall  2x10 W/ ball squeeze at wall  2x10  W/ ball squeeze at wall  2x10       LTR    5'', 10x 5'', 10x 5'', 10x       Assess pt symptoms     ES        Step-Ups      8\" 2x10ea                    Ther Activity                                       Gait Training                                       Modalities                                                  "

## 2024-12-27 ENCOUNTER — APPOINTMENT (OUTPATIENT)
Dept: PHYSICAL THERAPY | Age: 49
End: 2024-12-27
Payer: COMMERCIAL

## 2024-12-30 ENCOUNTER — APPOINTMENT (OUTPATIENT)
Dept: PHYSICAL THERAPY | Age: 49
End: 2024-12-30
Payer: COMMERCIAL

## 2024-12-31 DIAGNOSIS — E66.811 CLASS 1 OBESITY DUE TO EXCESS CALORIES WITH BODY MASS INDEX (BMI) OF 30.0 TO 30.9 IN ADULT, UNSPECIFIED WHETHER SERIOUS COMORBIDITY PRESENT: Primary | ICD-10-CM

## 2024-12-31 DIAGNOSIS — E66.09 CLASS 1 OBESITY DUE TO EXCESS CALORIES WITH BODY MASS INDEX (BMI) OF 30.0 TO 30.9 IN ADULT, UNSPECIFIED WHETHER SERIOUS COMORBIDITY PRESENT: Primary | ICD-10-CM

## 2024-12-31 RX ORDER — TIRZEPATIDE 5 MG/.5ML
5 INJECTION, SOLUTION SUBCUTANEOUS WEEKLY
Qty: 2 ML | Refills: 2 | Status: SHIPPED | OUTPATIENT
Start: 2024-12-31 | End: 2025-03-25

## 2025-01-03 ENCOUNTER — TELEPHONE (OUTPATIENT)
Age: 50
End: 2025-01-03

## 2025-01-03 ENCOUNTER — APPOINTMENT (OUTPATIENT)
Dept: PHYSICAL THERAPY | Age: 50
End: 2025-01-03
Payer: COMMERCIAL

## 2025-01-03 ENCOUNTER — OFFICE VISIT (OUTPATIENT)
Dept: FAMILY MEDICINE CLINIC | Facility: CLINIC | Age: 50
End: 2025-01-03
Payer: COMMERCIAL

## 2025-01-03 VITALS
SYSTOLIC BLOOD PRESSURE: 120 MMHG | HEIGHT: 65 IN | OXYGEN SATURATION: 99 % | RESPIRATION RATE: 16 BRPM | DIASTOLIC BLOOD PRESSURE: 80 MMHG | WEIGHT: 180.4 LBS | BODY MASS INDEX: 30.06 KG/M2 | TEMPERATURE: 97.8 F | HEART RATE: 60 BPM

## 2025-01-03 DIAGNOSIS — J01.00 ACUTE NON-RECURRENT MAXILLARY SINUSITIS: Primary | ICD-10-CM

## 2025-01-03 DIAGNOSIS — R05.8 OTHER COUGH: ICD-10-CM

## 2025-01-03 PROCEDURE — 99213 OFFICE O/P EST LOW 20 MIN: CPT | Performed by: FAMILY MEDICINE

## 2025-01-03 RX ORDER — BENZONATATE 100 MG/1
CAPSULE ORAL
COMMUNITY
Start: 2024-12-30

## 2025-01-03 RX ORDER — METHYLPREDNISOLONE 4 MG/1
TABLET ORAL
COMMUNITY
Start: 2024-12-30 | End: 2025-01-03

## 2025-01-03 RX ORDER — ECHINACEA PURPUREA EXTRACT 125 MG
1 TABLET ORAL
COMMUNITY
Start: 2024-12-30

## 2025-01-03 RX ORDER — FLUTICASONE PROPIONATE 50 MCG
2 SPRAY, SUSPENSION (ML) NASAL DAILY
COMMUNITY
Start: 2024-12-30

## 2025-01-03 RX ORDER — CETIRIZINE HCL 1 MG/ML
10 SOLUTION, ORAL ORAL DAILY
COMMUNITY
Start: 2024-12-30 | End: 2025-01-09

## 2025-01-03 RX ORDER — CODEINE PHOSPHATE AND GUAIFENESIN 10; 100 MG/5ML; MG/5ML
5 SOLUTION ORAL 3 TIMES DAILY PRN
Qty: 237 ML | Refills: 0 | Status: SHIPPED | OUTPATIENT
Start: 2025-01-03

## 2025-01-03 RX ORDER — PREDNISONE 50 MG/1
50 TABLET ORAL DAILY
Qty: 5 TABLET | Refills: 0 | Status: SHIPPED | OUTPATIENT
Start: 2025-01-03 | End: 2025-01-08

## 2025-01-03 RX ORDER — AZITHROMYCIN 250 MG/1
TABLET, FILM COATED ORAL
Qty: 6 TABLET | Refills: 0 | Status: SHIPPED | OUTPATIENT
Start: 2025-01-03 | End: 2025-01-07

## 2025-01-03 NOTE — PROGRESS NOTES
Name: Gracia Chirinos      : 1975      MRN: [id number]  Encounter Provider: Ricardo David MD  Encounter Date: 1/3/2025   Encounter department: [facility] [address] [facility]  :  Assessment & Plan  Acute non-recurrent maxillary sinusitis  - Prescriptions sent for Zithromax and prednisone. Advised of potential side effects.  - Encourage oral hydration and humidifiers.  - Follow up if not improving.   Orders:  •  azithromycin (ZITHROMAX) 250 mg tablet; Take 2 tablets today then 1 tablet daily x 4 days  •  predniSONE 50 mg tablet; Take 1 tablet (50 mg total) by mouth daily for 5 days    Other cough    Orders:  •  guaiFENesin-codeine (ROBITUSSIN AC) 100-10 mg/5 mL oral solution; Take 5 mL by mouth 3 (three) times a day as needed for cough           History of Present Illness     Patient reports to the office with cold symptoms x 6 days. She was seen in urgent care on Monday and was given a medrol dose pack, flonase nasal spray and tessalon pearles with no relief. She denies any fevers/chills, shortness of breath. She states her symptoms are not any better. She has sinus pressure, postnasal drip/rhinorrhea, ear congestion and a productive cough. She denies any known sick contacts.    Cough  Associated symptoms include postnasal drip and rhinorrhea. Pertinent negatives include no chest pain, chills, ear pain, fever, headaches, rash, sore throat or shortness of breath.     Review of Systems   Constitutional:  Negative for chills, fatigue and fever.   HENT:  Positive for congestion, postnasal drip, rhinorrhea, sinus pressure and sinus pain. Negative for ear pain and sore throat.    Eyes:  Negative for pain.   Respiratory:  Positive for cough. Negative for shortness of breath.    Cardiovascular:  Negative for chest pain and palpitations.   Gastrointestinal:  Negative for abdominal pain, nausea and vomiting.   Genitourinary:  Negative for dysuria.   Musculoskeletal:  Negative for arthralgias and back pain.  "  Skin:  Negative for rash.   Neurological:  Negative for dizziness and headaches.       Objective   /80 (BP Location: Left arm, Patient Position: Sitting, Cuff Size: Large)   Pulse 60   Temp 97.8 °F (36.6 °C) (Tympanic)   Resp 16   Ht 5' 5\" (1.651 m)   Wt 81.8 kg (180 lb 6.4 oz)   SpO2 99%   BMI 30.02 kg/m²      Physical Exam  Vitals and nursing note reviewed.   Constitutional:       General: She is not in acute distress.     Appearance: She is well-developed.   HENT:      Head: Normocephalic and atraumatic.      Right Ear: Tympanic membrane, ear canal and external ear normal.      Left Ear: Tympanic membrane, ear canal and external ear normal.      Mouth/Throat:      Pharynx: Posterior oropharyngeal erythema present.   Eyes:      Conjunctiva/sclera: Conjunctivae normal.   Cardiovascular:      Rate and Rhythm: Normal rate and regular rhythm.      Heart sounds: No murmur heard.  Pulmonary:      Effort: Pulmonary effort is normal. No respiratory distress.      Breath sounds: Normal breath sounds.   Musculoskeletal:         General: No swelling.   Skin:     General: Skin is warm and dry.   Neurological:      Mental Status: She is alert.   Psychiatric:         Mood and Affect: Mood normal.       "

## 2025-01-03 NOTE — TELEPHONE ENCOUNTER
Pt states she was told that Cough Syrup would be called into her pharmacy as well but it was not. Pt is already at her pharmacy, can this be sent over as soon as possible.    Please advise

## 2025-01-03 NOTE — ASSESSMENT & PLAN NOTE
- Prescriptions sent for Zithromax and prednisone. Advised of potential side effects.  - Encourage oral hydration and humidifiers.  - Follow up if not improving.   Orders:  •  azithromycin (ZITHROMAX) 250 mg tablet; Take 2 tablets today then 1 tablet daily x 4 days  •  predniSONE 50 mg tablet; Take 1 tablet (50 mg total) by mouth daily for 5 days

## 2025-01-07 ENCOUNTER — OFFICE VISIT (OUTPATIENT)
Dept: PHYSICAL THERAPY | Age: 50
End: 2025-01-07
Payer: COMMERCIAL

## 2025-01-07 DIAGNOSIS — G89.29 CHRONIC MIDLINE LOW BACK PAIN WITHOUT SCIATICA: ICD-10-CM

## 2025-01-07 DIAGNOSIS — M53.3 SI (SACROILIAC) JOINT DYSFUNCTION: Primary | ICD-10-CM

## 2025-01-07 DIAGNOSIS — M54.50 CHRONIC MIDLINE LOW BACK PAIN WITHOUT SCIATICA: ICD-10-CM

## 2025-01-07 PROCEDURE — 97110 THERAPEUTIC EXERCISES: CPT

## 2025-01-07 PROCEDURE — 97140 MANUAL THERAPY 1/> REGIONS: CPT

## 2025-01-07 NOTE — PROGRESS NOTES
"Daily Note     Today's date: 2025  Patient name: Gracia Chirinos  : 1975  MRN: [id number]  Referring provider: Melissa Crespo CRNP  Dx:   Encounter Diagnosis     ICD-10-CM    1. SI (sacroiliac) joint dysfunction  M53.3       2. Chronic midline low back pain without sciatica  M54.50     G89.29           Start Time: 174  Stop Time:   Total time in clinic (min): 46 minutes    Subjective: Pt reports she was extremely sore following previous session, lasting approx. 1 week until her significant other performed MET at home, which provided some relief. She is unsure what caused flare-up, but attributes it potentially to \"pulling on the leg last time\". States she has been sick over this time as she recovers from a cold, and requests session be kept relatively easier today as a result of not feeling well in general.      Objective: See treatment diary below      Assessment: Interventions were kept lighter today per pt request. Discussed with pt regarding active rest, symptom management, and progression to functional loading. L ASIS slightly higher than R ASIS today upon palpation, thus performed MET with symptom relief following. No reports of significant discomfort throughout session today. Plan to progress NV, pending pt's symptoms. Tolerated treatment well. Patient would benefit from continued PT      Plan: Continue per plan of care.  Progress treatment as tolerated.       Precautions: See chart.       Manuals 11/27 12/3 12/9 12/16 12/19 12/23 1/7      Pelvic Rotation MET (RLE into extension, LLE into flexion) ES  ES  ES  ES      Lumbosacral   ES ES  ES        LLE LAD and Gr. 5 mob   ES RLE LAD, AMC ES ES       L Lumbar Rotational Mobs     Sidelying     ES        Neuro Re-Ed 11/27 12/3 12/9  12/19 12/23 1/7      Hip Adduction Ball Squeezes 20x5\" 20x5\" 20x5\" 20x5\" 20x5\" 20x5\" 20x5\"      Bird Dogs             Dead Bugs  2x10 2x10 2x10         Bridges  2x10 w/ PPT 2x10 w/ PPT Mini, 2x10 w/ PPT       " "  PPT     x15        RDLs      W/ cane 2x10                    Ther Ex 11/27 12/3 12/9  12/19 12/23       Clamshells GTB 3x10 GTB 2x10ea  GTB 2x10  GTB 2x10 GTB 2x10      Pt Ed ES ES   ES ES ES      UB  10' 6' P!          Squats  W/ ball squeeze at wall  2x10 W/ ball squeeze at wall  2x10 W/ ball squeeze at wall  2x10  W/ ball squeeze at wall  2x10       LTR    5'', 10x 5'', 10x 5'', 10x 5'', 10x      Assess pt symptoms     ES        Step-Ups      8\" 2x10ea 8\" [id number]                   Ther Activity                                       Gait Training                                       Modalities                                                    "

## 2025-01-13 ENCOUNTER — CLINICAL SUPPORT (OUTPATIENT)
Dept: BARIATRICS | Facility: CLINIC | Age: 50
End: 2025-01-13
Payer: COMMERCIAL

## 2025-01-13 VITALS — BODY MASS INDEX: 29.76 KG/M2 | HEIGHT: 65 IN | WEIGHT: 178.6 LBS

## 2025-01-13 DIAGNOSIS — E66.3 OVERWEIGHT: Primary | ICD-10-CM

## 2025-01-13 PROCEDURE — S9470 NUTRITIONAL COUNSELING, DIET: HCPCS

## 2025-01-13 PROCEDURE — RECHECK

## 2025-01-13 NOTE — PROGRESS NOTES
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 Current weight 178.6# which is a 10.2# total weight loss (5%) from start wt.  Just started the 5mg of zepbound last week.  Week 1-3 of 2.5mg  she had diarrhea starting day 3.  The 4th dose of 2.5mg and this first dose of the 5mg she hasn't had the diarrhea. Day after the shot did have the nausesa, but not much more. She does find it helps lessen the food talk but she is also struggling with getting in certain foods now. She does report the protein shakes and greek yogurts aren't tasting as good. She did have the flu and was on abx and steroids for the cough. She is wondering if it is any side effects of those meds also.  Pt will continue to retry.  Pt is struggling with some breakfast ideas since she doesn't like eggs and the shakes and yogurts aren't tasting good right now. Suggested protein oatmeal with a chicken/turkey sausage ana for adequate protein.  Her  is moving in this weekend so she feels she will be more inclined to cooking dinner which should make options easier.  Pt will f/u with body comp in one month.     Patient seen by Medical Provider in past 6 months:  no  Requested to schedule appointment with Medical Provider: Yes    Anthropometric Measurements  Start Weight (#): 188.8# (11/5/24)  Current weight:  178.6#  TBW % Change from start weight: 5%  IBW: 130# (65\")  Highest Weight (#): 190#    Weight Loss History  Previous weight loss attempts: Commercial Programs (Weight Watchers, Cerevo, etc.)  Counseling with  MD  Exercise  High Protein/Low CHO diets (Atkins, [employer], etc.)  Meal Replacements (Medifast, Slim Fast, etc.)  Self Created Diets (Portion Control, Healthy Food Choices, etc.)    Food and Nutrition Related History  Wake up: 5am  on work days or 6:30am  Bed Time: can be in bed as early as 7pm  Sleep quality: poor, just started with using a CPAP  Works 30hrs per week " (mon and Friday off, 6hr Tue, wed, thrus and long sunday)    Dietary Recall *not set recall discussed today. Pt reports the following:  Having trouble with the protein shakes and yogurt lately  Using a protein bar that has 20gm protein, 220 cals.   Ordered more protein water to give that a try  Struggles with breakfast options    Beverages: water with zero harvey flavoring or seltzer  Volume of beverage intake: <64oz    Weekends: same  Cravings: sugar   Trouble area of day: most at night.    Frequency of Eating out: often  Food restrictions: none, dislikes eggs (but working on trying egg white) and cheese  Cooking: pt  Food Shopping: pt    Occupation:     Physical Activity  Activity: on feet all day at work, no other exercise too tired. Has SI joint dysfunction --continues with PT   Frequency: -  Physical limitations/barriers to exercise: joint pain    Estimated Needs  [employer] Needs (needs at 188#)  BMR: 1479 kcal  Maintenance calories (sedentary): 1774 kcal  1-2#/week loss (sedentary): 774-1274 kcal  1-2#/week loss (light activity): 4365-5414 kcal    Protein: 68-85 grams (1.2-1.5g/kg IBW)  Fluid: 1988 ml (35mL/kg IBW)    Nutrition Diagnosis  Yes;    Overweight/obesity  related to Excess energy intake as evidenced by  BMI more than normative standard for age and sex (obesity-grade I 30-34.9)       Nutrition Intervention    Nutrition Prescription  Calories: 7795-6308 kcal 1# per week w/o exercise  Protein: 75-80 g  Fluid: 2000 ml    Meal Plan (Harvey/Pro)  Breakfast: 150-200  Snack: --  Lunch: 300  Snack: 150  Dinner: 500-Factor meal  Snack: optinal 100 harvey snack    Nutrition Education  Calorie controlled menu  Lean protein food choices   Healthy snack options  Food journaling tips    Nutrition Counseling  Strategies: meal planning, portion sizes, healthy snack choices, hydration, fiber intake, protein intake, exercise, food logging    Monitoring and Evaluation:    Evaluation criteria  Energy  Intake  Meet protein needs  Maintain adequate hydration  Monitor weekly weight  Meal planning/preparation  Food journal   Decreased portions at mealtimes and snacks  Physical activity     Barriers to change:none  Readiness to change: Preparation:  (Getting ready to change)   Comprehension: good  Expected Compliance: good

## 2025-01-14 ENCOUNTER — OFFICE VISIT (OUTPATIENT)
Dept: PHYSICAL THERAPY | Age: 50
End: 2025-01-14
Payer: COMMERCIAL

## 2025-01-14 DIAGNOSIS — G89.29 CHRONIC MIDLINE LOW BACK PAIN WITHOUT SCIATICA: ICD-10-CM

## 2025-01-14 DIAGNOSIS — M53.3 SI (SACROILIAC) JOINT DYSFUNCTION: Primary | ICD-10-CM

## 2025-01-14 DIAGNOSIS — M54.50 CHRONIC MIDLINE LOW BACK PAIN WITHOUT SCIATICA: ICD-10-CM

## 2025-01-14 PROCEDURE — 97140 MANUAL THERAPY 1/> REGIONS: CPT

## 2025-01-14 PROCEDURE — 97110 THERAPEUTIC EXERCISES: CPT

## 2025-01-14 NOTE — PROGRESS NOTES
"Daily Note     Today's date: 2025  Patient name: Gracia Chirinos  : 1975  MRN: [id number]  Referring provider: Melissa Crespo CRNP  Dx:   Encounter Diagnosis     ICD-10-CM    1. SI (sacroiliac) joint dysfunction  M53.3       2. Chronic midline low back pain without sciatica  M54.50     G89.29           Start Time: 1615  Stop Time: 1700  Total time in clinic (min): 45 minutes    Subjective: Pt reports she is still sore and \"feels out\" in terms of her SI joint, but that she has overall felt better following previous session than she had before. Reports shoulder pain which she attributes to flare-up of RA.      Objective: See treatment diary below      Assessment: Continued interventions focusing on SIJ and lower lumbar loading as tolerated. Pt able to perform bridges today with posterior pelvic tilt and only slight discomfort during eccentric portion of the repetition. Able to perform leg press with no pain/discomfort. Tolerated treatment well. Patient would benefit from continued PT      Plan: Continue per plan of care.  Progress treatment as tolerated.       Precautions: See chart.       Manuals 11/27 12/3 12/9 12/16 12/19 12/23 1/7 1/14     Pelvic Rotation MET (RLE into extension, LLE into flexion) ES  ES  ES  ES ES     Lumbosacral   ES ES  ES        LLE LAD and Gr. 5 mob   ES RLE LAD, AMC ES ES       L Lumbar Rotational Mobs     Sidelying     ES        Neuro Re-Ed 11/27 12/3 12/9  12/19 12/23 1/7 1/14     Hip Adduction Ball Squeezes 20x5\" 20x5\" 20x5\" 20x5\" 20x5\" 20x5\" 20x5\" 20x5\"     Bird Dogs             Dead Bugs  2x10 2x10 2x10         Bridges  2x10 w/ PPT 2x10 w/ PPT Mini, 2x10 w/ PPT    2x10 w/ PPT     PPT     x15        RDLs      W/ cane 2x10                    Ther Ex 11/27 12/3 12/9  12/19 12/23  1/14     Clamshells GTB 3x10 GTB 2x10ea  GTB 2x10  GTB 2x10 GTB 2x10 Blue TB 2x10     Pt Ed ES ES   ES ES ES ES     UB  10' 6' P!          Squats  W/ ball squeeze at wall  2x10 W/ ball squeeze " "at wall  2x10 W/ ball squeeze at wall  2x10  W/ ball squeeze at wall  2x10  W/ ball squeeze at wall  2x10     LTR    5'', 10x 5'', 10x 5'', 10x 5'', 10x 5'', 10x     Assess pt symptoms     ES        Step-Ups      8\" 2x10ea 8\" [id number] 8\" [id number]     Leg Press        85# 3x10                  Ther Activity                                       Gait Training                                       Modalities                                                      "

## 2025-01-17 ENCOUNTER — APPOINTMENT (EMERGENCY)
Dept: CT IMAGING | Facility: HOSPITAL | Age: 50
End: 2025-01-17
Payer: COMMERCIAL

## 2025-01-17 ENCOUNTER — HOSPITAL ENCOUNTER (EMERGENCY)
Facility: HOSPITAL | Age: 50
Discharge: HOME | End: 2025-01-17
Attending: EMERGENCY MEDICINE
Payer: COMMERCIAL

## 2025-01-17 VITALS
TEMPERATURE: 97.3 F | HEART RATE: 68 BPM | SYSTOLIC BLOOD PRESSURE: 125 MMHG | DIASTOLIC BLOOD PRESSURE: 72 MMHG | RESPIRATION RATE: 16 BRPM | OXYGEN SATURATION: 98 %

## 2025-01-17 DIAGNOSIS — G43.909 MIGRAINE: Primary | ICD-10-CM

## 2025-01-17 LAB
ALBUMIN SERPL BCG-MCNC: 3.9 G/DL (ref 3.5–5)
ALP SERPL-CCNC: 55 U/L (ref 34–104)
ALT SERPL W P-5'-P-CCNC: 16 U/L (ref 7–52)
ANION GAP SERPL CALCULATED.3IONS-SCNC: 4 MMOL/L (ref 4–13)
AST SERPL W P-5'-P-CCNC: 18 U/L (ref 13–39)
BASOPHILS # BLD AUTO: 0.02 THOUSANDS/ΜL (ref 0–0.1)
BASOPHILS NFR BLD AUTO: 0 % (ref 0–1)
BILIRUB SERPL-MCNC: 0.35 MG/DL (ref 0.2–1)
BUN SERPL-MCNC: 19 MG/DL (ref 5–25)
CALCIUM SERPL-MCNC: 9.4 MG/DL (ref 8.4–10.2)
CHLORIDE SERPL-SCNC: 106 MMOL/L (ref 96–108)
CO2 SERPL-SCNC: 29 MMOL/L (ref 21–32)
CREAT SERPL-MCNC: 0.77 MG/DL (ref 0.6–1.3)
EOSINOPHIL # BLD AUTO: 0.2 THOUSAND/ΜL (ref 0–0.61)
EOSINOPHIL NFR BLD AUTO: 4 % (ref 0–6)
ERYTHROCYTE [DISTWIDTH] IN BLOOD BY AUTOMATED COUNT: 13.2 % (ref 11.6–15.1)
EXT PREGNANCY TEST URINE: NEGATIVE
EXT. CONTROL: NORMAL
GFR SERPL CREATININE-BSD FRML MDRD: 90 ML/MIN/1.73SQ M
GLUCOSE SERPL-MCNC: 84 MG/DL (ref 65–140)
HCT VFR BLD AUTO: 39.2 % (ref 34.8–46.1)
HGB BLD-MCNC: 13.1 G/DL (ref 11.5–15.4)
IMM GRANULOCYTES # BLD AUTO: 0.01 THOUSAND/UL (ref 0–0.2)
IMM GRANULOCYTES NFR BLD AUTO: 0 % (ref 0–2)
LYMPHOCYTES # BLD AUTO: 1.45 THOUSANDS/ΜL (ref 0.6–4.47)
LYMPHOCYTES NFR BLD AUTO: 31 % (ref 14–44)
MCH RBC QN AUTO: 29.3 PG (ref 26.8–34.3)
MCHC RBC AUTO-ENTMCNC: 33.4 G/DL (ref 31.4–37.4)
MCV RBC AUTO: 88 FL (ref 82–98)
MONOCYTES # BLD AUTO: 0.29 THOUSAND/ΜL (ref 0.17–1.22)
MONOCYTES NFR BLD AUTO: 6 % (ref 4–12)
NEUTROPHILS # BLD AUTO: 2.7 THOUSANDS/ΜL (ref 1.85–7.62)
NEUTS SEG NFR BLD AUTO: 59 % (ref 43–75)
NRBC BLD AUTO-RTO: 0 /100 WBCS
PLATELET # BLD AUTO: 184 THOUSANDS/UL (ref 149–390)
PMV BLD AUTO: 9.1 FL (ref 8.9–12.7)
POTASSIUM SERPL-SCNC: 4.1 MMOL/L (ref 3.5–5.3)
PROT SERPL-MCNC: 6.8 G/DL (ref 6.4–8.4)
RBC # BLD AUTO: 4.47 MILLION/UL (ref 3.81–5.12)
SODIUM SERPL-SCNC: 139 MMOL/L (ref 135–147)
WBC # BLD AUTO: 4.67 THOUSAND/UL (ref 4.31–10.16)

## 2025-01-17 PROCEDURE — 96375 TX/PRO/DX INJ NEW DRUG ADDON: CPT

## 2025-01-17 PROCEDURE — 36415 COLL VENOUS BLD VENIPUNCTURE: CPT

## 2025-01-17 PROCEDURE — 70498 CT ANGIOGRAPHY NECK: CPT

## 2025-01-17 PROCEDURE — 96365 THER/PROPH/DIAG IV INF INIT: CPT

## 2025-01-17 PROCEDURE — 81025 URINE PREGNANCY TEST: CPT

## 2025-01-17 PROCEDURE — 85025 COMPLETE CBC W/AUTO DIFF WBC: CPT

## 2025-01-17 PROCEDURE — 99284 EMERGENCY DEPT VISIT MOD MDM: CPT

## 2025-01-17 PROCEDURE — 70496 CT ANGIOGRAPHY HEAD: CPT

## 2025-01-17 PROCEDURE — 80053 COMPREHEN METABOLIC PANEL: CPT

## 2025-01-17 PROCEDURE — 99284 EMERGENCY DEPT VISIT MOD MDM: CPT | Performed by: EMERGENCY MEDICINE

## 2025-01-17 PROCEDURE — 96366 THER/PROPH/DIAG IV INF ADDON: CPT

## 2025-01-17 RX ORDER — MAGNESIUM SULFATE HEPTAHYDRATE 40 MG/ML
2 INJECTION, SOLUTION INTRAVENOUS ONCE
Status: COMPLETED | OUTPATIENT
Start: 2025-01-17 | End: 2025-01-17

## 2025-01-17 RX ORDER — KETOROLAC TROMETHAMINE 30 MG/ML
30 INJECTION, SOLUTION INTRAMUSCULAR; INTRAVENOUS ONCE
Status: COMPLETED | OUTPATIENT
Start: 2025-01-17 | End: 2025-01-17

## 2025-01-17 RX ORDER — DIPHENHYDRAMINE HYDROCHLORIDE 50 MG/ML
25 INJECTION INTRAMUSCULAR; INTRAVENOUS ONCE
Status: COMPLETED | OUTPATIENT
Start: 2025-01-17 | End: 2025-01-17

## 2025-01-17 RX ORDER — ONDANSETRON 2 MG/ML
4 INJECTION INTRAMUSCULAR; INTRAVENOUS ONCE
Status: COMPLETED | OUTPATIENT
Start: 2025-01-17 | End: 2025-01-17

## 2025-01-17 RX ORDER — SUMATRIPTAN 6 MG/.5ML
6 INJECTION, SOLUTION SUBCUTANEOUS ONCE
Status: DISCONTINUED | OUTPATIENT
Start: 2025-01-17 | End: 2025-01-17 | Stop reason: HOSPADM

## 2025-01-17 RX ORDER — DEXAMETHASONE SODIUM PHOSPHATE 10 MG/ML
10 INJECTION, SOLUTION INTRAMUSCULAR; INTRAVENOUS ONCE
Status: COMPLETED | OUTPATIENT
Start: 2025-01-17 | End: 2025-01-17

## 2025-01-17 RX ORDER — SUMATRIPTAN 6 MG/.5ML
6 INJECTION, SOLUTION SUBCUTANEOUS ONCE
Status: DISCONTINUED | OUTPATIENT
Start: 2025-01-17 | End: 2025-01-17

## 2025-01-17 RX ADMIN — DEXAMETHASONE SODIUM PHOSPHATE 10 MG: 10 INJECTION INTRAMUSCULAR; INTRAVENOUS at 12:26

## 2025-01-17 RX ADMIN — MAGNESIUM SULFATE HEPTAHYDRATE 2 G: 40 INJECTION, SOLUTION INTRAVENOUS at 12:30

## 2025-01-17 RX ADMIN — ONDANSETRON 4 MG: 2 INJECTION INTRAMUSCULAR; INTRAVENOUS at 12:23

## 2025-01-17 RX ADMIN — KETOROLAC TROMETHAMINE 30 MG: 30 INJECTION, SOLUTION INTRAMUSCULAR; INTRAVENOUS at 14:12

## 2025-01-17 RX ADMIN — DIPHENHYDRAMINE HYDROCHLORIDE 25 MG: 50 INJECTION INTRAMUSCULAR; INTRAVENOUS at 12:25

## 2025-01-17 RX ADMIN — SODIUM CHLORIDE 1000 ML: 0.9 INJECTION, SOLUTION INTRAVENOUS at 12:24

## 2025-01-17 RX ADMIN — IOHEXOL 40 ML: 350 INJECTION, SOLUTION INTRAVENOUS at 13:44

## 2025-01-17 NOTE — ED PROVIDER NOTES
ED Disposition       None          Assessment & Plan   {Hyperlinks  Risk Stratification - NIHSS - HEART SCORE - Fill out sepsis note and make sure you call [id number] if severe or septic shock:[id number]}    Medical Decision Making  Amount and/or Complexity of Data Reviewed  Labs: ordered.  Radiology: ordered.    Risk  Prescription drug management.      ED Course as of 25 1716      1400 On re-evaluation. Patients pain unchanged from prior and requesting pain medication. The patient's nausea has completely resolved.    1503 On re-evaluation pain is improved now 4/10 previously /10. Patient doesn't want any more pain medication at this time.        Medications - No data to display    ED Risk Strat Scores                                              History of Present Illness   {Hyperlinks  History (Med, Surg, Fam, Social) - Current Medications - Allergies  :[id number]}    Chief Complaint   Patient presents with   • Migraine     Patient reports migraine since Monday, no relief from home migraine medications       Past Medical History:   Diagnosis Date   • Abnormal Pap smear of cervix    • RAMIREZ positive    • Anxiety    • Basal cell carcinoma    • Depression    • Migraine    • Migraine    • Miscarriage    • MRSA colonization 2016   • Neck pain    • Pregnancy    • Rheumatoid arthritis (HCC) 2024   • Vertebral artery dissection (HCC)    • Vulvovaginitis    • Yeast infection       Past Surgical History:   Procedure Laterality Date   •  SECTION, LOW TRANSVERSE     • COLONOSCOPY     • DILATION AND CURETTAGE OF UTERUS     • GYNECOLOGIC CRYOSURGERY      cervix   • LASIK     • SKIN LESION EXCISION      basal cell carcinoma of left cheek   • SPINAL FUSION  10/2023      Family History   Problem Relation Age of Onset   • Hypertension Mother    • Lung cancer Mother 56   • Brain cancer Mother 56   • Breast cancer Maternal Aunt         dx in 40's    • Multiple sclerosis Father    • No  Known Problems Daughter    • No Known Problems Maternal Grandmother    • No Known Problems Maternal Grandfather    • No Known Problems Paternal Grandmother    • No Known Problems Paternal Grandfather    • No Known Problems Maternal Aunt       Social History     Tobacco Use   • Smoking status: Never   • Smokeless tobacco: Never   Vaping Use   • Vaping status: Never Used   Substance Use Topics   • Alcohol use: No   • Drug use: No      E-Cigarette/Vaping   • E-Cigarette Use Never User       E-Cigarette/Vaping Substances   • Nicotine No    • THC No    • CBD No    • Flavoring No    • Other No    • Unknown No       I have reviewed and agree with the history as documented.     Headache since Monday. Started in occipital region and radiates to temples.  No trauma. Feels like her normal migraines. 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 Headache is worse if leaning forward. Naproxen 500 mg yesterday morning. Nausea, no vomiting. Change in vision in the last 6 months but none since the start of the headache. Chills and neck stiffness. Used to get Botox for migraines every 90 days, last time was last year. She gets  neck stiffness and chills every migraine. Haven't had period since November, believes she is going through menopause. No birth control.   Took Furoset at 2 am this morning. Trazodone 50 mg last night.     Review of Systems        Objective   {Hyperlinks  Historical Vitals - Historical Labs - Chart Review/Microbiology - Last Echo - Code Status  :[id number]}    ED Triage Vitals [01/17/25 1120]   Temperature Pulse Blood Pressure Respirations SpO2 Patient Position - Orthostatic VS   (!) 97.3 °F (36.3 °C) 86 133/61 18 99 % Lying      Temp Source Heart Rate Source BP Location FiO2 (%) Pain Score    Oral Monitor Right arm -- --      Vitals      Date and Time Temp Pulse SpO2 Resp BP Pain Score FACES Pain Rating User   01/17/25 1120 97.3 °F (36.3 °C) 86 99 % 18 133/61 -- -- BS             Physical Exam  Vitals and nursing note reviewed.   Constitutional:       General: She is not in acute distress.     Appearance: She is well-developed.   HENT:      Head: Normocephalic and atraumatic.   Eyes:      Conjunctiva/sclera: Conjunctivae normal.   Cardiovascular:      Rate and Rhythm: Normal rate and regular rhythm.      Pulses: Normal pulses.      Heart sounds: Normal heart sounds. No murmur heard.     No friction rub. No gallop.   Pulmonary:      Effort: Pulmonary effort is normal. No respiratory distress.      Breath sounds: Normal breath sounds. No stridor. No wheezing, rhonchi or rales.   Abdominal:      Palpations: Abdomen is soft.      Tenderness: There is no abdominal tenderness.   Musculoskeletal:         General: No swelling.      Cervical back: Neck supple.   Skin:     General: Skin is warm and dry.      Capillary Refill: Capillary refill takes less than 2 seconds.   Neurological:      Mental Status: She is alert.      Comments: A&Ox4. Face symmetric, Tongue midline. CN II-XII intact. 5/5 strength in the bilateral upper and lower extremities with intact sensation to light touch throughout. Normal Finger-to-nose, Heel-to-shin, and Rapid alternating movements bilaterally. No pronator drift. Normal speech. Normal gait. Negative Brudzinski and kernig sign.   Psychiatric:         Mood and Affect: Mood normal.       Results Reviewed       None            No orders to display       Procedures    ED Medication and Procedure Management   Prior to Admission Medications   Prescriptions Last Dose Informant Patient Reported? Taking?   ALPRAZolam (XANAX) 0.25 mg tablet   No No   Sig: Take 1 tablet (0.25 mg total) by mouth daily as needed for anxiety   Probiotic Product (PRO-BIOTIC BLEND PO)  Self Yes No   Sig: Take by mouth   ZyrTEC Allergy 10 MG tablet   Yes No   Sig: Take 10 mg by mouth daily   benzonatate (TESSALON PERLES) 100 mg capsule   Yes No   Sig: take 1 capsule by mouth three times a day as  needed for cough   butalbital-acetaminophen-caffeine (Esgic) -40 mg per tablet  Self No No   Sig: Take 1 tablet by mouth every 4 (four) hours as needed for headaches   escitalopram (LEXAPRO) 20 mg tablet   No No   Sig: Take 1 tablet (20 mg total) by mouth daily   ferrous sulfate 325 (65 FE) MG EC tablet   No No   Sig: Take 1 tablet (325 mg total) by mouth in the morning   fluticasone (FLONASE) 50 mcg/act nasal spray   Yes No   Si sprays into each nostril daily   folic acid (FOLVITE) 1 mg tablet   Yes No   Sig: Take 1 tablet by mouth daily   gabapentin (NEURONTIN) 300 mg capsule   No No   Sig: Take 2 capsules (600 mg total) by mouth 2 (two) times a day Take 2 capsule in the morning and 2 at bedtime   guaiFENesin-codeine (ROBITUSSIN AC) 100-10 mg/5 mL oral solution   No No   Sig: Take 5 mL by mouth 3 (three) times a day as needed for cough   hydrOXYzine HCL (ATARAX) 25 mg tablet   No No   Sig: Take 1 tablet (25 mg total) by mouth in the morning   latanoprost (XALATAN) 0.005 % ophthalmic solution  Self Yes No   Sig: INSTILL 1 DROP IN AFFECTED EYE AT BEDTIME   methocarbamol (ROBAXIN) 750 mg tablet   Yes No   Sig: Take 750 mg by mouth daily at bedtime   methotrexate 50 MG/2ML injection   Yes No   Sig: Inject 25 mg under the skin once a week   naloxone (NARCAN) 4 mg/0.1 mL nasal spray   No No   Sig: Administer 1 spray into a nostril. If no response after 2-3 minutes, give another dose in the other nostril using a new spray.   naproxen (NAPROSYN) 500 mg tablet   Yes No   Sig: Take 500 mg by mouth 2 (two) times a day with meals   pantoprazole (PROTONIX) 40 mg tablet   No No   Sig: Take 1 tablet (40 mg total) by mouth every morning   pramipexole (MIRAPEX) 0.5 mg tablet   No No   Sig: Take 1 tablet (0.5 mg total) by mouth 3 (three) times a day   sodium chloride (OCEAN) 0.65 % nasal spray   Yes No   Si spray into each nostril   tirzepatide (Zepbound) 5 mg/0.5 mL auto-injector   No No   Sig: Inject 0.5 mL (5 mg  total) under the skin once a week   traZODone (DESYREL) 50 mg tablet   No No   Sig: Take 1 tablet (50 mg total) by mouth daily at bedtime      Facility-Administered Medications: None     Patient's Medications   Discharge Prescriptions    No medications on file     No discharge procedures on file.  ED SEPSIS DOCUMENTATION          mL/min/1.73 square meters)    Stage 4 Severe CKD (GFR = 15-29 mL/min/1.73 square meters)    Stage 5 End Stage CKD (GFR <15 mL/min/1.73 square meters)  Note: GFR calculation is accurate only with a steady state creatinine    CBC and differential [[id number]] Collected: 01/17/25 1222    Lab Status: Final result Specimen: Blood from Arm, Left Updated: 01/17/25 1238     WBC 4.67 Thousand/uL      RBC 4.47 Million/uL      Hemoglobin 13.1 g/dL      Hematocrit 39.2 %      MCV 88 fL      MCH 29.3 pg      MCHC 33.4 g/dL      RDW 13.2 %      MPV 9.1 fL      Platelets 184 Thousands/uL      nRBC 0 /100 WBCs      Segmented % 59 %      Immature Grans % 0 %      Lymphocytes % 31 %      Monocytes % 6 %      Eosinophils Relative 4 %      Basophils Relative 0 %      Absolute Neutrophils 2.70 Thousands/µL      Absolute Immature Grans 0.01 Thousand/uL      Absolute Lymphocytes 1.45 Thousands/µL      Absolute Monocytes 0.29 Thousand/µL      Eosinophils Absolute 0.20 Thousand/µL      Basophils Absolute 0.02 Thousands/µL             CTA head and neck with and without contrast   Final Interpretation by Geo Gatica MD (01/17 6674)      CT Brain:  No acute intracranial CT abnormality.      CT Angiography: Partially limited assessment due to suboptimal contrast bolus timing.      1.  Patent major vessels of the [place] without high-grade stenosis.   2.  No stenosis in the cervical carotid or vertebral arteries. No definite evidence for dissection.   3.  Additional findings as above.                  Workstation performed: [id number]             Procedures    ED Medication and Procedure Management   Prior to Admission Medications   Prescriptions Last Dose Informant Patient Reported? Taking?   ALPRAZolam (XANAX) 0.25 mg tablet   No No   Sig: Take 1 tablet (0.25 mg total) by mouth daily as needed for anxiety   Probiotic Product (PRO-BIOTIC BLEND PO)  Self Yes No   Sig: Take by mouth   ZyrTEC Allergy 10 MG tablet   Yes No   Sig: Take 10 mg  by mouth daily   benzonatate (TESSALON PERLES) 100 mg capsule   Yes No   Sig: take 1 capsule by mouth three times a day as needed for cough   butalbital-acetaminophen-caffeine (Esgic) -40 mg per tablet  Self No No   Sig: Take 1 tablet by mouth every 4 (four) hours as needed for headaches   escitalopram (LEXAPRO) 20 mg tablet   No No   Sig: Take 1 tablet (20 mg total) by mouth daily   ferrous sulfate 325 (65 FE) MG EC tablet   No No   Sig: Take 1 tablet (325 mg total) by mouth in the morning   fluticasone (FLONASE) 50 mcg/act nasal spray   Yes No   Si sprays into each nostril daily   folic acid (FOLVITE) 1 mg tablet   Yes No   Sig: Take 1 tablet by mouth daily   gabapentin (NEURONTIN) 300 mg capsule   No No   Sig: Take 2 capsules (600 mg total) by mouth 2 (two) times a day Take 2 capsule in the morning and 2 at bedtime   guaiFENesin-codeine (ROBITUSSIN AC) 100-10 mg/5 mL oral solution   No No   Sig: Take 5 mL by mouth 3 (three) times a day as needed for cough   hydrOXYzine HCL (ATARAX) 25 mg tablet   No No   Sig: Take 1 tablet (25 mg total) by mouth in the morning   latanoprost (XALATAN) 0.005 % ophthalmic solution  Self Yes No   Sig: INSTILL 1 DROP IN AFFECTED EYE AT BEDTIME   methocarbamol (ROBAXIN) 750 mg tablet   Yes No   Sig: Take 750 mg by mouth daily at bedtime   methotrexate 50 MG/2ML injection   Yes No   Sig: Inject 25 mg under the skin once a week   naloxone (NARCAN) 4 mg/0.1 mL nasal spray   No No   Sig: Administer 1 spray into a nostril. If no response after 2-3 minutes, give another dose in the other nostril using a new spray.   naproxen (NAPROSYN) 500 mg tablet   Yes No   Sig: Take 500 mg by mouth 2 (two) times a day with meals   pantoprazole (PROTONIX) 40 mg tablet   No No   Sig: Take 1 tablet (40 mg total) by mouth every morning   pramipexole (MIRAPEX) 0.5 mg tablet   No No   Sig: Take 1 tablet (0.5 mg total) by mouth 3 (three) times a day   sodium chloride (OCEAN) 0.65 % nasal spray   Yes  No   Si spray into each nostril   tirzepatide (Zepbound) 5 mg/0.5 mL auto-injector   No No   Sig: Inject 0.5 mL (5 mg total) under the skin once a week   traZODone (DESYREL) 50 mg tablet   No No   Sig: Take 1 tablet (50 mg total) by mouth daily at bedtime      Facility-Administered Medications: None     Discharge Medication List as of 2025  3:14 PM        CONTINUE these medications which have NOT CHANGED    Details   ALPRAZolam (XANAX) 0.25 mg tablet Take 1 tablet (0.25 mg total) by mouth daily as needed for anxiety, Starting Wed 10/23/2024, Normal      benzonatate (TESSALON PERLES) 100 mg capsule take 1 capsule by mouth three times a day as needed for cough, Historical Med      butalbital-acetaminophen-caffeine (Esgic) -40 mg per tablet Take 1 tablet by mouth every 4 (four) hours as needed for headaches, Starting 2022, Normal      escitalopram (LEXAPRO) 20 mg tablet Take 1 tablet (20 mg total) by mouth daily, Starting Wed 10/23/2024, Normal      ferrous sulfate 325 (65 FE) MG EC tablet Take 1 tablet (325 mg total) by mouth in the morning, Starting 2024, Until Wed 3/12/2025, Normal      fluticasone (FLONASE) 50 mcg/act nasal spray 2 sprays into each nostril daily, Starting 2024, Historical Med      folic acid (FOLVITE) 1 mg tablet Take 1 tablet by mouth daily, Starting Fri 3/29/2024, Historical Med      gabapentin (NEURONTIN) 300 mg capsule Take 2 capsules (600 mg total) by mouth 2 (two) times a day Take 2 capsule in the morning and 2 at bedtime, Starting Tue 10/8/2024, Until Sun 2025, Normal      guaiFENesin-codeine (ROBITUSSIN AC) 100-10 mg/5 mL oral solution Take 5 mL by mouth 3 (three) times a day as needed for cough, Starting Fri 1/3/2025, Normal      hydrOXYzine HCL (ATARAX) 25 mg tablet Take 1 tablet (25 mg total) by mouth in the morning, Starting Wed 10/23/2024, Normal      latanoprost (XALATAN) 0.005 % ophthalmic solution INSTILL 1 DROP IN AFFECTED EYE AT  BEDTIME, Historical Med      methocarbamol (ROBAXIN) 750 mg tablet Take 750 mg by mouth daily at bedtime, Starting Tue 12/12/2023, Historical Med      methotrexate 50 MG/2ML injection Inject 25 mg under the skin once a week, Starting Tue 9/17/2024, Historical Med      naloxone (NARCAN) 4 mg/0.1 mL nasal spray Administer 1 spray into a nostril. If no response after 2-3 minutes, give another dose in the other nostril using a new spray., Normal      naproxen (NAPROSYN) 500 mg tablet Take 500 mg by mouth 2 (two) times a day with meals, Starting Fri 7/26/2024, Until Sat 7/26/2025, Historical Med      pantoprazole (PROTONIX) 40 mg tablet Take 1 tablet (40 mg total) by mouth every morning, Starting Wed 10/23/2024, Normal      pramipexole (MIRAPEX) 0.5 mg tablet Take 1 tablet (0.5 mg total) by mouth 3 (three) times a day, Starting Fri 9/27/2024, Normal      Probiotic Product (PRO-BIOTIC BLEND PO) Take by mouth, Historical Med      sodium chloride (OCEAN) 0.65 % nasal spray 1 spray into each nostril, Starting Mon 12/30/2024, Historical Med      tirzepatide (Zepbound) 5 mg/0.5 mL auto-injector Inject 0.5 mL (5 mg total) under the skin once a week, Starting Tue 12/31/2024, Until Tue 3/25/2025, Normal      traZODone (DESYREL) 50 mg tablet Take 1 tablet (50 mg total) by mouth daily at bedtime, Starting Mon 12/9/2024, Until Sun 3/9/2025, Normal      ZyrTEC Allergy 10 MG tablet Take 10 mg by mouth daily, Starting Mon 12/30/2024, Until Thu 1/9/2025, Historical Med             ED SEPSIS DOCUMENTATION   Time reflects when diagnosis was documented in both MDM as applicable and the Disposition within this note       Time User Action Codes Description Comment    1/17/2025  3:07 PM Tolu Dee Add [G43.909] Migraine                  Tolu Dee MD  01/20/25 2009

## 2025-01-17 NOTE — DISCHARGE INSTRUCTIONS
Follow up neurology.    Go to your nearest emergency department if the headache starts at maximal onset, you have weakness on one side of the body, slurring speech, change in vision, neck stiffness with a fever, unable to tolerate oral intake.

## 2025-01-17 NOTE — ED ATTENDING ATTESTATION
1/17/2025  I, Chadd Luther DO, saw and evaluated the patient. I have discussed the patient with the resident/non-physician practitioner and agree with the resident's/non-physician practitioner's findings, Plan of Care, and MDM as documented in the resident's/non-physician practitioner's note, except where noted. All available labs and Radiology studies were reviewed.  I was present for key portions of any procedure(s) performed by the resident/non-physician practitioner and I was immediately available to provide assistance.       At this point I agree with the current assessment done in the Emergency Department.  I have conducted an independent evaluation of this patient a history and physical is as follows:    Patient is a 49-year-old female with a history of vertebral artery dissection and pseudoaneurysm, migraine headaches who presents with a headache.  Patient states that she woke up with a headache on Monday morning, 1/13/2025.  She states that headache has been constant since onset.  She describes it as a occipital headache that radiates to bilateral temporal regions.  She notes associated nausea, photophobia and phonophobia.  He denies vision changes, numbness, tingling, weakness.  She states that it is similar to her previous migraine headaches which she gets occasionally.  She states that she visits the ER for migraines about once a year.  However, she states that it is also similar to her symptoms related to the vertebral artery dissection.  She only had pain at that time.  She was laced on antiplatelets but has since stopped them after follow-up and recommendations from neurosurgery.    On exam, patient is in no acute distress.  Pupils equally round and reactive to light.  Cranial nerves II through XII grossly intact.  Motor, sensation normal.  Speech fluent.  Visual fields intact.  Heart is regular rate and rhythm.  Breath sounds normal.    Shared decision making used when discussing head imaging.   "Given her history of vertebral artery dissection, she agrees with head imaging today.  CTA head and neck somewhat limited but there is no evidence of high-grade stenosis or dissection.  I have a low suspicion for vertebral artery dissection since headache is very similar to previous migraines.  Patient was given symptomatic treatment and reports improvement in headache.  She is comfortable with discharge and outpatient follow-up.  She agrees to return to ED if she has worsening symptoms or develops dizziness, other concerns.    Portions of the above record have been created with voice recognition software.  Occasional wrong word or \"sound alike\" substitutions may have occurred due to the inherent limitations of voice recognition software.  Read the chart carefully and recognize, using context, where substitutions may have occurred.          ED Course         Critical Care Time  Procedures      "

## 2025-01-21 ENCOUNTER — OFFICE VISIT (OUTPATIENT)
Dept: PHYSICAL THERAPY | Age: 50
End: 2025-01-21
Payer: COMMERCIAL

## 2025-01-21 DIAGNOSIS — M54.50 CHRONIC MIDLINE LOW BACK PAIN WITHOUT SCIATICA: ICD-10-CM

## 2025-01-21 DIAGNOSIS — M53.3 SI (SACROILIAC) JOINT DYSFUNCTION: Primary | ICD-10-CM

## 2025-01-21 DIAGNOSIS — G89.29 CHRONIC MIDLINE LOW BACK PAIN WITHOUT SCIATICA: ICD-10-CM

## 2025-01-21 PROCEDURE — 97110 THERAPEUTIC EXERCISES: CPT

## 2025-01-21 PROCEDURE — 97112 NEUROMUSCULAR REEDUCATION: CPT

## 2025-01-21 NOTE — PROGRESS NOTES
"Daily Note     Today's date: 2025  Patient name: Gracia Chirinos  : 1975  MRN: [id number]  Referring provider: Melissa Crespo CRNP  Dx:   Encounter Diagnosis     ICD-10-CM    1. SI (sacroiliac) joint dysfunction  M53.3       2. Chronic midline low back pain without sciatica  M54.50     G89.29           Start Time: 1616  Stop Time: 1701  Total time in clinic (min): 45 minutes    Subjective: Pt reports that her SIJ and low back feels okay today, but that her left upper arm and her head have been bothering her significantly, to the point where she went to ED the other day. States that she feels they are not connected, and that she has experienced these pains before. She reports her migraines are usually helped by medication, but has not experienced relief in the past week or so.        Objective: See treatment diary below      Assessment: Pt reported slight discomfort with bridges, partially on the concentric portion, but primarily upon \"relaxing my muscles after the rep\". Performed dead bugs today to aid in core stability' pt required cueing for coordination of arm/leg movements, but otherwise performed with good technique. Fatigue reported during captain morgans, but no pain. Tolerated treatment well. Patient would benefit from continued PT      Plan: Continue per plan of care.  Progress treatment as tolerated.       Precautions: See chart.       Manuals 11/27 12/3 12/9 12/16 12/19 12/23 1/7 1/14 1/21    Pelvic Rotation MET (RLE into extension, LLE into flexion) ES  ES  ES  ES ES     Lumbosacral   ES ES  ES        LLE LAD and Gr. 5 mob   ES RLE LAD, AMC ES ES       L Lumbar Rotational Mobs     Sidelying     ES        Neuro Re-Ed 11/27 12/3 12/9  12/19 12/23 1/7 1/14 1/21    Hip Adduction Ball Squeezes 20x5\" 20x5\" 20x5\" 20x5\" 20x5\" 20x5\" 20x5\" 20x5\"     Bird Dogs             Dead Bugs  2x10 2x10 2x10     2x10ea    Bridges  2x10 w/ PPT 2x10 w/ PPT Mini, 2x10 w/ PPT    2x10 w/ PPT 2x10 w/ PPT    PPT   " "  x15        RDLs      W/ cane 2x10       Captain Belia         10x5\" B/L                 Ther Ex 11/27 12/3 12/9  12/19 12/23  1/14 1/21    Clamshells GTB 3x10 GTB 2x10ea  GTB 2x10  GTB 2x10 GTB 2x10 Blue TB 2x10 Blue TB 2x10    Pt Ed ES ES   ES ES ES ES ES    UB  10' 6' P!          Squats  W/ ball squeeze at wall  2x10 W/ ball squeeze at wall  2x10 W/ ball squeeze at wall  2x10  W/ ball squeeze at wall  2x10  W/ ball squeeze at wall  2x10     LTR    5'', 10x 5'', 10x 5'', 10x 5'', 10x 5'', 10x     Assess pt symptoms     ES        Step-Ups      8\" 2x10ea 8\" [id number] 8\" [id number]     Leg Press        85# 3x10 95# 3x10    115# x10                 Ther Activity                                       Gait Training                                       Modalities                                                        "

## 2025-01-28 ENCOUNTER — APPOINTMENT (OUTPATIENT)
Dept: PHYSICAL THERAPY | Age: 50
End: 2025-01-28
Payer: COMMERCIAL

## 2025-01-29 ENCOUNTER — OFFICE VISIT (OUTPATIENT)
Age: 50
End: 2025-01-29
Payer: COMMERCIAL

## 2025-01-29 VITALS
HEART RATE: 73 BPM | OXYGEN SATURATION: 99 % | DIASTOLIC BLOOD PRESSURE: 60 MMHG | SYSTOLIC BLOOD PRESSURE: 114 MMHG | TEMPERATURE: 97.8 F

## 2025-01-29 DIAGNOSIS — M06.00 SERONEGATIVE RHEUMATOID ARTHRITIS (HCC): Primary | ICD-10-CM

## 2025-01-29 DIAGNOSIS — R79.0 LOW IRON STORES: ICD-10-CM

## 2025-01-29 DIAGNOSIS — R76.8 POSITIVE ANA (ANTINUCLEAR ANTIBODY): ICD-10-CM

## 2025-01-29 PROCEDURE — 99214 OFFICE O/P EST MOD 30 MIN: CPT | Performed by: PHYSICIAN ASSISTANT

## 2025-01-29 NOTE — ASSESSMENT & PLAN NOTE
She has a history of low iron stores.  Recommend continuing iron supplement.  She does have restless leg symptoms which may be exacerbated by her low iron stores.  Will continue to monitor symptoms and ferritin levels.  Also recommended adding magnesium glycinate as well.    Orders:    Ferritin; Future

## 2025-01-29 NOTE — ASSESSMENT & PLAN NOTE
She has a history of a positive RAMIREZ however at this time she has no symptoms of any connective tissue disease.  She had an [facility] CTD test done which revealed negative lupus index would negative lupus activity markers as well.  She did have a positive centromere B antibody however she has no symptoms of CREST or scleroderma.  Will plan to monitor labs and symptoms closely.    Orders:    dsDNA Antibody by IFA, Jerry langley, with Reflex to Titer; Future    C3 complement; Future    C4 complement; Future

## 2025-01-29 NOTE — PROGRESS NOTES
Name: Gracia Chirinos      : 1975      MRN: [id number]  Encounter Provider: Dolly Gilbert PA-C  Encounter Date: 2025   Encounter department: [facility] RHEUMATOLOGY [facility]  :  Assessment & Plan  Seronegative rheumatoid arthritis (HCC)  Seronegative rheumatoid arthritis currently being treated with subcutaneous methotrexate injections.  She recently started Zepbound in 2024 and has noted a significant improvement in her symptoms.  She does note that she does not generally feel well on the day or so after taking methotrexate and is hesitant to continue on it.  We discussed multiple treatment options.  Plaquenil was discussed in detail including risks, benefits, administration, and indications.  I do believe it is reasonable to try Plaquenil and discontinue the methotrexate for now.  Recommend continued close monitoring of her symptoms.  I have given her prescription for musculoskeletal ultrasound for her to do in the next 2 to 3 months to continue to monitor her inflammatory arthritis activity.  Labs as ordered to monitor for medication side effects and toxicity.  She does have an appointment with her eye doctor later this week and will obtain clearance to start Plaquenil.  Will plan to follow-up in 3 months or sooner if needed.    Orders:    CBC and differential; Future    Comprehensive metabolic panel; Future    C-reactive protein; Future    Sedimentation rate, automated; Future    US MSK limited; Future    Positive RAMIREZ (antinuclear antibody)  She has a history of a positive RAMIREZ however at this time she has no symptoms of any connective tissue disease.  She had an [facility] CTD test done which revealed negative lupus index would negative lupus activity markers as well.  She did have a positive centromere B antibody however she has no symptoms of CREST or scleroderma.  Will plan to monitor labs and symptoms closely.    Orders:    dsDNA Antibody by KAM, Jerry langley  with Reflex to Titer; Future    C3 complement; Future    C4 complement; Future    Low iron stores  She has a history of low iron stores.  Recommend continuing iron supplement.  She does have restless leg symptoms which may be exacerbated by her low iron stores.  Will continue to monitor symptoms and ferritin levels.  Also recommended adding magnesium glycinate as well.    Orders:    Ferritin; Future        History of Present Illness   Gracia Chirinos is a 49 y.o. female.  She is here for follow-up of seronegative rheumatoid arthritis.  She was initially diagnosed in 2024 after developing pain and swelling in her hands and wrists.  She was initially treated with prednisone and oral methotrexate.  In June 2024 Rinvoq was added however she ultimately discontinued this as it was not providing any significant relief for her.  In September 2024 she switched to subcutaneous methotrexate injections.  She has continued to have persistent pain and swelling in her hands and wrists.  She had a musculoskeletal ultrasound done on 12/3/2024 of her bilateral hands and wrists.  This revealed mild synovial hypertrophy and synovial hyperemia in the right first, fifth, and RC/IC joints.  Mild synovial hypertrophy without hyperemia was noted in the left fourth and fifth MCP joints.  In December she was started on Zepbound and since then she has had a significant improvement in her overall pain.  She does not note any significant swelling in her hands.  She does have some generalized myofascial pain.    She has a history of a positive RAMIREZ.  She has a history of GERD and takes pantoprazole for this.  She has restless leg syndrome and takes Mirapex.  She also has a history of obstructive sleep apnea and is using a CPAP machine.  She has a history of low iron stores and takes an iron supplement for this.  She is working with weight management and was started on Zepbound in December 2024.  She has a history of chronic migraines which has  previously been treated with Botox.  She has a history of pruritus with chronically itchy skin and takes hydroxyzine for this.    She had labs done on 12/16/2024.  CBC, CMP unremarkable.  ESR 22, CRP 16.0.  TSH within normal limits.  Vitamin D 44.1.  She had an [facility] CTD test done which revealed a negative lupus index.  She did have a positive centromere B antibody.  The remainder of her antibody panel was negative.  SLE disease activity markers negative and methotrexate levels were in a therapeutic range.            Review of Systems   Constitutional:  Positive for fatigue. Negative for chills and fever.   HENT:  Negative for hearing loss, sore throat and tinnitus.         Dry mouth   Eyes:  Negative for pain and visual disturbance.   Respiratory:  Negative for cough and shortness of breath.    Cardiovascular:  Negative for chest pain and palpitations.   Gastrointestinal:  Positive for constipation. Negative for abdominal pain, nausea and vomiting.        GERD - on pantoprazole   Genitourinary:  Negative for difficulty urinating.   Musculoskeletal:  Positive for arthralgias, back pain (SI joint pain) and joint swelling. Negative for gait problem, myalgias, neck pain and neck stiffness.   Skin:  Negative for rash.   Neurological:  Positive for headaches (migraines). Negative for dizziness, seizures, weakness and numbness.   Psychiatric/Behavioral:  Positive for sleep disturbance (sleep apnea). Negative for confusion and decreased concentration.      Current Outpatient Medications on File Prior to Visit   Medication Sig Dispense Refill    ALPRAZolam (XANAX) 0.25 mg tablet Take 1 tablet (0.25 mg total) by mouth daily as needed for anxiety 30 tablet 0    benzonatate (TESSALON PERLES) 100 mg capsule take 1 capsule by mouth three times a day as needed for cough      butalbital-acetaminophen-caffeine (Esgic) -40 mg per tablet Take 1 tablet by mouth every 4 (four) hours as needed for headaches 30 tablet 0     escitalopram (LEXAPRO) 20 mg tablet Take 1 tablet (20 mg total) by mouth daily 90 tablet 1    ferrous sulfate 325 (65 FE) MG EC tablet Take 1 tablet (325 mg total) by mouth in the morning 90 tablet 0    fluticasone (FLONASE) 50 mcg/act nasal spray 2 sprays into each nostril daily      folic acid (FOLVITE) 1 mg tablet Take 1 tablet by mouth daily      gabapentin (NEURONTIN) 300 mg capsule Take 2 capsules (600 mg total) by mouth 2 (two) times a day Take 2 capsule in the morning and 2 at bedtime 120 capsule 5    guaiFENesin-codeine (ROBITUSSIN AC) 100-10 mg/5 mL oral solution Take 5 mL by mouth 3 (three) times a day as needed for cough 237 mL 0    hydrOXYzine HCL (ATARAX) 25 mg tablet Take 1 tablet (25 mg total) by mouth in the morning 90 tablet 1    latanoprost (XALATAN) 0.005 % ophthalmic solution INSTILL 1 DROP IN AFFECTED EYE AT BEDTIME  2    methocarbamol (ROBAXIN) 750 mg tablet Take 750 mg by mouth daily at bedtime      methotrexate 50 MG/2ML injection Inject 25 mg under the skin once a week      naloxone (NARCAN) 4 mg/0.1 mL nasal spray Administer 1 spray into a nostril. If no response after 2-3 minutes, give another dose in the other nostril using a new spray. 1 each 1    naproxen (NAPROSYN) 500 mg tablet Take 500 mg by mouth 2 (two) times a day with meals      pantoprazole (PROTONIX) 40 mg tablet Take 1 tablet (40 mg total) by mouth every morning 90 tablet 1    pramipexole (MIRAPEX) 0.5 mg tablet Take 1 tablet (0.5 mg total) by mouth 3 (three) times a day 90 tablet 1    Probiotic Product (PRO-BIOTIC BLEND PO) Take by mouth      sodium chloride (OCEAN) 0.65 % nasal spray 1 spray into each nostril      tirzepatide (Zepbound) 5 mg/0.5 mL auto-injector Inject 0.5 mL (5 mg total) under the skin once a week 2 mL 2    traZODone (DESYREL) 50 mg tablet Take 1 tablet (50 mg total) by mouth daily at bedtime 90 tablet 0    ZyrTEC Allergy 10 MG tablet Take 10 mg by mouth daily       No current facility-administered  medications on file prior to visit.         Objective   /60 (BP Location: Right arm, Patient Position: Sitting, Cuff Size: Adult)   Pulse 73   Temp 97.8 °F (36.6 °C) (Tympanic)   SpO2 99%      Physical Exam  Constitutional:       Appearance: Normal appearance.   Cardiovascular:      Rate and Rhythm: Normal rate and regular rhythm.   Pulmonary:      Breath sounds: Normal breath sounds.   Musculoskeletal:         General: Swelling (Soft tissue prominence noted bilateral MCP joints, right greater than left hand; soft tissue prominence right greater than left wrist) and tenderness (MCP joints bilaterally, bilateral elbows, shoulders, knees) present.   Skin:     General: Skin is warm and dry.   Neurological:      General: No focal deficit present.      Mental Status: She is alert.             Dragon Dictation software was used to dictate this note. It may contain errors with dictating incorrect words/spelling. Please contact provider directly for any questions.

## 2025-01-29 NOTE — ASSESSMENT & PLAN NOTE
Seronegative rheumatoid arthritis currently being treated with subcutaneous methotrexate injections.  She recently started Zepbound in December 2024 and has noted a significant improvement in her symptoms.  She does note that she does not generally feel well on the day or so after taking methotrexate and is hesitant to continue on it.  We discussed multiple treatment options.  Plaquenil was discussed in detail including risks, benefits, administration, and indications.  I do believe it is reasonable to try Plaquenil and discontinue the methotrexate for now.  Recommend continued close monitoring of her symptoms.  I have given her prescription for musculoskeletal ultrasound for her to do in the next 2 to 3 months to continue to monitor her inflammatory arthritis activity.  Labs as ordered to monitor for medication side effects and toxicity.  She does have an appointment with her eye doctor later this week and will obtain clearance to start Plaquenil.  Will plan to follow-up in 3 months or sooner if needed.    Orders:    CBC and differential; Future    Comprehensive metabolic panel; Future    C-reactive protein; Future    Sedimentation rate, automated; Future    US MSK limited; Future

## 2025-02-02 PROBLEM — J01.00 ACUTE NON-RECURRENT MAXILLARY SINUSITIS: Status: RESOLVED | Noted: 2022-07-06 | Resolved: 2025-02-02

## 2025-02-03 ENCOUNTER — PATIENT MESSAGE (OUTPATIENT)
Dept: SLEEP CENTER | Facility: CLINIC | Age: 50
End: 2025-02-03

## 2025-02-03 DIAGNOSIS — G25.81 RESTLESS LEGS SYNDROME (RLS): ICD-10-CM

## 2025-02-03 DIAGNOSIS — R11.0 NAUSEA: Primary | ICD-10-CM

## 2025-02-03 RX ORDER — ONDANSETRON 4 MG/1
4 TABLET, FILM COATED ORAL EVERY 8 HOURS PRN
Qty: 30 TABLET | Refills: 0 | Status: SHIPPED | OUTPATIENT
Start: 2025-02-03

## 2025-02-03 RX ORDER — GABAPENTIN 300 MG/1
600 CAPSULE ORAL 2 TIMES DAILY
Qty: 120 CAPSULE | Refills: 1 | Status: SHIPPED | OUTPATIENT
Start: 2025-02-03 | End: 2025-04-04

## 2025-02-04 ENCOUNTER — TELEPHONE (OUTPATIENT)
Dept: NEUROLOGY | Facility: CLINIC | Age: 50
End: 2025-02-04

## 2025-02-04 ENCOUNTER — PATIENT MESSAGE (OUTPATIENT)
Dept: SLEEP CENTER | Facility: CLINIC | Age: 50
End: 2025-02-04

## 2025-02-04 DIAGNOSIS — M06.00 SERONEGATIVE RHEUMATOID ARTHRITIS (HCC): Primary | ICD-10-CM

## 2025-02-04 DIAGNOSIS — G25.81 RESTLESS LEGS SYNDROME (RLS): ICD-10-CM

## 2025-02-04 RX ORDER — HYDROXYCHLOROQUINE SULFATE 200 MG/1
200 TABLET, FILM COATED ORAL 2 TIMES DAILY WITH MEALS
Qty: 180 TABLET | Refills: 1 | Status: SHIPPED | OUTPATIENT
Start: 2025-02-04 | End: 2025-08-03

## 2025-02-04 NOTE — TELEPHONE ENCOUNTER
Left vm we need to cancel her appt with MK, she can be rescheduled with any headache provider with a new patient opening.

## 2025-02-05 NOTE — PATIENT COMMUNICATION
Last office visit 9/11/2024  Next office visit 3/19/2025    Dr. Molina,     Please review RX request and sign if agreeable,  Thank you.

## 2025-02-06 ENCOUNTER — TELEPHONE (OUTPATIENT)
Age: 50
End: 2025-02-06

## 2025-02-06 RX ORDER — PRAMIPEXOLE DIHYDROCHLORIDE 0.5 MG/1
0.5 TABLET ORAL 3 TIMES DAILY
Qty: 90 TABLET | Refills: 1 | Status: SHIPPED | OUTPATIENT
Start: 2025-02-06

## 2025-02-07 ENCOUNTER — OFFICE VISIT (OUTPATIENT)
Age: 50
End: 2025-02-07
Payer: COMMERCIAL

## 2025-02-07 VITALS
WEIGHT: 176.4 LBS | OXYGEN SATURATION: 99 % | DIASTOLIC BLOOD PRESSURE: 74 MMHG | BODY MASS INDEX: 29.39 KG/M2 | HEIGHT: 65 IN | HEART RATE: 75 BPM | SYSTOLIC BLOOD PRESSURE: 118 MMHG

## 2025-02-07 DIAGNOSIS — G43.709 CHRONIC MIGRAINE WITHOUT AURA WITHOUT STATUS MIGRAINOSUS, NOT INTRACTABLE: Primary | ICD-10-CM

## 2025-02-07 PROBLEM — G43.909 MIGRAINE: Status: ACTIVE | Noted: 2017-06-01

## 2025-02-07 PROCEDURE — 99205 OFFICE O/P NEW HI 60 MIN: CPT

## 2025-02-07 NOTE — ASSESSMENT & PLAN NOTE
I had the pleasure of seeing Gracia today in the office at [facility] in [place].  She is presenting today for an initial new patient consultation in regard to her migraine headaches.  The patient states that her migraines started in the late 1990s when she was approximately 19 or 20 years old.  She noted that after moving to [place] from [place] her migraines actually had seem to become worse.  She started going to [facility] in [place] for her migraines after college.  She did get a second opinion from the [facility] and got off all her preventative medication she was taking and tried Botox for migraines.  Botox did seem to work well over the years.  Most recently receiving Botox at [facility] about a year ago but no longer to pursue Botox at [facility] due to insurance purposes.  The patient was currently experiencing 30/30 days in the month with some type of headache at today's appointment.  The patient was noting about 2-3 significant or severe debilitating headache days in the month.  The patient does not have any particular time of the day which the headaches occur.  Sometimes headaches lasting a few days in a row about once a year she will end up in the ED for the headaches.  She does take Fioricet for abortive therapy which seems to help.  The patient notes not currently overusing Fioricet but noticing she has to take it more often.  The patient does not have any significant aura associated with her migraine headaches.  She notes that they are usually occurring at the bilateral back of the head/occipital region.  Sometimes the right side of the head is worse than the left side.  She notes associated symptoms of nausea, photophobia, phonophobia, lightheadedness/dizziness, and stiff sore neck associated with the headaches.  The patient notes that any kind of movement will seem to make the headaches worse but does not seem to trigger the  headaches in any way.  Laying down to standing up may make the headaches worse but not triggering them to occur.  The patient most recently had a CTA head with and without contrast at the emergency department on 01/17/2025.  Did not show any acute intracranial abnormality or any significant flow-limiting stenosis, aneurysm or artery dissection.    Based on my evaluation of patient, does seem as though she is having chronic migraine without aura.  At this time due to the fact that the patient just recently had a CTA head and neck and did not show any acute intracranial abnormalities do not believe that she needs any additional imaging at this time.  Could consider an MRI brain in the future if her migraine headaches do seem to change in characteristics or significantly increase in frequency or severity.  For preventative therapy the patient is interested in going back on Botox for migraine prevention.  Advised the patient that based on the medications that she has tried in the past and that she was successful with Botox and trigger point injections for many years prior to this that she should restart Botox at this time.  We could consider trigger point injections in the future depending on how well Botox works for her migraines and neck tension.  For abortive therapy, the patient is growing a bit concerned about how often she has to use Fioricet/Esgic, does not want to overuse the medication.  Advised the patient that we can certainly try Nurtec for abortive therapy instead.  Advised patient to follow-up in approximately 6 months time.  Ideally will see the patient sooner rather than later for Botox injections for chronic migraine prevention.      Orders:    Ambulatory Referral to Neurology    Botulinum Toxin Type A SOLR 200 Units    rimegepant sulfate (NURTEC) 75 mg TBDP; Take 1 tablet (75 mg) by mouth once at the onset of a headache. Max dose: 75 mg/day.

## 2025-02-07 NOTE — PROGRESS NOTES
Name: Gracia Chirinos      : 1975      MRN: [id number]  Encounter Provider: Keagan Alexander PA-C  Encounter Date: 2025   Encounter department: [facility]  :  Assessment & Plan  Chronic migraine without aura without status migrainosus, not intractable  I had the pleasure of seeing Gracia today in the office at [facility] in [place].  She is presenting today for an initial new patient consultation in regard to her migraine headaches.  The patient states that her migraines started in the late  when she was approximately 19 or 20 years old.  She noted that after moving to [place] from [place] her migraines actually had seem to become worse.  She started going to [facility] in [place] for her migraines after college.  She did get a second opinion from the [facility] and got off all her preventative medication she was taking and tried Botox for migraines.  Botox did seem to work well over the years.  Most recently receiving Botox at [facility] about a year ago but no longer to pursue Botox at [facility] due to insurance purposes.  The patient was currently experiencing 30/30 days in the month with some type of headache at today's appointment.  The patient was noting about 2-3 significant or severe debilitating headache days in the month.  The patient does not have any particular time of the day which the headaches occur.  Sometimes headaches lasting a few days in a row about once a year she will end up in the ED for the headaches.  She does take Fioricet for abortive therapy which seems to help.  The patient notes not currently overusing Fioricet but noticing she has to take it more often.  The patient does not have any significant aura associated with her migraine headaches.  She notes that they are usually occurring at the bilateral back of the head/occipital region.  Sometimes the right side of the head  is worse than the left side.  She notes associated symptoms of nausea, photophobia, phonophobia, lightheadedness/dizziness, and stiff sore neck associated with the headaches.  The patient notes that any kind of movement will seem to make the headaches worse but does not seem to trigger the headaches in any way.  Laying down to standing up may make the headaches worse but not triggering them to occur.  The patient most recently had a CTA head with and without contrast at the emergency department on 01/17/2025.  Did not show any acute intracranial abnormality or any significant flow-limiting stenosis, aneurysm or artery dissection.    Based on my evaluation of patient, does seem as though she is having chronic migraine without aura.  At this time due to the fact that the patient just recently had a CTA head and neck and did not show any acute intracranial abnormalities do not believe that she needs any additional imaging at this time.  Could consider an MRI brain in the future if her migraine headaches do seem to change in characteristics or significantly increase in frequency or severity.  For preventative therapy the patient is interested in going back on Botox for migraine prevention.  Advised the patient that based on the medications that she has tried in the past and that she was successful with Botox and trigger point injections for many years prior to this that she should restart Botox at this time.  We could consider trigger point injections in the future depending on how well Botox works for her migraines and neck tension.  For abortive therapy, the patient is growing a bit concerned about how often she has to use Fioricet/Esgic, does not want to overuse the medication.  Advised the patient that we can certainly try Nurtec for abortive therapy instead.  Advised patient to follow-up in approximately 6 months time.  Ideally will see the patient sooner rather than later for Botox injections for chronic migraine  prevention.      Orders:    Ambulatory Referral to Neurology    Botulinum Toxin Type A SOLR 200 Units    rimegepant sulfate (NURTEC) 75 mg TBDP; Take 1 tablet (75 mg) by mouth once at the onset of a headache. Max dose: 75 mg/day.      Patient Instructions     Headache Calendar  Please maintain a headache calendar  Consider using phone applications such as Migraine Buddy or Migraine Diary    Headache/migraine treatment:     Rescue medications (for immediate treatment of a headache):   It is ok to take ibuprofen, acetaminophen or naproxen (Advil, Tylenol,  Aleve, Excedrin) if they help your headaches you should limit these to No more than 3 times a week to avoid medication overuse/rebound headaches.     For your more moderate to severe migraines take this medication early   - Start Nurtec 75 mg, take 1 tablet by mouth as needed for migraine abortive therapy.  Encouraged the patient to still continue to use Fioricet/Esgic if need be however would encourage the patient to try and cut back on this if we are able to get Nurtec approved.    Prescription preventive medications for headaches/migraines   (to take every day to help prevent headaches - not to take at the time of headache):  -Start Botox for chronic migraine prevention.    *Typically these types of medications take time until you see the benefit, although some may see improvement in days, often it may take weeks, especially if the medication is being titrated up to a beneficial level. Please contact us if there are any concerns or questions regarding the medication.     Over the counter preventive supplements for headaches/migraines (if you try, try for 3 months straight)  (to take every day to help prevent headaches - not to take at the time of headache):  There are combo pills online of these - none of which regulated by FDA and double check dosing - take appropriate dose only once a day- prevent a migraine, migravent, mind ease, migrelief   [] Magnesium 400mg  daily (If any diarrhea or upset stomach, decrease dose  as tolerated)  [] Riboflavin (Vitamin B2) 400mg daily (may make your urine bright/neon yellow)  - All supplements can be purchased online    Lifestyle Recommendations:  [x] SLEEP - Maintain a regular sleep schedule: Adults need at least 7-8 hours of uninterrupted a night. Maintain good sleep hygiene:  Going to bed and waking up at consistent times, avoiding excessive daytime naps, avoiding caffeinated beverages in the evening, avoid excessive stimulation in the evening and generally using bed primarily for sleeping.  One hour before bedtime would recommend turning lights down lower, decreasing your activity (may read quietly, listen to music at a low volume). When you get into bed, should eliminate all technology (no texting, emailing, playing with your phone, iPad or tablet in bed).  [x] HYDRATION - Maintain good hydration.  Drink  2L of fluid a day (4 typical small water bottles)  [x] DIET - Maintain good nutrition. In particular don't skip meals and try and eat healthy balanced meals regularly.  [x] TRIGGERS - Look for other triggers and avoid them: Limit caffeine to 1-2 cups a day or less. Avoid dietary triggers that you have noticed bring on your headaches (this could include aged cheese, peanuts, MSG, aspartame and nitrates).  [x] EXERCISE - physical exercise as we all know is good for you in many ways, and not only is good for your heart, but also is beneficial for your mental health, cognitive health and  chronic pain/headaches. I would encourage at the least 5 days of physical exercise weekly for at least 30 minutes.     Education and Follow-up  [x] Please call with any questions or concerns. Of course if any new concerning symptoms go to the emergency department.  [x] Follow up in 6 months with Rodolfo ARMAS      History of Present Illness   HPI  Current medical illnesses  or past medical history include vertebral artery dissection, migraine, ANETTE, GERD,  rheumatoid arthritis, degenerative disc disease, depression, anxiety, status post lumbar spinal fusion, restless leg syndrome, paresthesias, vitamin D deficiency, vitamin B12 deficiency      Interval History:    Migraines started in the late 1990s she stated. After moving to [place], migraines did actually get much worse she states. Started going to [facility] in [place], [place] for her migraines after college. She did go for second opinion at [facility] and got off all her preventative medications she was taking and tried Botox for migraines. She started Botox and seemed to work very well for her. Was seen for TMJ and fitted for mouth guards to help for the migraines. Started doing Botox for chronic migraines at [facility] she states. Has been over a year since seeing [facility] and receiving Botox for migraines she states.  She was also receiving trigger point injections for her neck tension as well.  She does believe a lot of her migraines are related to stiffness and tightness in the muscles of her neck.  Past history of vertebral artery dissection in 2017.  The patient was seen in the ED on 1/17/2025 in regards to significant migraine headache.  The patient had CTA head and neck which did not show any acute intracranial abnormality or any significant findings.    Headaches started at what age? 19-20 years old  How often do the headaches occur?   - as of 2/7/2025: 30/30 days in the month with some type of headache, 2-3/30 days in the month with more severe or debilitating headaches  What time of the day do the headaches start?  No particular time of day   How long do the headaches last? Sometimes headaches lasting a few days in a row, about once a year ended up in the ED for headaches. She does take Fioricet for abortive therapy seems to help.  She is concerned of potentially using too much Fioricet as of late.  Are you ever headache free? Yes    Aura?  without aura     Where is your headache located and pain quality? Bilateral back of the head/occipital region. Sometimes right side is worse than the left she states. Throbbing type pain with headaches  What is the intensity of pain? Worst 10/10, Average: 7/10  Associated symptoms:   [x] Nausea  [x] Stiff or sore neck   [x] Problems with concentration  [x] Photophobia     [x]Phonophobia      [x] Prefer quiet, dark room  [x] Light-headed or dizzy  (baseline)     Things that make the headache worse? Any kind of movement will make the headaches worse, does not seem to trigger headaches to occur     Any positional change headaches? Laying down to standing may make headaches worse but not triggering them to occur     Headache triggers:  alcohol, dehydration, weather changes/barometric pressure    Have you seen someone else for headaches or pain? Yes, [facility], [facility], at [facility]  Have you had trigger point injection performed and how often? Yes, last time was last year  Have you had Botox injection performed and how often? Yes, ast time was last year  Have you had epidural injections or transforaminal injections performed? Yes, two years ago   Are you current pregnant or planning on getting pregnant? No, her  has a vasectomy   Have you ever had any Brain imaging? Yes, CTA head and neck with and without contrast, 01/17/2025.     Last eye exam: she does go every 6 months to get her eyes checked. Does have glaucoma and recently developed cataracts.     What medications do you take or have you taken for your headaches?   ABORTIVE:    OTC medications: Tylenol  Prescription: Fioricet/Esgic, Naprosyn    Past/ failed/contraindicated:  OTC medications: ibuprofen  Prescription: Maxalt, Imitrex, Zomig    PREVENTIVE:   Lexapro 20 mg daily, Gabapentin 600 mg twice daily, Ropinirole, Trazodone, Hydroxyzine    Past/ failed/contraindicated:  Amitriptyline,  Propranolol (contraindicated due to low BP), Topamax (side effect of tremors), Emgality (did not help as much), Mg and B2,       LIFESTYLE  Sleep   - averages: 8 hours of sleep at night  Problems falling asleep?:   No  Problems staying asleep?:  No    - Does have some mild ANETTE and does use a CPAP    Physical activity: has not been exercising as much as she could be, trying to lose weight with Zepbound     Water: 60 ounces of water per day  Caffeine: 1 cup of coffee per day    Mood: Does have anxiety and depression, but more so anxiety. Lexapro 20 mg daily for her anxiety.     The following portions of the patient's history were reviewed and updated as appropriate: allergies, current medications, past family history, past medical history, past social history, past surgical history and problem list.    Pertinent family history:  Family history of headaches:  migraine headaches in mother, father, and uncle  Any family history of aneurysms - No    Pertinent social history:  Work:  at [employer]   Education: Bachelor's of Arts   Lives with      Illicit Drugs: denies  Alcohol/tobacco: Denies tobacco use, alcohol intake: social drinker     Review of Systems I have personally reviewed the MA's review of systems and made changes as necessary.    Medical History Reviewed by provider this encounter:     .  Past Medical History   Past Medical History:   Diagnosis Date    Abnormal Pap smear of cervix     RAMIREZ positive     Anxiety     Basal cell carcinoma     Depression     Migraine     Migraine     Miscarriage 2007    MRSA colonization 2016    Neck pain     Pregnancy     Rheumatoid arthritis (HCC) 2024    Vertebral artery dissection (HCC)     Vulvovaginitis     Yeast infection      Past Surgical History:   Procedure Laterality Date     SECTION, LOW TRANSVERSE      COLONOSCOPY      DILATION AND CURETTAGE OF UTERUS  2007    GYNECOLOGIC CRYOSURGERY      cervix    LASIK      SKIN  LESION EXCISION      basal cell carcinoma of left cheek    SPINAL FUSION  10/2023     Family History   Problem Relation Age of Onset    Hypertension Mother     Lung cancer Mother 56    Brain cancer Mother 56    Breast cancer Maternal Aunt         dx in 40's     Multiple sclerosis Father     No Known Problems Daughter     No Known Problems Maternal Grandmother     No Known Problems Maternal Grandfather     No Known Problems Paternal Grandmother     No Known Problems Paternal Grandfather     No Known Problems Maternal Aunt       reports that she has never smoked. She has never used smokeless tobacco. She reports that she does not drink alcohol and does not use drugs.  Current Outpatient Medications on File Prior to Visit   Medication Sig Dispense Refill    ALPRAZolam (XANAX) 0.25 mg tablet Take 1 tablet (0.25 mg total) by mouth daily as needed for anxiety 30 tablet 0    butalbital-acetaminophen-caffeine (Esgic) -40 mg per tablet Take 1 tablet by mouth every 4 (four) hours as needed for headaches 30 tablet 0    escitalopram (LEXAPRO) 20 mg tablet Take 1 tablet (20 mg total) by mouth daily 90 tablet 1    ferrous sulfate 325 (65 FE) MG EC tablet Take 1 tablet (325 mg total) by mouth in the morning 90 tablet 0    gabapentin (NEURONTIN) 300 mg capsule Take 2 capsules (600 mg total) by mouth 2 (two) times a day Take 2 capsule in the morning and 2 at bedtime 120 capsule 1    hydroxychloroquine (PLAQUENIL) 200 mg tablet Take 1 tablet (200 mg total) by mouth 2 (two) times a day with meals 180 tablet 1    hydrOXYzine HCL (ATARAX) 25 mg tablet Take 1 tablet (25 mg total) by mouth in the morning 90 tablet 1    latanoprost (XALATAN) 0.005 % ophthalmic solution INSTILL 1 DROP IN AFFECTED EYE AT BEDTIME  2    methocarbamol (ROBAXIN) 750 mg tablet Take 750 mg by mouth daily at bedtime      naproxen (NAPROSYN) 500 mg tablet Take 500 mg by mouth 2 (two) times a day with meals      ondansetron (ZOFRAN) 4 mg tablet Take 1 tablet (4  "mg total) by mouth every 8 (eight) hours as needed for nausea or vomiting 30 tablet 0    pantoprazole (PROTONIX) 40 mg tablet Take 1 tablet (40 mg total) by mouth every morning 90 tablet 1    pramipexole (MIRAPEX) 0.5 mg tablet Take 1 tablet (0.5 mg total) by mouth 3 (three) times a day 90 tablet 1    Probiotic Product (PRO-BIOTIC BLEND PO) Take by mouth      tirzepatide (Zepbound) 5 mg/0.5 mL auto-injector Inject 0.5 mL (5 mg total) under the skin once a week 2 mL 2    traZODone (DESYREL) 50 mg tablet Take 1 tablet (50 mg total) by mouth daily at bedtime 90 tablet 0    benzonatate (TESSALON PERLES) 100 mg capsule take 1 capsule by mouth three times a day as needed for cough (Patient not taking: Reported on 2/7/2025)      fluticasone (FLONASE) 50 mcg/act nasal spray 2 sprays into each nostril daily (Patient not taking: Reported on 2/7/2025)      folic acid (FOLVITE) 1 mg tablet Take 1 tablet by mouth daily (Patient not taking: Reported on 2/7/2025)      guaiFENesin-codeine (ROBITUSSIN AC) 100-10 mg/5 mL oral solution Take 5 mL by mouth 3 (three) times a day as needed for cough (Patient not taking: Reported on 2/7/2025) 237 mL 0    methotrexate 50 MG/2ML injection Inject 25 mg under the skin once a week (Patient not taking: Reported on 2/7/2025)      naloxone (NARCAN) 4 mg/0.1 mL nasal spray Administer 1 spray into a nostril. If no response after 2-3 minutes, give another dose in the other nostril using a new spray. (Patient not taking: Reported on 2/7/2025) 1 each 1    sodium chloride (OCEAN) 0.65 % nasal spray 1 spray into each nostril (Patient not taking: Reported on 2/7/2025)      ZyrTEC Allergy 10 MG tablet Take 10 mg by mouth daily (Patient not taking: Reported on 2/7/2025)       No current facility-administered medications on file prior to visit.     Allergies   Allergen Reactions    Reglan [Metoclopramide] Confusion     Pt reports Reglan \"makes me lose my mind and go crazy.\"      Current Outpatient " Medications on File Prior to Visit   Medication Sig Dispense Refill    ALPRAZolam (XANAX) 0.25 mg tablet Take 1 tablet (0.25 mg total) by mouth daily as needed for anxiety 30 tablet 0    butalbital-acetaminophen-caffeine (Esgic) -40 mg per tablet Take 1 tablet by mouth every 4 (four) hours as needed for headaches 30 tablet 0    escitalopram (LEXAPRO) 20 mg tablet Take 1 tablet (20 mg total) by mouth daily 90 tablet 1    ferrous sulfate 325 (65 FE) MG EC tablet Take 1 tablet (325 mg total) by mouth in the morning 90 tablet 0    gabapentin (NEURONTIN) 300 mg capsule Take 2 capsules (600 mg total) by mouth 2 (two) times a day Take 2 capsule in the morning and 2 at bedtime 120 capsule 1    hydroxychloroquine (PLAQUENIL) 200 mg tablet Take 1 tablet (200 mg total) by mouth 2 (two) times a day with meals 180 tablet 1    hydrOXYzine HCL (ATARAX) 25 mg tablet Take 1 tablet (25 mg total) by mouth in the morning 90 tablet 1    latanoprost (XALATAN) 0.005 % ophthalmic solution INSTILL 1 DROP IN AFFECTED EYE AT BEDTIME  2    methocarbamol (ROBAXIN) 750 mg tablet Take 750 mg by mouth daily at bedtime      naproxen (NAPROSYN) 500 mg tablet Take 500 mg by mouth 2 (two) times a day with meals      ondansetron (ZOFRAN) 4 mg tablet Take 1 tablet (4 mg total) by mouth every 8 (eight) hours as needed for nausea or vomiting 30 tablet 0    pantoprazole (PROTONIX) 40 mg tablet Take 1 tablet (40 mg total) by mouth every morning 90 tablet 1    pramipexole (MIRAPEX) 0.5 mg tablet Take 1 tablet (0.5 mg total) by mouth 3 (three) times a day 90 tablet 1    Probiotic Product (PRO-BIOTIC BLEND PO) Take by mouth      tirzepatide (Zepbound) 5 mg/0.5 mL auto-injector Inject 0.5 mL (5 mg total) under the skin once a week 2 mL 2    traZODone (DESYREL) 50 mg tablet Take 1 tablet (50 mg total) by mouth daily at bedtime 90 tablet 0    benzonatate (TESSALON PERLES) 100 mg capsule take 1 capsule by mouth three times a day as needed for cough (Patient  "not taking: Reported on 2/7/2025)      fluticasone (FLONASE) 50 mcg/act nasal spray 2 sprays into each nostril daily (Patient not taking: Reported on 2/7/2025)      folic acid (FOLVITE) 1 mg tablet Take 1 tablet by mouth daily (Patient not taking: Reported on 2/7/2025)      guaiFENesin-codeine (ROBITUSSIN AC) 100-10 mg/5 mL oral solution Take 5 mL by mouth 3 (three) times a day as needed for cough (Patient not taking: Reported on 2/7/2025) 237 mL 0    methotrexate 50 MG/2ML injection Inject 25 mg under the skin once a week (Patient not taking: Reported on 2/7/2025)      naloxone (NARCAN) 4 mg/0.1 mL nasal spray Administer 1 spray into a nostril. If no response after 2-3 minutes, give another dose in the other nostril using a new spray. (Patient not taking: Reported on 2/7/2025) 1 each 1    sodium chloride (OCEAN) 0.65 % nasal spray 1 spray into each nostril (Patient not taking: Reported on 2/7/2025)      ZyrTEC Allergy 10 MG tablet Take 10 mg by mouth daily (Patient not taking: Reported on 2/7/2025)       No current facility-administered medications on file prior to visit.      Social History     Tobacco Use    Smoking status: Never    Smokeless tobacco: Never   Vaping Use    Vaping status: Never Used   Substance and Sexual Activity    Alcohol use: No    Drug use: No    Sexual activity: Yes     Partners: Male     Birth control/protection: Male Sterilization        Objective   /74 (BP Location: Right arm, Patient Position: Sitting, Cuff Size: Large)   Pulse 75   Ht 5' 5\" (1.651 m)   Wt 80 kg (176 lb 6.4 oz)   SpO2 99%   BMI 29.35 kg/m²     Physical Exam  Neurological Exam    Physical Exam:                                                                 Vitals:            Constitutional:    /74 (BP Location: Right arm, Patient Position: Sitting, Cuff Size: Large)   Pulse 75   Ht 5' 5\" (1.651 m)   Wt 80 kg (176 lb 6.4 oz)   SpO2 99%   BMI 29.35 kg/m²   BP Readings from Last 3 Encounters: "   02/07/25 118/74   01/29/25 114/60   01/17/25 125/72     Pulse Readings from Last 3 Encounters:   02/07/25 75   01/29/25 73   01/17/25 68         Well developed, well nourished, well groomed. No dysmorphic features.       Psychiatric:  Normal behavior and appropriate affect        Neurological Examination:     Mental status/cognitive function:   Orientated to time, place and person. Recent and remote memory intact. Attention span and concentration as well as fund of knowledge are appropriate for age. Normal language and spontaneous speech.    Cranial Nerves:  II-visual fields full.   III, IV, VI-Pupils were equal, round, and reactive to light and accomodation. Extraocular movements were full and conjugate without nystagmus. Conjugate gaze, normal smooth pursuits, normal saccades   V-facial sensation symmetric.    VII-facial expression symmetric, intact forehead wrinkle, strong eye closure, symmetric smile    VIII-hearing grossly intact bilaterally   IX, X-palate elevation symmetric, no dysarthria.   XI-shoulder shrug strength intact    XII-tongue protrusion midline.    Motor Exam: symmetric bulk and tone throughout, no pronator drift. Power/strength 5/5 bilateral upper and lower extremities, no atrophy, fasciculations or abnormal movements noted.   Sensory: grossly intact light touch in all extremities.   Reflexes: brachioradialis 2+, biceps 2+, knee 2+ bilaterally  Coordination: Finger nose finger intact bilaterally, no apparent dysmetria, ataxia or tremor noted  Gait: steady casual and tandem gait.      Results/Data:    CTA head and neck with and without contrast, 1/17/2025:    IMPRESSION:     CT Brain:  No acute intracranial CT abnormality.     CT Angiography: Partially limited assessment due to suboptimal contrast bolus timing.     1.  Patent major vessels of the [place] of [place] without high-grade stenosis.  2.  No stenosis in the cervical carotid or vertebral arteries. No definite evidence for dissection.  3.   Additional findings as above.    Radiology Results Review: I have reviewed radiology reports from 01/17/2025 including: CT head.    Administrative Statements   I have spent a total time of 60 minutes in caring for this patient on the day of the visit/encounter including Diagnostic results, Risks and benefits of tx options, Instructions for management, Patient and family education, Importance of tx compliance, Risk factor reductions, Impressions, Counseling / Coordination of care, Documenting in the medical record, Reviewing / ordering tests, medicine, procedures  , and Obtaining or reviewing history  .

## 2025-02-07 NOTE — PROGRESS NOTES
Review of Systems   Constitutional:  Negative for appetite change, fatigue and fever.   HENT: Negative.  Negative for hearing loss, tinnitus, trouble swallowing and voice change.    Eyes: Negative.  Negative for photophobia, pain and visual disturbance.   Respiratory: Negative.  Negative for shortness of breath.    Cardiovascular: Negative.  Negative for palpitations.   Gastrointestinal:  Positive for nausea. Negative for vomiting.   Endocrine: Negative.  Negative for cold intolerance.   Genitourinary: Negative.  Negative for dysuria, frequency and urgency.   Musculoskeletal:  Negative for back pain, gait problem, myalgias, neck pain and neck stiffness.   Skin: Negative.  Negative for rash.   Allergic/Immunologic: Negative.    Neurological:  Positive for headaches (2-3 per month). Negative for dizziness, tremors, seizures, syncope, facial asymmetry, speech difficulty, weakness, light-headedness and numbness.   Hematological: Negative.  Does not bruise/bleed easily.   Psychiatric/Behavioral: Negative.  Negative for confusion, hallucinations and sleep disturbance.    All other systems reviewed and are negative.

## 2025-02-07 NOTE — PATIENT INSTRUCTIONS
Headache Calendar  Please maintain a headache calendar  Consider using phone applications such as Migraine Buddy or Migraine Diary    Headache/migraine treatment:     Rescue medications (for immediate treatment of a headache):   It is ok to take ibuprofen, acetaminophen or naproxen (Advil, Tylenol,  Aleve, Excedrin) if they help your headaches you should limit these to No more than 3 times a week to avoid medication overuse/rebound headaches.     For your more moderate to severe migraines take this medication early   - Start Nurtec 75 mg, take 1 tablet by mouth as needed for migraine abortive therapy.  Encouraged the patient to still continue to use Fioricet/Esgic if need be however would encourage the patient to try and cut back on this if we are able to get Nurtec approved.    Prescription preventive medications for headaches/migraines   (to take every day to help prevent headaches - not to take at the time of headache):  -Start Botox for chronic migraine prevention.    *Typically these types of medications take time until you see the benefit, although some may see improvement in days, often it may take weeks, especially if the medication is being titrated up to a beneficial level. Please contact us if there are any concerns or questions regarding the medication.     Over the counter preventive supplements for headaches/migraines (if you try, try for 3 months straight)  (to take every day to help prevent headaches - not to take at the time of headache):  There are combo pills online of these - none of which regulated by FDA and double check dosing - take appropriate dose only once a day- prevent a migraine, migravent, mind ease, migrelief   [] Magnesium 400mg daily (If any diarrhea or upset stomach, decrease dose  as tolerated)  [] Riboflavin (Vitamin B2) 400mg daily (may make your urine bright/neon yellow)  - All supplements can be purchased online    Lifestyle Recommendations:  [x] SLEEP - Maintain a regular  sleep schedule: Adults need at least 7-8 hours of uninterrupted a night. Maintain good sleep hygiene:  Going to bed and waking up at consistent times, avoiding excessive daytime naps, avoiding caffeinated beverages in the evening, avoid excessive stimulation in the evening and generally using bed primarily for sleeping.  One hour before bedtime would recommend turning lights down lower, decreasing your activity (may read quietly, listen to music at a low volume). When you get into bed, should eliminate all technology (no texting, emailing, playing with your phone, iPad or tablet in bed).  [x] HYDRATION - Maintain good hydration.  Drink  2L of fluid a day (4 typical small water bottles)  [x] DIET - Maintain good nutrition. In particular don't skip meals and try and eat healthy balanced meals regularly.  [x] TRIGGERS - Look for other triggers and avoid them: Limit caffeine to 1-2 cups a day or less. Avoid dietary triggers that you have noticed bring on your headaches (this could include aged cheese, peanuts, MSG, aspartame and nitrates).  [x] EXERCISE - physical exercise as we all know is good for you in many ways, and not only is good for your heart, but also is beneficial for your mental health, cognitive health and  chronic pain/headaches. I would encourage at the least 5 days of physical exercise weekly for at least 30 minutes.     Education and Follow-up  [x] Please call with any questions or concerns. Of course if any new concerning symptoms go to the emergency department.  [x] Follow up in 6 months with Rodolfo ARMAS

## 2025-02-10 ENCOUNTER — PATIENT MESSAGE (OUTPATIENT)
Age: 50
End: 2025-02-10

## 2025-02-10 ENCOUNTER — CLINICAL SUPPORT (OUTPATIENT)
Dept: BARIATRICS | Facility: CLINIC | Age: 50
End: 2025-02-10

## 2025-02-10 VITALS — WEIGHT: 170.4 LBS | HEIGHT: 65 IN | BODY MASS INDEX: 28.39 KG/M2

## 2025-02-10 DIAGNOSIS — R63.5 ABNORMAL WEIGHT GAIN: Primary | ICD-10-CM

## 2025-02-10 PROCEDURE — RECHECK

## 2025-02-10 PROCEDURE — WEIGHT

## 2025-02-10 NOTE — PROGRESS NOTES
"Weight Management Medical Nutrition Assessment  Pt is here today for body comp with current wt of 170.4# (18.5# wt loss= 10% TBW).  Reviewed results with pt explaining the need to decrease fat and maintain muscle while losing weight on GLP-1.  Pt as just below average muscle mass. She struggles with exercise at this time due to back and whole body pain issues. She does work with PT, but will monitor body comps about every 3 months to assess.  Pt is doing well on the GLP-1 (on 5mg Zepbound) but does notice some constipation.  She is drinking minimum 64oz, takes 3 chewable benefiber everyday (2gm fiber), Mirafiber 4 gummies per day (suggested to change to powder), 3 colace per day, and the laxative once a week.  May have a BM every day or every other day with the regimen.  Suggested to also try smooth move tea a few times per week.   Pt will f/u with RD in 1 month.     Patient seen by Medical Provider in past 6 months:  no  Requested to schedule appointment with Medical Provider: Yes    Anthropometric Measurements  Start Weight (#): 188.8# (11/5/24)  Current weight:  170.4#  TBW % Change from start weight: 10%  IBW: 130# (65\")  Highest Weight (#): 190#    Weight Loss History  Previous weight loss attempts: Commercial Programs (Weight Watchers, [employer] (ROOF), etc.)  Counseling with  MD  Exercise  High Protein/Low CHO diets (Atkins, [place], etc.)  Meal Replacements (Medifast, Slim Fast, etc.)  Self Created Diets (Portion Control, Healthy Food Choices, etc.)    Food and Nutrition Related History  Wake up: 5am  on work days or 6:30am  Bed Time: can be in bed as early as 7pm  Sleep quality: poor, just started with using a CPAP  Works 30hrs per week (mon and Friday off, 6hr Tue, wed, thrus and long sunday)    Dietary Recall  Not tolerating the milky products and coffee any more  Not craving sweets  B: Alevism oats has a protein cereal (3/4 cu =250 cals, 11gm protein) with a little milk OR [place] oatmeal   S: apple, " protein bar (Pure protein),   L: was making a smoothie--yogurt, Pbfit, ice, muscle milk, 1/2 banana but hasn't been liking it lately.  Suggested deli meat roll-ups and avoid skipping  D:  3oz protein, veggies and starch  *suggested to try clear protein or protein shot mixed in water ie:baripal    Beverages: water with zero harvey flavoring or seltzer  Volume of beverage intake: 64oz    Weekends: same  Cravings: sugar   Trouble area of day: most at night.    Frequency of Eating out: often  Food restrictions: none, dislikes eggs (but working on trying egg white) and cheese  Cooking: pt  Food Shopping: pt    Occupation:     Physical Activity  Activity: on feet all day at work, no other exercise too tired. Has SI joint dysfunction --continues with PT   Frequency: -  Physical limitations/barriers to exercise: joint pain    Estimated Needs  [employer] Needs (needs at 188#)  BMR: 1479 kcal  Maintenance calories (sedentary): 1774 kcal  1-2#/week loss (sedentary): 774-1274 kcal  1-2#/week loss (light activity): 9794-5666 kcal    Protein: 68-85 grams (1.2-1.5g/kg IBW)  Fluid: 1988 ml (35mL/kg IBW)    Nutrition Diagnosis  Yes;    Overweight/obesity  related to Excess energy intake as evidenced by  BMI more than normative standard for age and sex (obesity-grade I 30-34.9)       Nutrition Intervention    Nutrition Prescription  Calories: 7258-6676 kcal 1# per week w/o exercise  Protein: 75-80 g  Fluid: 2000 ml    Meal Plan (Harvey/Pro)  Breakfast: 150-200  Snack: --  Lunch: 300  Snack: 150  Dinner: 500-Factor meal  Snack: optinal 100 harvey snack    Nutrition Education  Calorie controlled menu  Lean protein food choices   Healthy snack options  Food journaling tips    Nutrition Counseling  Strategies: meal planning, portion sizes, healthy snack choices, hydration, fiber intake, protein intake, exercise, food logging    Monitoring and Evaluation:    Evaluation criteria  Energy Intake  Meet protein needs  Maintain  adequate hydration  Monitor weekly weight  Meal planning/preparation  Food journal   Decreased portions at mealtimes and snacks  Physical activity     Barriers to change:none  Readiness to change: Preparation:  (Getting ready to change)   Comprehension: good  Expected Compliance: good

## 2025-02-11 ENCOUNTER — OFFICE VISIT (OUTPATIENT)
Dept: PHYSICAL THERAPY | Age: 50
End: 2025-02-11
Payer: COMMERCIAL

## 2025-02-11 DIAGNOSIS — M53.3 SI (SACROILIAC) JOINT DYSFUNCTION: Primary | ICD-10-CM

## 2025-02-11 DIAGNOSIS — M54.50 CHRONIC MIDLINE LOW BACK PAIN WITHOUT SCIATICA: ICD-10-CM

## 2025-02-11 DIAGNOSIS — G89.29 CHRONIC MIDLINE LOW BACK PAIN WITHOUT SCIATICA: ICD-10-CM

## 2025-02-11 PROCEDURE — 97112 NEUROMUSCULAR REEDUCATION: CPT

## 2025-02-11 PROCEDURE — 97110 THERAPEUTIC EXERCISES: CPT

## 2025-02-11 NOTE — PROGRESS NOTES
PT Re-Evaluation     Today's date: 2025  Patient name: Gracia Chirinos  : 1975  MRN: [id number]  Referring provider: Melissa Crespo CRNP  Dx:   Encounter Diagnosis     ICD-10-CM    1. SI (sacroiliac) joint dysfunction  M53.3       2. Chronic midline low back pain without sciatica  M54.50     G89.29             Start Time: 1532  Stop Time: 1628  Total time in clinic (min): 56 minutes    Assessment  Impairments: abnormal or restricted ROM, impaired physical strength, lacks appropriate home exercise program, pain with function, poor posture , poor body mechanics, participation limitations and activity limitations  Symptom irritability: moderate    Assessment details: Pt is a 50 y/o female who presents for RE of low back/SIJ pain. No further referral is necessary at this time. Pt displays improvements in LE strength and lumbar mobility, as well as gross improvement in TrA activation in majority of core strengthening interventions. She continues to require cuing to maintain core activation during these interventions, and continues to display decreased lumbar mobility compared to baseline. Despite improvements, these impairments, along with recent flare of LBP, are continuing to impact their ability to perform functional activities including standing, walking, or sitting for prolonged periods, and squatting/bending to  objects from low surfaces. Pt has a fair prognosis. Pt would benefit from continued skilled PT intervention to address the aforementioned impairments leading to increased functional capacity and improved quality of life.      Understanding of Dx/Px/POC: good     Prognosis: fair    Goals  Short Term Goals: to be achieved by 4 weeks  1) Patient to be independent with basic HEP. - MET  2) Decrease pain to 3/10 at its worst. - IMPROVED  3) Increase lumbar spine ROM by 25% in all deficient planes. - IMPROVED EXTENSION, THOUGH NOW LIMITED IN FLEXION DUE TO PAIN   4) Pt will demonstrate  "improved LE strength by 1/2 MMT grade in all deficient planes. - MET    Long Term Goals: to be achieved by discharge  1) FOTO equal to or greater than 65. - REGRESSED, LIKELY DUE TO FLARE-UP IN LBP  2) Patient to be independent with comprehensive HEP. - ONGOING  3) Pt will demonstrate lumbar spine ROM WNL in all planes to improve performance of a/iadls. - IMPROVED  4) Pt will improve LE strength to 5/5 MMT grade in all planes to improve performance of a/iadls. - IMPROVED  5) Patient to report no sleep interruption secondary to pain to allow for improved energy levels and better performance of household and social activities. - PROGRESSED  6) Pt will be able to stand/walk at least 30min without symptoms to aid in improvement of functional mobility and community ambulation. - ABLE TO PERFORM, THOUGH WITH SLIGHT DISCOMFORT AFTERWARDS      Plan  Patient would benefit from: skilled physical therapy  Planned modality interventions: cryotherapy and thermotherapy: hydrocollator packs    Planned therapy interventions: activity modification, functional ROM exercises, graded activity, graded exercise, home exercise program, patient/caregiver education, strengthening, stretching, therapeutic activities, therapeutic exercise, therapeutic training, neuromuscular re-education and manual therapy    Frequency: 2x week  Duration in weeks: 8  Treatment plan discussed with: patient      Subjective Evaluation    History of Present Illness  Mechanism of injury: Gracia is a 48yo female presenting to OPPT for RE of low back/SIJ pain, (typically L>R). She states that she has noticed improvement in her pain, as well as the length of time during which her SIJ \"remains in place\" instead of \"popping out\". She reports that over the past 2 weeks since she was last seen by PT, her SIJ discomfort was only exaggerated beyond baseline one time. She states she has been experiencing new onset of low back discomfort across her low back which feels " different than the SIJ pain. Reports difficulty bending forward and carrying objects as a result, as well as continued difficulty with standing/walking for prolonged periods of time despite improvements in the latter.  Quality of life: good    Patient Goals  Patient goals for therapy: increased strength, independence with ADLs/IADLs, decreased pain and return to sport/leisure activities    Pain  Current pain ratin  At best pain rating: 3  At worst pain ratin  Quality: dull ache, sharp and throbbing  Relieving factors: relaxation and rest  Aggravating factors: sitting, stair climbing, standing, walking and running  Progression: improved      Diagnostic Tests  X-ray: normal  Treatments  Previous treatment: physical therapy        Objective     Concurrent Complaints  Negative for night pain, disturbed sleep, bladder dysfunction, bowel dysfunction and saddle (S4) numbness    Static Posture     Pelvis   Pelvis (Left): Elevated.     Tenderness     Left Hip   Tenderness in the ASIS and PSIS.     Right Hip   Tenderness in the ASIS and PSIS.     Active Range of Motion     Lumbar   Flexion:  WFL and with pain  Extension:  Restriction level: minimal  Left lateral flexion:  WFL and with pain  Right lateral flexion:  WFL  Left rotation:  WFL  Right rotation:  WFL    Joint Play     Pain: L4, L5 and S1     Strength/Myotome Testing     Left Hip   Planes of Motion   Flexion: 5  Extension: 4+  Abduction: 4+  Adduction: WFL  External rotation: 5  Internal rotation: 5 (pain)    Right Hip   Planes of Motion   Flexion: 5  Extension: 4+  Abduction: 4+  Adduction: WFL  External rotation: 5  Internal rotation: 5 (pain)    Left Knee   Flexion: 5  Extension: 5    Right Knee   Flexion: 5  Extension: 5    Left Ankle/Foot   Dorsiflexion: 5  Plantar flexion: 4+    Right Ankle/Foot   Dorsiflexion: 5  Plantar flexion: 4+    Tests     Lumbar   Positive sacral spring .   Negative prone instability , SIJ compression, sacroiliac distraction and  "Gaenslen's .     Left   Negative crossed SLR, passive SLR and slump test.     Right   Negative crossed SLR, passive SLR and slump test.     Left Pelvic Girdle/Sacrum   Negative: active SLR test.     Right Pelvic Girdle/Sacrum   Negative: active SLR test.     Left Hip   Negative MICHAEL and FADIR.     Right Hip   Negative MICHAEL and FADIR.     Additional Tests Details  Pain in lumbar spine with resisted trunk rotation bilaterally      Ambulation     Observational Gait   Walking speed and stride length within functional limits.   Base of support: normal    Functional Assessment      Squat    Left within functional limits and right within functional limits.              Daily Note     Today's date: 2025  Patient name: Gracia Chirinos  : 1975  MRN: [id number]  Referring provider: Melissa Crespo CRNP  Dx:   Encounter Diagnosis     ICD-10-CM    1. SI (sacroiliac) joint dysfunction  M53.3       2. Chronic midline low back pain without sciatica  M54.50     G89.29             Start Time: 1532  Stop Time: 1628  Total time in clinic (min): 56 minutes    Subjective: Pt reports that her SIJ and low back feels okay today, but that her left upper arm and her head have been bothering her significantly, to the point where she went to ED the other day. States that she feels they are not connected, and that she has experienced these pains before. She reports her migraines are usually helped by medication, but has not experienced relief in the past week or so.        Objective: See treatment diary below      Assessment: Pt reported slight discomfort with bridges, partially on the concentric portion, but primarily upon \"relaxing my muscles after the rep\". Performed dead bugs today to aid in core stability' pt required cueing for coordination of arm/leg movements, but otherwise performed with good technique. Fatigue reported during captain morgans, but no pain. Tolerated treatment well. Patient would benefit from continued " "PT      Plan: Continue per plan of care.  Progress treatment as tolerated.       Precautions: See chart.       Manuals 11/27 12/3 12/9 12/16 12/19 12/23 1/7 1/14 1/21    Pelvic Rotation MET (RLE into extension, LLE into flexion) ES  ES  ES  ES ES     Lumbosacral   ES ES  ES        LLE LAD and Gr. 5 mob   ES RLE LAD, AMC ES ES       L Lumbar Rotational Mobs     Sidelying     ES        Neuro Re-Ed 11/27 12/3 12/9  12/19 12/23 1/7 1/14 1/21 2/11   Hip Adduction Ball Squeezes 20x5\" 20x5\" 20x5\" 20x5\" 20x5\" 20x5\" 20x5\" 20x5\"     Bird Dogs             Dead Bugs  2x10 2x10 2x10     2x10ea 2x10ea in H/L   Bridges  2x10 w/ PPT 2x10 w/ PPT Mini, 2x10 w/ PPT    2x10 w/ PPT 2x10 w/ PPT    PPT     x15        RDLs      W/ cane 2x10       Captain Morgans         10x5\" B/L 10x5\" B/L   Palloff Press          CC 7.5# 2x10 B/L   Ther Ex 11/27 12/3 12/9  12/19 12/23  1/14 1/21 2/11   Clamshells GTB 3x10 GTB 2x10ea  GTB 2x10  GTB 2x10 GTB 2x10 Blue TB 2x10 Blue TB 2x10    Pt Ed ES ES   ES ES ES ES ES ES   UB  10' 6' P!          Squats  W/ ball squeeze at wall  2x10 W/ ball squeeze at wall  2x10 W/ ball squeeze at wall  2x10  W/ ball squeeze at wall  2x10  W/ ball squeeze at wall  2x10     LTR    5'', 10x 5'', 10x 5'', 10x 5'', 10x 5'', 10x     Assess pt symptoms     ES        Step-Ups      8\" 2x10ea 8\" [id number] 8\" [id number]     Leg Press        85# 3x10 95# 3x10    115# x10 135# 3x10   Re-assess strength and mobility          ES   TB Sidestepping          Blue TB x3 laps   Monster Walks          Blue TB x2 laps   Ther Activity                                       Gait Training                                       Modalities                                                               "

## 2025-02-14 ENCOUNTER — TELEPHONE (OUTPATIENT)
Dept: NEUROLOGY | Facility: CLINIC | Age: 50
End: 2025-02-14

## 2025-02-18 ENCOUNTER — OFFICE VISIT (OUTPATIENT)
Dept: PHYSICAL THERAPY | Age: 50
End: 2025-02-18
Payer: COMMERCIAL

## 2025-02-18 DIAGNOSIS — M53.3 SI (SACROILIAC) JOINT DYSFUNCTION: Primary | ICD-10-CM

## 2025-02-18 DIAGNOSIS — M54.50 CHRONIC MIDLINE LOW BACK PAIN WITHOUT SCIATICA: ICD-10-CM

## 2025-02-18 DIAGNOSIS — G89.29 CHRONIC MIDLINE LOW BACK PAIN WITHOUT SCIATICA: ICD-10-CM

## 2025-02-18 PROCEDURE — 97110 THERAPEUTIC EXERCISES: CPT

## 2025-02-18 PROCEDURE — 97112 NEUROMUSCULAR REEDUCATION: CPT

## 2025-02-18 NOTE — PROGRESS NOTES
"Daily Note     Today's date: 2025  Patient name: Gracia Chirinos  : 1975  MRN: [id number]  Referring provider: Melissa Crespo CRNP  Dx:   Encounter Diagnosis     ICD-10-CM    1. SI (sacroiliac) joint dysfunction  M53.3       2. Chronic midline low back pain without sciatica  M54.50     G89.29           Start Time: 1534  Stop Time: 1620  Total time in clinic (min): 46 minutes    Subjective: Pt reports her back continues to feel sore overall, as well as increased discomfort in R low back/glute. States she has not been able to perform HEP over the past week or so.       Objective: See treatment diary below      Assessment: Continued interventions focusing on core stability and hip/glute strengthening for increased support for low back and SIJ. Pt demonstrated no significant evidence of innominate rotation this session, despite reports of increased discomfort in her low back. Tender to palpation of bilateral PSIS (R>L), with slight alleviation of symptoms following gentle lumbosacral . Good technique with dead bugs today, with improved core contraction overall. Discussed with pt regarding importance of performing HEP for improved carryover and decrease in symptoms. Tolerated treatment well. Patient would benefit from continued PT      Plan: Continue per plan of care.  Progress treatment as tolerated.         Manuals     Pelvic Rotation MET (RLE into extension, LLE into flexion)       ES ES     Lumbosacral  ES            LLE LAD and Gr. 5 mob             L Lumbar Rotational Mobs             Neuro Re-Ed  2/11   Hip Adduction Ball Squeezes       20x5\" 20x5\"     Bird Dogs             Dead Bugs 2x10ea in H/L        2x10ea 2x10ea in H/L   Bridges        2x10 w/ PPT 2x10 w/ PPT    PPT             RDLs             Captain Belia 20x5\" B/L        10x5\" B/L 10x5\" B/L   Palloff Press          CC 7.5# 2x10 B/L   Ther Ex  2/11   Clamshells      " " GTB 2x10 Blue TB 2x10 Blue TB 2x10    Pt Ed ES      ES ES ES ES   UB             Squats        W/ ball squeeze at wall  2x10     LTR 5'', 10x      5'', 10x 5'', 10x     Assess pt symptoms             Step-Ups       8\" [id number] 8\" [id number]     Leg Press 135# x10    145# 2x10       85# 3x10 95# 3x10    115# x10 135# 3x10   Re-assess strength and mobility          ES   TB Sidestepping Blue TB x3 laps         Blue TB x3 laps   Monster Walks Blue TB x2 laps         Blue TB x2 laps   Ther Activity                                       Gait Training                                       Modalities                                                            "

## 2025-02-18 NOTE — TELEPHONE ENCOUNTER
Nurtec is approved through 2/14/2026. Called [facility] and left a message for the pharmacist making them aware of the approval. Sent pt a message through [employer].

## 2025-02-25 ENCOUNTER — APPOINTMENT (OUTPATIENT)
Dept: PHYSICAL THERAPY | Age: 50
End: 2025-02-25
Payer: COMMERCIAL

## 2025-02-25 ENCOUNTER — PATIENT MESSAGE (OUTPATIENT)
Dept: SLEEP CENTER | Facility: CLINIC | Age: 50
End: 2025-02-25

## 2025-02-25 DIAGNOSIS — F51.04 CHRONIC INSOMNIA: ICD-10-CM

## 2025-02-25 RX ORDER — TRAZODONE HYDROCHLORIDE 50 MG/1
75 TABLET ORAL
Qty: 135 TABLET | Refills: 0 | Status: SHIPPED | OUTPATIENT
Start: 2025-02-25 | End: 2025-05-26

## 2025-02-25 NOTE — PATIENT COMMUNICATION
Call to patient and left voicemail message offering patient a follow up appointment for tomorrow at 10 am with Dr. Molina in the [place] office. Advised appointment will be kept open till 3 pm for her. Requested a call back declining or accepting appointment for tomorrow. [phone] Option 3, then Option 1.   Call back from patient unable to take appointment for tomorrow.     Last office visit 9/11/2024  Next office visit 3/19/2025    Dr. Molina,     Please review RX request and sign if agreeable,  Thank you.     
social work

## 2025-02-26 ENCOUNTER — OFFICE VISIT (OUTPATIENT)
Dept: BARIATRICS | Facility: CLINIC | Age: 50
End: 2025-02-26
Payer: COMMERCIAL

## 2025-02-26 VITALS
SYSTOLIC BLOOD PRESSURE: 110 MMHG | OXYGEN SATURATION: 99 % | WEIGHT: 167.6 LBS | HEIGHT: 65 IN | HEART RATE: 84 BPM | DIASTOLIC BLOOD PRESSURE: 68 MMHG | BODY MASS INDEX: 27.92 KG/M2

## 2025-02-26 DIAGNOSIS — E66.811 CLASS 1 OBESITY DUE TO EXCESS CALORIES WITH BODY MASS INDEX (BMI) OF 30.0 TO 30.9 IN ADULT, UNSPECIFIED WHETHER SERIOUS COMORBIDITY PRESENT: ICD-10-CM

## 2025-02-26 DIAGNOSIS — E66.09 CLASS 1 OBESITY DUE TO EXCESS CALORIES WITH BODY MASS INDEX (BMI) OF 30.0 TO 30.9 IN ADULT, UNSPECIFIED WHETHER SERIOUS COMORBIDITY PRESENT: ICD-10-CM

## 2025-02-26 DIAGNOSIS — E66.3 OVERWEIGHT WITH BODY MASS INDEX (BMI) OF 27 TO 27.9 IN ADULT: Primary | ICD-10-CM

## 2025-02-26 PROCEDURE — 99215 OFFICE O/P EST HI 40 MIN: CPT | Performed by: INTERNAL MEDICINE

## 2025-02-26 RX ORDER — METFORMIN HYDROCHLORIDE 500 MG/1
TABLET, EXTENDED RELEASE ORAL
Qty: 180 TABLET | Refills: 3 | Status: SHIPPED | OUTPATIENT
Start: 2025-02-26

## 2025-02-26 RX ORDER — TIRZEPATIDE 5 MG/.5ML
5 INJECTION, SOLUTION SUBCUTANEOUS WEEKLY
Qty: 2 ML | Refills: 2 | Status: SHIPPED | OUTPATIENT
Start: 2025-02-26 | End: 2025-05-21

## 2025-02-26 NOTE — ASSESSMENT & PLAN NOTE
"Antiobesity Medications/Medical --  Patient is currently on 5 mg of Zepbound.  She has had a lifelong issue with constipation.  Currently she is on magnesium fiber Colace and MiraLAX.  Occasionally she has to give herself a week and laxative.  We discussed consider adding Sennakot   Other oral medication called \"  We also discussed adding metformin to help with her bowels and to potentiate the effect of Zepbound  When she retried phentermine a few years ago she did not like the way it made her feel.    She does not want to take Topamax as she tried it years ago for migraines and it contributed to tremors.   -Patient is currently on Lexapro for depression.  We could consider using Wellbutrin and naltrexone for emotional eating  -Patient has been on multiple weight promoting medications including steroids gabapentin and hydroxyzine.  Patient reports remarkable improvements in rheumatoid arthritis symptoms and has been able to discontinue the methotrexate.  She is currently only needing Plaquenil.  She has noted remarkable anti-inflammatory benefits from the Zepbound      Nutrition:       Nutrition Prescription  Calories: 1483-6666 kcal 1# per week w/o exercise  Protein: 75-80 g  Fluid: 2000 ml     Meal Plan (Harvey/Pro)  Breakfast: 150-200  Snack: --  Lunch: 300  Snack: 150  Dinner: 500-Factor meal  Snack: optinal 100 harvey snack    Physical Activity:   Has been doing PT once a week for her back due to her being an  and being hunched over  Add ST 2-3 days   Two 15 minute bouts of brisk walking   She reports reaction with feeling very nauseated when she gave herself the shot in her thigh.  Switched to abdomen and it is better    Sleep: -ANETTE on CPAP ; 8-9 hours of sleep per night      Food Behaviors/Stress:   Improved binge tendencies on Zepbound  On Lexapro     "

## 2025-02-26 NOTE — PROGRESS NOTES
"  Assessment/Plan     Gracia Chirinos is 49 y.o. year old female  who comes in for consultation for assistance with weight management.     - Discussed options of [facility]-Intensive Lifestyle Intervention Program, Very Low Calorie Diet-VLCD, and Conservative Program and the role of weight loss medications.  - Patient is interested in pursuing Conservative Program    Overweight with body mass index (BMI) of 27 to 27.9 in adult  Antiobesity Medications/Medical --  Patient is currently on 5 mg of Zepbound.  She has had a lifelong issue with constipation.  Currently she is on magnesium fiber Colace and MiraLAX.  Occasionally she has to give herself a week and laxative.  We discussed consider adding Sennakot   Other oral medication called \"  We also discussed adding metformin to help with her bowels and to potentiate the effect of Zepbound  When she retried phentermine a few years ago she did not like the way it made her feel.    She does not want to take Topamax as she tried it years ago for migraines and it contributed to tremors.   -Patient is currently on Lexapro for depression.  We could consider using Wellbutrin and naltrexone for emotional eating  -Patient has been on multiple weight promoting medications including steroids gabapentin and hydroxyzine.  Patient reports remarkable improvements in rheumatoid arthritis symptoms and has been able to discontinue the methotrexate.  She is currently only needing Plaquenil.  She has noted remarkable anti-inflammatory benefits from the Zepbound      Nutrition:       Nutrition Prescription  Calories: 4340-6718 kcal 1# per week w/o exercise  Protein: 75-80 g  Fluid: 2000 ml     Meal Plan (Harvey/Pro)  Breakfast: 150-200  Snack: --  Lunch: 300  Snack: 150  Dinner: 500-Factor meal  Snack: optinal 100 harvey snack    Physical Activity:   Has been doing PT once a week for her back due to her being an  and being hunched over  Add ST 2-3 days   Two 15 minute bouts of brisk " walking   She reports reaction with feeling very nauseated when she gave herself the shot in her thigh.  Switched to abdomen and it is better    Sleep: -ANETTE on CPAP ; 8-9 hours of sleep per night      Food Behaviors/Stress:   Improved binge tendencies on Zepbound  On Lexapro         Gracia was seen today for follow-up.    Diagnoses and all orders for this visit:    Overweight with body mass index (BMI) of 27 to 27.9 in adult    Class 1 obesity due to excess calories with body mass index (BMI) of 30.0 to 30.9 in adult, unspecified whether serious comorbidity present  -     metFORMIN (GLUCOPHAGE-XR) 500 mg 24 hr tablet; Start with 1 tablet with dinner and after 1 week increase to 1 tablet twice a day with meals.  -     tirzepatide (Zepbound) 5 mg/0.5 mL auto-injector; Inject 0.5 mL (5 mg total) under the skin once a week            -In addition, please follow general recommendations below.          Return visit:  6-8 weeks        General Lifestyle recommendations:    Nutrition   -Avoid skipping meals. Avoid sugary beverages. At least 64oz of water daily.  Limit processed food, refined sugars and grain. Encourage  healthy choices for meals and snacks   -Focus on protein goals and non starchy fiber rich vegetables for satiety effect and to help support a calorie deficit.   - Emphasize portion control, well balanced macronutrient's (protein, carbohydrate, fat using MyPlate method )and adequate protein with each meal/snacks and distributing calories equally throughout the day along with.   -Advise starting the day with a protein breakfast   Behavioral/Stress   Food log via carlos or provided paper log (carlos options include www.[email or web address], sparkpeople.com, loseit.com, calorieking.com, [place]). Encouraged mindful eating. Be sure to set aside time to eat, eat slowly, and savor your food. Consider meditation apps and/or taking a few minutes of mindfulness every AM. Understand the role of regarding the role of stress  "hormone cortisol in promoting weight gain and visceral fat accumulation. Weigh daily or atleast 2-3 times/ week  Physical Activity   Increase physical activity by 10 minutes daily. Gradually increase physical activity to a goal of 5 days per week for 30 minutes of MODERATE intensity ( should be able to pass the \"talk test\" but should not be able to sing. Target 150-300 minutes  PLUS 2 days per week of FULL BODY resistance training. Progression will be addressed at follow up visits. Encouraged contemplation regarding establishing a daily physical activity routine  - Resistance training along with increase protein intake is important to maintain and enhance metabolism  Sleep   Encourage sleep hygiene and importance of having adequate sleep duration at least > 6 hours to support response in weight loss efforts    Handouts provided :  THRIVE program at [facility]  MyPlate and food quality  Food log resources, phone carlos or paper journal  Antiobesity medications options     - Discussed at length and the role of weight loss medications and medication options   - Explained the importance of making lifestyle changes in addition to starting any anti-obesity medications if the  patient chooses.  -  Initial weight loss goal of 5-10% weight loss for improved health  - Weight loss can improve patient's co-morbid conditions and/or prevent weight-related complications.  - Weight is not at goal and patient has been unable to achieve a meaningful weight loss above 5% using various programs and tools for more than 6 months    Gracia was seen today for follow-up.    Diagnoses and all orders for this visit:    Overweight with body mass index (BMI) of 27 to 27.9 in adult    Class 1 obesity due to excess calories with body mass index (BMI) of 30.0 to 30.9 in adult, unspecified whether serious comorbidity present  -     metFORMIN (GLUCOPHAGE-XR) 500 mg 24 hr tablet; Start with 1 tablet with dinner and after 1 week increase to 1 tablet " twice a day with meals.  -     tirzepatide (Zepbound) 5 mg/0.5 mL auto-injector; Inject 0.5 mL (5 mg total) under the skin once a week            Zepbound Instructions:    - Begin Zepbound 2.5 mg subcutaneously once a week. Dose changes may occur after 4 doses if medication is tolerated. You will be assessed prior to each dose change to make sure you are tolerating the medication well.  - Please message me when you have 2 pens left from the prescription so there are no lapses in treatment.  - Visit Zepbound.com for further information/injection instructions.   - Take precautions to avoid dehydration. Aim for 64-80 oz of fluids/day  -Stop eating before you feel full  -Instructed  to administer a missed dose as soon as possible within 4 days. If more than 4 days have passed, skip the missed dose, administer the next dose on the regularly scheduled day, and resume the regular once-weekly dosing schedule  -Please eat small frequent meals to help reduce nausea. Avoid excessively fatty meals fried foods. Lemon water and saltine crackers may help with this.   - Side effects of Zepbound discussed: nausea, vomiting, diarrhea, and constipation. If you experience fever, nausea/vomiting, and pain radiating to your back this may be a sign of pancreatitis. Please go to the emergency room if this occurs.  - Consider OTC bowel regimen including stool softeners, fiber supplements to regularize bowel movements while on medication. MiraLAX can be used if needed  - - If on oral birth control a 2nd method of birth control is recommended during the 1st 8 weeks of therapy and for 4 weeks after any dosage change.   - Patient understands the side effects of the medication and proper administration. Patient agrees with the treatment plan and all questions were answered.    Subcutaneous injection:  Inject subQ into the thigh, abdomen, or upper arm; rotate injection sites.  Administer at any time of day, with or without meals.  Administer  separately from insulin (do not mix the products); may inject both medications in the same body region but not adjacent to each other.  The day of the weekly administration can be changed as long as the time between the 2 doses is at least 3 days (72 hours)  Administer a missed dose as soon as possible within 4 days (96 hours) of missed dose; if more than 4 days have passed, skip the missed dose and administer next dose on regularly scheduled day and resume regular once weekly dosing schedule             Total time spent reviewing chart, interviewing patient, examining patient, discussing plan, answering all questions, and documentin min, with >50% face-to-face time spent counseling patient on nonsurgical interventions for the treatment of excess weight. Discussed in detail nonsurgical options including intensive lifestyle intervention program, very low-calorie diet program and conservative program.  Discussed the role of weight loss medications.  Counseled patient on diet behavior and exercise modification for weight loss.        History of present illness/ Weight history   Patient reports that she has had trouble with her weight all her life.  She grew up in [place] and states that her mom and dad were not very strict with what she ate.  She states her mother had obesity and had father also gained weight after he was sedentary due to her diagnosis of MS.  He has had bariatric surgery by Dr. Jordan and is a patient at our center.  She states that she has been up and down with her weight all her life.  When she gets to her current weight of 180s she states that she starts implementing lifestyle efforts such as diet and physical activity.  Lately she states her physical activity has been limited due to back surgery last year.  She has also been evaluated for autoimmune disease and has been getting a few rounds of steroids this year which is also significantly contributed to her weight.  She reports 160 was  her lowest weight and her highest weight is 188.  She started seeing the dietitian and has lost 5 pain 8 pounds since then.      She would really like to consider medication as she feels like the food noise has been significant.  She describes binge eating where she can eat a whole box of cinnamon rolls.  She does report that she lives alone and if she were living with her  or her daughter it is possible she could hide what she is eating.  She also reports eating until uncomfortably full.  She denies any purging or restrictive behaviors.  She states that she has mentioned the above issue to her therapist but has not been seeing them as frequently.  She has never been formally diagnosed with an eating disorder or been referred to an eating disorder therapist.  She reports binge episodes 1-2 times a month.  She also states that she could eat at the end of her workday while watching TV and uses food for comfort    She states that she had tried fen-phen in the 90s and had succeeded in losing weight.  When she retried phentermine a few years ago she did not like the way it made her feel.  She does not want to take Topamax as she tried it years ago for migraines and it contributed to tremors.  She also notes low ferritin  Has been seeing CORDELIA Dean and was provided nutrition prescription below.      Nutrition Prescription  Calories: 0732-4769 kcal 1# per week w/o exercise  Protein: 75 g  Fluid: 2000 ml     Meal Plan (Harvey/Pro)  Breakfast: 150-200  Snack: --  Lunch: 300  Snack: 150  Dinner: 500-Factor meal  Snack: optinal 100 harvey snack      Screener Tool for Eating Disorders   (URL: SBIRT-ED - Eatingdisorderscreener.org)     1. Do you make yourself throw up because you feel uncomfortably full?  No     2. Do you worry you have lost control over how much you eat?  Yes     3. Have you recently lost more than 15 pounds in a 3-month period?  No     4. Do you think you are fat even though others say you are too thin?  No      5. Would you say that food dominates your life?   Yes     TOTAL: 2 /5     SCORING GUIDE:  LOW RISK: 1-2/5  MEDIUM RISK: 3/5  HIGH RISK: 4-5/5      Wt Readings from Last 30 Encounters:   02/26/25 76 kg (167 lb 9.6 oz)   02/10/25 77.3 kg (170 lb 6.4 oz)   02/07/25 80 kg (176 lb 6.4 oz)   01/13/25 81 kg (178 lb 9.6 oz)   01/03/25 81.8 kg (180 lb 6.4 oz)   12/23/24 83.5 kg (184 lb)   12/16/24 84.5 kg (186 lb 3.2 oz)   12/12/24 88.2 kg (194 lb 6.4 oz)   12/02/24 86.2 kg (190 lb)   11/25/24 83.3 kg (183 lb 9.6 oz)   11/19/24 83.3 kg (183 lb 9.6 oz)   11/05/24 85.6 kg (188 lb 12.8 oz)   10/23/24 83.5 kg (184 lb)   09/11/24 83.9 kg (185 lb)   08/02/24 83.9 kg (185 lb)   04/17/24 83 kg (183 lb)   04/08/24 83 kg (183 lb)   01/17/24 82.1 kg (181 lb)   09/20/23 81.6 kg (180 lb)   08/15/23 83.5 kg (184 lb)   05/19/23 83.5 kg (184 lb)   04/11/23 81 kg (178 lb 9.6 oz)   01/18/23 81.8 kg (180 lb 6.4 oz)   11/29/22 79.4 kg (175 lb)   11/28/22 81.6 kg (180 lb)   07/06/22 78.5 kg (173 lb)   06/03/22 78.9 kg (174 lb)   04/25/22 81.4 kg (179 lb 6 oz)   03/27/22 73.9 kg (163 lb)   03/02/22 80.3 kg (177 lb)       RA- inflammation down, MTX took         Lifestyle questionnaire       Dietary Recall  Not tolerating the milky products and coffee any more  Not craving sweets  B: Tenriism oats has a protein cereal (3/4 cu =250 cals, 11gm protein) with a little milk OR [place] oatmeal   S: apple, protein bar (Pure protein),   L: was making a smoothie--yogurt, Pbfit, ice, muscle milk, 1/2 banana but hasn't been liking it lately.  Suggested deli meat roll-ups and avoid skipping  D:  3oz protein, veggies and starch  *suggested to try clear protein or protein shot mixed in water ie:baripal     Beverages: water with zero lalo flavoring or seltzer  Volume of beverage intake: 64oz     Weekends: same  Cravings: sugar   Trouble area of day: most at night.     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 Patient also denies personal and family history of medullary thyroid cancer, and multiple endocrine neoplasia type 2 (MEN 2 tumor). -Patient denies any history of kidney stones, seizures, or+ glaucoma, diabetic retinopathy, gall bladder disease, gastroparesis, hyperthyroidism.  -Denies Hx of CAD, PAD, palpitations, arrhythmia, uncontrolled hypertension  -Denies uncontrolled anxiety or depression, suicidal behavior or thinking , insomnia or sleep disturbance.         Past medical history/past surgical history       Previous notes and records have been reviewed.    The following portions of the patient's history were reviewed and updated as appropriate: allergies, current medications, past family history, past medical history, past social history, past surgical history, and problem list.    Past Medical History:   Diagnosis Date    Abnormal Pap smear of cervix     RAMIREZ positive     Anemia 2024    Anxiety     Arthritis March 2024    Basal cell carcinoma     Cataract 2024    Medicine related    Depression     Glaucoma 2019    Migraine     Migraine     Miscarriage 2007    MRSA colonization 07/13/2016    Neck pain     Pregnancy     Rheumatoid arthritis (HCC) 03/2024    Vertebral artery dissection (HCC)     Vulvovaginitis      "Yeast infection          Past Surgical History:   Procedure Laterality Date     SECTION, LOW TRANSVERSE  2008    COLONOSCOPY      DILATION AND CURETTAGE OF UTERUS  2007    GYNECOLOGIC CRYOSURGERY      cervix    LASIK      SKIN LESION EXCISION      basal cell carcinoma of left cheek    SPINAL FUSION  10/2023             Family History   Problem Relation Age of Onset    Hypertension Mother     Lung cancer Mother 56    Brain cancer Mother 56    Depression Mother     Lupus Mother     Early death Mother         Lung cancer    Breast cancer Maternal Aunt         dx in 40's     Multiple sclerosis Father     No Known Problems Daughter     No Known Problems Maternal Grandmother     No Known Problems Maternal Grandfather     No Known Problems Paternal Grandmother     No Known Problems Paternal Grandfather     No Known Problems Maternal Aunt             Objective     /68   Pulse 84   Ht 5' 5\" (1.651 m)   Wt 76 kg (167 lb 9.6 oz)   LMP 2025 (Exact Date)   SpO2 99%   BMI 27.89 kg/m²       Review of Systems   Constitutional:  Negative for fatigue.   HENT:  Negative for sore throat.    Respiratory:  Negative for cough and shortness of breath.    Cardiovascular:  Negative for chest pain, palpitations and leg swelling.   Gastrointestinal:  Negative for abdominal pain, constipation, diarrhea and nausea.   Genitourinary:  Negative for dysuria.   Musculoskeletal:  Negative for arthralgias and back pain.   Skin:  Negative for rash.   Neurological:  Negative for headaches.   Psychiatric/Behavioral:  Negative for dysphoric mood. The patient is not nervous/anxious.        Physical Exam  Vitals and nursing note reviewed.   Constitutional:       Appearance: Normal appearance.   HENT:      Head: Normocephalic.   Pulmonary:      Effort: Pulmonary effort is normal.   Neurological:      General: No focal deficit present.      Mental Status: She is alert and oriented to person, place, and time.   Psychiatric:         " Mood and Affect: Mood normal.         Behavior: Behavior normal.         Thought Content: Thought content normal.         Judgment: Judgment normal.            Medications       Current Outpatient Medications:     ALPRAZolam (XANAX) 0.25 mg tablet, Take 1 tablet (0.25 mg total) by mouth daily as needed for anxiety, Disp: 30 tablet, Rfl: 0    butalbital-acetaminophen-caffeine (Esgic) -40 mg per tablet, Take 1 tablet by mouth every 4 (four) hours as needed for headaches, Disp: 30 tablet, Rfl: 0    escitalopram (LEXAPRO) 20 mg tablet, Take 1 tablet (20 mg total) by mouth daily, Disp: 90 tablet, Rfl: 1    ferrous sulfate 325 (65 FE) MG EC tablet, Take 1 tablet (325 mg total) by mouth in the morning, Disp: 90 tablet, Rfl: 0    gabapentin (NEURONTIN) 300 mg capsule, Take 2 capsules (600 mg total) by mouth 2 (two) times a day Take 2 capsule in the morning and 2 at bedtime, Disp: 120 capsule, Rfl: 1    hydroxychloroquine (PLAQUENIL) 200 mg tablet, Take 1 tablet (200 mg total) by mouth 2 (two) times a day with meals, Disp: 180 tablet, Rfl: 1    hydrOXYzine HCL (ATARAX) 25 mg tablet, Take 1 tablet (25 mg total) by mouth in the morning, Disp: 90 tablet, Rfl: 1    latanoprost (XALATAN) 0.005 % ophthalmic solution, INSTILL 1 DROP IN AFFECTED EYE AT BEDTIME, Disp: , Rfl: 2    metFORMIN (GLUCOPHAGE-XR) 500 mg 24 hr tablet, Start with 1 tablet with dinner and after 1 week increase to 1 tablet twice a day with meals., Disp: 180 tablet, Rfl: 3    methocarbamol (ROBAXIN) 750 mg tablet, Take 750 mg by mouth daily at bedtime, Disp: , Rfl:     naproxen (NAPROSYN) 500 mg tablet, Take 500 mg by mouth 2 (two) times a day with meals, Disp: , Rfl:     ondansetron (ZOFRAN) 4 mg tablet, Take 1 tablet (4 mg total) by mouth every 8 (eight) hours as needed for nausea or vomiting, Disp: 30 tablet, Rfl: 0    pantoprazole (PROTONIX) 40 mg tablet, Take 1 tablet (40 mg total) by mouth every morning, Disp: 90 tablet, Rfl: 1    pramipexole  (MIRAPEX) 0.5 mg tablet, Take 1 tablet (0.5 mg total) by mouth 3 (three) times a day, Disp: 90 tablet, Rfl: 1    Probiotic Product (PRO-BIOTIC BLEND PO), Take by mouth, Disp: , Rfl:     tirzepatide (Zepbound) 5 mg/0.5 mL auto-injector, Inject 0.5 mL (5 mg total) under the skin once a week, Disp: 2 mL, Rfl: 2    traZODone (DESYREL) 50 mg tablet, Take 1.5 tablets (75 mg total) by mouth daily at bedtime, Disp: 135 tablet, Rfl: 0    benzonatate (TESSALON PERLES) 100 mg capsule, take 1 capsule by mouth three times a day as needed for cough (Patient not taking: Reported on 2/7/2025), Disp: , Rfl:     fluticasone (FLONASE) 50 mcg/act nasal spray, 2 sprays into each nostril daily (Patient not taking: Reported on 2/7/2025), Disp: , Rfl:     folic acid (FOLVITE) 1 mg tablet, Take 1 tablet by mouth daily (Patient not taking: Reported on 2/7/2025), Disp: , Rfl:     guaiFENesin-codeine (ROBITUSSIN AC) 100-10 mg/5 mL oral solution, Take 5 mL by mouth 3 (three) times a day as needed for cough (Patient not taking: Reported on 2/7/2025), Disp: 237 mL, Rfl: 0    methotrexate 50 MG/2ML injection, Inject 25 mg under the skin once a week (Patient not taking: Reported on 2/7/2025), Disp: , Rfl:     naloxone (NARCAN) 4 mg/0.1 mL nasal spray, Administer 1 spray into a nostril. If no response after 2-3 minutes, give another dose in the other nostril using a new spray. (Patient not taking: Reported on 2/7/2025), Disp: 1 each, Rfl: 1    rimegepant sulfate (NURTEC) 75 mg TBDP, Take 1 tablet (75 mg) by mouth once at the onset of a headache. Max dose: 75 mg/day. (Patient not taking: Reported on 2/26/2025), Disp: 16 tablet, Rfl: 3    sodium chloride (OCEAN) 0.65 % nasal spray, 1 spray into each nostril (Patient not taking: Reported on 2/7/2025), Disp: , Rfl:     ZyrTEC Allergy 10 MG tablet, Take 10 mg by mouth daily (Patient not taking: Reported on 2/7/2025), Disp: , Rfl:     Current Facility-Administered Medications:     Botulinum Toxin Type A  SOLR 200 Units, 200 Units, Injection, See Admin Instructions,            Labs and imaging     Recent labs and imaging have been personally reviewed.  Lab Results   Component Value Date    WBC 4.67 01/17/2025    HGB 13.1 01/17/2025    HCT 39.2 01/17/2025    MCV 88 01/17/2025     01/17/2025     Lab Results   Component Value Date     10/10/2014    SODIUM 139 01/17/2025    K 4.1 01/17/2025     01/17/2025    CO2 29 01/17/2025    ANIONGAP 7 10/10/2014    AGAP 4 01/17/2025    BUN 19 01/17/2025    CREATININE 0.77 01/17/2025    GLUC 84 01/17/2025    CALCIUM 9.4 01/17/2025    AST 18 01/17/2025    ALT 16 01/17/2025    ALKPHOS 55 01/17/2025    PROT 6.8 10/10/2014    TP 6.8 01/17/2025    BILITOT 0.2 10/10/2014    TBILI 0.35 01/17/2025    EGFR 90 01/17/2025     Lab Results   Component Value Date    HGBA1C 5.6 12/16/2024     Lab Results   Component Value Date    AUL9IQUMGSLH 1.509 12/16/2024    TSH 1.40 07/13/2020     Lab Results   Component Value Date    CHOLESTEROL 191 01/29/2024     Lab Results   Component Value Date    HDL 56 01/29/2024     Lab Results   Component Value Date    TRIG 154 (H) 01/29/2024     Lab Results   Component Value Date    LDLCALC 108 (H) 01/29/2024

## 2025-02-28 ENCOUNTER — OFFICE VISIT (OUTPATIENT)
Dept: PHYSICAL THERAPY | Age: 50
End: 2025-02-28
Payer: COMMERCIAL

## 2025-02-28 DIAGNOSIS — M54.50 CHRONIC MIDLINE LOW BACK PAIN WITHOUT SCIATICA: ICD-10-CM

## 2025-02-28 DIAGNOSIS — M53.3 SI (SACROILIAC) JOINT DYSFUNCTION: Primary | ICD-10-CM

## 2025-02-28 DIAGNOSIS — G89.29 CHRONIC MIDLINE LOW BACK PAIN WITHOUT SCIATICA: ICD-10-CM

## 2025-02-28 PROCEDURE — 97110 THERAPEUTIC EXERCISES: CPT

## 2025-02-28 PROCEDURE — 97140 MANUAL THERAPY 1/> REGIONS: CPT

## 2025-02-28 NOTE — PROGRESS NOTES
"Daily Note     Today's date: 2025  Patient name: Gracia Chirinos  : 1975  MRN: [id number]  Referring provider: Melissa Crespo CRNP  Dx:   Encounter Diagnosis     ICD-10-CM    1. SI (sacroiliac) joint dysfunction  M53.3       2. Chronic midline low back pain without sciatica  M54.50     G89.29           Start Time: 803  Stop Time: 847  Total time in clinic (min): 44 minutes    Subjective: Pt reports significant increase in LBP following a shift at work on  where she was very busy. States her SIJ is irritated today, and that her low back pain radiates \"about skilled nursing\" up her back. Reports it has gotten a little better since , but is still very sore overall.      Objective: See treatment diary below  (-) active SLR bilaterally  (-) passive SLR bilaterally  (-) Shahzad bilaterally  (+) Fadir bilaterally  (-) Sacral compression  (+) Sacral distraction    Assessment: Began session by assessing pt's low back symptoms. Pt demonstrates moderately decreased lumbar flexion, with pain in mid range as well as difficulty returning to standing. Tender to palpation at PSIS bilaterally, as well as approx. T12-L5 spinous processes and paraspinal musculature on the right side. No radicular symptoms reported. Interventions focused primarily on lumbar stretching and gentle movement as tolerated. Discomfort reported with lumbar extension during ball rollouts, but otherwise no increase in pain throughout session. Discussed with pt regarding active rest and taking breaks as possible during her upcoming busy weekend. Tolerated treatment fair. Patient would benefit from continued PT      Plan: Continue per plan of care.  Progress treatment as tolerated.         Manuals     Pelvic Rotation MET (RLE into extension, LLE into flexion)       ES ES     Lumbosacral  ES ES           LLE LAD and Gr. 5 mob             L Lumbar Rotational Mobs             Neuro Re-Ed  " "2/11   Hip Adduction Ball Squeezes       20x5\" 20x5\"     Bird Dogs             Dead Bugs 2x10ea in H/L        2x10ea 2x10ea in H/L   Bridges        2x10 w/ PPT 2x10 w/ PPT    PPT             RDLs             Captain Celaya 20x5\" B/L        10x5\" B/L 10x5\" B/L   Palloff Press          CC 7.5# 2x10 B/L   Ther Ex 2/18 2/28 1/14 1/21 2/11   Clamshells       GTB 2x10 Blue TB 2x10 Blue TB 2x10    Pt Ed ES ES     ES ES ES ES   UB             Squats        W/ ball squeeze at wall  2x10     LTR 5'', 10x 5\" 2x10     5'', 10x 5'', 10x     Assess pt symptoms  ES           Step-Ups       8\" [id number] 8\" [id number]/[id number]     Leg Press 135# x10    145# 2x10       85# 3x10 95# 3x10    115# x10 135# 3x10   Re-assess strength and mobility          ES   TB Sidestepping Blue TB x3 laps         Blue TB x3 laps   Monster Walks Blue TB x2 laps         Blue TB x2 laps   Ball Rollouts  Pink Pball 20x5\"           Open Books  10x5\" B/L           Ther Activity                                       Gait Training                                       Modalities                                                              "

## 2025-03-04 ENCOUNTER — OFFICE VISIT (OUTPATIENT)
Dept: PHYSICAL THERAPY | Age: 50
End: 2025-03-04
Payer: COMMERCIAL

## 2025-03-04 DIAGNOSIS — M54.50 CHRONIC MIDLINE LOW BACK PAIN WITHOUT SCIATICA: ICD-10-CM

## 2025-03-04 DIAGNOSIS — G89.29 CHRONIC MIDLINE LOW BACK PAIN WITHOUT SCIATICA: ICD-10-CM

## 2025-03-04 DIAGNOSIS — M53.3 SI (SACROILIAC) JOINT DYSFUNCTION: Primary | ICD-10-CM

## 2025-03-04 PROCEDURE — 97110 THERAPEUTIC EXERCISES: CPT

## 2025-03-04 PROCEDURE — 97112 NEUROMUSCULAR REEDUCATION: CPT

## 2025-03-04 NOTE — PROGRESS NOTES
"Daily Note     Today's date: 3/4/2025  Patient name: Gracia Chirinos  : 1975  MRN: [id number]  Referring provider: Melissa Crespo CRNP  Dx:   Encounter Diagnosis     ICD-10-CM    1. SI (sacroiliac) joint dysfunction  M53.3       2. Chronic midline low back pain without sciatica  M54.50     G89.29           Start Time: 1622  Stop Time: 1701  Total time in clinic (min): 39 minutes    Subjective: Pt reports that her back is feeling better today than previous session, but her hips are still bothering her especially at night. States she ahs had time to perform stretches over the past week in between busy sessions at work, but has been unable to perform strengthening and stability interventions.      Objective: See treatment diary below      Assessment: Interventions focused on hip/glute strengthening and core stability. Pt demonstrated increased difficulty with dead bugs today compared to previous session. Required cue to maintain tension on theraband during sidestepping and was able to correct, with pt reporting increased challenge after cue. Discussed with pt regarding continuing to find time for strengthening interventions at home. Tolerated treatment well. Patient would benefit from continued PT      Plan: Continue per plan of care.  Progress treatment as tolerated.         Manuals 2/18 2/28 3/4    1/7 1/14 1/21    Pelvic Rotation MET (RLE into extension, LLE into flexion)       ES ES     Lumbosacral  ES ES           LLE LAD and Gr. 5 mob             L Lumbar Rotational Mobs             Neuro Re-Ed  3 2   Hip Adduction Ball Squeezes       20x5\" 20x5\"     Bird Dogs             Dead Bugs 2x10ea in H/L  2x10ea      2x10ea 2x10ea in H/L   Bridges        2x10 w/ PPT 2x10 w/ PPT    PPT             RDLs             Captain Radhamess 20x5\" B/L  20x5\" B/L      10x5\" B/L 10x5\" B/L   Palloff Press          CC 7.5# 2x10 B/L   Ther Ex  3 2   Clamshells       " "GTB 2x10 Blue TB 2x10 Blue TB 2x10    Pt Ed ES ES ES    ES ES ES ES   UB             Squats        W/ ball squeeze at wall  2x10     LTR 5'', 10x 5\" 2x10 5\" 2x10    5'', 10x 5'', 10x     Assess pt symptoms  ES           Step-Ups       8\" [id number] 8\" fwd/[id number]     Leg Press 135# x10    145# 2x10  135# x10    145# 2x10     85# 3x10 95# 3x10    115# x10 135# 3x10   Re-assess strength and mobility          ES   TB Sidestepping Blue TB x3 laps  Blue TB x2 laps       Blue TB x3 laps   Monster Walks Blue TB x2 laps  Blue TB x2 laps       Blue TB x2 laps   Ball Rollouts  Pink Pball 20x5\"           Open Books  10x5\" B/L 10x5\" B/L          Ther Activity                                       Gait Training                                       Modalities                                                                "

## 2025-03-18 NOTE — PATIENT COMMUNICATION
Patient called in to check on status of botox appointment with Janie and F/U appointment as well.    I have rescheduled patient's 6 month F/U appt with Janie in [place] office.    Patient would need a PA for botox and a botox appt as well.    Please assist

## 2025-03-19 ENCOUNTER — OFFICE VISIT (OUTPATIENT)
Dept: SLEEP CENTER | Facility: CLINIC | Age: 50
End: 2025-03-19
Payer: COMMERCIAL

## 2025-03-19 ENCOUNTER — OFFICE VISIT (OUTPATIENT)
Age: 50
End: 2025-03-19
Payer: COMMERCIAL

## 2025-03-19 ENCOUNTER — TELEPHONE (OUTPATIENT)
Age: 50
End: 2025-03-19

## 2025-03-19 VITALS
WEIGHT: 173.6 LBS | HEIGHT: 65 IN | BODY MASS INDEX: 28.92 KG/M2 | DIASTOLIC BLOOD PRESSURE: 60 MMHG | SYSTOLIC BLOOD PRESSURE: 120 MMHG

## 2025-03-19 VITALS — BODY MASS INDEX: 28.79 KG/M2 | TEMPERATURE: 98 F | WEIGHT: 173 LBS

## 2025-03-19 DIAGNOSIS — G25.81 RESTLESS LEGS SYNDROME (RLS): ICD-10-CM

## 2025-03-19 DIAGNOSIS — R79.0 LOW IRON STORES: ICD-10-CM

## 2025-03-19 DIAGNOSIS — F51.04 CHRONIC INSOMNIA: ICD-10-CM

## 2025-03-19 DIAGNOSIS — D48.5 NEOPLASM OF UNCERTAIN BEHAVIOR OF SKIN: Primary | ICD-10-CM

## 2025-03-19 DIAGNOSIS — G47.33 OSA (OBSTRUCTIVE SLEEP APNEA): Primary | ICD-10-CM

## 2025-03-19 DIAGNOSIS — Z85.89 HISTORY OF SQUAMOUS CELL CARCINOMA: ICD-10-CM

## 2025-03-19 DIAGNOSIS — Z85.828 HISTORY OF BASAL CELL CARCINOMA (BCC): ICD-10-CM

## 2025-03-19 PROCEDURE — 11104 PUNCH BX SKIN SINGLE LESION: CPT | Performed by: REGISTERED NURSE

## 2025-03-19 PROCEDURE — 88305 TISSUE EXAM BY PATHOLOGIST: CPT | Performed by: STUDENT IN AN ORGANIZED HEALTH CARE EDUCATION/TRAINING PROGRAM

## 2025-03-19 PROCEDURE — 99214 OFFICE O/P EST MOD 30 MIN: CPT | Performed by: STUDENT IN AN ORGANIZED HEALTH CARE EDUCATION/TRAINING PROGRAM

## 2025-03-19 PROCEDURE — 99204 OFFICE O/P NEW MOD 45 MIN: CPT | Performed by: REGISTERED NURSE

## 2025-03-19 RX ORDER — FERROUS SULFATE 325(65) MG
TABLET, DELAYED RELEASE (ENTERIC COATED) ORAL
Qty: 90 TABLET | Refills: 0 | Status: SHIPPED | OUTPATIENT
Start: 2025-03-19

## 2025-03-19 RX ORDER — PRAMIPEXOLE DIHYDROCHLORIDE 0.5 MG/1
0.5 TABLET ORAL 2 TIMES DAILY
Qty: 90 TABLET | Refills: 1 | Status: SHIPPED | OUTPATIENT
Start: 2025-03-19

## 2025-03-19 RX ORDER — GABAPENTIN 300 MG/1
600 CAPSULE ORAL 2 TIMES DAILY
Qty: 120 CAPSULE | Refills: 1 | Status: SHIPPED | OUTPATIENT
Start: 2025-03-19 | End: 2025-05-18

## 2025-03-19 NOTE — ASSESSMENT & PLAN NOTE
See RLS  Orders:    ferrous sulfate 325 (65 FE) MG EC tablet; 325mg every other day with Vitamin C    PAP DME Resupply/Reorder

## 2025-03-19 NOTE — PROGRESS NOTES
"[facility] Dermatology Clinic Note     Patient Name: Gracia Chirinos  Encounter Date: 3/19/2025     Have you been cared for by a [facility] Dermatologist in the last 3 years and, if so, which description applies to you?    NO.   I am considered a \"new\" patient and must complete all patient intake questions. I am FEMALE/of child-bearing potential.    REVIEW OF SYSTEMS:  Have you recently had or currently have any of the following? Recent fever or chills? No  Any non-healing wound? No  Are you pregnant or planning to become pregnant? No  Are you currently or planning to be nursing or breast feeding? No   PAST MEDICAL HISTORY:  Have you personally ever had or currently have any of the following?  If \"YES,\" then please provide more detail. Skin cancer (such as Melanoma, Basal Cell Carcinoma, Squamous Cell Carcinoma?  YES, BASAL CELL & SQUAMOUS CELL  Tuberculosis, HIV/AIDS, Hepatitis B or C: No  Radiation Treatment No   HISTORY OF IMMUNOSUPPRESSION:   Do you have a history of any of the following:  Systemic Immunosuppression such as Diabetes, Biologic or Immunotherapy, Chemotherapy, Organ Transplantation, Bone Marrow Transplantation or Prednsione?  YES, RA    Answering \"YES\" requires the addition of the dotphrase \"IMMUNOSUPPRESSED\" as the first diagnosis of the patient's visit.   FAMILY HISTORY:  Any \"first degree relatives\" (parent, brother, sister, or child) with the following?    Skin Cancer, Pancreatic or Other Cancer? Mother - lung cancer and brain cancer   PATIENT EXPERIENCE:    Do you want the Dermatologist to perform a COMPLETE skin exam today including a clinical examination under the \"bra and underwear\" areas?  NO  If necessary, do we have your permission to call and leave a detailed message on your Preferred Phone number that includes your specific medical information?  Yes      Allergies   Allergen Reactions    Reglan [Metoclopramide] Confusion     Pt reports Reglan \"makes me lose my mind and go crazy.\"    "   Current Outpatient Medications:     ALPRAZolam (XANAX) 0.25 mg tablet, Take 1 tablet (0.25 mg total) by mouth daily as needed for anxiety, Disp: 30 tablet, Rfl: 0    benzonatate (TESSALON PERLES) 100 mg capsule, take 1 capsule by mouth three times a day as needed for cough (Patient not taking: Reported on 2/7/2025), Disp: , Rfl:     butalbital-acetaminophen-caffeine (Esgic) -40 mg per tablet, Take 1 tablet by mouth every 4 (four) hours as needed for headaches, Disp: 30 tablet, Rfl: 0    escitalopram (LEXAPRO) 20 mg tablet, Take 1 tablet (20 mg total) by mouth daily, Disp: 90 tablet, Rfl: 1    ferrous sulfate 325 (65 FE) MG EC tablet, 325mg every other day with Vitamin C, Disp: 90 tablet, Rfl: 0    fluticasone (FLONASE) 50 mcg/act nasal spray, 2 sprays into each nostril daily (Patient not taking: Reported on 2/7/2025), Disp: , Rfl:     folic acid (FOLVITE) 1 mg tablet, Take 1 tablet by mouth daily (Patient not taking: Reported on 2/7/2025), Disp: , Rfl:     gabapentin (NEURONTIN) 300 mg capsule, Take 2 capsules (600 mg total) by mouth 2 (two) times a day Take 2 capsule in the morning and 2 at bedtime, Disp: 120 capsule, Rfl: 1    guaiFENesin-codeine (ROBITUSSIN AC) 100-10 mg/5 mL oral solution, Take 5 mL by mouth 3 (three) times a day as needed for cough (Patient not taking: Reported on 2/7/2025), Disp: 237 mL, Rfl: 0    hydroxychloroquine (PLAQUENIL) 200 mg tablet, Take 1 tablet (200 mg total) by mouth 2 (two) times a day with meals, Disp: 180 tablet, Rfl: 1    hydrOXYzine HCL (ATARAX) 25 mg tablet, Take 1 tablet (25 mg total) by mouth in the morning, Disp: 90 tablet, Rfl: 1    latanoprost (XALATAN) 0.005 % ophthalmic solution, INSTILL 1 DROP IN AFFECTED EYE AT BEDTIME, Disp: , Rfl: 2    metFORMIN (GLUCOPHAGE-XR) 500 mg 24 hr tablet, Start with 1 tablet with dinner and after 1 week increase to 1 tablet twice a day with meals., Disp: 180 tablet, Rfl: 3    methocarbamol (ROBAXIN) 750 mg tablet, Take 750 mg by  mouth daily at bedtime, Disp: , Rfl:     methotrexate 50 MG/2ML injection, Inject 25 mg under the skin once a week (Patient not taking: Reported on 2/7/2025), Disp: , Rfl:     naloxone (NARCAN) 4 mg/0.1 mL nasal spray, Administer 1 spray into a nostril. If no response after 2-3 minutes, give another dose in the other nostril using a new spray. (Patient not taking: Reported on 2/7/2025), Disp: 1 each, Rfl: 1    naproxen (NAPROSYN) 500 mg tablet, Take 500 mg by mouth 2 (two) times a day with meals, Disp: , Rfl:     ondansetron (ZOFRAN) 4 mg tablet, Take 1 tablet (4 mg total) by mouth every 8 (eight) hours as needed for nausea or vomiting, Disp: 30 tablet, Rfl: 0    pantoprazole (PROTONIX) 40 mg tablet, Take 1 tablet (40 mg total) by mouth every morning, Disp: 90 tablet, Rfl: 1    pramipexole (MIRAPEX) 0.5 mg tablet, Take 1 tablet (0.5 mg total) by mouth 2 (two) times a day, Disp: 90 tablet, Rfl: 1    Probiotic Product (PRO-BIOTIC BLEND PO), Take by mouth, Disp: , Rfl:     rimegepant sulfate (NURTEC) 75 mg TBDP, Take 1 tablet (75 mg) by mouth once at the onset of a headache. Max dose: 75 mg/day. (Patient not taking: Reported on 3/19/2025), Disp: 16 tablet, Rfl: 3    sodium chloride (OCEAN) 0.65 % nasal spray, 1 spray into each nostril (Patient not taking: Reported on 2/7/2025), Disp: , Rfl:     tirzepatide (Zepbound) 5 mg/0.5 mL auto-injector, Inject 0.5 mL (5 mg total) under the skin once a week, Disp: 2 mL, Rfl: 2    traZODone (DESYREL) 50 mg tablet, Take 1.5 tablets (75 mg total) by mouth daily at bedtime, Disp: 135 tablet, Rfl: 0    ZyrTEC Allergy 10 MG tablet, Take 10 mg by mouth daily (Patient not taking: Reported on 2/7/2025), Disp: , Rfl:     Current Facility-Administered Medications:     Botulinum Toxin Type A SOLR 200 Units, 200 Units, Injection, See Admin Instructions,           Whom besides the patient is providing clinical information about today's encounter?   NO ADDITIONAL HISTORIAN (patient alone  provided history)    Physical Exam and Assessment/Plan by Diagnosis:    HISTORY OF BASAL CELL CARCINOMA    Physical Exam:  Anatomic Location Affected:  Face  Morphological Description of scar:  well healed  Suspected Recurrence: No  Pertinent Positives:  Pertinent Negatives:      Additional History of Present Condition:  History of basal cell carcinoma with no sign of recurrence    Assessment and Plan:  Based on a thorough discussion of this condition and the management approach to it (including a comprehensive discussion of the known risks, side effects and potential benefits of treatment), the patient (family) agrees to implement the following specific plan:  Counseled to return to clinic prior to scheduled appointment should any of these lesions change or should any new lesions of concern arise  Counseled on use of sun protection daily. Reviewed latest FDA sunscreen guidelines, including use of broad spectrum (UVA and UVB blocking) sunscreen or sun protective clothing with SPF 30-50 every 2-3 hours and reapplied after exposure to water; use of photoprotective clothing, including a broad brim hat and UPF rated clothing if outdoors for several hours; avoid use of tanning beds as these pose significant risk for melanoma and skin cancer.  Follow up in clinic with provider every 12 months for skin screening.    How can basal cell carcinoma be prevented?  The most important way to prevent BCC is to avoid sunburn. This is especially important in childhood and early life. Fair skinned individuals and those with a personal or family history of BCC should protect their skin from sun exposure daily, year-round and lifelong.  Stay indoors or under the shade in the middle of the day   Wear covering clothing   Apply high protection factor SPF50+ broad-spectrum sunscreens generously to exposed skin if outdoors   Avoid indoor tanning (sun beds, solaria)  Oral nicotinamide (vitamin B3) in a dose of 500 mg twice daily may reduce  the number and severity of BCCs.    What is the outlook for basal cell carcinoma?  Most BCCs are cured by treatment. Cure is most likely if treatment is undertaken when the lesion is small.  About 50% of people with BCC develop a second one within 3 years of the first. They are also at increased risk of other skin cancers, especially melanoma. Regular self-skin examinations and long-term annual skin checks by an experienced health professional are recommended.     HISTORY OF SQUAMOUS CELL CARCINOMA     Physical Exam:  Anatomic Location Affected:  Chest  Morphological Description of Scar:  Well healed  Suspected Recurrence: no  Regional adenopathy: no    Additional History of Present Condition:  well healed. No signs of recurrence     Assessment and Plan:  Based on a thorough discussion of this condition and the management approach to it (including a comprehensive discussion of the known risks, side effects and potential benefits of treatment), the patient (family) agrees to implement the following specific plan:  Counseled to return to clinic prior to scheduled appointment should any of these lesions change or should any new lesions of concern arise  Counseled on use of sun protection daily. Reviewed latest FDA sunscreen guidelines, including use of broad spectrum (UVA and UVB blocking) sunscreen or sun protective clothing with SPF 30-50 every 2-3 hours and reapplied after exposure to water; use of photoprotective clothing, including a broad brim hat and UPF rated clothing if outdoors for several hours; avoid use of tanning beds as these pose significant risk for melanoma and skin cancer.  Follow up in clinic with provider every 12 months for skin screening.      How can SCC be prevented?  The most important way to prevent SCC is to avoid sunburn. This is especially important in childhood and early life. Fair skinned individuals and those with a personal or family history of BCC should protect their skin from sun  "exposure daily, year-round and lifelong.  Stay indoors or under the shade in the middle of the day   Wear covering clothing   Apply high protection factor SPF50+ broad-spectrum sunscreens generously to exposed skin if outdoors   Avoid indoor tanning (sun beds, solaria)      What is the outlook for SCC?  Most SCC are cured by treatment. Cure is most likely if treatment is undertaken when the lesion is small. A small percent of SCC's can spread to lymph  nodes and long term monitoring is indicated.   They are also at increased risk of other skin cancers, especially melanoma. Regular self-skin examinations and long-term annual skin checks by an experienced health professional are recommended.      NEOPLASM OF UNCERTAIN BEHAVIOR OF SKIN    Physical Exam:  (Anatomic Location); (Size and Morphological Description); (Differential Diagnosis):  Forehead; small pink papule; sebaceous hyperplasia rule out BCC    Additional History of Present Condition:  present for 4 months denies associated symptoms    Assessment and Plan:  I have discussed with the patient that a sample of skin via a \"skin biopsy” would be potentially helpful to further make a specific diagnosis under the microscope.  Based on a thorough discussion of this condition and the management approach to it (including a comprehensive discussion of the known risks, side effects and potential benefits of treatment), the patient (family) agrees to implement the following specific plan:    Procedure:  Skin Biopsy.  After a thorough discussion of treatment options and risk/benefits/alternatives (including but not limited to local pain, scarring, dyspigmentation, blistering, possible superinfection, and inability to confirm a diagnosis via histopathology), verbal and written consent were obtained and portion of the rash was biopsied for tissue sample.  See below for consent that was obtained from patient and subsequent Procedure Note.    PROCEDURE NOTE:  PUNCH " BIOPSY      Performing Physician:  Dr. Manuel    Anatomic Location; Clinical Description with size (cm); Pre-Op Diagnosis:    Forehead; small pink papule; sebaceous hyperplasia rule out BCC       Anesthesia: 1% xylocaine with epi       Topical anesthesia: None       Indications: To indicate diagnosis and management plan.    Procedure Details     Patient informed of the risks (including bleeding,scaring and infection) and benefits of the procedure explained. Verbal and written informed consent obtained. The area was prepped and draped in the usual fashion. Anesthesia was obtained with 1% lidocaine with epinephrine. The skin was then stretched perpendicular to the skin tension lines and a punch biopsy to an appropriate sampling depth was obtained with a 4 mm punch with a forceps and iris scissors.     Hemostasis was obtained with 5-0 Prolene x 2 sutures.     Complications:  None      Specimen has been sent for review by Dermatopathology.      Plan:  1. Instructed to keep the wound dry and covered for 24-48h and clean thereafter.  2. Warning signs of infection were reviewed.    3. Recommended that the patient use acetaminophen as needed for pain  4. Sutures if any should be removed in 14 days      Standard post-procedure care has been explained and has been included in written form within the patient's copy of Informed Consent.      RECURRING PRURITIC LESION  Physical Exam:  Anatomic Location Affected:  left thigh  Morphological Description:  not present today   Pertinent Positives:  Pertinent Negatives:    Additional History of Present Condition:  recurring pruritic lesion on the left thigh that often last for about 2-3 weeks before resolving.     Assessment and Plan:  Based on a thorough discussion of this condition and the management approach to it (including a comprehensive discussion of the known risks, side effects and potential benefits of treatment), the patient (family) agrees to implement the following  specific plan:  Discussed that patient should send a picture of lesion of concern whenever it recurs.    Scribe Attestation      I,:  Brian Stephens MA am acting as a scribe while in the presence of the attending physician.:       I,:  Duglas Manuel MD personally performed the services described in this documentation    as scribed in my presence.:

## 2025-03-19 NOTE — PROGRESS NOTES
Name: Gracia Chirinos      : 1975      MRN: [id number]  Encounter Provider: Erickson Molina DO  Encounter Date: 3/19/2025   Encounter department: [facility] SLEEP MEDICINE [place]  :  Assessment & Plan  ANETTE (obstructive sleep apnea)  Gracia is a very pleasant 49-year-old woman with a PMHx of chronic migraine, GERD, anxiety, depression, DDD status post lumbar spinal fusion, chronic midline low back pain, vitamin D deficiency, vitamin B12 deficiency who presents in follow up for EDS, ANETTE (AHI 11, O2 maritza 89% 2024), RLS, and insomnia.  With regards to her ANETTE, she continues to have difficulties with utilizing the device, although it sounds as though this is primarily due to nasal congestion difficulties.      Compliance report reviewed and analyzed  Continue AutoPAP at settings of  4-15 cmH2O  Did recommend that she trial Flonase overnight on the nights that she thinks she is having significant congestion  Also reviewed desensitization techniques today  Prescription for new supplies ordered today  Reviewed CMS/insurance requirements and resupply guidelines  Information provided on the above as well as general maintenance steps  Recommended maintaining good Sleep Hygiene    Orders:    PAP DME Resupply/Reorder    Restless legs syndrome (RLS)  Reassuringly, her RLS remains well-controlled on her current medication regimen; it is worth noting that she tried coming down off of both Mirapex and/or gabapentin, and her RLS did return, thus indicating that these are working well.    Reviewed the suspected pathophysiology of Restless Legs Syndrome.  Reviewed the importance of treating other sleep disorders for potential improvement is RLS symptoms  Continue iron supplementation at 325mg every other day with Vitamin C  Continue gabapentin at 600 mg twice daily  Okay with continuing Mirapex at 0.5 mg twice daily for now, although reviewed the concept of augmentation at length today  Reviewed nonmedical therapy for  restless legs syndrome, including cold/warm compresses, warm/hot baths or showers, gentle massage, mild leg stretching at nighttime, magnesium glycinate supplements (200-1000mg at nighttime daily), mentally alerting activities, and avoidance of exacerbating factors (including caffeine, alcohol, nicotine, antidepressants, anti-nausea meds and antihistamines)      Orders:    gabapentin (NEURONTIN) 300 mg capsule; Take 2 capsules (600 mg total) by mouth 2 (two) times a day Take 2 capsule in the morning and 2 at bedtime    pramipexole (MIRAPEX) 0.5 mg tablet; Take 1 tablet (0.5 mg total) by mouth 2 (two) times a day    PAP DME Resupply/Reorder    Chronic insomnia  This is currently well-controlled with trazodone, with no exacerbations or worsening since her last visit with me.    Discussed the pathophysiology behind chronic insomnia, including the 3-P model  Discussed that CBT-I is first-line for treatment of chronic insomnia, and has the best data supporting its efficacy  Reviewed the combination of sleep restriction along with stimulus control  We will consider a formal Cognitive Behavioral Therapy for Insomnia (CBT-I) at a future appointment.  Will continue trazodone at 50-75 mg nightly for insomnia.  Recommended that she utilize good sleep hygiene     Orders:    PAP DME Resupply/Reorder    Low iron stores  See RLS  Orders:    ferrous sulfate 325 (65 FE) MG EC tablet; 325mg every other day with Vitamin C    PAP DME Resupply/Reorder        Follow-up:  She will Return in about 6 months (around 9/19/2025).      ________________________________________________________________________________________________    Per Last Visit Note (Date: 9/11/2024):  Gracia is a very pleasant 49-year-old woman with a PMHx of chronic migraine, GERD, anxiety, depression, DDD status post lumbar spinal fusion, chronic midline low back pain, vitamin D deficiency, vitamin B12 deficiency who presents in follow up for EDS, ANETTE (AHI 11, O2 maritza  89% 4/2024), RLS, and insomnia.  She does endorse benefit from PAP therapy, and, she even though it is taken some time to get used to it notes that it is going better.  Upon review of her compliance report, it does appear to be therapeutically controlling her ANETTE, although I wonder if raising her pressure minimum may help with comfort and the subconscious mask removals that she is describing.  Her RLS is well-controlled for now on her current medication regimen, although it is worth noting that on her iron studies, while her iron saturation was 40%, her ferritin level was very low at 19.  She continues trazodone for her insomnia, which is effective, however she did endorse a desire to come off of medication for insomnia in the future if possible.     Continue AutoPap; will adjust settings to 8-15 cm H2O  Prescription for new supplies ordered today  Reviewed CMS/insurance requirements and resupply guidelines  Information provided on the above as well as general maintenance steps  Also reviewed desensitization techniques at length with the patient.  Reviewed my trepidation surrounding dopamine agonists for management of RLS, however I am okay with her continuing current medication regimen, provided she watches very closely for any degree of augmentation.  Should this occur, we may trial increasing her gabapentin instead of the Mirapex, then trying to come off of the Mirapex.  She is to let me know when she next needs refills of these medications.  I also reviewed the importance of proper iron levels and controlling RLS, and provided recommendations for supplementation.  Discussed the pathophysiology behind chronic insomnia, including the 3-P model  Discussed that CBT-I is first-line for treatment of chronic insomnia, and has the best data supporting its efficacy  Reviewed the combination of sleep restriction along with stimulus control extensively with the patient.  Referral placed for formal Cognitive Behavioral  "Therapy for Insomnia (CBT-I).  Will continue trazodone 25mg nightly for insomnia.  However did recommend considering weaning the trazodone if able, per the recommendations of the behavioral sleep medicine provider.  Recommended that she establish good sleep hygiene as a bedrock for CBT-I to build upon.  She will Return in about 6 months (around 3/11/2025).      Sleep Studies:  -PSG 4/23/2024: BMI 30.4.  minutes, sleep efficiency 93.3%. Sleep onset latency 16.7 minutes, REM sleep latency 130.5 minutes. AHI 11, supine AHI 18.4, REM AHI 9.9. O2 maritza 89%, 0.1 minutes spent below 90%. PLM index 11.2.      ________________________________________________________________________________________________      Interval History: Gracia Chirinos is a 49 y.o. female with a PMHx of chronic migraine, GERD, anxiety, depression, DDD status post lumbar spinal fusion, chronic midline low back pain, vitamin D deficiency, vitamin B12 deficiency who presents in follow up for EDS, ANETTE (AHI 11, O2 maritza 89% 4/2024), RLS, and insomnia.    States she's always tired. Has lost weight since December (~180lbs to 165lbs, then regained to 173lbs after a vacation). Is working with [facility].       SDB:  -Current experience with PAP Therapy: Not sure that she does get benefit from the device. Does still endorse sleepiness during the day; is still having difficulties using it, due primarily to her nose feeling congested.   -Also endorses some aerophagia maybe 2-3 nights in a week.   -States that even on the days that she uses it for 8-10 hours, she still feels sleepy during the day.   -Mask type: Nasal   -Difficulties with mask: See above; nasal congestion difficulties.   -Device: ResMed AirSense 11; received 6/2024.  -Difficulties with device: Denies  -DME: [facility]  -Compliance:            RLS:  -Starting: Early 2000's  -Current symptom description: Urge to move legs, also an uncomfortable pressure in her bladder and an \"arousal feeling\" " in her inguinal region.   -Current Frequency: Almost nonexistant  -Current Medications:   -Mirapex 0.5 mg in the morning, 0.5 mg at night (has tried to reduce in the past, with return of RLS symptoms)   -Gabapentin 600 mg in the morning and 600 mg at bedtime (RLS returned with dose reduction)   -Iron 325mg daily, without vitamin C  -Previous Medications:   -Lyrica (unspecified reaction to it in the past)  -Other Pertinent Medications:   -Lexapro 20 mg daily   -Xanax 0.25 mg as needed   -Hydroxyzine 25 mg daily  -Iron studies (10/28/2024):   -Iron: 82   -TIBC: 364   -Transferrin saturation: 23%   -Ferritin: 17        Insomnia:  -Current Symptom Severity/Description: Well-controlled on trazodone  -CBT-I?  -Denies  -Current Treatments:   -Trazodone 50-75 mg  -Prior Treatments Tried:   -Ambien (was helpful)        SLEEP SCHEDULE:  Bedtime: 2100   Time it takes to fall sleep: 16-30 minutes  Wake up Time: 0500   Number of times patient wakes up per night: 2-3  Reason (s) why patient wakes up during the night: restroom,      Estimated total sleep time (in a 24 hour period of time): ~7 hours           Sitting and reading: (Patient-Rptd) (P) Slight chance of dozing  Watching TV: (Patient-Rptd) (P) Slight chance of dozing  Sitting, inactive in a public place (e.g. a theatre or a meeting): (Patient-Rptd) (P) Would never doze  As a passenger in a car for an hour without a break: (Patient-Rptd) (P) Would never doze  Lying down to rest in the afternoon when circumstances permit: (Patient-Rptd) (P) High chance of dozing  Sitting and talking to someone: (Patient-Rptd) (P) Would never doze  Sitting quietly after a lunch without alcohol: (Patient-Rptd) (P) Would never doze  In a car, while stopped for a few minutes in traffic: (Patient-Rptd) (P) Would never doze  Total score: (Patient-Rptd) (P) 5     Review of Systems  Pertinent positives/negatives included in HPI and also as noted:       Objective   LMP 01/28/2025 (Exact Date)       "  Physical Exam  PHYSICAL EXAMINATION:  Vital Signs: /60   Ht 5' 5\" (1.651 m)   Wt 78.7 kg (173 lb 9.6 oz)   LMP 01/28/2025 (Exact Date)   BMI 28.89 kg/m²     Constitutional: NAD, well appearing   Mental Status: AAOx3  Skin: Warm, dry, no rashes noted   Eyes: PERRL, normal conjunctiva  Chest: No evidence of respiratory distress, no accessory muscle use; no evidence of peripheral cyanosis  Abdomen: Soft, NT/ND  Extremities: No digital clubbing or pedal edema  Neuro: Strength 5/5 throughout, sensation grossly intact    Data  Lab Results   Component Value Date    HGB 13.1 01/17/2025    HCT 39.2 01/17/2025    MCV 88 01/17/2025      Lab Results   Component Value Date    GLUCOSE 99 10/14/2017    CALCIUM 9.4 01/17/2025     10/10/2014    K 4.1 01/17/2025    CO2 29 01/17/2025     01/17/2025    BUN 19 01/17/2025    CREATININE 0.77 01/17/2025     Lab Results   Component Value Date    IRON 82 10/28/2024    TIBC 364 10/28/2024    FERRITIN 17 10/28/2024     Lab Results   Component Value Date    AST 18 01/17/2025    ALT 16 01/17/2025         Electronically signed by:    Erickson Molina DO  Board-Certified Neurology and Sleep Medicine  [facility]  03/19/25  "

## 2025-03-19 NOTE — PATIENT INSTRUCTIONS
Continue PAP Therapy  Continue AutoPAP at settings of 4-15 cmH2O  Remember to clean your mask and equipment regularly, as directed.  Can trial Flonase  Recommend the desensitization techniques we discussed today (wearing during the day, during sedate activities such as watching TV or reading, to help your body adjust and get used to the mask) to help with subconscious nighttime mask removal and with overall mask comfort.  Continue iron supplementation at 325mg every other day with Vitamin C  Nonmedical therapy for restless legs syndrome includes: cold/warm compresses, warm/hot baths or showers, gentle massage, mild leg stretching at nighttime, or magnesium glycinate supplements (200-1000mg at nighttime daily). Mentally alerting activities help too. Note that caffeine, alcohol, nicotine, antidepressants, anti-nausea meds and antihistamines can cause or worsen symptoms.   You should be eligible for new supplies approximately every 3-6 months, depending on your insurance coverage. Contact your Durable Medical Equipment (DME) company for new supplies as needed.  Practice good Sleep Hygiene, as outlined below.  Follow up in 6 months.      Care and Maintenance  Headgear should be washed as needed. Daily inspection and weekly washings are recommended. Do not disassemble the straps. Machine wash in warm water, making sure to attach Velcro hooks and tabs before washing. Line dry or machine dry on a low setting.  Masks should be washed every day. Daily inspection is recommended. Leave the mask and tubing attached. Gently wash the mask with a soft cloth using warm water and mild detergent, concentrating on the mask cushion flaps. DO NOT use alcohol or bleach. Rinse thoroughly and air dry.  Tubing and headgear should be washed weekly. Daily inspection is recommended. Wash in warm water and mild detergent and rinse thoroughly. Hook the tubing to the machine and blow until dry.  Humidifier should be washed daily and filled with  DISTILLED water before use. Wash with warm water and mild detergent. Rinse thoroughly and air dry.  Disposable filters should be replaced once a month. Wash reusable foam filters with warm water and mild detergent at least once a month. Rinse thoroughly and dry with paper towels.  Avoid  that contain fragrance or conditioners, as these will leave a residue.  NEVER iron any soft goods.      CMS Requirements    Your insurance requires a face-to-face follow up visit within a 31-90 day period after starting CPAP.  Your insurance requires compliance with CPAP, which is at least 4 hours per night for 70% of the time. This must be done over a 30 day period and must occur within the initial 31-90 day period after starting CPAP.  Your insurance also requires at least yearly follow ups to continue to pay for CPAP supplies.       PAP Supply Guidelines    Below are the guidelines for reordering your supplies. You will be responsible for your deductible, co payments, and out of pocket expenses.    Item Frequency   Nasal Mask (no headgear) 1 every 3 months   Nasal Mask Cushion 1 every 2 weeks   Full Face Mask (no headgear) 1 every 3 months   Full Face Mask Cushion 1 every month   Nasal Pillows 1 every 2 weeks   Headgear 1 every 6 months   hin Strap 1 every 6 months   silva 1 every 3 months   Filters: Reusable 1 every 6 months   Filters: Disposable 1 every 2 weeks   Humidifier Chamber(disposable) 1 every 6 months         Good Sleep Hygiene  Wake up at the same time every day, even on the weekends.  Use your bed for sleep and intimacy only.  If you have been in bed awake for 30 minutes, get up and leave the bedroom. Choose a dull activity not involving a blue screen (TV, computer, handheld devices). Go back to bed when you feel sleepy.  Avoid caffeine, nicotine and alcohol before you go to bed.  Avoid large meals before you go to bed.  Avoid using screens (computers, tablets, smartphones, etc.) for at least 1 hour before  bedtime  Exercise regularly, but do not exercise right before you go to bed.  Avoid daytime naps. If you do take a nap, sleep for 20-40 minutes, and not after 2pm.

## 2025-03-19 NOTE — TELEPHONE ENCOUNTER
I spoke with pt, I also gave her Makayla's direct number as she has some questions as to scheduling some time in the fall to have surgery.

## 2025-03-19 NOTE — ASSESSMENT & PLAN NOTE
Gracia is a very pleasant 49-year-old woman with a PMHx of chronic migraine, GERD, anxiety, depression, DDD status post lumbar spinal fusion, chronic midline low back pain, vitamin D deficiency, vitamin B12 deficiency who presents in follow up for EDS, ANETTE (AHI 11, O2 maritza 89% 4/2024), RLS, and insomnia.  With regards to her ANETTE, she continues to have difficulties with utilizing the device, although it sounds as though this is primarily due to nasal congestion difficulties.      Compliance report reviewed and analyzed  Continue AutoPAP at settings of 4-15 cmH2O  Did recommend that she trial Flonase overnight on the nights that she thinks she is having significant congestion  Also reviewed desensitization techniques today  Prescription for new supplies ordered today  Reviewed CMS/insurance requirements and resupply guidelines  Information provided on the above as well as general maintenance steps  Recommended maintaining good Sleep Hygiene    Orders:    PAP DME Resupply/Reorder

## 2025-03-19 NOTE — TELEPHONE ENCOUNTER
Caller: Gracia Chirinos    Doctor and/or Office: Dr. Orquidea ATWOOD#: [phone]    Escalation: Surgery Patient needs to cancel her 4-3 surgery. She would like to speak to surgery scheduler. She would also like to speak to clinical staff in regards to what after the surgery might look like in terms of wearing a boot, being off her foot etc. Please return call. Thank you

## 2025-03-19 NOTE — ASSESSMENT & PLAN NOTE
This is currently well-controlled with trazodone, with no exacerbations or worsening since her last visit with me.    Discussed the pathophysiology behind chronic insomnia, including the 3-P model  Discussed that CBT-I is first-line for treatment of chronic insomnia, and has the best data supporting its efficacy  Reviewed the combination of sleep restriction along with stimulus control  We will consider a formal Cognitive Behavioral Therapy for Insomnia (CBT-I) at a future appointment.  Will continue trazodone at 50-75 mg nightly for insomnia.  Recommended that she utilize good sleep hygiene     Orders:    PAP DME Resupply/Reorder

## 2025-03-19 NOTE — ASSESSMENT & PLAN NOTE
Reassuringly, her RLS remains well-controlled on her current medication regimen; it is worth noting that she tried coming down off of both Mirapex and/or gabapentin, and her RLS did return, thus indicating that these are working well.    Reviewed the suspected pathophysiology of Restless Legs Syndrome.  Reviewed the importance of treating other sleep disorders for potential improvement is RLS symptoms  Continue iron supplementation at 325mg every other day with Vitamin C  Continue gabapentin at 600 mg twice daily  Okay with continuing Mirapex at 0.5 mg twice daily for now, although reviewed the concept of augmentation at length today  Reviewed nonmedical therapy for restless legs syndrome, including cold/warm compresses, warm/hot baths or showers, gentle massage, mild leg stretching at nighttime, magnesium glycinate supplements (200-1000mg at nighttime daily), mentally alerting activities, and avoidance of exacerbating factors (including caffeine, alcohol, nicotine, antidepressants, anti-nausea meds and antihistamines)      Orders:    gabapentin (NEURONTIN) 300 mg capsule; Take 2 capsules (600 mg total) by mouth 2 (two) times a day Take 2 capsule in the morning and 2 at bedtime    pramipexole (MIRAPEX) 0.5 mg tablet; Take 1 tablet (0.5 mg total) by mouth 2 (two) times a day    PAP DME Resupply/Reorder

## 2025-03-20 ENCOUNTER — TELEPHONE (OUTPATIENT)
Dept: SLEEP CENTER | Facility: CLINIC | Age: 50
End: 2025-03-20

## 2025-03-21 ENCOUNTER — ANESTHESIA EVENT (OUTPATIENT)
Dept: ANESTHESIOLOGY | Facility: HOSPITAL | Age: 50
End: 2025-03-21

## 2025-03-21 ENCOUNTER — ANESTHESIA (OUTPATIENT)
Dept: ANESTHESIOLOGY | Facility: HOSPITAL | Age: 50
End: 2025-03-21

## 2025-03-21 ENCOUNTER — OFFICE VISIT (OUTPATIENT)
Dept: PHYSICAL THERAPY | Age: 50
End: 2025-03-21
Payer: COMMERCIAL

## 2025-03-21 DIAGNOSIS — M54.50 CHRONIC MIDLINE LOW BACK PAIN WITHOUT SCIATICA: ICD-10-CM

## 2025-03-21 DIAGNOSIS — G89.29 CHRONIC MIDLINE LOW BACK PAIN WITHOUT SCIATICA: ICD-10-CM

## 2025-03-21 DIAGNOSIS — M53.3 SI (SACROILIAC) JOINT DYSFUNCTION: Primary | ICD-10-CM

## 2025-03-21 PROCEDURE — 97110 THERAPEUTIC EXERCISES: CPT

## 2025-03-21 PROCEDURE — 97112 NEUROMUSCULAR REEDUCATION: CPT

## 2025-03-21 NOTE — PROGRESS NOTES
"Daily Note     Today's date: 3/21/2025  Patient name: Gracia Chirinos  : 1975  MRN: [id number]  Referring provider: Melissa Crespo CRNP  Dx:   Encounter Diagnosis     ICD-10-CM    1. SI (sacroiliac) joint dysfunction  M53.3       2. Chronic midline low back pain without sciatica  M54.50     G89.29           Start Time: 08  Stop Time: 847  Total time in clinic (min): 44 minutes    Subjective: Pt reports that her low back was very sore following her flight to [place], and had to perform several METs to relieve symptoms. States her IT band has been bothering her as well, and that her back has been acting up and sending more symptoms to her shin/calf.      Objective: See treatment diary below      Assessment: Continued interventions focusing on core and hip/glute strengthening. Pt required VC for technique during captain morgans and was able to correct. Pt reported feeling as though her SIJ was out again, though her alignment appeared to be normal. Tolerated additions of step ups  well, with no increase in symptoms. Discussed with pt the importance of continuing to perform HEP for strengthening and carryover of interventions performed within session. Tolerated treatment well. Patient would benefit from continued PT      Plan: Continue per plan of care.  Progress treatment as tolerated.         Manuals 2/18 2/28 3/4 3/21   1/7 1/14 1/21    Pelvic Rotation MET (RLE into extension, LLE into flexion)       ES ES     Lumbosacral  ES ES           LLE LAD and Gr. 5 mob             L Lumbar Rotational Mobs             Neuro Re-Ed  3/4 3/21   1/7 1/14 1/21 2/11   Hip Adduction Ball Squeezes       20x5\" 20x5\"     Bird Dogs             Dead Bugs 2x10ea in H/L  2x10ea 2x10ea     2x10ea 2x10ea in H/L   Bridges        2x10 w/ PPT 2x10 w/ PPT    PPT             RDLs             Captain Morgans 20x5\" B/L  20x5\" B/L 20x5\" B/L     10x5\" B/L 10x5\" B/L   Palloff Press          CC 7.5# 2x10 B/L   Ther Ex " "2/18 2/28 3/4 3/21    1/14 1/21 2/11   Janina       GTB 2x10 Blue TB 2x10 Blue TB 2x10    Pt Ed ES ES ES ES   ES ES ES ES   UB             Squats        W/ ball squeeze at wall  2x10     LTR 5'', 10x 5\" 2x10 5\" 2x10 5\" 2x10   5'', 10x 5'', 10x     Assess pt symptoms  ES           Step-Ups    W/ opposite march 2x10ea   8\" [id number] 8\" [id number]     Leg Press 135# x10    145# 2x10  135# x10    145# 2x10     85# 3x10 95# 3x10    115# x10 135# 3x10   Re-assess strength and mobility          ES   TB Sidestepping Blue TB x3 laps  Blue TB x2 laps Blue TB x2 laps      Blue TB x3 laps   Monster Walks Blue TB x2 laps  Blue TB x2 laps Blue TB x2 laps      Blue TB x2 laps   Ball Rollouts  Pink Pball 20x5\"           Open Books  10x5\" B/L 10x5\" B/L          Ther Activity                                       Gait Training                                       Modalities                                                                  "

## 2025-03-24 PROCEDURE — 88305 TISSUE EXAM BY PATHOLOGIST: CPT | Performed by: STUDENT IN AN ORGANIZED HEALTH CARE EDUCATION/TRAINING PROGRAM

## 2025-03-25 ENCOUNTER — TELEPHONE (OUTPATIENT)
Dept: DERMATOLOGY | Facility: CLINIC | Age: 50
End: 2025-03-25

## 2025-03-25 ENCOUNTER — RESULTS FOLLOW-UP (OUTPATIENT)
Age: 50
End: 2025-03-25

## 2025-03-25 DIAGNOSIS — D04.39 SQUAMOUS CELL CARCINOMA IN SITU OF SKIN OF FOREHEAD: Primary | ICD-10-CM

## 2025-03-25 NOTE — TELEPHONE ENCOUNTER
Pre- operative Mohs Telephone Scheduling Note    Do you have a pacemaker or defibrillator? no    Do you have a cochlear implant, spinal or brain stimulator? no    Do you take antibiotics before skin or dental procedures? no  If yes, will likely require pre-operative antibiotics. Ask  the patient why they take the antibiotics (usually because of joint replacement).    Do you have a history of a joint replacements within the past 2 years? no   If yes, will likely require pre-operative antibiotics. Ask if orthopaedic surgeon has prescribed pre-operative antibiotics to take before procedures/dental work?    Do you take any OTC medications that thin your blood (Aspirin, Aleve, Ibuprofen) or supplements that thin your blood (fish oil, garlic, vitamin E, Ginko Biloba)? no    Do you take any prescribed medications that thin your blood (Coumadin, Plavix, Xarelto, Eliquis or another prescribed blood thinner)? no    Do you have an allergy to lidocaine or epinephrine? no    Do you have allergies to Iodine? no    Do you wear a lidocaine patch? no    Have you ever been diagnosed with HIV, AIDS, Hep B and Hep C? no    Do you use a cane, walker or wheelchair? no    Is the patient from a nursing home? no If yes, Is there any special accommodations that is needed for patient n/a    Do you smoke? no      If yes,  patient to try and stop 2 days before surgery and 7 days after the surgery. Minimizing smoking as much as possible during this time will improve healing and the cosmetic result after surgery.    Do you use supplemental oxygen? If so, how many liters and can you be off it for a short period of time? N/a    Date scheduled: 6/3/25 at 8:30 am with Dr Krueger    Coordination of Care with other provider (Oculoplastics, Plastics, ENT) required? no   IF YES, PLEASE FORWARD TO APPROPRIATE PERSONNEL TO HELP COORDINATE.    Are there remaining tumors to be scheduled? no    Was Prior Authorization obtained? No (please use  [facility] to document prior auth)

## 2025-03-25 NOTE — LETTER
Gracia Chirinos     1975    [address] [place] [id number]    Dear Gracia Chirinos,    You are scheduled to have the Mohs procedure on Kaya 3, 2025 at 8:30 am for forehead with Dr. Allie Krueger. Our office is located in [facility] our address is [address] [place] [address]. Once you arrive please check in with our front staff in [address] and they will escort you to the Mohs waiting room.  If you have someone bringing you to your appointment they may wait in the waiting room or accompany you in your visit.    Below you will find some pre-op instructions along with some information regarding the Mohs procedure.     If you have any questions please call our office at [phone].     Thank you,    [facility]         PRE-OPERATIVE INSTRUCTIONS - MOHS    Before your scheduled surgery, there are a number of important precautions and positive steps you should take to help prepare yourself for a successful treatment and speedy recovery.    Some of the steps, which are listed below, may seem unnecessary and inconvenient, but they are important. For example, when you stop smoking, you increase your ability to heal. Occasionally, there may be valid reasons, personal or medical, why you can't comply. In such cases, please call the office so we can discuss possible ways to overcome any obstacles you may be encountering.    If you have any questions about the surgery, or remember additional medical information that you forgot to mention to our staff, please contact the office prior to your surgery.    GENERAL INFORMATION REGARDING MOHS MICROGRAPHIC SURGERY    Mohs surgery is a specialized technique for the removal of skin cancer developed by Dr. Frederick Mohs over 50 years ago to improve the cure rates of skin cancer. Traditionally, skin cancers are treated by destructive methods (radiation, freezing, scraping, and burning) or  excision (cutting out the tissue with standards margins and sending it to an outside laboratory for testing). These methods all yield cure rates between 65%-94%. However, for cancers located in cosmetically sensitive areas, large tumors, or tumors unsuccessfully treated by other means, Mohs surgery offers a higher cure rate. In most cases, Mohs surgery provides you with a 99% cure rate for primary (previously untreated) basal cell cancer and a 95% cure rate for primary squamous cell cancer. In Mohs surgery, tissue is removed and processed in a way that we are able to check 100% of the margins, giving the highest cure rate for any method of treating skin cancers while providing maximal preservation of normal skin. This allows the surgeon to produce an optimal cosmetic result for the patient by maximizing the amount of tissue removed yielding as small a scar as possible    On the day of surgery, you will be given local anesthesia only (similar to what was given to you during your initial biopsy). You will remain awake. You will verify the location of the skin cancer prior to the onset of the surgery. Once the area is numb, the tissue containing the skin cancer will be removed, taking a small safety margin. This margin is usually smaller than what would be taken with a standard excision. Once the tissue is removed, it is marked and oriented. The first layer (“Stage I”) will be processed in our laboratory. The wound will be treated for bleeding and a bandage will be placed to keep you comfortable while you wait an approximate 45 minutes-1 hour (for the processing of the tissue) in your room. Your Mohs surgeon will examine the pathology in the lab, checking all the margins. If any tumor remains, you will need to take a second layer of skin (“Stage 2”). The area will be re-anesthetized and your Mohs surgeon will remove more skin only in the area where the tumor exists. This process will continue until all the skin cancer  is removed. Unfortunately, there is no method to predict how many layers or stages will be taken.    Once the tumor has been removed completely, we will discuss the best ways to close the defect. Most wounds may be closed with stitches. A larger wound may require a skin graft or a flap. In rare instances, especially for cancers around the eye or for larger cancers, we may work with another surgeon (oculoplastic, ENT, plastics) with special skills to assist with reconstruction.  If the surgery is coordinated with another specialist, you will have the Mohs portion of the procedure first and see the coordinated specialist after the skin cancer has been removed.  Always follow the pre-operative instructions of the surgeon doing the closure.    Medications: Please take all your normal medications the morning of your surgery. If you are a diabetic, please bring your insulin or medications with you, as well as a snack to avoid having low blood sugar during your day with us.    1) Blood Thinners  VERY IMPORTANT: We do NOT stop or hold prescribed blood thinners (such as Coumadin/Warfarin, Plavix, Eliquis, Pradaxa, Brilinta, Apixaban, Xarelto, Lovonox, Rivaroxaban, or Aggrenox) before Mohs surgery. Additionally, If you take aspirin because you have had a stroke, heart attack, heart disease, other condition, or your physician has prescribed you to take it, please continue your aspirin.    Although there is a risk of increased bruising and bleeding, we are still able to safely perform surgery while continuing these medications. Please NEVER stop your prescribed blood thinner without the managing doctor's (the doctor that prescribed the medication) permission or knowledge. If you have any concerns about not holding your blood thinner, please address these with your Mohs surgeon.    Most people should stop all non-steroidal anti-inflammatory medications (Motrin, Naproxen, Advil, Midol, Aleve, etc.) for 7 days prior to your  scheduled surgery and 2 days after (unless instructed otherwise after surgery).  You may take Tylenol for pain.     Vitamins and Supplements  Avoid taking any supplements with Vitamin E, Fish Oil, Gingko, Ginseng, and Garlic for 2 weeks before and 2 days after your surgery. These thin your blood.    Lidocaine Patches  Avoid wearing any over the counter or prescribed Lidocaine patches the day of the surgery.    Alcohol: Avoid drinking alcohol for 2 days prior to your surgery, and for 2 days afterwards (it thins the blood and causes more bruising and swelling).    Smoking: Try to STOP or reduce smoking significantly the week before your surgery, and especially the week afterwards (it greatly improves how well you heal). Tobacco smoke deprives the blood of oxygen, which is urgently needed by the wound during the healing process.    Contact Lenses: Do not wear them on the day of the surgery. Instead, wear glasses and bring your case, in case we need to remove them.    Clothing: Do not wear your nicest clothing on your surgery day. We recommend wearing a button down shirt that will not disrupt your post-operative dressing when changing later that night. Please avoid wearing jeans during the procedure to help prevent damage to our equipment.     Bathing: On the morning of your surgery, you may bathe or shower normally. If you get your hair done on a weekly basis, remember to get your hair washed the day before surgery.   - You will need to keep your surgical site dry for a minimum of 48 hours after surgery.    Makeup: If your surgery is on the face, please do not wear any makeup on the day of the surgery.    Jewelry: Please try to avoid wearing jewelry on the day of surgery.    Food: On the morning of surgery, have breakfast but limit your intake of caffeinated beverages. They are diuretic and may inconvenience you during surgery. If you are following up with another surgeon the same day as your Mohs surgery, you must  receive permission to eat breakfast from that surgeon.    What to bring with you on the day of your surgery:  Bring snacks - Since you could be at the office long, you may bring snacks and/or lunch with you. Some snacks and drinks are available at the office as well.   Bring a sweater - Bring a sweater or jacket that buttons or zips down the front and will not disturb your wound dressing during removal.  Bring something to do - You will be spending much of the day in our office. There will be 45-60 minute waiting periods  between layers/stages, and while there is a television with cable in every room, it is nice to have something to keep you occupied such as books, magazines, knitting, music, or work.     Planning Ahead:  Appointment Length - Understand this procedure length is unpredictable, therefore, plan to be in the office for at least half a day. Depending on procedures scheduled prior to yours, there may be waiting prior to the start of your procedure.  Other Appointments - It is important to realize that no matter how small the skin cancer appears to be, looks can be deceiving. Since your surgery may last the entire day, you should not schedule any other appointments that day.  Special Occasions - Surgery often creates swelling and bruising. Also, the post-op dressing may be rather large and obvious. Keep this in mind as you arrange your social/work schedule. If an important event is already planned, please check with your referring physician or your Mohs surgeon to see if the surgery can be postponed.  Activity Limits after Surgery - If surgery was performed on your face, we recommend that you keep your activity level to a minimum for 2-3 days (the blood pressure elevation related to exercise can lead to bleeding). If you have stitches in an area that will be under tension or significant movement (neck, back, arms, legs), you will need to avoid heavy lifting (anything over 5 lbs) or exercise for at least 2  weeks and possibly longer. We also advise that you limit out of town travel for the first 7 days after surgery. You should also wait at least 7 days before going into a pool or the ocean.  Housework - Since you will need to minimize activity after surgery, plan to do your groceries, laundry, gardening, and other heavy household chores prior to your surgery. Please make arrangements for assistance during the post-op period. If surgery is around your mouth area, you may need to eat soft foods, such as soup, milkshakes, or yogurt for 48 hours.    Purchasing bandage supplies: Prior to surgery, please purchase the following items to care for your surgical wound properly.  Cotton swabs (Q-tips)  Vaseline or Aquaphor  Telfa pads (or any non-stick dressing)  Paper tape or Hypafix tape  Gauze pads (3x3)    Transportation: It is often reassuring and comforting to have a  drive you to and from the surgery. He or she is welcome to wait in the office during the surgery. If you do not have a , you may drive to and from your procedure (unless stated otherwise). If the site being treated is near your eye, be aware that the final bandage may cause some obstruction of vision.     Rescheduling: If you need to reschedule your surgery, please notify the office as soon as possible.

## 2025-03-25 NOTE — RESULT ENCOUNTER NOTE
DERMATOPATHOLOGY RESULT NOTE    Results reviewed by ordering physician.  Called patient to personally discuss results. No answer, left voicemail with result.      Instructions for Clinical Derm Team:   (remember to route Result Note to appropriate staff):    None    Result & Plan by Specimen:    Specimen A: malignant  Plan: MOHs        A. Skin, forehead, punch biopsy:    SQUAMOUS CELL CARCINOMA IN SITU; not seen at examined inked specimen margins (see note).    Note: While the lesion is not seen at examined inked specimen margins, margin evaluation of biopsy specimens is occasionally inadequate in excluding presence of lesion at margins; clinical pathological correlation is advised.   Electronically signed by Tanja Crawford MD on 3/24/2025 at 1025 EDT

## 2025-03-28 ENCOUNTER — OFFICE VISIT (OUTPATIENT)
Dept: PHYSICAL THERAPY | Age: 50
End: 2025-03-28
Payer: COMMERCIAL

## 2025-03-28 DIAGNOSIS — G89.29 CHRONIC MIDLINE LOW BACK PAIN WITHOUT SCIATICA: ICD-10-CM

## 2025-03-28 DIAGNOSIS — M54.50 CHRONIC MIDLINE LOW BACK PAIN WITHOUT SCIATICA: ICD-10-CM

## 2025-03-28 DIAGNOSIS — M53.3 SI (SACROILIAC) JOINT DYSFUNCTION: Primary | ICD-10-CM

## 2025-03-28 PROCEDURE — 97112 NEUROMUSCULAR REEDUCATION: CPT | Performed by: PHYSICAL THERAPIST

## 2025-03-28 PROCEDURE — 97110 THERAPEUTIC EXERCISES: CPT | Performed by: PHYSICAL THERAPIST

## 2025-03-28 PROCEDURE — 97140 MANUAL THERAPY 1/> REGIONS: CPT | Performed by: PHYSICAL THERAPIST

## 2025-03-28 NOTE — PROGRESS NOTES
"Daily Note     Today's date: 3/28/2025  Patient name: Gracia Chirinos  : 1975  MRN: [id number]  Referring provider: Melissa Crespo CRNP  Dx:   Encounter Diagnosis     ICD-10-CM    1. SI (sacroiliac) joint dysfunction  M53.3       2. Chronic midline low back pain without sciatica  M54.50     G89.29           Start Time: 0930  Stop Time: 1015  Total time in clinic (min): 45 minutes    Subjective: pt reports her hip feels \"out\".  Pt discussed chronic hip pain that radiates to both LE that is constant in nature.  Pt notes the pain is not necessarily changeable with position.       Objective: See treatment diary below  Pain provoked with Stinchfield test marilynn in hips      Assessment: Tolerated treatment well. Patient demonstrated fatigue post treatment and would benefit from continued PT      Plan: Continue per plan of care.  Lapse in care for a week while on a trip       Manuals 2/18 2/28 3/4 3/21 3/28        Pelvic Rotation MET (RLE into extension, LLE into flexion)     KD        Lumbosacral  ES ES           LLE LAD and Gr. 5 mob             L Lumbar Rotational Mobs             Neuro Re-Ed 2/18 2/28 3/4 3/21         Hip Adduction Ball Squeezes             Bird Dogs             Dead Bugs 2x10ea in H/L  2x10ea 2x10ea nv        Bridges     3 x 10        PPT             RDLs             Captjanina Celaya 20x5\" B/L  20x5\" B/L 20x5\" B/L nv        Palloff Press     nv        Ther Ex 2/18 2/28 3/4 3/21         Clamshells     2 x 10        Pt Ed ES ES ES ES         UB     10'        Squats             LTR 5'', 10x 5\" 2x10 5\" 2x10 5\" 2x10         Assess pt symptoms  ES           Step-Ups    W/ opposite march 2x10ea nv        Leg Press 135# x10    145# 2x10  135# x10    145# 2x10  nv        Re-assess strength and mobility             TB Sidestepping Blue TB x3 laps  Blue TB x2 laps Blue TB x2 laps nv        Monster Walks Blue TB x2 laps  Blue TB x2 laps Blue TB x2 laps nv        Ball Rollouts  Pink Pball 20x5\"         " "  Open Books  10x5\" B/L 10x5\" B/L          Ther Activity                                       Gait Training                                       Modalities                                                                    "

## 2025-04-11 ENCOUNTER — APPOINTMENT (OUTPATIENT)
Dept: LAB | Facility: CLINIC | Age: 50
End: 2025-04-11
Payer: COMMERCIAL

## 2025-04-11 DIAGNOSIS — G25.81 RESTLESS LEGS SYNDROME (RLS): ICD-10-CM

## 2025-04-11 DIAGNOSIS — R79.0 LOW IRON STORES: ICD-10-CM

## 2025-04-11 DIAGNOSIS — M06.00 SERONEGATIVE RHEUMATOID ARTHRITIS (HCC): ICD-10-CM

## 2025-04-11 DIAGNOSIS — R76.8 POSITIVE ANA (ANTINUCLEAR ANTIBODY): ICD-10-CM

## 2025-04-11 LAB
ALBUMIN SERPL BCG-MCNC: 4 G/DL (ref 3.5–5)
ALP SERPL-CCNC: 57 U/L (ref 34–104)
ALT SERPL W P-5'-P-CCNC: 11 U/L (ref 7–52)
ANION GAP SERPL CALCULATED.3IONS-SCNC: 7 MMOL/L (ref 4–13)
AST SERPL W P-5'-P-CCNC: 19 U/L (ref 13–39)
BASOPHILS # BLD AUTO: 0.03 THOUSANDS/ÂΜL (ref 0–0.1)
BASOPHILS NFR BLD AUTO: 1 % (ref 0–1)
BILIRUB SERPL-MCNC: 0.45 MG/DL (ref 0.2–1)
BUN SERPL-MCNC: 23 MG/DL (ref 5–25)
C3 SERPL-MCNC: 113 MG/DL (ref 87–200)
C4 SERPL-MCNC: 34 MG/DL (ref 19–52)
CALCIUM SERPL-MCNC: 8.9 MG/DL (ref 8.4–10.2)
CHLORIDE SERPL-SCNC: 107 MMOL/L (ref 96–108)
CO2 SERPL-SCNC: 28 MMOL/L (ref 21–32)
CREAT SERPL-MCNC: 0.87 MG/DL (ref 0.6–1.3)
CRP SERPL QL: 2.5 MG/L
EOSINOPHIL # BLD AUTO: 0.11 THOUSAND/ÂΜL (ref 0–0.61)
EOSINOPHIL NFR BLD AUTO: 2 % (ref 0–6)
ERYTHROCYTE [DISTWIDTH] IN BLOOD BY AUTOMATED COUNT: 12.5 % (ref 11.6–15.1)
ERYTHROCYTE [SEDIMENTATION RATE] IN BLOOD: 5 MM/HOUR (ref 0–19)
FERRITIN SERPL-MCNC: 33 NG/ML (ref 30–307)
GFR SERPL CREATININE-BSD FRML MDRD: 78 ML/MIN/1.73SQ M
GLUCOSE P FAST SERPL-MCNC: 75 MG/DL (ref 65–99)
HCT VFR BLD AUTO: 38.5 % (ref 34.8–46.1)
HGB BLD-MCNC: 12.8 G/DL (ref 11.5–15.4)
IMM GRANULOCYTES # BLD AUTO: 0.01 THOUSAND/UL (ref 0–0.2)
IMM GRANULOCYTES NFR BLD AUTO: 0 % (ref 0–2)
IRON SATN MFR SERPL: 30 % (ref 15–50)
IRON SERPL-MCNC: 92 UG/DL (ref 50–212)
LYMPHOCYTES # BLD AUTO: 1.64 THOUSANDS/ÂΜL (ref 0.6–4.47)
LYMPHOCYTES NFR BLD AUTO: 29 % (ref 14–44)
MCH RBC QN AUTO: 29.7 PG (ref 26.8–34.3)
MCHC RBC AUTO-ENTMCNC: 33.2 G/DL (ref 31.4–37.4)
MCV RBC AUTO: 89 FL (ref 82–98)
MONOCYTES # BLD AUTO: 0.35 THOUSAND/ÂΜL (ref 0.17–1.22)
MONOCYTES NFR BLD AUTO: 6 % (ref 4–12)
NEUTROPHILS # BLD AUTO: 3.44 THOUSANDS/ÂΜL (ref 1.85–7.62)
NEUTS SEG NFR BLD AUTO: 62 % (ref 43–75)
NRBC BLD AUTO-RTO: 0 /100 WBCS
PLATELET # BLD AUTO: 155 THOUSANDS/UL (ref 149–390)
PMV BLD AUTO: 9.4 FL (ref 8.9–12.7)
POTASSIUM SERPL-SCNC: 4.2 MMOL/L (ref 3.5–5.3)
PROT SERPL-MCNC: 6.6 G/DL (ref 6.4–8.4)
RBC # BLD AUTO: 4.31 MILLION/UL (ref 3.81–5.12)
SODIUM SERPL-SCNC: 142 MMOL/L (ref 135–147)
TIBC SERPL-MCNC: 302.4 UG/DL (ref 250–450)
TRANSFERRIN SERPL-MCNC: 216 MG/DL (ref 203–362)
UIBC SERPL-MCNC: 210 UG/DL (ref 155–355)
WBC # BLD AUTO: 5.58 THOUSAND/UL (ref 4.31–10.16)

## 2025-04-11 PROCEDURE — 86140 C-REACTIVE PROTEIN: CPT

## 2025-04-11 PROCEDURE — 85025 COMPLETE CBC W/AUTO DIFF WBC: CPT

## 2025-04-11 PROCEDURE — 80053 COMPREHEN METABOLIC PANEL: CPT

## 2025-04-11 PROCEDURE — 86160 COMPLEMENT ANTIGEN: CPT

## 2025-04-11 PROCEDURE — 83550 IRON BINDING TEST: CPT

## 2025-04-11 PROCEDURE — 85652 RBC SED RATE AUTOMATED: CPT

## 2025-04-11 PROCEDURE — 83540 ASSAY OF IRON: CPT

## 2025-04-11 PROCEDURE — 36415 COLL VENOUS BLD VENIPUNCTURE: CPT

## 2025-04-11 PROCEDURE — 82728 ASSAY OF FERRITIN: CPT

## 2025-04-11 PROCEDURE — 86225 DNA ANTIBODY NATIVE: CPT

## 2025-04-14 ENCOUNTER — PATIENT MESSAGE (OUTPATIENT)
Dept: SLEEP CENTER | Facility: CLINIC | Age: 50
End: 2025-04-14

## 2025-04-14 NOTE — PATIENT COMMUNICATION
Compliance report requested from [employer] liaisons and in [employer].     Dr. Molina,    Please review and advise,  Thank you.

## 2025-04-15 ENCOUNTER — TELEPHONE (OUTPATIENT)
Dept: SLEEP CENTER | Facility: CLINIC | Age: 50
End: 2025-04-15

## 2025-04-15 ENCOUNTER — OFFICE VISIT (OUTPATIENT)
Dept: PHYSICAL THERAPY | Age: 50
End: 2025-04-15
Payer: COMMERCIAL

## 2025-04-15 DIAGNOSIS — M54.50 CHRONIC MIDLINE LOW BACK PAIN WITHOUT SCIATICA: ICD-10-CM

## 2025-04-15 DIAGNOSIS — G47.33 OSA (OBSTRUCTIVE SLEEP APNEA): Primary | ICD-10-CM

## 2025-04-15 DIAGNOSIS — G89.29 CHRONIC MIDLINE LOW BACK PAIN WITHOUT SCIATICA: ICD-10-CM

## 2025-04-15 DIAGNOSIS — M53.3 SI (SACROILIAC) JOINT DYSFUNCTION: Primary | ICD-10-CM

## 2025-04-15 PROCEDURE — 97112 NEUROMUSCULAR REEDUCATION: CPT

## 2025-04-15 PROCEDURE — 97110 THERAPEUTIC EXERCISES: CPT

## 2025-04-15 NOTE — PROGRESS NOTES
"Daily Note     Today's date: 4/15/2025  Patient name: Gracia Chirinos  : 1975  MRN: [id number]  Referring provider: Melissa Crespo CRNP  Dx:   Encounter Diagnosis     ICD-10-CM    1. SI (sacroiliac) joint dysfunction  M53.3       2. Chronic midline low back pain without sciatica  M54.50     G89.29           Start Time: 1531  Stop Time: 1615  Total time in clinic (min): 44 minutes    Subjective: Pt reports that she is sore today in her low back and hips. Reports attempting \"shotgun\" MET learned during previous session, with no significant relief. States she been able to start going to the gym with her .      Objective: See treatment diary below      Assessment: Continued interventions focusing on core and lumbar stability/strengthening. Reported discomfort with bridges but was able to perform with good technique. No pain/discomfort with leg press or step-ups. Pt would benefit from further strengthening of lumbar and gluteal musculature. Tolerated treatment well. Patient would benefit from continued PT      Plan: Continue per plan of care.  Progress treatment as tolerated.         Manuals 2/18 2/28 3/4 3/21 3/28 4/15       Pelvic Rotation MET (RLE into extension, LLE into flexion)     KD        Lumbosacral  ES ES           LLE LAD and Gr. 5 mob             L Lumbar Rotational Mobs             Neuro Re-Ed 2/18 2/28 3/4 3/21         Hip Adduction Ball Squeezes             Bird Dogs             Dead Bugs 2x10ea in H/L  2x10ea 2x10ea nv        Bridges     3 x 10 3 x 10       PPT             RDLs             Captjanina Riccis 20x5\" B/L  20x5\" B/L 20x5\" B/L nv 20x5\" B/L       Palloff Press     nv        Ther Ex 2/18 2/28 3/4 3/21         Clamshells     2 x 10        Pt Ed ES ES ES ES         UB     10' 10'       Squats             LTR 5'', 10x 5\" 2x10 5\" 2x10 5\" 2x10         Assess pt symptoms  ES           Step-Ups    W/ opposite march 2x10ea nv W/ opposite march 3x10ea       Leg Press 135# x10    145# " "2x10  135# x10    145# 2x10  nv 145# 3x10       Re-assess strength and mobility             TB Sidestepping Blue TB x3 laps  Blue TB x2 laps Blue TB x2 laps nv        Monster Walks Blue TB x2 laps  Blue TB x2 laps Blue TB x2 laps nv        Ball Rollouts  Pink Pball 20x5\"           Open Books  10x5\" B/L 10x5\" B/L          Lunges      W/ slider 2x10 B/L       Ther Activity                                       Gait Training                                       Modalities                                                                      "

## 2025-04-15 NOTE — TELEPHONE ENCOUNTER
Per Wendie message:   4/14/25 11:02 AM  Good morning, I have been having a lot of burping issues while using my CPAP. I was wondering if we could possibly turn down the air pressure? Thank you so much, Gracia.  ------------------------------------------------    Compliance reviewed and pressure change ordered.    Call placed to patient, left message advising of above, and change could take 24-48 hours to occur and [employer] should contact patient to confirm change was completed.    Rx for pressure change sent to [facility] via [employer].

## 2025-04-16 ENCOUNTER — OFFICE VISIT (OUTPATIENT)
Dept: BARIATRICS | Facility: CLINIC | Age: 50
End: 2025-04-16
Payer: COMMERCIAL

## 2025-04-16 ENCOUNTER — PROCEDURE VISIT (OUTPATIENT)
Age: 50
End: 2025-04-16
Payer: COMMERCIAL

## 2025-04-16 VITALS
WEIGHT: 166.8 LBS | HEIGHT: 65 IN | SYSTOLIC BLOOD PRESSURE: 108 MMHG | BODY MASS INDEX: 27.79 KG/M2 | HEART RATE: 78 BPM | DIASTOLIC BLOOD PRESSURE: 60 MMHG

## 2025-04-16 VITALS
DIASTOLIC BLOOD PRESSURE: 82 MMHG | TEMPERATURE: 97.7 F | OXYGEN SATURATION: 99 % | HEART RATE: 88 BPM | SYSTOLIC BLOOD PRESSURE: 110 MMHG

## 2025-04-16 DIAGNOSIS — E66.811 CLASS 1 OBESITY DUE TO EXCESS CALORIES WITH SERIOUS COMORBIDITY AND BODY MASS INDEX (BMI) OF 30.0 TO 30.9 IN ADULT: Primary | ICD-10-CM

## 2025-04-16 DIAGNOSIS — E66.09 CLASS 1 OBESITY DUE TO EXCESS CALORIES WITH SERIOUS COMORBIDITY AND BODY MASS INDEX (BMI) OF 30.0 TO 30.9 IN ADULT: Primary | ICD-10-CM

## 2025-04-16 DIAGNOSIS — G43.709 CHRONIC MIGRAINE WITHOUT AURA WITHOUT STATUS MIGRAINOSUS, NOT INTRACTABLE: Primary | ICD-10-CM

## 2025-04-16 PROCEDURE — 99215 OFFICE O/P EST HI 40 MIN: CPT | Performed by: INTERNAL MEDICINE

## 2025-04-16 PROCEDURE — 64615 CHEMODENERV MUSC MIGRAINE: CPT

## 2025-04-16 RX ORDER — TIZANIDINE 2 MG/1
TABLET ORAL
Qty: 60 TABLET | Refills: 1 | Status: SHIPPED | OUTPATIENT
Start: 2025-04-16

## 2025-04-16 RX ORDER — TIRZEPATIDE 10 MG/.5ML
10 INJECTION, SOLUTION SUBCUTANEOUS WEEKLY
Qty: 2 ML | Refills: 2 | Status: SHIPPED | OUTPATIENT
Start: 2025-05-22 | End: 2025-08-14

## 2025-04-16 RX ORDER — TIRZEPATIDE 7.5 MG/.5ML
7.5 INJECTION, SOLUTION SUBCUTANEOUS WEEKLY
Qty: 2 ML | Refills: 0 | Status: SHIPPED | OUTPATIENT
Start: 2025-04-16 | End: 2025-06-11

## 2025-04-16 NOTE — PROGRESS NOTES
Universal Protocol   procedure performed by consultantConsent: Verbal consent obtained. Written consent obtained.  Risks and benefits: risks, benefits and alternatives were discussed  Consent given by: patient  Patient understanding: patient states understanding of the procedure being performed  Patient consent: the patient's understanding of the procedure matches consent given  Procedure consent: procedure consent matches procedure scheduled  Relevant documents: relevant documents present and verified  Patient identity confirmed: verbally with patient      Chemodenervation     Date/Time  4/16/2025 10:30 AM     Performed by  BARB Baez   Authorized by  BARB Baez     Pre-procedure details      Prepped With: Alcohol     Anesthesia  (see MAR for exact dosages):     Anesthesia method:  None   Procedure details      Position:  Upright   Botox      Botox Type:  Type A    Brand:  Botox    mL's of Botulinum Toxin:  155    mL's of preservative free sterile saline:  4    Final Concentration per CC:  50 units    Needle Gauge:  30 G 2.5 inch   Procedures      Botox Procedures: chronic headache      Indications: migraines      Date of last exam:  2/7/2025    Last date: new start 4/16/2025.   Injection Location      Head / Face:  L inferior cervical paraspinal, R superior cervical paraspinal, R inferior cervical paraspinal, L , L superior cervical paraspinal, R , R frontalis, L frontalis, R inferior occipitalis, L lateral occipitalis, L inferior occipitalis, R medial occipitalis, L medial occipitalis, R lateral occipitalis, procerus, R temporalis, L temporalis, R superior trapezius and L superior trapezius    L  injection amount:  5 unit(s)    R  injection amount:  5 unit(s)    L lateral frontalis:  5 unit(s)    R lateral frontalis:  5 unit(s)    L medial frontalis:  5 unit(s)    R medial frontalis:  5 unit(s)    L temporalis injection amount:  20  unit(s)    R temporalis injection amount:  20 unit(s)    Procerus injection amount:  5 unit(s)    L inferior occipitalis injection amount:  5 unit(s)    R inferior occipitalis injection amount:  5 unit(s)    L lateral occipitalis injection amount:  5 unit(s)    R lateral occipitalis injection amount:  5 unit(s)    L medial occipitalis injection amount:  5 unit(s)    R medial occipitalis injection amount:  5 unit(s)    L inferior cervical paraspinal injection amount:  5 unit(s)    R inferior cervical paraspinal injection amount:  5 unit(s)    L superior cervical paraspinal injection amount:  5 unit(s)    R superior cervical paraspinal injection amount:  5 unit(s)    L superior trapezius injection amount:  15 unit(s)    R superior trapezius injection amount:  15 unit(s)   Total Units      Total units used:  155    Total units discarded:  45   Post-procedure details      Chemodenervation:  Chronic migraine    Facial Nerve Location::  Bilateral facial nerve    Patient tolerance of procedure:  Tolerated well, no immediate complications   Comments       155 units administered following the PREEMPT Protocol, 45 units wasted.

## 2025-04-16 NOTE — ASSESSMENT & PLAN NOTE
Antiobesity Medications/Medical --  Patient reports her weight has plateaued over the last 4 weeks.  She is currently finished 3 months of the 5 mg of Zepbound.  She has lost a total of 17 pounds.  Will titrate up to 7.5 mg and subsequently 10 mg   Tried to wean off gabapentin-which she uses for RLS   Has been taking metformin once a day as she experienced nausea when she went to twice a day-discussed with patient to continue this while she titrates Zepbound  Patient is extremely pleased that her inflammatory markers have improved CRP got down to 2.5 from 16 after she started the Zepbound.  She got off the methotrexate and feels her rheumatological condition is improving      Nutrition:    Nutrition Intervention     Nutrition Prescription  Calories: 7385-3478 kcal 1# per week w/o exercise  Protein: 75-80 g  Fluid: 2000 ml     Meal Plan (Harvey/Pro)  Breakfast: 150-200  Snack: --  Lunch: 300  Snack: 150  Dinner: 500-Factor meal  Snack: optinal 100 harvey snack       Physical Activity:   Patient reports SI joint dysfunction nerve pain doing PT  States she is going to the gym and lifting some weights.    Sleep: -  Has not been sleeping as well due to back pain  ANETTE on CPAP ; 8-9 hours of sleep per night    Food Behaviors/Stress/pyschosocial:    Feels return of hunger and cravings on current dose of Zepbound

## 2025-04-16 NOTE — PATIENT INSTRUCTIONS
Botox Therapy  Important Information    Our goal is to make sure you fully understand how Botox Therapy treatment may benefit you and to help you understand how you can play an active role in your treatments and ongoing care. Please review the following information below.    Call our office IMMEDIATELY @ [phone] and speak to one of our Botox Coordinators if you have a change in insurance. (a prior authorization is required and accurate information is vital)  Please call at least 24 hours in advance if you can't make your appointment.  Appointments are scheduled every 91 days (this will be scheduled in advance before leaving the office)  You must allow for at least 2-3 treatments to determine if Botox is right for you. It may take a few weeks to see a response from treatment.  Those little bumps you've got on your face will go away in 20-30 minutes (do not massage them)  No laying flat for 4 hours  No heavy exercise for 4 hours  No tight fitting hats or headbands for 4 hours  No hair dye or scalp/face massage or treatment within 24 hours of treatment  You can make normal facial expressions  You may shower and/or wash your hair normally   Botox will take effect in between 7-10 days  We encourage you to use a headache diary or journal to document your headache frequency and severity. You can also utilize the Migraine Luis carlos (downloadable on most smart phones)  Sign up for the Botox Savings Program (commercial insurance patients may qualify). Sign up at [employer] or call [phone] Option: 4  To get more information on Botox therapy for Chronic Migraines and see frequently asked questions, please visit [email or web address]  If you have any questions or concerns, please call the office at [phone]    We look forward to servicing you!

## 2025-04-16 NOTE — PROGRESS NOTES
Assessment/Plan     Gracia Chirinos is 49 y.o. year old female  who comes in for consultation for assistance with weight management.     - Discussed options of [facility]-Intensive Lifestyle Intervention Program, Very Low Calorie Diet-VLCD, and Conservative Program and the role of weight loss medications.  - Patient is interested in pursuing Conservative Program    Class 1 obesity due to excess calories with serious comorbidity and body mass index (BMI) of 30.0 to 30.9 in adult  Antiobesity Medications/Medical --  Patient reports her weight has plateaued over the last 4 weeks.  She is currently finished 3 months of the 5 mg of Zepbound.  She has lost a total of 17 pounds.  Will titrate up to 7.5 mg and subsequently 10 mg   Tried to wean off gabapentin-which she uses for RLS   Has been taking metformin once a day as she experienced nausea when she went to twice a day-discussed with patient to continue this while she titrates Zepbound  Patient is extremely pleased that her inflammatory markers have improved CRP got down to 2.5 from 16 after she started the Zepbound.  She got off the methotrexate and feels her rheumatological condition is improving      Nutrition:    Nutrition Intervention     Nutrition Prescription  Calories: 5435-2101 kcal 1# per week w/o exercise  Protein: 75-80 g  Fluid: 2000 ml     Meal Plan (Harvey/Pro)  Breakfast: 150-200  Snack: --  Lunch: 300  Snack: 150  Dinner: 500-Factor meal  Snack: optinal 100 harvey snack       Physical Activity:   Patient reports SI joint dysfunction nerve pain doing PT  States she is going to the gym and lifting some weights.    Sleep: -  Has not been sleeping as well due to back pain  ANETTE on CPAP ; 8-9 hours of sleep per night    Food Behaviors/Stress/pyschosocial:    Feels return of hunger and cravings on current dose of Zepbound        Gracia was seen today for follow-up.    Diagnoses and all orders for this visit:    Class 1 obesity due to excess calories with serious  "comorbidity and body mass index (BMI) of 30.0 to 30.9 in adult  -     tirzepatide (Zepbound) 7.5 mg/0.5 mL auto-injector; Inject 0.5 mL (7.5 mg total) under the skin once a week  -     tirzepatide (Zepbound) 10 mg/0.5 mL auto-injector; Inject 0.5 mL (10 mg total) under the skin once a week Do not start before May 22, 2025.              -In addition, please follow general recommendations below.          Return visit:  6-8 weeks        General Lifestyle recommendations:    Nutrition   -Avoid skipping meals. Avoid sugary beverages. At least 64oz of water daily.  Limit processed food, refined sugars and grain. Encourage  healthy choices for meals and snacks   -Focus on protein goals and non starchy fiber rich vegetables for satiety effect and to help support a calorie deficit.   - Emphasize portion control, well balanced macronutrient's (protein, carbohydrate, fat using MyPlate method )and adequate protein with each meal/snacks and distributing calories equally throughout the day along with.   -Advise starting the day with a protein breakfast   Behavioral/Stress   Food log via carlos or provided paper log (carlos options include www.Wellpepper.com, sparkpeople.com, loseit.com, calorieking.com, deCarta). Encouraged mindful eating. Be sure to set aside time to eat, eat slowly, and savor your food. Consider meditation apps and/or taking a few minutes of mindfulness every AM. Understand the role of regarding the role of stress hormone cortisol in promoting weight gain and visceral fat accumulation. Weigh daily or atleast 2-3 times/ week  Physical Activity   Increase physical activity by 10 minutes daily. Gradually increase physical activity to a goal of 5 days per week for 30 minutes of MODERATE intensity ( should be able to pass the \"talk test\" but should not be able to sing. Target 150-300 minutes  PLUS 2 days per week of FULL BODY resistance training. Progression will be addressed at follow up visits. Encouraged " contemplation regarding establishing a daily physical activity routine  - Resistance training along with increase protein intake is important to maintain and enhance metabolism  Sleep   Encourage sleep hygiene and importance of having adequate sleep duration at least > 6 hours to support response in weight loss efforts    Handouts provided :  [facility] program at [facility]  MyPlate and food quality  Food log resources, phone carlos or paper journal  Antiobesity medications options     - Discussed at length and the role of weight loss medications and medication options   - Explained the importance of making lifestyle changes in addition to starting any anti-obesity medications if the  patient chooses.  -  Initial weight loss goal of 5-10% weight loss for improved health  - Weight loss can improve patient's co-morbid conditions and/or prevent weight-related complications.  - Weight is not at goal and patient has been unable to achieve a meaningful weight loss above 5% using various programs and tools for more than 6 months    Gracia was seen today for follow-up.    Diagnoses and all orders for this visit:    Class 1 obesity due to excess calories with serious comorbidity and body mass index (BMI) of 30.0 to 30.9 in adult  -     tirzepatide (Zepbound) 7.5 mg/0.5 mL auto-injector; Inject 0.5 mL (7.5 mg total) under the skin once a week  -     tirzepatide (Zepbound) 10 mg/0.5 mL auto-injector; Inject 0.5 mL (10 mg total) under the skin once a week Do not start before May 22, 2025.              Zepbound Instructions:    - Begin Zepbound 2.5 mg subcutaneously once a week. Dose changes may occur after 4 doses if medication is tolerated. You will be assessed prior to each dose change to make sure you are tolerating the medication well.  - Please message me when you have 2 pens left from the prescription so there are no lapses in treatment.  - Visit Zepbound.com for further information/injection instructions.   - Take  precautions to avoid dehydration. Aim for 64-80 oz of fluids/day  -Stop eating before you feel full  -Instructed  to administer a missed dose as soon as possible within 4 days. If more than 4 days have passed, skip the missed dose, administer the next dose on the regularly scheduled day, and resume the regular once-weekly dosing schedule  -Please eat small frequent meals to help reduce nausea. Avoid excessively fatty meals fried foods. Lemon water and saltine crackers may help with this.   - Side effects of Zepbound discussed: nausea, vomiting, diarrhea, and constipation. If you experience fever, nausea/vomiting, and pain radiating to your back this may be a sign of pancreatitis. Please go to the emergency room if this occurs.  - Consider OTC bowel regimen including stool softeners, fiber supplements to regularize bowel movements while on medication. MiraLAX can be used if needed  - - If on oral birth control a 2nd method of birth control is recommended during the 1st 8 weeks of therapy and for 4 weeks after any dosage change.   - Patient understands the side effects of the medication and proper administration. Patient agrees with the treatment plan and all questions were answered.    Subcutaneous injection:  Inject subQ into the thigh, abdomen, or upper arm; rotate injection sites.  Administer at any time of day, with or without meals.  Administer separately from insulin (do not mix the products); may inject both medications in the same body region but not adjacent to each other.  The day of the weekly administration can be changed as long as the time between the 2 doses is at least 3 days (72 hours)  Administer a missed dose as soon as possible within 4 days (96 hours) of missed dose; if more than 4 days have passed, skip the missed dose and administer next dose on regularly scheduled day and resume regular once weekly dosing schedule             Total time spent reviewing chart, interviewing patient, examining  patient, discussing plan, answering all questions, and documentin min, with >50% face-to-face time spent counseling patient on nonsurgical interventions for the treatment of excess weight. Discussed in detail nonsurgical options including intensive lifestyle intervention program, very low-calorie diet program and conservative program.  Discussed the role of weight loss medications.  Counseled patient on diet behavior and exercise modification for weight loss.        History of present illness/ Weight history   Patient reports that she has had trouble with her weight all her life.  She grew up in [place] and states that her mom and dad were not very strict with what she ate.  She states her mother had obesity and had father also gained weight after he was sedentary due to her diagnosis of MS.  He has had bariatric surgery by Dr. Jordan and is a patient at our center.  She states that she has been up and down with her weight all her life.  When she gets to her current weight of 180s she states that she starts implementing lifestyle efforts such as diet and physical activity.  Lately she states her physical activity has been limited due to back surgery last year.  She has also been evaluated for autoimmune disease and has been getting a few rounds of steroids this year which is also significantly contributed to her weight.  She reports 160 was her lowest weight and her highest weight is 188.  She started seeing the dietitian and has lost 5 pain 8 pounds since then.      She would really like to consider medication as she feels like the food noise has been significant.  She describes binge eating where she can eat a whole box of cinnamon rolls.  She does report that she lives alone and if she were living with her  or her daughter it is possible she could hide what she is eating.  She also reports eating until uncomfortably full.  She denies any purging or restrictive behaviors.  She states that she has  mentioned the above issue to her therapist but has not been seeing them as frequently.  She has never been formally diagnosed with an eating disorder or been referred to an eating disorder therapist.  She reports binge episodes 1-2 times a month.  She also states that she could eat at the end of her workday while watching TV and uses food for comfort    She states that she had tried fen-phen in the 90s and had succeeded in losing weight.  When she retried phentermine a few years ago she did not like the way it made her feel.  She does not want to take Topamax as she tried it years ago for migraines and it contributed to tremors.  She also notes low ferritin  Has been seeing CORDELIA Dean and was provided nutrition prescription below.      Nutrition Prescription  Calories: 9562-4161 kcal 1# per week w/o exercise  Protein: 75 g  Fluid: 2000 ml     Meal Plan (Harvey/Pro)  Breakfast: 150-200  Snack: --  Lunch: 300  Snack: 150  Dinner: 500-Factor meal  Snack: optinal 100 harvey snack      Screener Tool for Eating Disorders   (URL: SBIRT-ED - Eatingdisorderscreener.org)     1. Do you make yourself throw up because you feel uncomfortably full?  No     2. Do you worry you have lost control over how much you eat?  Yes     3. Have you recently lost more than 15 pounds in a 3-month period?  No     4. Do you think you are fat even though others say you are too thin?  No     5. Would you say that food dominates your life?   Yes     TOTAL: 2 /5     SCORING GUIDE:  LOW RISK: 1-2/5  MEDIUM RISK: 3/5  HIGH RISK: 4-5/5      Wt Readings from Last 30 Encounters:   04/16/25 75.7 kg (166 lb 12.8 oz)   03/19/25 78.5 kg (173 lb)   03/19/25 78.7 kg (173 lb 9.6 oz)   02/26/25 76 kg (167 lb 9.6 oz)   02/10/25 77.3 kg (170 lb 6.4 oz)   02/07/25 80 kg (176 lb 6.4 oz)   01/13/25 81 kg (178 lb 9.6 oz)   01/03/25 81.8 kg (180 lb 6.4 oz)   12/23/24 83.5 kg (184 lb)   12/16/24 84.5 kg (186 lb 3.2 oz)   12/12/24 88.2 kg (194 lb 6.4 oz)   12/02/24 86.2 kg (190  lb)   11/25/24 83.3 kg (183 lb 9.6 oz)   11/19/24 83.3 kg (183 lb 9.6 oz)   11/05/24 85.6 kg (188 lb 12.8 oz)   10/23/24 83.5 kg (184 lb)   09/11/24 83.9 kg (185 lb)   08/02/24 83.9 kg (185 lb)   04/17/24 83 kg (183 lb)   04/08/24 83 kg (183 lb)   01/17/24 82.1 kg (181 lb)   09/20/23 81.6 kg (180 lb)   08/15/23 83.5 kg (184 lb)   05/19/23 83.5 kg (184 lb)   04/11/23 81 kg (178 lb 9.6 oz)   01/18/23 81.8 kg (180 lb 6.4 oz)   11/29/22 79.4 kg (175 lb)   11/28/22 81.6 kg (180 lb)   07/06/22 78.5 kg (173 lb)   06/03/22 78.9 kg (174 lb)       RA- inflammation down, MTX took         Lifestyle questionnaire       Dietary Recall  Not tolerating the milky products and coffee any more  Not craving sweets  B: [employer] oats has a protein cereal (3/4 cu =250 cals, 11gm protein) with a little milk OR [place] oatmeal   S: apple, protein bar (Pure protein),   L: was making a smoothie--yogurt, Pbfit, ice, muscle milk, 1/2 banana but hasn't been liking it lately.  Suggested deli meat roll-ups and avoid skipping  D:  3oz protein, veggies and starch  *suggested to try clear protein or protein shot mixed in water ie:baripal     Beverages: water with zero lalo flavoring or seltzer  Volume of beverage intake: 64oz     Weekends: same  Cravings: sugar   Trouble area of day: most at night.     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 25.0-29.9- Overweight  Difference: -17 lbs      Anti obesity Medications/ Medical---    Weight loss medication and dose: Zepbound   Date initiated: 2025         Colonoscopy: UTD  Mammogram: UTD    Medication considerations/contraindications     -Patient denies personal history of pancreatitis. Patient also denies personal and family history of medullary thyroid cancer, and multiple endocrine neoplasia type 2 (MEN 2 tumor). -Patient denies any history of kidney stones, seizures, or+ glaucoma, diabetic retinopathy, gall bladder disease, gastroparesis, hyperthyroidism.  -Denies Hx of CAD, PAD, palpitations, arrhythmia, uncontrolled hypertension  -Denies uncontrolled anxiety or depression, suicidal behavior or thinking , insomnia or sleep disturbance.         Past medical history/past surgical history       Previous notes and records have been reviewed.    The following portions of the patient's history were reviewed and updated as appropriate: allergies, current medications, past family history, past medical history, past social history, past surgical history, and problem list.    Past Medical History:   Diagnosis Date    Abnormal Pap smear of cervix     RAMIREZ positive     Anemia     Anxiety     Arthritis 2024    Basal cell carcinoma     Cataract     Medicine related    Depression     Glaucoma     Migraine     Migraine     Miscarriage 2007    MRSA colonization 2016    Neck pain     Pregnancy     Rheumatoid arthritis (HCC) 2024    Vertebral artery dissection (HCC)     Vulvovaginitis     Yeast infection          Past Surgical History:   Procedure Laterality Date     SECTION, LOW TRANSVERSE      COLONOSCOPY      DILATION AND CURETTAGE OF UTERUS  2007    GYNECOLOGIC CRYOSURGERY      cervix    LASIK      SKIN LESION EXCISION      basal cell carcinoma of  "left cheek    SPINAL FUSION  10/2023             Family History   Problem Relation Age of Onset    Hypertension Mother     Lung cancer Mother 56    Brain cancer Mother 56    Depression Mother     Lupus Mother     Early death Mother         Lung cancer    Breast cancer Maternal Aunt         dx in 40's     Multiple sclerosis Father     No Known Problems Daughter     No Known Problems Maternal Grandmother     No Known Problems Maternal Grandfather     No Known Problems Paternal Grandmother     No Known Problems Paternal Grandfather     No Known Problems Maternal Aunt             Objective     /60 (Patient Position: Sitting, Cuff Size: Large)   Pulse 78   Ht 5' 5\" (1.651 m)   Wt 75.7 kg (166 lb 12.8 oz)   LMP 03/04/2025   BMI 27.76 kg/m²       Review of Systems   Constitutional:  Negative for fatigue.   HENT:  Negative for sore throat.    Respiratory:  Negative for cough and shortness of breath.    Cardiovascular:  Negative for chest pain, palpitations and leg swelling.   Gastrointestinal:  Negative for abdominal pain, constipation, diarrhea and nausea.   Genitourinary:  Negative for dysuria.   Musculoskeletal:  Negative for arthralgias and back pain.   Skin:  Negative for rash.   Neurological:  Negative for headaches.   Psychiatric/Behavioral:  Negative for dysphoric mood. The patient is not nervous/anxious.        Physical Exam  Vitals and nursing note reviewed.   Constitutional:       Appearance: Normal appearance.   HENT:      Head: Normocephalic.   Pulmonary:      Effort: Pulmonary effort is normal.   Neurological:      General: No focal deficit present.      Mental Status: She is alert and oriented to person, place, and time.   Psychiatric:         Mood and Affect: Mood normal.         Behavior: Behavior normal.         Thought Content: Thought content normal.         Judgment: Judgment normal.            Medications       Current Outpatient Medications:     butalbital-acetaminophen-caffeine (Esgic) " -40 mg per tablet, Take 1 tablet by mouth every 4 (four) hours as needed for headaches, Disp: 30 tablet, Rfl: 0    escitalopram (LEXAPRO) 20 mg tablet, Take 1 tablet (20 mg total) by mouth daily, Disp: 90 tablet, Rfl: 1    ferrous sulfate 325 (65 FE) MG EC tablet, 325mg every other day with Vitamin C, Disp: 90 tablet, Rfl: 0    gabapentin (NEURONTIN) 300 mg capsule, Take 2 capsules (600 mg total) by mouth 2 (two) times a day Take 2 capsule in the morning and 2 at bedtime, Disp: 120 capsule, Rfl: 1    hydroxychloroquine (PLAQUENIL) 200 mg tablet, Take 1 tablet (200 mg total) by mouth 2 (two) times a day with meals, Disp: 180 tablet, Rfl: 1    hydrOXYzine HCL (ATARAX) 25 mg tablet, Take 1 tablet (25 mg total) by mouth in the morning, Disp: 90 tablet, Rfl: 1    latanoprost (XALATAN) 0.005 % ophthalmic solution, INSTILL 1 DROP IN AFFECTED EYE AT BEDTIME, Disp: , Rfl: 2    metFORMIN (GLUCOPHAGE-XR) 500 mg 24 hr tablet, Start with 1 tablet with dinner and after 1 week increase to 1 tablet twice a day with meals., Disp: 180 tablet, Rfl: 3    naproxen (NAPROSYN) 500 mg tablet, Take 500 mg by mouth 2 (two) times a day with meals, Disp: , Rfl:     ondansetron (ZOFRAN) 4 mg tablet, Take 1 tablet (4 mg total) by mouth every 8 (eight) hours as needed for nausea or vomiting, Disp: 30 tablet, Rfl: 0    pantoprazole (PROTONIX) 40 mg tablet, Take 1 tablet (40 mg total) by mouth every morning, Disp: 90 tablet, Rfl: 1    pramipexole (MIRAPEX) 0.5 mg tablet, Take 1 tablet (0.5 mg total) by mouth 2 (two) times a day, Disp: 90 tablet, Rfl: 1    Probiotic Product (PRO-BIOTIC BLEND PO), Take by mouth, Disp: , Rfl:     [START ON 5/22/2025] tirzepatide (Zepbound) 10 mg/0.5 mL auto-injector, Inject 0.5 mL (10 mg total) under the skin once a week Do not start before May 22, 2025., Disp: 2 mL, Rfl: 2    tirzepatide (Zepbound) 7.5 mg/0.5 mL auto-injector, Inject 0.5 mL (7.5 mg total) under the skin once a week, Disp: 2 mL, Rfl: 0     traZODone (DESYREL) 50 mg tablet, Take 1.5 tablets (75 mg total) by mouth daily at bedtime, Disp: 135 tablet, Rfl: 0    ALPRAZolam (XANAX) 0.25 mg tablet, Take 1 tablet (0.25 mg total) by mouth daily as needed for anxiety (Patient not taking: Reported on 4/16/2025), Disp: 30 tablet, Rfl: 0    benzonatate (TESSALON PERLES) 100 mg capsule, take 1 capsule by mouth three times a day as needed for cough (Patient not taking: Reported on 2/7/2025), Disp: , Rfl:     fluticasone (FLONASE) 50 mcg/act nasal spray, 2 sprays into each nostril daily (Patient not taking: Reported on 2/7/2025), Disp: , Rfl:     folic acid (FOLVITE) 1 mg tablet, Take 1 tablet by mouth daily (Patient not taking: Reported on 2/7/2025), Disp: , Rfl:     guaiFENesin-codeine (ROBITUSSIN AC) 100-10 mg/5 mL oral solution, Take 5 mL by mouth 3 (three) times a day as needed for cough (Patient not taking: Reported on 2/7/2025), Disp: 237 mL, Rfl: 0    methotrexate 50 MG/2ML injection, Inject 25 mg under the skin once a week (Patient not taking: Reported on 2/7/2025), Disp: , Rfl:     naloxone (NARCAN) 4 mg/0.1 mL nasal spray, Administer 1 spray into a nostril. If no response after 2-3 minutes, give another dose in the other nostril using a new spray. (Patient not taking: Reported on 2/7/2025), Disp: 1 each, Rfl: 1    rimegepant sulfate (NURTEC) 75 mg TBDP, Take 1 tablet (75 mg) by mouth once at the onset of a headache. Max dose: 75 mg/day. (Patient not taking: Reported on 4/16/2025), Disp: 16 tablet, Rfl: 3    sodium chloride (OCEAN) 0.65 % nasal spray, 1 spray into each nostril (Patient not taking: Reported on 2/7/2025), Disp: , Rfl:     tiZANidine (ZANAFLEX) 2 mg tablet, Take 1-2 tablets as needed at bedtime (max 4mg/day) for muscle spasm (Patient not taking: Reported on 4/16/2025), Disp: 60 tablet, Rfl: 1    ZyrTEC Allergy 10 MG tablet, Take 10 mg by mouth daily (Patient not taking: Reported on 4/16/2025), Disp: , Rfl:     Current Facility-Administered  Medications:     Botulinum Toxin Type A SOLR 200 Units, 200 Units, Injection, See Admin Instructions, , 200 Units at 04/16/25 1054           Labs and imaging     Recent labs and imaging have been personally reviewed.  Lab Results   Component Value Date    WBC 5.58 04/11/2025    HGB 12.8 04/11/2025    HCT 38.5 04/11/2025    MCV 89 04/11/2025     04/11/2025     Lab Results   Component Value Date     10/10/2014    SODIUM 142 04/11/2025    K 4.2 04/11/2025     04/11/2025    CO2 28 04/11/2025    ANIONGAP 7 10/10/2014    AGAP 7 04/11/2025    BUN 23 04/11/2025    CREATININE 0.87 04/11/2025    GLUC 84 01/17/2025    GLUF 75 04/11/2025    CALCIUM 8.9 04/11/2025    AST 19 04/11/2025    ALT 11 04/11/2025    ALKPHOS 57 04/11/2025    PROT 6.8 10/10/2014    TP 6.6 04/11/2025    BILITOT 0.2 10/10/2014    TBILI 0.45 04/11/2025    EGFR 78 04/11/2025     Lab Results   Component Value Date    HGBA1C 5.6 12/16/2024     Lab Results   Component Value Date    BET4GTESAFXX 1.509 12/16/2024    TSH 1.40 07/13/2020     Lab Results   Component Value Date    CHOLESTEROL 191 01/29/2024     Lab Results   Component Value Date    HDL 56 01/29/2024     Lab Results   Component Value Date    TRIG 154 (H) 01/29/2024     Lab Results   Component Value Date    LDLCALC 108 (H) 01/29/2024

## 2025-04-17 LAB
DME PARACHUTE DELIVERY DATE ACTUAL: NORMAL
DME PARACHUTE DELIVERY DATE REQUESTED: NORMAL
DME PARACHUTE ITEM DESCRIPTION: NORMAL
DME PARACHUTE ORDER STATUS: NORMAL
DME PARACHUTE SUPPLIER NAME: NORMAL
DME PARACHUTE SUPPLIER PHONE: NORMAL
DSDNA AB SER QL CLIF: NEGATIVE

## 2025-04-21 ENCOUNTER — PATIENT MESSAGE (OUTPATIENT)
Dept: OBGYN CLINIC | Facility: CLINIC | Age: 50
End: 2025-04-21

## 2025-04-21 ENCOUNTER — TELEPHONE (OUTPATIENT)
Age: 50
End: 2025-04-21

## 2025-04-21 NOTE — TELEPHONE ENCOUNTER
Pt sent a myc message that she has been feeling aroused in her genital area and is concerned for a UTI since she had the same feeling last time she had a UTI. No other symptoms.

## 2025-04-23 DIAGNOSIS — R30.0 DYSURIA: Primary | ICD-10-CM

## 2025-04-24 ENCOUNTER — OFFICE VISIT (OUTPATIENT)
Dept: OBGYN CLINIC | Facility: CLINIC | Age: 50
End: 2025-04-24
Payer: COMMERCIAL

## 2025-04-24 ENCOUNTER — APPOINTMENT (OUTPATIENT)
Dept: RADIOLOGY | Facility: AMBULARY SURGERY CENTER | Age: 50
End: 2025-04-24
Attending: ORTHOPAEDIC SURGERY
Payer: COMMERCIAL

## 2025-04-24 VITALS — WEIGHT: 170 LBS | HEIGHT: 65 IN | BODY MASS INDEX: 28.32 KG/M2

## 2025-04-24 DIAGNOSIS — M25.551 BILATERAL HIP PAIN: ICD-10-CM

## 2025-04-24 DIAGNOSIS — M25.552 LEFT HIP PAIN: ICD-10-CM

## 2025-04-24 DIAGNOSIS — M25.552 BILATERAL HIP PAIN: ICD-10-CM

## 2025-04-24 DIAGNOSIS — R29.4 CLICKING OF LEFT HIP: ICD-10-CM

## 2025-04-24 PROCEDURE — 99203 OFFICE O/P NEW LOW 30 MIN: CPT | Performed by: ORTHOPAEDIC SURGERY

## 2025-04-24 PROCEDURE — 73502 X-RAY EXAM HIP UNI 2-3 VIEWS: CPT

## 2025-04-24 NOTE — PROGRESS NOTES
"Name: Gracia Chirinos      : 1975      MRN: [id number]  Encounter Provider: Eliz Pacheco MD  Encounter Date: 2025   Encounter department: [facility]  :  Assessment & Plan  Left hip pain  Consent on today's office visit was the left hip pain and associated clicking sensation.  Suspect the symptoms could be contributory from acetabular labral tear.  In this regard I will request an MRI arthrogram of the left hip for further evaluation and follow-up after this investigation is completed.  Orders:    MRI arthrogram left hip; Future    Bilateral hip pain    Orders:    XR hip/pelv 2-3 vws left if performed; Future    Clicking of left hip    Orders:    MRI arthrogram left hip; Future    FL injection left hip (arthrogram); Future        History of Present Illness   HPI  Gracia Chirinos is a 49 y.o. female who presents for evaluation of \" bilateral hip pain\"    Patient reports that she has had long-term history of low back pain and bilateral hip pain for several years.  In this regard she has had extensive testing and treatment done at an external facility.  This is included multiple bilateral greater trochanteric bursa injections, physical therapy, oral pain medications and steroids as well as a lumbar spine fusion at L4-L5 and L5-S1 level in .  Unfortunately, her symptoms of bilateral gluteal and lateral hip pain have persisted despite these measures.  Her background history is also significant for seronegative rheumatoid arthritis for which she is currently on Plaquenil.  On today's office visit, she reports that the left-sided symptoms are worse she may also have occasional left hip clicking sensation.  History obtained from: patient  Patient Active Problem List   Diagnosis    Migraine    Anxiety    Chronic migraine without aura    Vertebral artery dissection (HCC)    GERD (gastroesophageal reflux disease)    Overweight with body mass index (BMI) of 27 to 27.9 in adult " "   Other insomnia    Restless legs syndrome (RLS)    Piriformis syndrome of right side    COVID-19 virus infection    Vitamin B12 deficiency    Vitamin D deficiency    Paresthesia    Depression    Chronic midline low back pain without sciatica    Pre-op examination    DDD (degenerative disc disease), lumbar    S/P lumbar spinal fusion    Snoring    Arthralgia    Abnormal wellness exam    Excessive daytime sleepiness    Obstructive sleep apnea (adult) (pediatric)    ANETTE (obstructive sleep apnea)    Memory problem    B12 deficiency    Itchy skin    Seronegative rheumatoid arthritis (HCC)    Gastroesophageal reflux disease    Class 1 obesity due to excess calories with serious comorbidity and body mass index (BMI) of 30.0 to 30.9 in adult    Positive RAMIREZ (antinuclear antibody)    Low iron stores    Chronic insomnia       Review of Systems       Objective   Ht 5' 5\" (1.651 m)   Wt 77.1 kg (170 lb)   LMP 03/04/2025   BMI 28.29 kg/m²      Physical Exam         Left Hip Exam     Tenderness   Left hip tenderness location: Tenderness over the posterior lateral hip and the greater trochanteric bursa area.    Range of Motion   Flexion:  120   External rotation:  70   Internal rotation: 10     Tests   MICHAEL: negative    Comments:  Positive FADIR.  Negative logroll.           I have personally reviewed pertinent films in PACS and my interpretation is plain radiograph of the pelvis and left hip performed today does not reveal any acute osseous findings or significant hip degenerative changes.  Lower lumbar spine degenerative changes with extensive fusion of the lower lumbosacral spine noted..   Procedures  Portions of the record may have been created with voice recognition software. Occasional wrong word or \"sound alike\" substitutions may have occurred due to the inherent limitations of voice recognition software. Please review the chart carefully and recognize, using context, where substitutions/typographical errors may have " occurred.

## 2025-04-25 ENCOUNTER — APPOINTMENT (OUTPATIENT)
Dept: PHYSICAL THERAPY | Age: 50
End: 2025-04-25
Payer: COMMERCIAL

## 2025-04-28 ENCOUNTER — TELEPHONE (OUTPATIENT)
Dept: NON INVASIVE DIAGNOSTICS | Facility: HOSPITAL | Age: 50
End: 2025-04-28

## 2025-04-28 ENCOUNTER — APPOINTMENT (OUTPATIENT)
Dept: PHYSICAL THERAPY | Age: 50
End: 2025-04-28
Attending: NURSE PRACTITIONER
Payer: COMMERCIAL

## 2025-04-28 DIAGNOSIS — F32.A DEPRESSION, UNSPECIFIED DEPRESSION TYPE: ICD-10-CM

## 2025-04-28 DIAGNOSIS — F41.9 ANXIETY: ICD-10-CM

## 2025-04-28 DIAGNOSIS — L29.9 ITCHY SKIN: ICD-10-CM

## 2025-04-28 NOTE — TELEPHONE ENCOUNTER
Call placed to patient to discuss upcoming left hip MRI arthrogram at [facility]. Allergies reviewed and verified patient does not currently take any anticoagulant medications. Pre procedure instructions including diet and taking own medications discussed with patient. Patient instructed that she may eat normally and take medications as usual before the procedure. Procedure and post procedure expectations and instructions reviewed with the patient. Patient verbalizes understanding and denies any questions at this time. Reminded of the location, date and time of the expected procedure. Name and contact number provided in case patient has any further questions.

## 2025-04-29 ENCOUNTER — PATIENT MESSAGE (OUTPATIENT)
Age: 50
End: 2025-04-29

## 2025-04-29 DIAGNOSIS — R11.0 NAUSEA: ICD-10-CM

## 2025-04-29 DIAGNOSIS — F41.9 ANXIETY: ICD-10-CM

## 2025-04-29 DIAGNOSIS — M25.50 ARTHRALGIA, UNSPECIFIED JOINT: Primary | ICD-10-CM

## 2025-04-29 DIAGNOSIS — L29.9 ITCHY SKIN: ICD-10-CM

## 2025-04-29 DIAGNOSIS — F32.A DEPRESSION, UNSPECIFIED DEPRESSION TYPE: ICD-10-CM

## 2025-04-30 ENCOUNTER — OFFICE VISIT (OUTPATIENT)
Age: 50
End: 2025-04-30
Payer: COMMERCIAL

## 2025-04-30 ENCOUNTER — OFFICE VISIT (OUTPATIENT)
Dept: PHYSICAL THERAPY | Age: 50
End: 2025-04-30
Attending: NURSE PRACTITIONER
Payer: COMMERCIAL

## 2025-04-30 ENCOUNTER — APPOINTMENT (OUTPATIENT)
Dept: LAB | Age: 50
End: 2025-04-30
Payer: COMMERCIAL

## 2025-04-30 VITALS
BODY MASS INDEX: 28.12 KG/M2 | TEMPERATURE: 98.6 F | OXYGEN SATURATION: 97 % | HEART RATE: 89 BPM | DIASTOLIC BLOOD PRESSURE: 70 MMHG | HEIGHT: 65 IN | SYSTOLIC BLOOD PRESSURE: 116 MMHG | WEIGHT: 168.8 LBS

## 2025-04-30 DIAGNOSIS — R79.0 LOW IRON STORES: ICD-10-CM

## 2025-04-30 DIAGNOSIS — M54.50 CHRONIC MIDLINE LOW BACK PAIN WITHOUT SCIATICA: ICD-10-CM

## 2025-04-30 DIAGNOSIS — M06.00 SERONEGATIVE RHEUMATOID ARTHRITIS (HCC): Primary | ICD-10-CM

## 2025-04-30 DIAGNOSIS — Z79.899 ENCOUNTER FOR LONG-TERM (CURRENT) USE OF MEDICATIONS: ICD-10-CM

## 2025-04-30 DIAGNOSIS — M53.3 SI (SACROILIAC) JOINT DYSFUNCTION: Primary | ICD-10-CM

## 2025-04-30 DIAGNOSIS — G89.29 CHRONIC MIDLINE LOW BACK PAIN WITHOUT SCIATICA: ICD-10-CM

## 2025-04-30 DIAGNOSIS — R30.0 DYSURIA: ICD-10-CM

## 2025-04-30 LAB
BACTERIA UR QL AUTO: ABNORMAL /HPF
BILIRUB UR QL STRIP: NEGATIVE
CLARITY UR: CLEAR
COLOR UR: YELLOW
GLUCOSE UR STRIP-MCNC: NEGATIVE MG/DL
HGB UR QL STRIP.AUTO: NEGATIVE
KETONES UR STRIP-MCNC: NEGATIVE MG/DL
LEUKOCYTE ESTERASE UR QL STRIP: NEGATIVE
MUCOUS THREADS UR QL AUTO: ABNORMAL
NITRITE UR QL STRIP: NEGATIVE
NON-SQ EPI CELLS URNS QL MICRO: ABNORMAL /HPF
PH UR STRIP.AUTO: 6 [PH]
PROT UR STRIP-MCNC: ABNORMAL MG/DL
RBC #/AREA URNS AUTO: ABNORMAL /HPF
SP GR UR STRIP.AUTO: 1.02 (ref 1–1.03)
UROBILINOGEN UR STRIP-ACNC: <2 MG/DL
WBC #/AREA URNS AUTO: ABNORMAL /HPF

## 2025-04-30 PROCEDURE — 97112 NEUROMUSCULAR REEDUCATION: CPT | Performed by: PHYSICAL THERAPIST

## 2025-04-30 PROCEDURE — 97110 THERAPEUTIC EXERCISES: CPT | Performed by: PHYSICAL THERAPIST

## 2025-04-30 PROCEDURE — 81001 URINALYSIS AUTO W/SCOPE: CPT

## 2025-04-30 PROCEDURE — 99214 OFFICE O/P EST MOD 30 MIN: CPT | Performed by: PHYSICIAN ASSISTANT

## 2025-04-30 RX ORDER — ONDANSETRON 4 MG/1
4 TABLET, FILM COATED ORAL EVERY 8 HOURS PRN
Qty: 30 TABLET | Refills: 0 | Status: SHIPPED | OUTPATIENT
Start: 2025-04-30

## 2025-04-30 RX ORDER — HYDROXYZINE HYDROCHLORIDE 25 MG/1
25 TABLET, FILM COATED ORAL DAILY
Qty: 90 TABLET | Refills: 0 | Status: SHIPPED | OUTPATIENT
Start: 2025-04-30

## 2025-04-30 RX ORDER — NAPROXEN 500 MG/1
500 TABLET ORAL 2 TIMES DAILY WITH MEALS
Qty: 60 TABLET | Refills: 0 | Status: SHIPPED | OUTPATIENT
Start: 2025-04-30 | End: 2026-04-30

## 2025-04-30 RX ORDER — ESCITALOPRAM OXALATE 20 MG/1
20 TABLET ORAL DAILY
Qty: 90 TABLET | Refills: 0 | Status: SHIPPED | OUTPATIENT
Start: 2025-04-30

## 2025-04-30 RX ORDER — ESCITALOPRAM OXALATE 20 MG/1
20 TABLET ORAL DAILY
Qty: 90 TABLET | Refills: 0 | OUTPATIENT
Start: 2025-04-30

## 2025-04-30 RX ORDER — HYDROXYZINE HYDROCHLORIDE 25 MG/1
25 TABLET, FILM COATED ORAL DAILY
Qty: 90 TABLET | Refills: 0 | OUTPATIENT
Start: 2025-04-30

## 2025-04-30 NOTE — PROGRESS NOTES
Name: Gracia Chirinos      : 1975      MRN: [id number]  Encounter Provider: Dolly Gilbert PA-C  Encounter Date: 2025   Encounter department: [facility]  :  Assessment & Plan  Seronegative rheumatoid arthritis (HCC)  History of seronegative inflammatory arthritis.  She has previously been treated with Rinvoq and methotrexate.  In February she started Plaquenil and is currently taking 200 mg once a day.  At this time she is not having any significant joint pain and does not have any swelling in her joints.  She has noticed the most significant improvement in her overall pain after starting Zepbound for weight loss.  At this time she would like to taper off of Plaquenil and monitor her symptoms without medication.  She does not have any signs of active disease on exam today and recent labs revealed significantly improved sed rate and CRP.  Will plan to monitor labs and symptoms closely.  She does have a history of a positive RAMIREZ so I have suggested repeating her Avise CTD test to monitor.  Will plan to follow-up in 3 to 4 months or sooner if needed.    Orders:    CBC and differential; Future    Comprehensive metabolic panel; Future    C-reactive protein; Future    Sedimentation rate, automated; Future    dsDNA Antibody by IFA, Crithidia luciliae, with Reflex to Titer; Future    C3 complement; Future    C4 complement; Future    MISCELLANEOUS LAB TEST; Future    Low iron stores  Her iron stores are improving with iron supplement.  Will continue to monitor labs and will plan to check ferritin with next labs.    Orders:    Ferritin; Future    Encounter for long-term (current) use of medications    Orders:    CBC and differential; Future    Comprehensive metabolic panel; Future    C-reactive protein; Future    Sedimentation rate, automated; Future    dsDNA Antibody by IFA, Crithidia luciliae, with Reflex to Titer; Future    C3 complement; Future    C4 complement;  Future        History of Present Illness   Gracia Chirinos is a 49 y.o. female.  She is here for follow-up of seronegative rheumatoid arthritis.  She was diagnosed in 2024 after developing pain and swelling in her hands and wrists.  She was initially treated with prednisone and oral methotrexate.  In June 2024 Rinvoq was added however she ultimately discontinued this as it was not providing any significant relief for her.  In September 2024 she switched to subcutaneous methotrexate injections.  She has continued to have persistent pain and swelling in her hands and wrists.  She had a musculoskeletal ultrasound done on 12/3/2024 of her bilateral hands and wrists.  This revealed mild synovial hypertrophy and synovial hyperemia in the right first, fifth, and RC/IC joints.  Mild synovial hypertrophy without hyperemia was noted in the left fourth and fifth MCP joints.  In December she was started on Zepbound and since then she has had a significant improvement in her overall pain.  In February 2025 she also started Plaquenil for her inflammatory arthritis symptoms.  She has been taking 200 mg once a day.  She has been feeling well.  She has no significant joint pain.  She has no swelling in her joints.    She has a history of a positive RAMIREZ.  She has a history of GERD and takes pantoprazole for this.  She has restless leg syndrome and takes Mirapex.  She also has a history of obstructive sleep apnea and is using a CPAP machine.  She has a history of low iron stores and takes an iron supplement for this.  She is working with weight management and was started on Zepbound in December 2024.  She has a history of chronic migraines which has previously been treated with Botox.  She has a history of pruritus with chronically itchy skin and takes hydroxyzine for this.    She had labs done on 4/11/2025.  CBC unremarkable.  Creatinine 0.87, GFR 78.  ESR, CRP within normal limits.  C3, C4 within normal limits.  Double-stranded DNA  negative.            Review of Systems   Constitutional:  Positive for fatigue. Negative for chills and fever.   HENT:  Negative for hearing loss, sore throat and tinnitus.         Dry mouth   Eyes:  Negative for pain and visual disturbance.   Respiratory:  Negative for cough and shortness of breath.    Cardiovascular:  Negative for chest pain and palpitations.   Gastrointestinal:  Positive for constipation. Negative for abdominal pain, nausea and vomiting.        GERD - on pantoprazole   Genitourinary:  Negative for difficulty urinating.   Musculoskeletal:  Positive for arthralgias, back pain (SI joint pain) and joint swelling. Negative for gait problem, myalgias, neck pain and neck stiffness.   Skin:  Negative for rash.   Neurological:  Positive for headaches (migraines). Negative for dizziness, seizures, weakness and numbness.   Psychiatric/Behavioral:  Positive for sleep disturbance (sleep apnea). Negative for confusion and decreased concentration.      Current Outpatient Medications on File Prior to Visit   Medication Sig Dispense Refill    butalbital-acetaminophen-caffeine (Esgic) -40 mg per tablet Take 1 tablet by mouth every 4 (four) hours as needed for headaches 30 tablet 0    escitalopram (LEXAPRO) 20 mg tablet Take 1 tablet (20 mg total) by mouth daily 90 tablet 1    ferrous sulfate 325 (65 FE) MG EC tablet 325mg every other day with Vitamin C 90 tablet 0    gabapentin (NEURONTIN) 300 mg capsule Take 2 capsules (600 mg total) by mouth 2 (two) times a day Take 2 capsule in the morning and 2 at bedtime 120 capsule 1    hydroxychloroquine (PLAQUENIL) 200 mg tablet Take 1 tablet (200 mg total) by mouth 2 (two) times a day with meals 180 tablet 1    hydrOXYzine HCL (ATARAX) 25 mg tablet Take 1 tablet (25 mg total) by mouth in the morning 90 tablet 1    latanoprost (XALATAN) 0.005 % ophthalmic solution INSTILL 1 DROP IN AFFECTED EYE AT BEDTIME  2    metFORMIN (GLUCOPHAGE-XR) 500 mg 24 hr tablet Start with  1 tablet with dinner and after 1 week increase to 1 tablet twice a day with meals. 180 tablet 3    naproxen (NAPROSYN) 500 mg tablet Take 1 tablet (500 mg total) by mouth 2 (two) times a day with meals 60 tablet 0    ondansetron (ZOFRAN) 4 mg tablet Take 1 tablet (4 mg total) by mouth every 8 (eight) hours as needed for nausea or vomiting 30 tablet 0    pantoprazole (PROTONIX) 40 mg tablet Take 1 tablet (40 mg total) by mouth every morning 90 tablet 1    pramipexole (MIRAPEX) 0.5 mg tablet Take 1 tablet (0.5 mg total) by mouth 2 (two) times a day 90 tablet 1    Probiotic Product (PRO-BIOTIC BLEND PO) Take by mouth      [START ON 5/22/2025] tirzepatide (Zepbound) 10 mg/0.5 mL auto-injector Inject 0.5 mL (10 mg total) under the skin once a week Do not start before May 22, 2025. 2 mL 2    tirzepatide (Zepbound) 7.5 mg/0.5 mL auto-injector Inject 0.5 mL (7.5 mg total) under the skin once a week 2 mL 0    traZODone (DESYREL) 50 mg tablet Take 1.5 tablets (75 mg total) by mouth daily at bedtime 135 tablet 0    ALPRAZolam (XANAX) 0.25 mg tablet Take 1 tablet (0.25 mg total) by mouth daily as needed for anxiety (Patient not taking: Reported on 4/16/2025) 30 tablet 0    benzonatate (TESSALON PERLES) 100 mg capsule take 1 capsule by mouth three times a day as needed for cough (Patient not taking: Reported on 2/7/2025)      fluticasone (FLONASE) 50 mcg/act nasal spray 2 sprays into each nostril daily (Patient not taking: Reported on 2/7/2025)      guaiFENesin-codeine (ROBITUSSIN AC) 100-10 mg/5 mL oral solution Take 5 mL by mouth 3 (three) times a day as needed for cough (Patient not taking: Reported on 2/7/2025) 237 mL 0    naloxone (NARCAN) 4 mg/0.1 mL nasal spray Administer 1 spray into a nostril. If no response after 2-3 minutes, give another dose in the other nostril using a new spray. (Patient not taking: Reported on 2/7/2025) 1 each 1    rimegepant sulfate (NURTEC) 75 mg TBDP Take 1 tablet (75 mg) by mouth once at the  "onset of a headache. Max dose: 75 mg/day. (Patient not taking: Reported on 4/30/2025) 16 tablet 3    sodium chloride (OCEAN) 0.65 % nasal spray 1 spray into each nostril (Patient not taking: Reported on 2/7/2025)      tiZANidine (ZANAFLEX) 2 mg tablet Take 1-2 tablets as needed at bedtime (max 4mg/day) for muscle spasm (Patient not taking: Reported on 4/16/2025) 60 tablet 1    ZyrTEC Allergy 10 MG tablet Take 10 mg by mouth daily (Patient not taking: Reported on 4/16/2025)      [DISCONTINUED] folic acid (FOLVITE) 1 mg tablet Take 1 tablet by mouth daily (Patient not taking: Reported on 2/7/2025)      [DISCONTINUED] methotrexate 50 MG/2ML injection Inject 25 mg under the skin once a week (Patient not taking: Reported on 2/7/2025)       Current Facility-Administered Medications on File Prior to Visit   Medication Dose Route Frequency Provider Last Rate Last Admin    Botulinum Toxin Type A SOLR 200 Units  200 Units Injection See Admin Instructions    200 Units at 04/16/25 1054         Objective   /70 (BP Location: Right arm, Patient Position: Sitting, Cuff Size: Adult)   Pulse 89   Temp 98.6 °F (37 °C) (Tympanic)   Ht 5' 5\" (1.651 m)   Wt 76.6 kg (168 lb 12.8 oz)   LMP 03/04/2025   SpO2 97%   BMI 28.09 kg/m²      Physical Exam  Constitutional:       Appearance: Normal appearance.   Cardiovascular:      Rate and Rhythm: Normal rate and regular rhythm.   Pulmonary:      Breath sounds: Normal breath sounds.   Musculoskeletal:         General: Swelling (Soft tissue prominence noted bilateral MCP joints, right greater than left hand; soft tissue prominence right greater than left wrist) and tenderness (MCP joints bilaterally, bilateral elbows, shoulders, knees) present.   Skin:     General: Skin is warm and dry.   Neurological:      General: No focal deficit present.      Mental Status: She is alert.             Dragon Dictation software was used to dictate this note. It may contain errors with dictating " incorrect words/spelling. Please contact provider directly for any questions.

## 2025-04-30 NOTE — ASSESSMENT & PLAN NOTE
Her iron stores are improving with iron supplement.  Will continue to monitor labs and will plan to check ferritin with next labs.    Orders:    Ferritin; Future

## 2025-04-30 NOTE — ASSESSMENT & PLAN NOTE
History of seronegative inflammatory arthritis.  She has previously been treated with Rinvoq and methotrexate.  In February she started Plaquenil and is currently taking 200 mg once a day.  At this time she is not having any significant joint pain and does not have any swelling in her joints.  She has noticed the most significant improvement in her overall pain after starting Zepbound for weight loss.  At this time she would like to taper off of Plaquenil and monitor her symptoms without medication.  She does not have any signs of active disease on exam today and recent labs revealed significantly improved sed rate and CRP.  Will plan to monitor labs and symptoms closely.  She does have a history of a positive RAMIREZ so I have suggested repeating her Avise CTD test to monitor.  Will plan to follow-up in 3 to 4 months or sooner if needed.    Orders:    CBC and differential; Future    Comprehensive metabolic panel; Future    C-reactive protein; Future    Sedimentation rate, automated; Future    dsDNA Antibody by IFA, Jerry langley, with Reflex to Titer; Future    C3 complement; Future    C4 complement; Future    MISCELLANEOUS LAB TEST; Future

## 2025-05-01 ENCOUNTER — RESULTS FOLLOW-UP (OUTPATIENT)
Dept: OBGYN CLINIC | Facility: CLINIC | Age: 50
End: 2025-05-01

## 2025-05-01 NOTE — PROGRESS NOTES
"Daily Note     Today's date: 2025  Patient name: Gracia Chirinos  : 1975  MRN: [id number]  Referring provider: Melissa Crespo CRNP  Dx:   Encounter Diagnosis     ICD-10-CM    1. SI (sacroiliac) joint dysfunction  M53.3       2. Chronic midline low back pain without sciatica  M54.50     G89.29           Start Time: 0930  Stop Time: 1015  Total time in clinic (min): 45 minutes    Subjective: Pt reports increased symptoms.       Objective: See treatment diary below      Assessment: Tolerated treatment well. Patient was re-assessed and pain was primarily located at right PSIS. Pain was increased with directional testing but no definite directional preference.Patient was educated on importance of reintegrating flexion based motions back into normal routine. Patient reports she was doing well when coming to PT more regularly and has difficulty with home PT program. Patient was give SIJ belt and did have positive results. Went over proper ergonomics at work and recommended performing flexion and extension based interventions before and after work shift. PT Patient demonstrated fatigue post treatment and would benefit from continued PT      Plan: Continue per plan of care.        Manuals 2/18 2/28 3/4 3/21 3/28 4/15 4/30      Pelvic Rotation MET (RLE into extension, LLE into flexion)     KD        Lumbosacral  ES ES           LLE LAD and Gr. 5 mob             L Lumbar Rotational Mobs             Neuro Re-Ed 2/18 2/28 3/4 3/21         Hip Adduction Ball Squeezes             Bird Dogs             Dead Bugs 2x10ea in H/L  2x10ea 2x10ea nv        Bridges     3 x 10 3 x 10       PPT             RDLs             janina Belia 20x5\" B/L  20x5\" B/L 20x5\" B/L nv 20x5\" B/L       Palloff Press     nv        Ther Ex 2/18 2/28 3/4 3/21         Clamshells     2 x 10        Pt Ed ES ES ES ES         UB     10' 10'       Squats             LTR 5'', 10x 5\" 2x10 5\" 2x10 5\" 2x10         Assess pt symptoms  ES     JF    " "  Step-Ups    W/ opposite march 2x10ea nv W/ opposite march 3x10ea       Leg Press 135# x10    145# 2x10  135# x10    145# 2x10  nv 145# 3x10       Re-assess strength and mobility             TB Sidestepping Blue TB x3 laps  Blue TB x2 laps Blue TB x2 laps nv        Monster Walks Blue TB x2 laps  Blue TB x2 laps Blue TB x2 laps nv        Ball Rollouts  Pink Pball 20x5\"           Open Books  10x5\" B/L 10x5\" B/L          Lunges      W/ slider 2x10 B/L       Ther Activity                                       Gait Training                                       Modalities                                                                        "

## 2025-05-02 NOTE — PATIENT COMMUNICATION
I called patient to discuss request; reached vm, left detailed message (consent on file).    Upon chart review, seen by neurology for migraines; please call provider who is managing your back pain with medication request.

## 2025-05-05 ENCOUNTER — HOSPITAL ENCOUNTER (OUTPATIENT)
Dept: MRI IMAGING | Facility: HOSPITAL | Age: 50
Discharge: HOME | End: 2025-05-05
Attending: ORTHOPAEDIC SURGERY
Payer: COMMERCIAL

## 2025-05-05 ENCOUNTER — HOSPITAL ENCOUNTER (OUTPATIENT)
Dept: RADIOLOGY | Facility: HOSPITAL | Age: 50
Discharge: HOME | End: 2025-05-05
Attending: ORTHOPAEDIC SURGERY
Payer: COMMERCIAL

## 2025-05-05 DIAGNOSIS — R29.4 CLICKING OF LEFT HIP: ICD-10-CM

## 2025-05-05 DIAGNOSIS — M25.552 LEFT HIP PAIN: ICD-10-CM

## 2025-05-05 PROCEDURE — 73722 MRI JOINT OF LWR EXTR W/DYE: CPT

## 2025-05-05 PROCEDURE — 27093 INJECTION FOR HIP X-RAY: CPT

## 2025-05-05 PROCEDURE — 77002 NEEDLE LOCALIZATION BY XRAY: CPT

## 2025-05-05 PROCEDURE — A9585 GADOBUTROL INJECTION: HCPCS | Performed by: ORTHOPAEDIC SURGERY

## 2025-05-05 RX ORDER — ROPIVACAINE HYDROCHLORIDE 2 MG/ML
10 INJECTION, SOLUTION EPIDURAL; INFILTRATION; PERINEURAL ONCE
Status: COMPLETED | OUTPATIENT
Start: 2025-05-05 | End: 2025-05-05

## 2025-05-05 RX ORDER — LIDOCAINE HYDROCHLORIDE 10 MG/ML
5 INJECTION, SOLUTION EPIDURAL; INFILTRATION; INTRACAUDAL; PERINEURAL ONCE
Status: COMPLETED | OUTPATIENT
Start: 2025-05-05 | End: 2025-05-05

## 2025-05-05 RX ORDER — GADOBUTROL 604.72 MG/ML
2 INJECTION INTRAVENOUS ONCE
Status: COMPLETED | OUTPATIENT
Start: 2025-05-05 | End: 2025-05-05

## 2025-05-05 RX ADMIN — LIDOCAINE HYDROCHLORIDE 2 ML: 10 INJECTION, SOLUTION EPIDURAL; INFILTRATION; INTRACAUDAL at 13:51

## 2025-05-05 RX ADMIN — IOHEXOL 2 ML: 300 INJECTION, SOLUTION INTRAVENOUS at 13:55

## 2025-05-05 RX ADMIN — GADOBUTROL 0.2 ML: 604.72 INJECTION INTRAVENOUS at 13:55

## 2025-05-05 RX ADMIN — ROPIVACAINE HYDROCHLORIDE 2 ML/HR: 2 INJECTION, SOLUTION EPIDURAL; INFILTRATION at 13:55

## 2025-05-06 ENCOUNTER — APPOINTMENT (OUTPATIENT)
Dept: PHYSICAL THERAPY | Age: 50
End: 2025-05-06
Attending: NURSE PRACTITIONER
Payer: COMMERCIAL

## 2025-05-09 ENCOUNTER — OFFICE VISIT (OUTPATIENT)
Dept: PHYSICAL THERAPY | Age: 50
End: 2025-05-09
Attending: NURSE PRACTITIONER
Payer: COMMERCIAL

## 2025-05-09 DIAGNOSIS — M54.50 CHRONIC MIDLINE LOW BACK PAIN WITHOUT SCIATICA: ICD-10-CM

## 2025-05-09 DIAGNOSIS — G89.29 CHRONIC MIDLINE LOW BACK PAIN WITHOUT SCIATICA: ICD-10-CM

## 2025-05-09 DIAGNOSIS — M53.3 SI (SACROILIAC) JOINT DYSFUNCTION: Primary | ICD-10-CM

## 2025-05-09 PROCEDURE — 97110 THERAPEUTIC EXERCISES: CPT

## 2025-05-09 NOTE — PROGRESS NOTES
Daily Note     Today's date: 2025  Patient name: Gracia Chirinos  : 1975  MRN: [id number]  Referring provider: Melissa Crespo CRNP  Dx:   Encounter Diagnosis     ICD-10-CM    1. SI (sacroiliac) joint dysfunction  M53.3       2. Chronic midline low back pain without sciatica  M54.50     G89.29           Start Time: 1344  Stop Time: 1429  Total time in clinic (min): 45 minutes    Subjective: Pt reports significant increase in low back pain and lateral hip symptoms since previous session. States her SIJ pain has not changed, but she has been experiencing increased radicular symptoms down to her L ankle, worse with activity and improved with rest. She reports she had an MRI Arthrogram which revealed no bursitis or labral tear, which she was very surprised about and is confused about what is causing her symptoms. Reports having to walk around a lot this weekend and is unsure of how her symptoms will react. She reports that generally she feels as though she has been regressing in her progress.      Objective: See treatment diary below    (+) TTP B/L Greater Trochanter  (-) Slump  (-) SLR  (-) MICHAEL B/L  (+) FADIR LLE      Assessment: Session today focused on re-assessment of hip and low back pain. Pt's reports of radicular symptoms not able to be reproduced with provocative neural tension testing. TTP over bilateral greater trochanter. Resisted abduction painless and moderately strong. Pain reproduced with active hip ER, especially when simulating sliding L leg up her R leg as if to put on a sock. TTP along posterior thigh, though no increase in radicular symptoms with resisted hip extension or knee flexion. Repeated movements assessment resulted in peripheralization of symptoms with repeated flexion and slight centralization from posterior lower leg up to her thigh with MARCELO. Discussed with pt regarding performance of MARCELO for HEP until she returns next week. Tolerated treatment fair. Patient would benefit  "from continued PT      Plan: Continue per plan of care.  Progress treatment as tolerated.         Manuals 2/18 2/28 3/4 3/21 3/28 4/15 4/30 5/9     Pelvic Rotation MET (RLE into extension, LLE into flexion)     KD        Lumbosacral  ES ES           LLE LAD and Gr. 5 mob             L Lumbar Rotational Mobs             Neuro Re-Ed 2/18 2/28 3/4 3/21         Hip Adduction Ball Squeezes             Bird Dogs             Dead Bugs 2x10ea in H/L  2x10ea 2x10ea nv        Bridges     3 x 10 3 x 10       PPT             RDLs             Captain Morgans 20x5\" B/L  20x5\" B/L 20x5\" B/L nv 20x5\" B/L       Palloff Press     nv        Ther Ex 2/18 2/28 3/4 3/21         Clamshells     2 x 10        Pt Ed ES ES ES ES         UB     10' 10'       Squats             LTR 5'', 10x 5\" 2x10 5\" 2x10 5\" 2x10         Assess pt symptoms  ES     JF ES     Step-Ups    W/ opposite march 2x10ea nv W/ opposite march 3x10ea       Leg Press 135# x10    145# 2x10  135# x10    145# 2x10  nv 145# 3x10       Re-assess strength and mobility        ES     TB Sidestepping Blue TB x3 laps  Blue TB x2 laps Blue TB x2 laps nv        Monster Walks Blue TB x2 laps  Blue TB x2 laps Blue TB x2 laps nv        Ball Rollouts  Pink Pball 20x5\"           Open Books  10x5\" B/L 10x5\" B/L          Lunges      W/ slider 2x10 B/L       Ther Activity                                       Gait Training                                       Modalities                                                                          "

## 2025-05-13 ENCOUNTER — APPOINTMENT (OUTPATIENT)
Dept: PHYSICAL THERAPY | Age: 50
End: 2025-05-13
Attending: NURSE PRACTITIONER
Payer: COMMERCIAL

## 2025-05-15 ENCOUNTER — OFFICE VISIT (OUTPATIENT)
Dept: OBGYN CLINIC | Facility: CLINIC | Age: 50
End: 2025-05-15
Payer: COMMERCIAL

## 2025-05-15 VITALS — HEIGHT: 65 IN | WEIGHT: 164.4 LBS | BODY MASS INDEX: 27.39 KG/M2

## 2025-05-15 DIAGNOSIS — M54.32 LEFT SIDED SCIATICA: Primary | ICD-10-CM

## 2025-05-15 PROCEDURE — 99213 OFFICE O/P EST LOW 20 MIN: CPT | Performed by: ORTHOPAEDIC SURGERY

## 2025-05-15 NOTE — PROGRESS NOTES
"Name: Gracia Chirinos      : 1975      MRN: [id number]  Encounter Provider: Eliz Pacheco MD  Encounter Date: 5/15/2025   Encounter department: [facility]  :  Assessment & Plan  Left sided sciatica  I had a very detailed discussion with the patient with regards to her persistent left gluteal pain.  Her left hip MRI arthrogram does not reveal any structural abnormality correlating with her current symptoms.  She is maximally tender over the left sciatic notch that reproduces her symptoms.  In this regard I recommend her to be seen by pain management for evaluation of potential sciatic notch area diagnostic/therapeutic cortisone injection.  Patient is agreeable for the same and referral has been made to pain management.  Orders:    Ambulatory referral to Spine & Pain Management; Future        History of Present Illness   HPI  Gracia Chirinos is a 49 y.o. female who presents for follow-up of her left hip/gluteal area pain.  Since her last office visit on 2025, patient had an MRI arthrogram of the left hip performed on 2025 and this did not reveal any left hip acetabular labral tear or other structural abnormality of the left hip.  Notably, patient does have a known history of lumbar spine fusion of L4-S1 level.  She localizes the pain to the left upper gluteal area with intermittent radiation to her left thigh and lateral hip.  History obtained from: patient    Review of Systems       Objective   Ht 5' 5\" (1.651 m)   Wt 74.6 kg (164 lb 6.4 oz)   LMP 2025   BMI 27.36 kg/m²      Physical Exam         Left Hip Exam     Tenderness   Left hip tenderness location: Maximally tender over the left sciatic notch.    Range of Motion   Flexion:  130   External rotation:  70   Internal rotation: 20     Muscle Strength   Abduction: 4/5     Tests   MICHAEL: negative    Comments:  Does report some discomfort over the L4-S1 area.  Denies having any significant discomfort over " "the left sacroiliac joint area.  Does have some discomfort over the gluteus medius diffusely but most prominently tender over the left sciatic notch.  Negative passive SLR on the right and equivocal left passive SLR at 80 degrees.  No significant gluteal pain with passive piriformis stretch.  No significant pain with left hip internal rotation in flexion.           I have personally reviewed pertinent films in PACS and my interpretation is as noted above in the HPI.   Procedures  Portions of the record may have been created with voice recognition software. Occasional wrong word or \"sound alike\" substitutions may have occurred due to the inherent limitations of voice recognition software. Please review the chart carefully and recognize, using context, where substitutions/typographical errors may have occurred.      "

## 2025-05-16 ENCOUNTER — TELEPHONE (OUTPATIENT)
Age: 50
End: 2025-05-16

## 2025-05-16 NOTE — TELEPHONE ENCOUNTER
Patient called asking to speak to [facility].  Warm transfer to [facility].

## 2025-05-19 ENCOUNTER — PATIENT MESSAGE (OUTPATIENT)
Dept: SLEEP CENTER | Facility: CLINIC | Age: 50
End: 2025-05-19

## 2025-05-21 ENCOUNTER — TELEPHONE (OUTPATIENT)
Age: 50
End: 2025-05-21

## 2025-05-21 DIAGNOSIS — F51.04 CHRONIC INSOMNIA: ICD-10-CM

## 2025-05-21 NOTE — PROGRESS NOTES
Assessment:  No diagnosis found.    Plan:  No orders of the defined types were placed in this encounter.      No orders of the defined types were placed in this encounter.      My impressions and treatment recommendations were discussed in detail with the patient, who verbalized understanding and had no further questions.    ***    {Oral Swab Statement:21585}    {Opioid Statement:48716}    {UDS Statement:15711}    {PDMP Statement:41272}    {Pain Management Procedure Statement:79035}    Follow-up is planned in *** time or sooner as warranted.  Discharge instructions were provided. I personally saw and examined the patient and I agree with the above discussed plan of care.    I have spent a total time of *** minutes in caring for this patient on the day of the visit/encounter including {AMB Counseling Topics:1455345673}.     History of Present Illness:    Gracia Chirinos is a 49 y.o. female who presents to [facility] for initial evaluation of the above stated pain complaints. The patient has a past medical and chronic pain history as outlined in the assessment section. {He/she (caps):45082} was referred by Eliz Pacheco MD  [address]  [address]  [place] [address] ***.    ***    Review of Systems:    Review of Systems        Past Medical History:   Diagnosis Date    Abnormal Pap smear of cervix     RAMIREZ positive     Anemia     Anxiety     Arthritis 2024    Basal cell carcinoma     Cataract     Medicine related    Depression     Glaucoma 2019    Migraine     Migraine     Miscarriage 2007    MRSA colonization 2016    Neck pain     Pregnancy     Rheumatoid arthritis (HCC) 2024    Vertebral artery dissection (HCC)     Vulvovaginitis     Yeast infection        Past Surgical History:   Procedure Laterality Date     SECTION, LOW TRANSVERSE      COLONOSCOPY      DILATION AND CURETTAGE OF UTERUS      FL INJECTION LEFT HIP (ARTHROGRAM)  2025     "GYNECOLOGIC CRYOSURGERY      cervix    LASIK      SKIN LESION EXCISION      basal cell carcinoma of left cheek    SPINAL FUSION  10/2023       Family History   Problem Relation Age of Onset    Hypertension Mother     Lung cancer Mother 56    Brain cancer Mother 56    Depression Mother     Lupus Mother     Early death Mother         Lung cancer    Breast cancer Maternal Aunt         dx in 40's     Multiple sclerosis Father     No Known Problems Daughter     No Known Problems Maternal Grandmother     No Known Problems Maternal Grandfather     No Known Problems Paternal Grandmother     No Known Problems Paternal Grandfather     No Known Problems Maternal Aunt        Social History     Occupational History    Not on file   Tobacco Use    Smoking status: Never    Smokeless tobacco: Never   Vaping Use    Vaping status: Never Used   Substance and Sexual Activity    Alcohol use: No    Drug use: No    Sexual activity: Yes     Partners: Male     Birth control/protection: Male Sterilization       Current Medications[1]    Allergies[2]    Physical Exam:    There were no vitals taken for this visit.    Constitutional: {General Appearance:82404::\"normal, well developed, well nourished, alert, in no distress and non-toxic and no overt pain behavior.\"}  Eyes: {Sclera:35843::\"anicteric\"}  HEENT: {Hearin::\"grossly intact\"}  Neck: {Neck:81134::\"supple, symmetric, trachea midline and no masses \"}  Pulmonary:{Respiratory effort:09188::\"even and unlabored\"}  Cardiovascular:{Examination of Extremities:67975::\"No edema or pitting edema present\"}  Skin:{Skin and Subcutaneous tissues:74889::\"Normal without rashes or lesions and well hydrated\"}  Psychiatric:{Mood and Affect:57312::\"Mood and affect appropriate\"}  Neurologic:{Cranial Nerves:06044::\"Cranial Nerves II-XII grossly intact\"}  Musculoskeletal:{Gait and Station:68865::\"normal\"}    Imaging  No orders to display       No orders of the defined types were placed in this " encounter.        \       [1]   Current Outpatient Medications:     ALPRAZolam (XANAX) 0.25 mg tablet, Take 1 tablet (0.25 mg total) by mouth daily as needed for anxiety, Disp: 30 tablet, Rfl: 0    benzonatate (TESSALON PERLES) 100 mg capsule, take 1 capsule by mouth three times a day as needed for cough (Patient not taking: Reported on 2/7/2025), Disp: , Rfl:     butalbital-acetaminophen-caffeine (Esgic) -40 mg per tablet, Take 1 tablet by mouth every 4 (four) hours as needed for headaches, Disp: 30 tablet, Rfl: 0    escitalopram (LEXAPRO) 20 mg tablet, Take 1 tablet (20 mg total) by mouth daily, Disp: 90 tablet, Rfl: 0    ferrous sulfate 325 (65 FE) MG EC tablet, 325mg every other day with Vitamin C, Disp: 90 tablet, Rfl: 0    fluticasone (FLONASE) 50 mcg/act nasal spray, 2 sprays into each nostril daily (Patient not taking: Reported on 2/7/2025), Disp: , Rfl:     gabapentin (NEURONTIN) 300 mg capsule, Take 2 capsules (600 mg total) by mouth 2 (two) times a day Take 2 capsule in the morning and 2 at bedtime, Disp: 120 capsule, Rfl: 1    guaiFENesin-codeine (ROBITUSSIN AC) 100-10 mg/5 mL oral solution, Take 5 mL by mouth 3 (three) times a day as needed for cough (Patient not taking: Reported on 2/7/2025), Disp: 237 mL, Rfl: 0    hydroxychloroquine (PLAQUENIL) 200 mg tablet, Take 1 tablet (200 mg total) by mouth 2 (two) times a day with meals, Disp: 180 tablet, Rfl: 1    hydrOXYzine HCL (ATARAX) 25 mg tablet, Take 1 tablet (25 mg total) by mouth in the morning, Disp: 90 tablet, Rfl: 0    latanoprost (XALATAN) 0.005 % ophthalmic solution, INSTILL 1 DROP IN AFFECTED EYE AT BEDTIME, Disp: , Rfl: 2    metFORMIN (GLUCOPHAGE-XR) 500 mg 24 hr tablet, Start with 1 tablet with dinner and after 1 week increase to 1 tablet twice a day with meals. (Patient not taking: Reported on 5/15/2025), Disp: 180 tablet, Rfl: 3    naloxone (NARCAN) 4 mg/0.1 mL nasal spray, Administer 1 spray into a nostril. If no response after 2-3  minutes, give another dose in the other nostril using a new spray. (Patient not taking: Reported on 2/7/2025), Disp: 1 each, Rfl: 1    naproxen (NAPROSYN) 500 mg tablet, Take 1 tablet (500 mg total) by mouth 2 (two) times a day with meals, Disp: 60 tablet, Rfl: 0    ondansetron (ZOFRAN) 4 mg tablet, Take 1 tablet (4 mg total) by mouth every 8 (eight) hours as needed for nausea or vomiting, Disp: 30 tablet, Rfl: 0    pantoprazole (PROTONIX) 40 mg tablet, Take 1 tablet (40 mg total) by mouth every morning (Patient not taking: Reported on 5/15/2025), Disp: 90 tablet, Rfl: 1    pramipexole (MIRAPEX) 0.5 mg tablet, Take 1 tablet (0.5 mg total) by mouth 2 (two) times a day, Disp: 90 tablet, Rfl: 1    Probiotic Product (PRO-BIOTIC BLEND PO), Take by mouth, Disp: , Rfl:     rimegepant sulfate (NURTEC) 75 mg TBDP, Take 1 tablet (75 mg) by mouth once at the onset of a headache. Max dose: 75 mg/day. (Patient not taking: Reported on 2/26/2025), Disp: 16 tablet, Rfl: 3    sodium chloride (OCEAN) 0.65 % nasal spray, 1 spray into each nostril (Patient not taking: Reported on 2/7/2025), Disp: , Rfl:     [START ON 5/22/2025] tirzepatide (Zepbound) 10 mg/0.5 mL auto-injector, Inject 0.5 mL (10 mg total) under the skin once a week Do not start before May 22, 2025., Disp: 2 mL, Rfl: 2    tirzepatide (Zepbound) 7.5 mg/0.5 mL auto-injector, Inject 0.5 mL (7.5 mg total) under the skin once a week, Disp: 2 mL, Rfl: 0    tiZANidine (ZANAFLEX) 2 mg tablet, Take 1-2 tablets as needed at bedtime (max 4mg/day) for muscle spasm, Disp: 60 tablet, Rfl: 1    traZODone (DESYREL) 50 mg tablet, Take 1.5 tablets (75 mg total) by mouth daily at bedtime, Disp: 135 tablet, Rfl: 0    ZyrTEC Allergy 10 MG tablet, Take 10 mg by mouth daily (Patient not taking: Reported on 4/16/2025), Disp: , Rfl:     Current Facility-Administered Medications:     Botulinum Toxin Type A SOLR 200 Units, 200 Units, Injection, See Admin Instructions, , 200 Units at 04/16/25  "1054  [2]   Allergies  Allergen Reactions    Reglan [Metoclopramide] Confusion     Pt reports Reglan \"makes me lose my mind and go crazy.\"     "

## 2025-05-21 NOTE — TELEPHONE ENCOUNTER
Spoke to patient, per Dr. Romero doesn't need to f/up today she has an upcoming appt. In July w/ Dr. Romero.

## 2025-05-21 NOTE — TELEPHONE ENCOUNTER
Patient calling in and wondering if she needs to keep her appt today at 10 with Dr. Romero as she just spoke with her via [employer]     Please call patient back and let her know if she still needs to be seen or can reschedule to a later date

## 2025-05-22 ENCOUNTER — CONSULT (OUTPATIENT)
Age: 50
End: 2025-05-22
Attending: ORTHOPAEDIC SURGERY
Payer: COMMERCIAL

## 2025-05-22 ENCOUNTER — TELEPHONE (OUTPATIENT)
Age: 50
End: 2025-05-22

## 2025-05-22 VITALS — BODY MASS INDEX: 27.32 KG/M2 | WEIGHT: 164 LBS | HEIGHT: 65 IN

## 2025-05-22 DIAGNOSIS — G47.33 OSA (OBSTRUCTIVE SLEEP APNEA): Primary | ICD-10-CM

## 2025-05-22 DIAGNOSIS — M25.559 GREATER TROCHANTERIC PAIN SYNDROME: Primary | ICD-10-CM

## 2025-05-22 DIAGNOSIS — M46.1 SACROILIITIS (HCC): ICD-10-CM

## 2025-05-22 PROCEDURE — 99244 OFF/OP CNSLTJ NEW/EST MOD 40: CPT | Performed by: STUDENT IN AN ORGANIZED HEALTH CARE EDUCATION/TRAINING PROGRAM

## 2025-05-22 RX ORDER — TRAZODONE HYDROCHLORIDE 50 MG/1
75 TABLET ORAL
Qty: 135 TABLET | Refills: 0 | Status: SHIPPED | OUTPATIENT
Start: 2025-05-22 | End: 2025-08-20

## 2025-05-22 NOTE — PROGRESS NOTES
Assessment:  1. Greater trochanteric pain syndrome    2. Sacroiliitis (HCC)        Plan:  No orders of the defined types were placed in this encounter.      No orders of the defined types were placed in this encounter.      My impressions and treatment recommendations were discussed in detail with the patient, who verbalized understanding and had no further questions.    Gracia is a very pleasant 49-year-old female who presents today for evaluation management of low back and leg pain.  I independently interpreted the patient's lumbar radiographs showing stable posterior lumbar instrumented fusion at L4-S1.  Based on the patient's history and physical examination, it does appear that the patient's pain is multifactorial and mechanical in nature with components of sacroiliac joint dysfunction as well as greater trochanteric pain syndrome.  Given the patient's failure to improve with conservative care including physical therapy and multimodal analgesics, I do think she would benefit from bilateral sacroiliac joint injection and greater trochanteric bursa injection under fluoroscopic guidance.    We did discuss that if failure to improve, we can consider obtaining an updated lumbar MRI to further evaluate for pathology.    Complete risks and benefits including bleeding, infection, tissue reaction, nerve injury and allergic reaction were discussed. The approach was demonstrated using models and literature was provided. Verbal and written consent was obtained.    Discharge instructions were provided. I personally saw and examined the patient and I agree with the above discussed plan of care.    I have spent a total time of 42 minutes in caring for this patient on the day of the visit/encounter including Diagnostic results, Prognosis, Risks and benefits of tx options, Instructions for management, Impressions, Documenting in the medical record, Reviewing/placing orders in the medical record (including tests, medications,  and/or procedures), and Obtaining or reviewing history  .     History of Present Illness:    Gracia Chirinos is a 49 y.o. female who presents to [facility] for initial evaluation of the above stated pain complaints. The patient has a past medical and chronic pain history as outlined in the assessment section. She was referred by Eliz Pacheco MD  [address]  [address] [place] [address] .    49-year-old female with past medical history of anxiety, GERD, skin cancer, rheumatoid arthritis, history of L4-S1 PLIF presents today for evaluation management of low back and bilateral leg pain.  Pain has been present for 4 years.  She denies any specific inciting event.  The pain is rated as moderate to severe and is currently 8 out of 10.  She reports interference with daily activities.  The pain is constant worse throughout the day.  Pain is described as burning and throbbing.  She reports weakness in her lower limbs.  Pain is located across the lumbosacral area into the buttock radiating down the legs.  Pain is improved with lying down made worse with standing bending and walking.  She has tried physical therapy and has had surgery without any relief of her symptoms.  She uses heat and ice without relief.  She is currently using Voltaren gel.  She has tried Tylenol in the past.  She is also using naproxen and tizanidine.  She tried oral steroids in the past which have been helpful and she is also on gabapentin.  She presents today for further evaluation.    Review of Systems:    Review of Systems   Constitutional:  Negative for fever and unexpected weight change.   HENT:  Negative for trouble swallowing.    Eyes:  Negative for visual disturbance.   Respiratory:  Negative for chest tightness, shortness of breath and wheezing.    Cardiovascular:  Negative for chest pain and palpitations.   Gastrointestinal:  Negative for constipation, diarrhea, nausea and vomiting.   Endocrine: Negative  "for cold intolerance, heat intolerance and polydipsia.   Genitourinary:  Negative for difficulty urinating and frequency.   Musculoskeletal:  Positive for back pain, gait problem, neck pain and neck stiffness. Negative for arthralgias, joint swelling and myalgias.   Skin:  Negative for rash.   Neurological:  Positive for dizziness, weakness, light-headedness, numbness and headaches. Negative for seizures and syncope.   Hematological:  Does not bruise/bleed easily.   Psychiatric/Behavioral:  Positive for sleep disturbance. Negative for dysphoric mood.    All other systems reviewed and are negative.          Past Medical History[1]    Past Surgical History[2]    Family History[3]    Social History     Occupational History    Not on file   Tobacco Use    Smoking status: Never    Smokeless tobacco: Never   Vaping Use    Vaping status: Never Used   Substance and Sexual Activity    Alcohol use: No    Drug use: No    Sexual activity: Yes     Partners: Male     Birth control/protection: Male Sterilization       Current Medications[4]    Allergies[5]    Physical Exam:    Ht 5' 5\" (1.651 m)   Wt 74.4 kg (164 lb)   BMI 27.29 kg/m²     Constitutional: normal, well developed, well nourished, alert, in no distress and non-toxic and no overt pain behavior.  Eyes: anicteric  HEENT: grossly intact  Neck: supple, symmetric, trachea midline and no masses   Pulmonary:even and unlabored  Cardiovascular:No edema or pitting edema present  Skin:Normal without rashes or lesions and well hydrated  Psychiatric:Mood and affect appropriate  Neurologic:Cranial Nerves II-XII grossly intact  Musculoskeletal: Range of motion of the lumbar spine is full.  Tenderness to the SI area with positive Shahzad's, Gaenslen's, and SI compression test.  Tenderness over the greater trochanter with provocation on Shahzad's test.  Strength, sensation, reflexes preserved in lower limbs.    Imaging       XR spine lumbar 2 or 3 views injury  Status: Final result "     PACS Images - GE     Show images for XR spine lumbar 2 or 3 views injury  PACS Images - Sectra     Show images for XR spine lumbar 2 or 3 views injury  Study Result    Narrative & Impression   XR SPINE LUMBAR 2 OR 3 VIEWS INJURY     INDICATION: Z98.1: Arthrodesis status.     COMPARISON: None     FINDINGS:     No acute fracture. Intact pedicles.     Five non-rib-bearing lumbar vertebral bodies.     Postoperative changes of posterior fusion are present at L4-S1 with prosthetic the spacers at L4/L5 and L5/S1. The surgical hardware appears intact. There is mild convexity of the lower thoracic/upper lumbar spine to the left. No subluxation.     Age-appropriate lumbar degenerative changes.     Unremarkable soft tissues.     IMPRESSION:     No acute osseous abnormality.     Postoperative changes of posterior fusion at L4-S1. The surgical hardware appears intact.           Workstation performed: [id number]        Imaging    XR spine lumbar 2 or 3 views injury (Order: [id number]) - 11/11/2024    Result History    XR spine lumbar 2 or 3 views injury (Order #[id number]) on 11/13/2024 - Order Result History Report    Order Report     Order Details  Order Questions    Question Answer   Pregnant? No            Result Information    Status Priority Source   Final result (11/13/2024  4:07 PM) Routine      Reason for Exam    Dx: S/P lumbar spinal fusion [Z98.1 (ICD-10-CM)]           XR spine lumbar 2 or 3 views injury: Patient Communication     Add Comments   Seen       Signed by    Signed Date/Time  Phone Pager   LAKISHA RIVERA 11/13/2024 16:07 [phone]        Exam Information    Status Exam Begun  Exam Ended Performing Tech   Final [99] 11/11/2024 15:21 11/11/2024  3:23 PM JOHNNY ALEGRIA [2329]       Questionnaire          Question Answer Comment   1. When should the test be performed?     2. Is the patient pregnant? No          End Exam      IMAGING END EXAM XRAY    Question Answer Comment   1. Is the patient  "pregnant? No    2. Script Info/History/Comments: 1 year follow up for fusion of L-3 TO S-1.  PT HAS NO COMPLAINTS.    3. Any additional comments the Radiologist needs to know?     4. Was the patient shielded? No    5. Was fluoro used? No    6. Fluoro time? Please type \"MINUTES\" or \"SECONDS\" following your time.     7. Were all the images in this exam within the acceptable exposure index range as posted? Yes    8. If No, provide additional information why (ie which view or position, fixed or AEC, wall/table/tabletop, etc)     9. Number of images sent to PACS: 3    10. Was jewelry removed from the patient? No    11. Jewelry removed:           PATIENT EDUCATION    Question Answer Comment   1. Was the patient educated? Yes    2. Why was the patient not educated?                External Results Report    Open External Results Report      Encounter    View Encounter                     Sedation Documentation Timeline (11/11/2024 00:00 to 11/11/2024 15:24)    An end event has not been filed for the most recent intervention. Due to this intervention’s length, all data may not appear below. To see all data, file an end event.  11/11/2024 11/11/2024 Event By   15:04:54 Discharge Orders Placed CB   Imaging  - XR spine lumbar 2 or 3 views injury           Pre-op Summary    Pre-op             Recovery Summary    Recovery                 Routing History    None         No orders to display       No orders of the defined types were placed in this encounter.              [1]   Past Medical History:  Diagnosis Date    Abnormal Pap smear of cervix     RAMIREZ positive     Anemia 2024    Anxiety     Arthritis March 2024    Basal cell carcinoma     Cataract 2024    Medicine related    Depression     Glaucoma 2019    Migraine     Migraine     Miscarriage 2007    MRSA colonization 07/13/2016    Neck pain     Pregnancy     Rheumatoid arthritis (HCC) 03/2024    Vertebral artery dissection (HCC)     Vulvovaginitis     Yeast infection    [2] "   Past Surgical History:  Procedure Laterality Date     SECTION, LOW TRANSVERSE      COLONOSCOPY      DILATION AND CURETTAGE OF UTERUS      FL INJECTION LEFT HIP (ARTHROGRAM)  2025    GYNECOLOGIC CRYOSURGERY      cervix    LASIK      SKIN LESION EXCISION      basal cell carcinoma of left cheek    SPINAL FUSION  10/2023   [3]   Family History  Problem Relation Name Age of Onset    Hypertension Mother Judit     Lung cancer Mother Judit 56    Brain cancer Mother Judit 56    Depression Mother Judit     Lupus Mother Judit     Early death Mother Judit         Lung cancer    Breast cancer Maternal Aunt          dx in 40's     Multiple sclerosis Father      No Known Problems Daughter      No Known Problems Maternal Grandmother      No Known Problems Maternal Grandfather      No Known Problems Paternal Grandmother      No Known Problems Paternal Grandfather      No Known Problems Maternal Aunt     [4]   Current Outpatient Medications:     ALPRAZolam (XANAX) 0.25 mg tablet, Take 1 tablet (0.25 mg total) by mouth daily as needed for anxiety, Disp: 30 tablet, Rfl: 0    butalbital-acetaminophen-caffeine (Esgic) -40 mg per tablet, Take 1 tablet by mouth every 4 (four) hours as needed for headaches, Disp: 30 tablet, Rfl: 0    escitalopram (LEXAPRO) 20 mg tablet, Take 1 tablet (20 mg total) by mouth daily, Disp: 90 tablet, Rfl: 0    ferrous sulfate 325 (65 FE) MG EC tablet, 325mg every other day with Vitamin C, Disp: 90 tablet, Rfl: 0    hydrOXYzine HCL (ATARAX) 25 mg tablet, Take 1 tablet (25 mg total) by mouth in the morning, Disp: 90 tablet, Rfl: 0    latanoprost (XALATAN) 0.005 % ophthalmic solution, , Disp: , Rfl: 2    naproxen (NAPROSYN) 500 mg tablet, Take 1 tablet (500 mg total) by mouth 2 (two) times a day with meals, Disp: 60 tablet, Rfl: 0    ondansetron (ZOFRAN) 4 mg tablet, Take 1 tablet (4 mg total) by mouth every 8 (eight) hours as needed for nausea or vomiting, Disp: 30 tablet,  Rfl: 0    pramipexole (MIRAPEX) 0.5 mg tablet, Take 1 tablet (0.5 mg total) by mouth 2 (two) times a day, Disp: 90 tablet, Rfl: 1    Probiotic Product (PRO-BIOTIC BLEND PO), Take by mouth, Disp: , Rfl:     tirzepatide (Zepbound) 10 mg/0.5 mL auto-injector, Inject 0.5 mL (10 mg total) under the skin once a week Do not start before May 22, 2025., Disp: 2 mL, Rfl: 2    tirzepatide (Zepbound) 7.5 mg/0.5 mL auto-injector, Inject 0.5 mL (7.5 mg total) under the skin once a week, Disp: 2 mL, Rfl: 0    tiZANidine (ZANAFLEX) 2 mg tablet, Take 1-2 tablets as needed at bedtime (max 4mg/day) for muscle spasm, Disp: 60 tablet, Rfl: 1    traZODone (DESYREL) 50 mg tablet, Take 1.5 tablets (75 mg total) by mouth daily at bedtime, Disp: 135 tablet, Rfl: 0    benzonatate (TESSALON PERLES) 100 mg capsule, take 1 capsule by mouth three times a day as needed for cough (Patient not taking: Reported on 2/7/2025), Disp: , Rfl:     fluticasone (FLONASE) 50 mcg/act nasal spray, 2 sprays into each nostril daily (Patient not taking: Reported on 2/7/2025), Disp: , Rfl:     gabapentin (NEURONTIN) 300 mg capsule, Take 2 capsules (600 mg total) by mouth 2 (two) times a day Take 2 capsule in the morning and 2 at bedtime, Disp: 120 capsule, Rfl: 1    guaiFENesin-codeine (ROBITUSSIN AC) 100-10 mg/5 mL oral solution, Take 5 mL by mouth 3 (three) times a day as needed for cough (Patient not taking: Reported on 2/7/2025), Disp: 237 mL, Rfl: 0    hydroxychloroquine (PLAQUENIL) 200 mg tablet, Take 1 tablet (200 mg total) by mouth 2 (two) times a day with meals, Disp: 180 tablet, Rfl: 1    metFORMIN (GLUCOPHAGE-XR) 500 mg 24 hr tablet, Start with 1 tablet with dinner and after 1 week increase to 1 tablet twice a day with meals. (Patient not taking: Reported on 5/22/2025), Disp: 180 tablet, Rfl: 3    naloxone (NARCAN) 4 mg/0.1 mL nasal spray, Administer 1 spray into a nostril. If no response after 2-3 minutes, give another dose in the other nostril using a  "new spray. (Patient not taking: Reported on 2/7/2025), Disp: 1 each, Rfl: 1    pantoprazole (PROTONIX) 40 mg tablet, Take 1 tablet (40 mg total) by mouth every morning (Patient not taking: Reported on 5/15/2025), Disp: 90 tablet, Rfl: 1    rimegepant sulfate (NURTEC) 75 mg TBDP, Take 1 tablet (75 mg) by mouth once at the onset of a headache. Max dose: 75 mg/day. (Patient not taking: Reported on 2/26/2025), Disp: 16 tablet, Rfl: 3    sodium chloride (OCEAN) 0.65 % nasal spray, 1 spray into each nostril (Patient not taking: Reported on 2/7/2025), Disp: , Rfl:     ZyrTEC Allergy 10 MG tablet, Take 10 mg by mouth daily (Patient not taking: Reported on 4/16/2025), Disp: , Rfl:     Current Facility-Administered Medications:     Botulinum Toxin Type A SOLR 200 Units, 200 Units, Injection, See Admin Instructions, , 200 Units at 04/16/25 1054  [5]   Allergies  Allergen Reactions    Reglan [Metoclopramide] Confusion     Pt reports Reglan \"makes me lose my mind and go crazy.\"     "

## 2025-05-22 NOTE — TELEPHONE ENCOUNTER
Per chart, patient also sent [employer] message regarding repeat sleep study on 5/19/25 which was forwarded to Dr. Molina.  Replied to patient via [employer] advising her that both messages have been sent to Dr Molina and either Dr. Molina or the nursing staff will reach out once he replies with his recommendations.     Dr. Molina, please advise.

## 2025-05-22 NOTE — TELEPHONE ENCOUNTER
Patient called in stated she has lost more weight since last office visit and would like to know if she could repeat sleep study to possibly get off CPAP machine  Patient requesting call back to discuss  # [phone]

## 2025-05-22 NOTE — TELEPHONE ENCOUNTER
Chart reviewed; I would be amenable to a repeat sleep study to determine the current severity, if any, of her ANETTE after weight loss. Her BMI has dropped several points since the study was completed. I will place the order for it, and it can be scheduled at her earliest convenience.

## 2025-05-27 ENCOUNTER — TELEPHONE (OUTPATIENT)
Age: 50
End: 2025-05-27

## 2025-05-27 NOTE — TELEPHONE ENCOUNTER
I thought the same thing to but if you look under podiatry note you can see she cx with him for April I had to search it for it.  She was sched for a right foot cheilectomy

## 2025-05-27 NOTE — TELEPHONE ENCOUNTER
Caller: Gracia     Doctor and/or Office: Dr. Orquidea ATWOOD#: [phone]     Escalation: Surgery Gracia called in to rs her surgery she would like to sched for November she was wondering how booked out you are.  Please advise thank you

## 2025-05-28 ENCOUNTER — TELEPHONE (OUTPATIENT)
Age: 50
End: 2025-05-28

## 2025-05-28 NOTE — TELEPHONE ENCOUNTER
Patient called in stating that she is having a procedure on Kaya 3 patient is calling in requesting something to relax while procedure is being done patient is having a cancer cell removed from fore head patient is requesting a call back at [phone]  Please Advise

## 2025-05-28 NOTE — TELEPHONE ENCOUNTER
Pt is having a cancel cell removed from her forehead and is requesting something to help her relax during procedure.  Please advise

## 2025-05-30 DIAGNOSIS — F41.9 ANXIETY: ICD-10-CM

## 2025-05-30 RX ORDER — ALPRAZOLAM 0.25 MG
0.25 TABLET ORAL DAILY PRN
Qty: 30 TABLET | Refills: 0 | Status: SHIPPED | OUTPATIENT
Start: 2025-05-30

## 2025-05-30 NOTE — TELEPHONE ENCOUNTER
Pt is requesting Valium be sent to the pharmacy instead because she states Xanax does not do much for her.    Please advise

## 2025-05-31 ENCOUNTER — HOSPITAL ENCOUNTER (EMERGENCY)
Facility: HOSPITAL | Age: 50
Discharge: HOME | End: 2025-05-31
Attending: EMERGENCY MEDICINE | Admitting: EMERGENCY MEDICINE
Payer: COMMERCIAL

## 2025-05-31 ENCOUNTER — APPOINTMENT (EMERGENCY)
Dept: CT IMAGING | Facility: HOSPITAL | Age: 50
End: 2025-05-31
Payer: COMMERCIAL

## 2025-05-31 ENCOUNTER — APPOINTMENT (EMERGENCY)
Dept: RADIOLOGY | Facility: HOSPITAL | Age: 50
End: 2025-05-31
Payer: COMMERCIAL

## 2025-05-31 VITALS
RESPIRATION RATE: 18 BRPM | BODY MASS INDEX: 27.15 KG/M2 | SYSTOLIC BLOOD PRESSURE: 116 MMHG | TEMPERATURE: 98.3 F | DIASTOLIC BLOOD PRESSURE: 67 MMHG | WEIGHT: 163.14 LBS | OXYGEN SATURATION: 100 % | HEART RATE: 87 BPM

## 2025-05-31 DIAGNOSIS — R55 SYNCOPE AND COLLAPSE: Primary | ICD-10-CM

## 2025-05-31 DIAGNOSIS — S90.31XA TRAUMATIC ECCHYMOSIS OF RIGHT FOOT, INITIAL ENCOUNTER: ICD-10-CM

## 2025-05-31 DIAGNOSIS — R42 ORTHOSTATIC DIZZINESS: ICD-10-CM

## 2025-05-31 LAB
ALBUMIN SERPL BCG-MCNC: 4.2 G/DL (ref 3.5–5)
ALP SERPL-CCNC: 60 U/L (ref 34–104)
ALT SERPL W P-5'-P-CCNC: 10 U/L (ref 7–52)
ANION GAP SERPL CALCULATED.3IONS-SCNC: 4 MMOL/L (ref 4–13)
AST SERPL W P-5'-P-CCNC: 19 U/L (ref 13–39)
BASOPHILS # BLD AUTO: 0.01 THOUSANDS/ÂΜL (ref 0–0.1)
BASOPHILS NFR BLD AUTO: 0 % (ref 0–1)
BILIRUB SERPL-MCNC: 0.32 MG/DL (ref 0.2–1)
BUN SERPL-MCNC: 17 MG/DL (ref 5–25)
CALCIUM SERPL-MCNC: 8.6 MG/DL (ref 8.4–10.2)
CARDIAC TROPONIN I PNL SERPL HS: <2 NG/L (ref ?–50)
CHLORIDE SERPL-SCNC: 107 MMOL/L (ref 96–108)
CO2 SERPL-SCNC: 28 MMOL/L (ref 21–32)
CREAT SERPL-MCNC: 0.71 MG/DL (ref 0.6–1.3)
EOSINOPHIL # BLD AUTO: 0.12 THOUSAND/ÂΜL (ref 0–0.61)
EOSINOPHIL NFR BLD AUTO: 2 % (ref 0–6)
ERYTHROCYTE [DISTWIDTH] IN BLOOD BY AUTOMATED COUNT: 12.4 % (ref 11.6–15.1)
GFR SERPL CREATININE-BSD FRML MDRD: 100 ML/MIN/1.73SQ M
GLUCOSE SERPL-MCNC: 85 MG/DL (ref 65–140)
HCT VFR BLD AUTO: 38.3 % (ref 34.8–46.1)
HGB BLD-MCNC: 12.9 G/DL (ref 11.5–15.4)
IMM GRANULOCYTES # BLD AUTO: 0.02 THOUSAND/UL (ref 0–0.2)
IMM GRANULOCYTES NFR BLD AUTO: 0 % (ref 0–2)
LYMPHOCYTES # BLD AUTO: 1.37 THOUSANDS/ÂΜL (ref 0.6–4.47)
LYMPHOCYTES NFR BLD AUTO: 20 % (ref 14–44)
MAGNESIUM SERPL-MCNC: 2.2 MG/DL (ref 1.9–2.7)
MCH RBC QN AUTO: 29.1 PG (ref 26.8–34.3)
MCHC RBC AUTO-ENTMCNC: 33.7 G/DL (ref 31.4–37.4)
MCV RBC AUTO: 87 FL (ref 82–98)
MONOCYTES # BLD AUTO: 0.37 THOUSAND/ÂΜL (ref 0.17–1.22)
MONOCYTES NFR BLD AUTO: 5 % (ref 4–12)
NEUTROPHILS # BLD AUTO: 4.9 THOUSANDS/ÂΜL (ref 1.85–7.62)
NEUTS SEG NFR BLD AUTO: 73 % (ref 43–75)
NRBC BLD AUTO-RTO: 0 /100 WBCS
PLATELET # BLD AUTO: 168 THOUSANDS/UL (ref 149–390)
PMV BLD AUTO: 9.1 FL (ref 8.9–12.7)
POTASSIUM SERPL-SCNC: 3.8 MMOL/L (ref 3.5–5.3)
PROT SERPL-MCNC: 7 G/DL (ref 6.4–8.4)
RBC # BLD AUTO: 4.43 MILLION/UL (ref 3.81–5.12)
SODIUM SERPL-SCNC: 139 MMOL/L (ref 135–147)
TSH SERPL DL<=0.05 MIU/L-ACNC: 1.21 UIU/ML (ref 0.45–4.5)
WBC # BLD AUTO: 6.79 THOUSAND/UL (ref 4.31–10.16)

## 2025-05-31 PROCEDURE — 85025 COMPLETE CBC W/AUTO DIFF WBC: CPT

## 2025-05-31 PROCEDURE — 93005 ELECTROCARDIOGRAM TRACING: CPT

## 2025-05-31 PROCEDURE — 99285 EMERGENCY DEPT VISIT HI MDM: CPT

## 2025-05-31 PROCEDURE — 73630 X-RAY EXAM OF FOOT: CPT

## 2025-05-31 PROCEDURE — 71045 X-RAY EXAM CHEST 1 VIEW: CPT

## 2025-05-31 PROCEDURE — 84484 ASSAY OF TROPONIN QUANT: CPT

## 2025-05-31 PROCEDURE — 80053 COMPREHEN METABOLIC PANEL: CPT

## 2025-05-31 PROCEDURE — 70450 CT HEAD/BRAIN W/O DYE: CPT

## 2025-05-31 PROCEDURE — 99285 EMERGENCY DEPT VISIT HI MDM: CPT | Performed by: PHYSICIAN ASSISTANT

## 2025-05-31 PROCEDURE — 36415 COLL VENOUS BLD VENIPUNCTURE: CPT

## 2025-05-31 PROCEDURE — 84443 ASSAY THYROID STIM HORMONE: CPT | Performed by: PHYSICIAN ASSISTANT

## 2025-05-31 PROCEDURE — 83735 ASSAY OF MAGNESIUM: CPT | Performed by: PHYSICIAN ASSISTANT

## 2025-05-31 PROCEDURE — 73610 X-RAY EXAM OF ANKLE: CPT

## 2025-05-31 PROCEDURE — 96360 HYDRATION IV INFUSION INIT: CPT

## 2025-05-31 RX ORDER — ACETAMINOPHEN 325 MG/1
975 TABLET ORAL ONCE
Status: COMPLETED | OUTPATIENT
Start: 2025-05-31 | End: 2025-05-31

## 2025-05-31 RX ADMIN — SODIUM CHLORIDE 1000 ML: 0.9 INJECTION, SOLUTION INTRAVENOUS at 12:54

## 2025-05-31 RX ADMIN — ACETAMINOPHEN 975 MG: 325 TABLET ORAL at 14:51

## 2025-05-31 NOTE — ED PROVIDER NOTES
"Time reflects when diagnosis was documented in both MDM as applicable and the Disposition within this note       Time User Action Codes Description Comment    2025  3:08 PM Lon March [R55] Syncope and collapse     2025  3:08 PM Lon March [R42] Orthostatic dizziness     2025  3:08 PM Lon March [S90.31XA] Traumatic ecchymosis of right foot, initial encounter           ED Disposition       ED Disposition   Discharge    Condition   Stable    Date/Time   Sat May 31, 2025  3:18 PM    Comment   Gracia Taran discharge to home/self care.                   Assessment & Plan       Medical Decision Making    Patient is a 49-year-old female with history of anemia, migraine, rheumatoid arthritis, vertebral artery dissection, surgical history of  section that presents to the Emergency Department after syncopal episode with collapse resulting in head strike yesterday.     No sirs  Hemodynamically stable and afebrile  No electrolytes, normal kidney function, normal glucose  No leukocytosis, no bandemia; normal H&H  Normal sinus rhythm on ECG, negative troponin, doubt ACS component; chest x-ray with no acute pulmonary disease administered by me  Normal TSH, doubt acute thyroid pathology  Right foot right ankle x-rays with no acute osseous abnormality on wet read; patient still had pain, ecchymosis noted to dorsum of right foot; CAM boot in place  CT head without contrast that impression is \"no acute intracranial abnormality\"  Orthostatics performed, no orthostasis  Patient did report getting some dizziness when she got up from a sitting to standing position  Will have patient follow-up with cardiology for further evaluation    Delivered 1 L bolus of normal saline solution in the emergency department; patient demonstrates decrease in presenting dizziness ED symptomatology status post medication delivery  Ddx likely and not limited to orthostatic hypotension, BPPV, Ménière's disease, " labyrinthitis, TIA, hyponatremia, anemia, hypoglycemia  Will treat for orthostatic hypotension    Follow-up with PCP  Follow up with emergency department if symptoms persist or exacerbate  Patient demonstrates verbal understanding of all clinical laboratory and imaging findings, discharge instructions, follow-up, and verbally agrees with current treatment plan with teach back    *Due to voice recognition software, sound alike and misspelled words may be contained in the documentation*,dm    Amount and/or Complexity of Data Reviewed  Labs: ordered. Decision-making details documented in ED Course.  Radiology: ordered and independent interpretation performed. Decision-making details documented in ED Course.  ECG/medicine tests: ordered and independent interpretation performed. Decision-making details documented in ED Course.    Risk  OTC drugs.        ED Course as of 05/31/25 1943   Sat May 31, 2025   1353 Patient with orthostatics, laying down 73, blood pressure 115/70, SpO2 saturation 100%  Sitting 72, blood pressure 122/67, SpO2 saturation 100%  Standing heart rate 87, blood pressure 116/67, 98 SpO2 saturation- via Derreck Perez PCA       Medications   sodium chloride 0.9 % bolus 1,000 mL (0 mL Intravenous Stopped 5/31/25 1354)   acetaminophen (TYLENOL) tablet 975 mg (975 mg Oral Given 5/31/25 1451)       ED Risk Strat Scores                    No data recorded                            History of Present Illness       Chief Complaint   Patient presents with    Dizziness     Patient here for eval witnessed syncopal episode that occurred yesterday that lasted for about a few seconds. +HS -thinners. Pt reports she felt dizzy prior to syncopal episode, pt also had Rt foot injury during episode. +bruising on anterior side, denies numbness/tinling. Denies N/V/D/CP/SOB. Pt states she has a hx of decreased orthostatics.      Patient is a 49-year-old female with history of anemia, migraine, rheumatoid arthritis, vertebral  "artery dissection, surgical history of  section that presents to the Emergency Department after syncopal episode with collapse resulting in head strike yesterday.  Patient states that she was looking at purses and when she stood up, she \"got dizzy, and I woke up on the ground.\"  Patient denies numbness, tingling or loss of Paroven in her extremities.  Patient does have a history of vertebral artery dissection, however patient states \"I do not have headache at this time.\"  Patient denies numbness, tingling or loss of power in any oral extremities.  Patient denies palliative factors with provocative factors of getting to sitting to standing position.  Patient does state that last medication change was in 2024, Zepbound, patient does not report any change in dizziness symptoms from sitting to standing with that medication.  Patient does report that she has followed up with her PCP with her dizziness worsening sitting to standing but never followed up with cardiology in the past.  Patient DVT and PE.  Patient denies chest pain, shortness of breath, and abdominal pain.    Past Medical History[1]   Past Surgical History[2]   Family History[3]   Social History[4]   E-Cigarette/Vaping    E-Cigarette Use Never User       E-Cigarette/Vaping Substances    Nicotine No     THC No     CBD No     Flavoring No     Other No     Unknown No       I have reviewed and agree with the history as documented.     HPI    Review of Systems        Objective       ED Triage Vitals [25 1158]   Temperature Pulse Blood Pressure Respirations SpO2 Patient Position - Orthostatic VS   98.3 °F (36.8 °C) 78 108/67 18 100 % Sitting      Temp Source Heart Rate Source BP Location FiO2 (%) Pain Score    Oral Monitor Right arm -- 10 - Worst Possible Pain      Vitals      Date and Time Temp Pulse SpO2 Resp BP Pain Score FACES Pain Rating User   25 1451 -- -- -- -- -- 5 -- MD   25 1342 -- 87 -- 18 116/67 -- -- DO   25 " 1341 -- 62 100 % 18 112/68 -- -- DO   05/31/25 1340 -- 73 100 % 18 115/70 -- -- DO   05/31/25 1330 -- 78 100 % 18 116/65 -- -- MD   05/31/25 1158 98.3 °F (36.8 °C) 78 100 % 18 108/67 10 - Worst Possible Pain -- DP            Physical Exam  Vitals and nursing note reviewed.   Constitutional:       General: She is awake.      Appearance: Normal appearance. She is well-developed and normal weight. She is not ill-appearing, toxic-appearing or diaphoretic.      Comments: /67 (BP Location: Right arm)   Pulse 87   Temp 98.3 °F (36.8 °C) (Oral)   Resp 18   Wt 74 kg (163 lb 2.3 oz)   LMP 05/29/2025 (Exact Date)   SpO2 100%   BMI 27.15 kg/m²      HENT:      Head: Normocephalic and atraumatic.      Jaw: There is normal jaw occlusion.      Right Ear: Hearing and external ear normal. No decreased hearing noted. No drainage, swelling or tenderness. No mastoid tenderness.      Left Ear: Hearing and external ear normal. No decreased hearing noted. No drainage, swelling or tenderness. No mastoid tenderness.      Nose: Nose normal.      Mouth/Throat:      Lips: Pink.      Mouth: Mucous membranes are moist.      Pharynx: Oropharynx is clear. Uvula midline.     Eyes:      General: Lids are normal. Vision grossly intact. Gaze aligned appropriately.         Right eye: No discharge.         Left eye: No discharge.      Extraocular Movements: Extraocular movements intact.      Conjunctiva/sclera: Conjunctivae normal.      Pupils: Pupils are equal, round, and reactive to light.     Neck:      Vascular: No JVD.      Trachea: Trachea and phonation normal. No tracheal tenderness or tracheal deviation.      Comments: No midline tenderness, no stepoffs  No tenderness with passive and active cervical ROM with head turning approximately 45 degree angles to the left and right  No cervical tenderness with cranial axial loading    Cardiovascular:      Rate and Rhythm: Normal rate and regular rhythm.      Pulses: Normal pulses.            Radial pulses are 2+ on the right side and 2+ on the left side.        Posterior tibial pulses are 2+ on the right side and 2+ on the left side.      Heart sounds: Normal heart sounds.   Pulmonary:      Effort: Pulmonary effort is normal.      Breath sounds: Normal breath sounds and air entry. No stridor. No decreased breath sounds, wheezing, rhonchi or rales.   Chest:      Chest wall: No tenderness.   Abdominal:      General: Abdomen is flat. Bowel sounds are normal. There is no distension.      Palpations: Abdomen is soft. Abdomen is not rigid.      Tenderness: There is no abdominal tenderness. There is no guarding or rebound.     Musculoskeletal:         General: Normal range of motion.      Cervical back: Full passive range of motion without pain, normal range of motion and neck supple. No rigidity. No spinous process tenderness or muscular tenderness. Normal range of motion.      Comments: Passive ROM intact  Upper and lower extremity 5/5 bilaterally  Neurovascularly intact  No grinding or clicking of joints       Feet:      Right foot:      Toenail Condition: Right toenails are normal.      Left foot:      Toenail Condition: Left toenails are normal.   Lymphadenopathy:      Head:      Right side of head: No submental, submandibular, tonsillar, preauricular, posterior auricular or occipital adenopathy.      Left side of head: No submental, submandibular, tonsillar, preauricular, posterior auricular or occipital adenopathy.      Cervical: No cervical adenopathy.      Right cervical: No superficial, deep or posterior cervical adenopathy.     Left cervical: No superficial, deep or posterior cervical adenopathy.     Skin:     General: Skin is warm.      Capillary Refill: Capillary refill takes less than 2 seconds.      Findings: No rash.     Neurological:      General: No focal deficit present.      Mental Status: She is alert and oriented to person, place, and time. Mental status is at baseline.      GCS: GCS eye  subscore is 4. GCS verbal subscore is 5. GCS motor subscore is 6.      Cranial Nerves: Cranial nerves 2-12 are intact.      Sensory: Sensation is intact. No sensory deficit.      Motor: Motor function is intact.      Coordination: Coordination is intact.      Gait: Gait is intact.      Deep Tendon Reflexes: Reflexes are normal and symmetric.      Reflex Scores:       Patellar reflexes are 2+ on the right side and 2+ on the left side.    Psychiatric:         Attention and Perception: Attention normal.         Mood and Affect: Mood normal.         Speech: Speech normal.         Behavior: Behavior normal. Behavior is cooperative.         Thought Content: Thought content normal.         Judgment: Judgment normal.         Results Reviewed       Procedure Component Value Units Date/Time    TSH, 3rd generation with Free T4 reflex [[id number]]  (Normal) Collected: 05/31/25 1212    Lab Status: Final result Specimen: Blood from Arm, Left Updated: 05/31/25 1356     TSH 3RD GENERATON 1.210 uIU/mL     Magnesium [[id number]]  (Normal) Collected: 05/31/25 1212    Lab Status: Final result Specimen: Blood from Arm, Left Updated: 05/31/25 1356     Magnesium 2.2 mg/dL     HS Troponin 0hr (reflex protocol) [[id number]]  (Normal) Collected: 05/31/25 1212    Lab Status: Final result Specimen: Blood from Arm, Left Updated: 05/31/25 1256     hs TnI 0hr <2 ng/L     Comprehensive metabolic panel [[id number]] Collected: 05/31/25 1212    Lab Status: Final result Specimen: Blood from Arm, Left Updated: 05/31/25 1249     Sodium 139 mmol/L      Potassium 3.8 mmol/L      Chloride 107 mmol/L      CO2 28 mmol/L      ANION GAP 4 mmol/L      BUN 17 mg/dL      Creatinine 0.71 mg/dL      Glucose 85 mg/dL      Calcium 8.6 mg/dL      AST 19 U/L      ALT 10 U/L      Alkaline Phosphatase 60 U/L      Total Protein 7.0 g/dL      Albumin 4.2 g/dL      Total Bilirubin 0.32 mg/dL      eGFR 100 ml/min/1.73sq m     Narrative:      [employer]  guidelines for Chronic Kidney Disease (CKD):     Stage 1 with normal or high GFR (GFR > 90 mL/min/1.73 square meters)    Stage 2 Mild CKD (GFR = 60-89 mL/min/1.73 square meters)    Stage 3A Moderate CKD (GFR = 45-59 mL/min/1.73 square meters)    Stage 3B Moderate CKD (GFR = 30-44 mL/min/1.73 square meters)    Stage 4 Severe CKD (GFR = 15-29 mL/min/1.73 square meters)    Stage 5 End Stage CKD (GFR <15 mL/min/1.73 square meters)  Note: GFR calculation is accurate only with a steady state creatinine    CBC and differential [[id number]] Collected: 05/31/25 1212    Lab Status: Final result Specimen: Blood from Arm, Left Updated: 05/31/25 1243     WBC 6.79 Thousand/uL      RBC 4.43 Million/uL      Hemoglobin 12.9 g/dL      Hematocrit 38.3 %      MCV 87 fL      MCH 29.1 pg      MCHC 33.7 g/dL      RDW 12.4 %      MPV 9.1 fL      Platelets 168 Thousands/uL      nRBC 0 /100 WBCs      Segmented % 73 %      Immature Grans % 0 %      Lymphocytes % 20 %      Monocytes % 5 %      Eosinophils Relative 2 %      Basophils Relative 0 %      Absolute Neutrophils 4.90 Thousands/µL      Absolute Immature Grans 0.02 Thousand/uL      Absolute Lymphocytes 1.37 Thousands/µL      Absolute Monocytes 0.37 Thousand/µL      Eosinophils Absolute 0.12 Thousand/µL      Basophils Absolute 0.01 Thousands/µL             CT head without contrast   ED Interpretation by Lon March PA-C (05/31 1411)   Reading Physician Reading Date Result Priority  Bernardo Chen MD  [phone]  5/31/2025     Narrative & Impression  CT BRAIN - WITHOUT CONTRAST     INDICATION:   ground level fall with head strike.     COMPARISON: CTA head and neck 1/17/2025     TECHNIQUE:  CT examination of the brain was performed.  Multiplanar 2D reformatted images were created from the source data.     Radiation dose length product (DLP) for this visit:  1037 mGy-cm. .  This examination, like all CT scans performed in the [facility], was performed utilizing  techniques to minimize radiation dose exposure, including the use of iterative   reconstruction and automated exposure control.     IMAGE QUALITY:  Diagnostic.     FINDINGS:     PARENCHYMA:No intracranial mass, mass effect or midline shift. No CT signs of acute infarction.  No acute parenchymal hemorrhage.     VENTRICLES AND EXTRA-AXIAL SPACES:  Normal for the patient's age.     VISUALIZED ORBITS:  Normal visualized orbits.     PARANAS   AL SINUSES:  Normal visualized paranasal sinuses.     CALVARIUM AND EXTRACRANIAL SOFT TISSUES:  Normal.     IMPRESSION:     No acute intracranial abnormality.                 Workstation performed: [id number]           Final Interpretation by Bernardo Chen MD (05/31 1405)      No acute intracranial abnormality.                  Workstation performed: [id number]         XR chest 1 view portable   ED Interpretation by Lon March PA-C (05/31 1422)   Chest x-ray with no acute pulmonary disease on initial read by me.      Final Interpretation by Tanya Keller MD (05/31 1656)      No acute cardiopulmonary disease.            Workstation performed: [id number]         XR foot 3+ views RIGHT   ED Interpretation by Lon March PA-C (05/31 1422)   No acute osseous abnormality on initial read by me.      XR ankle 3+ views RIGHT   ED Interpretation by Lon March PA-C (05/31 1421)   No acute osseous abnormality initially by me.          Splint application    Date/Time: 5/31/2025 3:17 PM    Performed by: Lon March PA-C  Authorized by: Lon March PA-C    Other Assisting Provider: No    Verbal consent obtained?: Yes    Risks and benefits: Risks, benefits and alternatives were discussed    Consent given by:  Patient  Time Out:     Time out: Immediately prior to the procedure a time out was called      Time out performed at:  5/31/2025 3:18 PM  Patient states understanding of procedure being performed: Yes    Patient's understanding of procedure matches consent: Yes     Patient identity confirmed:  Verbally with patient and arm band  Pre-procedure details:     Sensation:  Normal    Skin color:  Pink  Procedure details:     Laterality:  Right    Strapping: No      Cast type: CAM boot/walker.    Splint composition: static    Post-procedure details:     Pain:  Improved    Sensation:  Normal    Skin color:  Pink    Patient tolerance of procedure:  Tolerated well, no immediate complications  ECG 12 Lead Documentation Only    Date/Time: 5/31/2025 12:06 PM    Performed by: Lon March PA-C  Authorized by: Lon March PA-C    Indications / Diagnosis:  Syncope and collapse  ECG reviewed by me, the ED Provider: yes    Patient location:  ED  Previous ECG:     Previous ECG:  Unavailable    Comparison to cardiac monitor: Yes    Interpretation:     Interpretation: normal    Rate:     ECG rate:  78    ECG rate assessment: normal    Rhythm:     Rhythm: sinus rhythm    Ectopy:     Ectopy: none    QRS:     QRS axis:  Normal    QRS intervals:  Normal  Conduction:     Conduction: normal    ST segments:     ST segments:  Normal  T waves:     T waves: normal        ED Medication and Procedure Management   Prior to Admission Medications   Prescriptions Last Dose Informant Patient Reported? Taking?   ALPRAZolam (XANAX) 0.25 mg tablet   No No   Sig: Take 1 tablet (0.25 mg total) by mouth daily as needed for anxiety   Probiotic Product (PRO-BIOTIC BLEND PO)  Self Yes No   Sig: Take by mouth   ZyrTEC Allergy 10 MG tablet   Yes No   Sig: Take 10 mg by mouth daily   Patient not taking: Reported on 4/16/2025   benzonatate (TESSALON PERLES) 100 mg capsule  Self Yes No   Sig: take 1 capsule by mouth three times a day as needed for cough   Patient not taking: Reported on 2/7/2025   butalbital-acetaminophen-caffeine (Esgic) -40 mg per tablet  Self No No   Sig: Take 1 tablet by mouth every 4 (four) hours as needed for headaches   escitalopram (LEXAPRO) 20 mg tablet   No No   Sig: Take 1 tablet (20 mg  total) by mouth daily   ferrous sulfate 325 (65 FE) MG EC tablet   No No   Simg every other day with Vitamin C   fluticasone (FLONASE) 50 mcg/act nasal spray  Self Yes No   Si sprays into each nostril daily   Patient not taking: Reported on 2025   gabapentin (NEURONTIN) 300 mg capsule   No No   Sig: Take 2 capsules (600 mg total) by mouth 2 (two) times a day Take 2 capsule in the morning and 2 at bedtime   guaiFENesin-codeine (ROBITUSSIN AC) 100-10 mg/5 mL oral solution  Self No No   Sig: Take 5 mL by mouth 3 (three) times a day as needed for cough   Patient not taking: Reported on 2025   hydrOXYzine HCL (ATARAX) 25 mg tablet   No No   Sig: Take 1 tablet (25 mg total) by mouth in the morning   latanoprost (XALATAN) 0.005 % ophthalmic solution  Self Yes No   metFORMIN (GLUCOPHAGE-XR) 500 mg 24 hr tablet   No No   Sig: Start with 1 tablet with dinner and after 1 week increase to 1 tablet twice a day with meals.   Patient not taking: Reported on 2025   naloxone (NARCAN) 4 mg/0.1 mL nasal spray  Self No No   Sig: Administer 1 spray into a nostril. If no response after 2-3 minutes, give another dose in the other nostril using a new spray.   Patient not taking: Reported on 2025   naproxen (NAPROSYN) 500 mg tablet   No No   Sig: Take 1 tablet (500 mg total) by mouth 2 (two) times a day with meals   ondansetron (ZOFRAN) 4 mg tablet   No No   Sig: Take 1 tablet (4 mg total) by mouth every 8 (eight) hours as needed for nausea or vomiting   pramipexole (MIRAPEX) 0.5 mg tablet   No No   Sig: Take 1 tablet (0.5 mg total) by mouth 2 (two) times a day   rimegepant sulfate (NURTEC) 75 mg TBDP   No No   Sig: Take 1 tablet (75 mg) by mouth once at the onset of a headache. Max dose: 75 mg/day.   Patient not taking: Reported on 2025   sodium chloride (OCEAN) 0.65 % nasal spray  Self Yes No   Si spray into each nostril   Patient not taking: Reported on 2025   tiZANidine (ZANAFLEX) 2 mg tablet   No  No   Sig: Take 1-2 tablets as needed at bedtime (max 4mg/day) for muscle spasm   tirzepatide (Zepbound) 10 mg/0.5 mL auto-injector   No No   Sig: Inject 0.5 mL (10 mg total) under the skin once a week Do not start before May 22, 2025.   tirzepatide (Zepbound) 7.5 mg/0.5 mL auto-injector   No No   Sig: Inject 0.5 mL (7.5 mg total) under the skin once a week   traZODone (DESYREL) 50 mg tablet   No No   Sig: Take 1.5 tablets (75 mg total) by mouth daily at bedtime      Facility-Administered Medications Last Administration Doses Remaining   Botulinum Toxin Type A SOLR 200 Units 4/16/2025 10:54 AM         Discharge Medication List as of 5/31/2025  3:20 PM        CONTINUE these medications which have NOT CHANGED    Details   ALPRAZolam (XANAX) 0.25 mg tablet Take 1 tablet (0.25 mg total) by mouth daily as needed for anxiety, Starting Fri 5/30/2025, Normal      benzonatate (TESSALON PERLES) 100 mg capsule take 1 capsule by mouth three times a day as needed for cough, Historical Med      butalbital-acetaminophen-caffeine (Esgic) -40 mg per tablet Take 1 tablet by mouth every 4 (four) hours as needed for headaches, Starting Mon 11/28/2022, Normal      escitalopram (LEXAPRO) 20 mg tablet Take 1 tablet (20 mg total) by mouth daily, Starting Wed 4/30/2025, Normal      ferrous sulfate 325 (65 FE) MG EC tablet 325mg every other day with Vitamin C, Normal      fluticasone (FLONASE) 50 mcg/act nasal spray 2 sprays into each nostril daily, Starting Mon 12/30/2024, Historical Med      gabapentin (NEURONTIN) 300 mg capsule Take 2 capsules (600 mg total) by mouth 2 (two) times a day Take 2 capsule in the morning and 2 at bedtime, Starting Wed 3/19/2025, Until Sun 5/18/2025, Normal      guaiFENesin-codeine (ROBITUSSIN AC) 100-10 mg/5 mL oral solution Take 5 mL by mouth 3 (three) times a day as needed for cough, Starting Fri 1/3/2025, Normal      hydrOXYzine HCL (ATARAX) 25 mg tablet Take 1 tablet (25 mg total) by mouth in the  morning, Starting Wed 4/30/2025, Normal      latanoprost (XALATAN) 0.005 % ophthalmic solution Historical Med      metFORMIN (GLUCOPHAGE-XR) 500 mg 24 hr tablet Start with 1 tablet with dinner and after 1 week increase to 1 tablet twice a day with meals., Normal      naloxone (NARCAN) 4 mg/0.1 mL nasal spray Administer 1 spray into a nostril. If no response after 2-3 minutes, give another dose in the other nostril using a new spray., Normal      naproxen (NAPROSYN) 500 mg tablet Take 1 tablet (500 mg total) by mouth 2 (two) times a day with meals, Starting Wed 4/30/2025, Until Thu 4/30/2026, Normal      ondansetron (ZOFRAN) 4 mg tablet Take 1 tablet (4 mg total) by mouth every 8 (eight) hours as needed for nausea or vomiting, Starting Wed 4/30/2025, Normal      pramipexole (MIRAPEX) 0.5 mg tablet Take 1 tablet (0.5 mg total) by mouth 2 (two) times a day, Starting Wed 3/19/2025, Normal      Probiotic Product (PRO-BIOTIC BLEND PO) Take by mouth, Historical Med      rimegepant sulfate (NURTEC) 75 mg TBDP Take 1 tablet (75 mg) by mouth once at the onset of a headache. Max dose: 75 mg/day., Normal      sodium chloride (OCEAN) 0.65 % nasal spray 1 spray into each nostril, Starting Mon 12/30/2024, Historical Med      tirzepatide (Zepbound) 10 mg/0.5 mL auto-injector Inject 0.5 mL (10 mg total) under the skin once a week Do not start before May 22, 2025., Starting Thu 5/22/2025, Until Thu 8/14/2025, Normal      tirzepatide (Zepbound) 7.5 mg/0.5 mL auto-injector Inject 0.5 mL (7.5 mg total) under the skin once a week, Starting Wed 4/16/2025, Until Wed 6/11/2025, Normal      tiZANidine (ZANAFLEX) 2 mg tablet Take 1-2 tablets as needed at bedtime (max 4mg/day) for muscle spasm, Normal      traZODone (DESYREL) 50 mg tablet Take 1.5 tablets (75 mg total) by mouth daily at bedtime, Starting Thu 5/22/2025, Until Wed 8/20/2025, Normal      ZyrTEC Allergy 10 MG tablet Take 10 mg by mouth daily, Starting Mon 12/30/2024, Until Thu  2025, Historical Med           No discharge procedures on file.  ED SEPSIS DOCUMENTATION   Time reflects when diagnosis was documented in both MDM as applicable and the Disposition within this note       Time User Action Codes Description Comment    2025  3:08 PM Lon March [R55] Syncope and collapse     2025  3:08 PM Lon March [R42] Orthostatic dizziness     2025  3:08 PM Lon March [S90.31XA] Traumatic ecchymosis of right foot, initial encounter                      [1]   Past Medical History:  Diagnosis Date    Abnormal Pap smear of cervix     RAMIREZ positive     Anemia     Anxiety     Arthritis 2024    Basal cell carcinoma     Cataract     Medicine related    Depression     Glaucoma     Migraine     Migraine     Miscarriage     MRSA colonization 2016    Neck pain     Pregnancy     Rheumatoid arthritis (HCC) 2024    Vertebral artery dissection (HCC)     Vulvovaginitis     Yeast infection    [2]   Past Surgical History:  Procedure Laterality Date     SECTION, LOW TRANSVERSE      COLONOSCOPY      DILATION AND CURETTAGE OF UTERUS      FL INJECTION LEFT HIP (ARTHROGRAM)  2025    GYNECOLOGIC CRYOSURGERY      cervix    LASIK      SKIN LESION EXCISION      basal cell carcinoma of left cheek    SPINAL FUSION  10/2023   [3]   Family History  Problem Relation Name Age of Onset    Hypertension Mother Judit     Lung cancer Mother Judit 56    Brain cancer Mother Judit 56    Depression Mother Judit     Lupus Mother Judit     Early death Mother Judit         Lung cancer    Breast cancer Maternal Aunt          dx in 40's     Multiple sclerosis Father      No Known Problems Daughter      No Known Problems Maternal Grandmother      No Known Problems Maternal Grandfather      No Known Problems Paternal Grandmother      No Known Problems Paternal Grandfather      No Known Problems Maternal Aunt     [4]   Social History  Tobacco Use     Smoking status: Never    Smokeless tobacco: Never   Vaping Use    Vaping status: Never Used   Substance Use Topics    Alcohol use: No    Drug use: No        Lon March PA-C  05/31/25 1942

## 2025-05-31 NOTE — DISCHARGE INSTRUCTIONS
Patient Education     Minor Contusion ED   General Information   You came to the Emergency Department (ED) for a contusion. This is the medical name for a bruise. A bruise happens when blood vessels under the skin break. The blood leaks into the tissues and causes pain and swelling. It also causes skin discoloration that starts as red, blue, or purple and changes to green or yellow as the bruise heals.  What care is needed at home?   Call your regular doctor to let them know you were in the ED. Make a follow-up appointment if you were told to.  Rest your bruised area. You may want to place the bruised area on pillows when you rest. Slowly increase your activity level as you are able to.  Use an elastic bandage or compression pants to help limit swelling.  Place an ice pack or a bag of frozen vegetables wrapped in a towel over the painful part. Never put ice right on the skin. Use ice every 1 to 2 hours for 10 to 15 minutes at a time. Use for the first 24 to 48 hours after your injury.  You may want to take medicine like ibuprofen, naproxen, or acetaminophen to help with pain.  When do I need to call the doctor?   Your joint swells.  You are not able to move or walk because of the pain.  You have bruises for no reason.  You develop bleeding in addition to skin bruises.  You have new or worsening symptoms.  Last Reviewed Date   2020-07-15  Consumer Information Use and Disclaimer   This generalized information is a limited summary of diagnosis, treatment, and/or medication information. It is not meant to be comprehensive and should be used as a tool to help the user understand and/or assess potential diagnostic and treatment options. It does NOT include all information about conditions, treatments, medications, side effects, or risks that may apply to a specific patient. It is not intended to be medical advice or a substitute for the medical advice, diagnosis, or treatment of a health care provider based on the health  care provider's examination and assessment of a patient’s specific and unique circumstances. Patients must speak with a health care provider for complete information about their health, medical questions, and treatment options, including any risks or benefits regarding use of medications. This information does not endorse any treatments or medications as safe, effective, or approved for treating a specific patient. [employer] and its affiliates disclaim any warranty or liability relating to this information or the use thereof. The use of this information is governed by the Terms of Use, available at https://www.[employer].com/en/know/clinical-effectiveness-terms   Copyright   Copyright © 2024 [employer] and its affiliates and/or licensors. All rights reserved.  Patient Education     Syncope (Fainting) Discharge Instructions   About this topic   The medical term for fainting is syncope. Fainting happens when your brain does not get enough blood for a short period of time and you pass out or almost pass out.  Things that can cause you to faint include:  Standing too long in one place.  Standing up too quickly, especially if you have not had enough to drink or eat.  Strong emotions like fear.  Sudden pain like getting a blood test or a shot.  Taking certain medicines.  Sometimes, a serious condition like a heart problem, may cause you to faint. You may get hurt if you fall or are driving when it happens.       What care is needed at home?   Ask your doctor what you need to do when you go home. Make sure you ask questions if you do not understand what the doctor says. This way you will know what you need to do.  Avoid moving quickly from sitting or lying down to standing up.  Sit on the edge of the bed for a few minutes before you stand up. When you stand up, walk slowly at first.  Move your legs often if you need to sit or  one position for a long time.  If the doctors think you may be dehydrated, be  sure to drink extra fluids, even if you are not thirsty.  Try to eat regular meals throughout the day.  Sit or lie down right away if you feel faint or dizzy.  Take extra care to protect yourself from falls. Use handrails and walk slowly.  Avoid driving when you feel faint. If you feel faint while driving, be sure to stop and pull over right away.  What follow-up care is needed?   Your doctor may ask you to make visits to the office to check on your progress. Be sure to keep these visits. Your doctor may order tests like blood test or an ECG to check your heart. Be sure to keep these visits and follow up with your doctor for results.  What drugs may be needed?   The doctor may order drugs to:  Help with dizziness  Treat an upset stomach  Help with blood pressure  Control heart rhythm  Will physical activity be limited?   Physical activities may be limited. Some strenuous activities may cause fainting.  When do I need to call the doctor?   You faint or feel like you will faint and also have any of the following:  Bad chest discomfort or severe trouble breathing.  Your heart feels like it is beating very fast, very slow, or abnormally.  You have a seizure.  You have new weakness in your arms or legs.  You develop trouble speaking, swallowing, seeing, or hearing.  You have another fainting event.  You hit your head when you faint.  Teach Back: Helping You Understand   The Teach Back Method helps you understand the information we are giving you. After you talk with the staff, tell them in your own words what you learned. This helps to make sure the staff has described each thing clearly. It also helps to explain things that may have been confusing. Before going home, make sure you can do these:  I can tell you about my condition.  I can tell you what I will do to help me stay safe when moving about.  I can tell you what I will do if I have numbness or weakness in the face, arms, or legs.  Last Reviewed Date    2021-06-07  Consumer Information Use and Disclaimer   This generalized information is a limited summary of diagnosis, treatment, and/or medication information. It is not meant to be comprehensive and should be used as a tool to help the user understand and/or assess potential diagnostic and treatment options. It does NOT include all information about conditions, treatments, medications, side effects, or risks that may apply to a specific patient. It is not intended to be medical advice or a substitute for the medical advice, diagnosis, or treatment of a health care provider based on the health care provider's examination and assessment of a patient’s specific and unique circumstances. Patients must speak with a health care provider for complete information about their health, medical questions, and treatment options, including any risks or benefits regarding use of medications. This information does not endorse any treatments or medications as safe, effective, or approved for treating a specific patient. [employer] and its affiliates disclaim any warranty or liability relating to this information or the use thereof. The use of this information is governed by the Terms of Use, available at https://www.[employer].com/en/know/clinical-effectiveness-terms   Copyright   Copyright © 2024 [employer] and its affiliates and/or licensors. All rights reserved.  Reading Physician Reading Date Result Priority   Bernardo Chen MD  [phone]  5/31/2025      Narrative & Impression  CT BRAIN - WITHOUT CONTRAST     INDICATION:   ground level fall with head strike.     COMPARISON: CTA head and neck 1/17/2025     TECHNIQUE:  CT examination of the brain was performed.  Multiplanar 2D reformatted images were created from the source data.     Radiation dose length product (DLP) for this visit:  1037 mGy-cm. .  This examination, like all CT scans performed in the [facility], was performed utilizing  techniques to minimize radiation dose exposure, including the use of iterative   reconstruction and automated exposure control.     IMAGE QUALITY:  Diagnostic.     FINDINGS:     PARENCHYMA:No intracranial mass, mass effect or midline shift. No CT signs of acute infarction.  No acute parenchymal hemorrhage.     VENTRICLES AND EXTRA-AXIAL SPACES:  Normal for the patient's age.     VISUALIZED ORBITS:  Normal visualized orbits.     PARANASAL SINUSES:  Normal visualized paranasal sinuses.     CALVARIUM AND EXTRACRANIAL SOFT TISSUES:  Normal.     IMPRESSION:     No acute intracranial abnormality.                 Workstation performed: [id number]

## 2025-05-31 NOTE — Clinical Note
Gracia Chirinos was seen and treated in our emergency department on 5/31/2025.                Diagnosis:     Gracia  may return to work on return date.    She may return on this date: 06/03/2025         If you have any questions or concerns, please don't hesitate to call.      Lon March PA-C    ______________________________           _______________          _______________  Hospital Representative                              Date                                Time

## 2025-06-02 LAB
ATRIAL RATE: 78 BPM
P AXIS: 80 DEGREES
PR INTERVAL: 124 MS
QRS AXIS: 69 DEGREES
QRSD INTERVAL: 82 MS
QT INTERVAL: 382 MS
QTC INTERVAL: 435 MS
T WAVE AXIS: 52 DEGREES
VENTRICULAR RATE: 78 BPM

## 2025-06-02 PROCEDURE — 93010 ELECTROCARDIOGRAM REPORT: CPT | Performed by: INTERNAL MEDICINE

## 2025-06-03 ENCOUNTER — PROCEDURE VISIT (OUTPATIENT)
Dept: DERMATOLOGY | Facility: CLINIC | Age: 50
End: 2025-06-03
Attending: REGISTERED NURSE
Payer: COMMERCIAL

## 2025-06-03 VITALS
HEIGHT: 65 IN | SYSTOLIC BLOOD PRESSURE: 110 MMHG | TEMPERATURE: 98.5 F | WEIGHT: 163 LBS | HEART RATE: 92 BPM | OXYGEN SATURATION: 98 % | BODY MASS INDEX: 27.16 KG/M2 | DIASTOLIC BLOOD PRESSURE: 50 MMHG

## 2025-06-03 DIAGNOSIS — D04.39 SQUAMOUS CELL CARCINOMA IN SITU (SCCIS) OF SKIN OF FOREHEAD: Primary | ICD-10-CM

## 2025-06-03 PROCEDURE — 12052 INTMD RPR FACE/MM 2.6-5.0 CM: CPT | Performed by: DERMATOLOGY

## 2025-06-03 PROCEDURE — 17311 MOHS 1 STAGE H/N/HF/G: CPT | Performed by: DERMATOLOGY

## 2025-06-03 NOTE — PATIENT INSTRUCTIONS
"Mohs Surgery After Care    WOUND CARE AFTER SURGERY:    Try NOT to remove the pressure bandage for 48 hours. Keep the area clean and dry while this bandage is on.     After removing the bandage for the first time, gently clean the area with soap and water. If the bandage is difficult to remove, getting the bandage wet in the shower will sometimes help soften the adhesive and allow it to be removed more easily.     You will now need to cleanse this area daily in the shower with gentle soap. There is no need to scrub the area. There is no need for further wound care for 2 weeks. You will notice over this time that the glue will start to flake off. Do not apply and Vaseline or Aquaphor to the area as this will dissolve your skin glue.  If you would like to keep the area covered, non stick dressing and paper tape (or Hypafix) are recommended for sensitive skin but a bandaid is fine if it covers the area well.    After 2 weeks, you can go ahead and use some Vaseline or Aquaphor to help dissolve any remaining glue.     RESTRICTIONS:     For two DAYS:   - You will need to take it very easy as this time is highest risk for bleeding. Being a \"couch potato\" during these two days is generally recommended.   - For surgeries on the face/neck/scalp: Avoid leaning down to pick things up off the floor as this brings blood up to your head. Instead, squat down to pick things up.     For two WEEKS:   - No heavy lifting (anything greater than 10 pounds)   - You can start to do slow, gentle activities such as slow walking but nothing to increase your heart rate and blood pressure too much (such as cardiovascular exercise). It is important to take it easy as there is still a risk for bleeding and a high risk popping of stitches open during this time.     If we did surgery near the eyes (including the nose, forehead, front part of your scalp, cheeks): It is VERY common to get a large amount of swelling around your eyes (puffy eyes). " Although less frequent, this can be enough to swell your eyes shut and can also come along with bruising. This should not hurt and is very expected and normal. It is typically worst at ~ 3 days out from your surgery and dramatically better 1 week post-operatively.         MANAGING YOUR PAIN AFTER SURGERY     You can expect to have some pain after surgery. This is normal. The pain is typically worse the first two days after surgery, and quickly begins to get better.     The best strategy for controlling your pain after surgery is around the clock pain control. You can take over the counter Acetaminophen (Tylenol) for discomfort, if no contraindications.     If you are taking this at the maximum dose, you can alternate this with Motrin (ibuprofen or Advil) as well. Alternating these medications with each other allows you to maximize your pain control. In addition to Tylenol and Motrin, you can use heating pads or ice packs on your incisions to help reduce your pain.     How will I alternate your regular strength over-the-counter pain medication?  You will take a dose of pain medication every three hours.   Start by taking 650 mg of Tylenol (2 pills of 325 mg)   3 hours later take 600 mg of Motrin (3 pills of 200 mg)   3 hours after taking the Motrin take 650 mg of Tylenol   3 hours after that take 600 mg of Motrin.    See example - if your first dose of Tylenol is at 12:00 PM     12:00 PM  Tylenol 650 mg (2 pills of 325 mg)    3:00 PM  Motrin 600 mg (3 pills of 200 mg)    6:00 PM  Tylenol 650 mg (2 pills of 325 mg)    9:00 PM  Motrin 600 mg (3 pills of 200 mg)    Continue alternating every 3 hours      Important:   Do not take more than 4000mg of Tylenol or 3200mg of Motrin in a 24-hour period.     What if I still have pain?   If you have pain that is not controlled with the over-the-counter pain medications (Tylenol and Motrin or Advil), don't hesitate to call our staff using the number provided. We will help make  sure you are managing your pain in the best way possible, and if necessary, we can provide a prescription for additional pain medication.     CALL OUR OFFICE IMMEDIATELY FOR ANY SIGNS OF INFECTION:    This includes fever, chills, increased redness, warmth, tenderness, severe discomfort/pain, or pus or foul smell coming from the wound. If you are experiencing any of the above, please call [facility] directly at [phone].    IF BLEEDING IS NOTICED:    Place a clean cloth over the area and apply firm pressure for thirty minutes.  Check the wound ONLY after 30 minutes of direct pressure; do not cheat and sneak a peak, as that does not count.  If bleeding persists after 30 minutes of legitimate direct pressure, then try one more round of direct pressure to the area.  Should the bleeding become heavier or not stop after the second attempt, call [facility] directly at [phone]. Your call will get routed to the dermatology surgeon on call even after hours.

## 2025-06-03 NOTE — PROGRESS NOTES
Mohs Procedure Note    Patient: Gracia Chirinos  : 1975  MRN: [id number]  Date: 6/3/2025    History of Present Illness: The patient is a 49 y.o. 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     Past Medical History[1]    Past Surgical History[2]    Current Medications[3]    Allergies[4]    Physical Exam:   Vitals:    25 0826   BP: 110/50   Pulse: 92   Temp: 98.5 °F (36.9 °C)   SpO2: 98%       Skin: 0.8 x 1 cm pink atrophic patch on the forehead.    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     Plan: Mohs    Mohs Procedure Timeout      Flowsheet Row Most Recent Value   Timeout: 905   Patient Identity Verified: Yes   Correct Site Verified: Yes   Correct Procedure Verified: Yes            Mohs Diagnosis/Indication/Location/ID      Flowsheet Row Most Recent Value   Pathology Type Squamous cell carcinoma   Anatomic Site --  [Forehead]   Indications for Mohs tumor location   Mohs ID [id number]            Mohs Site/Accession/Pre-Post      Flowsheet Row Most Recent Value   Original Site Identified (as submitted by referring clinician) Referral, Photo   Biopsy Accession/Specimen # (as submitted by referring clincian) [id number]   Pre-Mohs Size Length (cm) 0.8   Pre-Mohs Size Width (cm): 1   Post-Mohs Size-Length (cm) 1.1   Post Mohs Size-Width (cm) 1.4   Repair Type Intermediate layered closure   Suture Type Vicryl and cyanoacrylate   Vicryl Suture Size 4   Final repair length (cm): 3   Anesthetic Used 1% Lidocaine with epinephrine  [buffered]            Mohs Tumor Stage 1 Information      Flowsheet Row Most Recent Value   Tissue Sections (blocks) 1   Microscopic Exam Section 1: No tumor identified in section.   Tumor Clear After Stage I? Yes                    Patient identified, procedure verified, site identified and verified. Time out completed. Surgical removal of the lesion discussed with the patient (risks and  benefits, including possibility of scarring, infection, recurrence or potential for further treatment).    I have specifically identified the site with the patient. I have discussed the fact that the patient will have a scar after the procedure regardless of granulation or repair with sutures. I have discussed that the repair options can range from granulation in some cases to linear or curvilinear closures to larger flaps or grafts.  There are sometimes flaps or grafts used that require multiples stages of surgery and will not be completed today, rather be completed over a series of appointments. I have discussed that occasionally due to location, size or depth of the lesion I may recommend consultation with and transfer of care for further removal or the reconstruction to another provider such as ophthalmology surgery, plastic surgery, ENT surgery, or surgical oncology. There are cases in which other testing such as imaging with MRI or CT scan or testing of lymph nodes is recommended because of the nature/depth/location of tumor seen during the removal. There is a risk of injury to nerves causing temporary or permanent numbness or the inability to move muscles full such as the inability to lift eyebrows. Questions answered and verbal and written consent was obtained.    The tumor qualifies for Mohs based on AUC criteria. Dr. Krueger served as the surgeon and pathologist during the procedure.    Stage I:  Mohs surgery was performed in the first stage. A 2mm margin was taken around the visible and palpable tumor and biopsy scar. Excision of the residual wound resulted in 1 gross section(s). Hemostasis was obtained by spot electrocoagulation and a pressure dressing was placed. The patient tolerated the procedure well and no complications were noted.  Adjacent edges of each gross section were color coded and multiple, horizontal, frozen sections were prepared from the undersurface of the gross sections. The total  microscopic area was examined and tumor extension was plotted on the anatomic map. A tumor free plane was demonstrated and excision of the tumor was complete. The final defect measured 1.1x1.4 cm and extended to the level of the subcutaneous fat. Because of the size and depth of the defect, the defect needed to be closed.    With the patient in the supine position and under adequate local anesthesia with 1% lidocaine with epinephrine 1:100,000, the defect was scrubbed with Chlorhexidine. Sterile drapes were placed from the sterile tray.  Because of the location of the surgical defect, an intermediate closure was judged to give the best possible cosmetic and functional result.  The edges of the defect were carefully debrided removing any dead or coagulated tissue.      Hemostasis was obtained by pinpoint electrocoagulation.  Careful planning of removal of redundant tissue at either end of the defect was drawn out so that the suture lines would fall in the optimal orientation with regard to the relaxed skin tension lines.  These were then removed with a #15c blade scalpel.  The wound was then approximated by a deep layer of buried vertical mattress sutures and the cutaneous margins were approximated and closed by cyanoacrylate.  Estimated blood loss was less than 5 mL.      The patient tolerated the procedure well.  The wound was dressed with a non-stick pad and a compression dressing.     Allie Krueger MD served as the surgeon and pathologist during the procedure.    Postoperative care: Wound care discussed at length and written instructions provided.  I urged the patient to call us if any problems or question should arise.     Complications: none  Post-op medications: none  Patient condition after procedure: stable  Discharge plans: Plan for follow up as planned with general dermatologist for skin checks or sooner if needed for healing surgical site.     Scribe Attestation      I,:  Rehana Miles MA am acting  as a scribe while in the presence of the attending physician.:       I,:  Allie Krueger MD personally performed the services described in this documentation    as scribed in my presence.:                   [1]   Past Medical History:  Diagnosis Date    Abnormal Pap smear of cervix     RAMIREZ positive     Anemia     Anxiety     Arthritis 2024    Basal cell carcinoma     Cataract     Medicine related    Depression     Glaucoma     Migraine     Migraine     Miscarriage     MRSA colonization 2016    Neck pain     Pregnancy     Rheumatoid arthritis (HCC) 2024    Squamous cell skin cancer 2025    SCCIS forehead-Mohs    Vertebral artery dissection (HCC)     Vulvovaginitis     Yeast infection    [2]   Past Surgical History:  Procedure Laterality Date     SECTION, LOW TRANSVERSE      COLONOSCOPY      DILATION AND CURETTAGE OF UTERUS      FL INJECTION LEFT HIP (ARTHROGRAM)  2025    GYNECOLOGIC CRYOSURGERY      cervix    LASIK      MOHS SURGERY  2025    SCCIS forehead;     SKIN LESION EXCISION      basal cell carcinoma of left cheek    SPINAL FUSION  10/2023   [3]   Current Outpatient Medications:     ALPRAZolam (XANAX) 0.25 mg tablet, Take 1 tablet (0.25 mg total) by mouth daily as needed for anxiety, Disp: 30 tablet, Rfl: 0    butalbital-acetaminophen-caffeine (Esgic) -40 mg per tablet, Take 1 tablet by mouth every 4 (four) hours as needed for headaches, Disp: 30 tablet, Rfl: 0    escitalopram (LEXAPRO) 20 mg tablet, Take 1 tablet (20 mg total) by mouth daily, Disp: 90 tablet, Rfl: 0    ferrous sulfate 325 (65 FE) MG EC tablet, 325mg every other day with Vitamin C, Disp: 90 tablet, Rfl: 0    hydrOXYzine HCL (ATARAX) 25 mg tablet, Take 1 tablet (25 mg total) by mouth in the morning, Disp: 90 tablet, Rfl: 0    latanoprost (XALATAN) 0.005 % ophthalmic solution, , Disp: , Rfl: 2    naproxen (NAPROSYN) 500 mg tablet, Take 1 tablet (500 mg total) by  mouth 2 (two) times a day with meals, Disp: 60 tablet, Rfl: 0    ondansetron (ZOFRAN) 4 mg tablet, Take 1 tablet (4 mg total) by mouth every 8 (eight) hours as needed for nausea or vomiting, Disp: 30 tablet, Rfl: 0    pramipexole (MIRAPEX) 0.5 mg tablet, Take 1 tablet (0.5 mg total) by mouth 2 (two) times a day, Disp: 90 tablet, Rfl: 1    Probiotic Product (PRO-BIOTIC BLEND PO), Take by mouth, Disp: , Rfl:     tirzepatide (Zepbound) 10 mg/0.5 mL auto-injector, Inject 0.5 mL (10 mg total) under the skin once a week Do not start before May 22, 2025., Disp: 2 mL, Rfl: 2    tiZANidine (ZANAFLEX) 2 mg tablet, Take 1-2 tablets as needed at bedtime (max 4mg/day) for muscle spasm, Disp: 60 tablet, Rfl: 1    traZODone (DESYREL) 50 mg tablet, Take 1.5 tablets (75 mg total) by mouth daily at bedtime, Disp: 135 tablet, Rfl: 0    benzonatate (TESSALON PERLES) 100 mg capsule, take 1 capsule by mouth three times a day as needed for cough (Patient not taking: Reported on 2/7/2025), Disp: , Rfl:     fluticasone (FLONASE) 50 mcg/act nasal spray, 2 sprays into each nostril daily (Patient not taking: Reported on 2/7/2025), Disp: , Rfl:     gabapentin (NEURONTIN) 300 mg capsule, Take 2 capsules (600 mg total) by mouth 2 (two) times a day Take 2 capsule in the morning and 2 at bedtime, Disp: 120 capsule, Rfl: 1    guaiFENesin-codeine (ROBITUSSIN AC) 100-10 mg/5 mL oral solution, Take 5 mL by mouth 3 (three) times a day as needed for cough (Patient not taking: Reported on 2/7/2025), Disp: 237 mL, Rfl: 0    metFORMIN (GLUCOPHAGE-XR) 500 mg 24 hr tablet, Start with 1 tablet with dinner and after 1 week increase to 1 tablet twice a day with meals. (Patient not taking: Reported on 5/15/2025), Disp: 180 tablet, Rfl: 3    naloxone (NARCAN) 4 mg/0.1 mL nasal spray, Administer 1 spray into a nostril. If no response after 2-3 minutes, give another dose in the other nostril using a new spray. (Patient not taking: Reported on 2/7/2025), Disp: 1  "each, Rfl: 1    rimegepant sulfate (NURTEC) 75 mg TBDP, Take 1 tablet (75 mg) by mouth once at the onset of a headache. Max dose: 75 mg/day. (Patient not taking: Reported on 2/26/2025), Disp: 16 tablet, Rfl: 3    sodium chloride (OCEAN) 0.65 % nasal spray, 1 spray into each nostril (Patient not taking: Reported on 2/7/2025), Disp: , Rfl:     tirzepatide (Zepbound) 7.5 mg/0.5 mL auto-injector, Inject 0.5 mL (7.5 mg total) under the skin once a week, Disp: 2 mL, Rfl: 0    ZyrTEC Allergy 10 MG tablet, Take 10 mg by mouth daily (Patient not taking: Reported on 4/16/2025), Disp: , Rfl:     Current Facility-Administered Medications:     Botulinum Toxin Type A SOLR 200 Units, 200 Units, Injection, See Admin Instructions, , 200 Units at 04/16/25 1054  [4]   Allergies  Allergen Reactions    Reglan [Metoclopramide] Confusion     Pt reports Reglan \"makes me lose my mind and go crazy.\"     "

## 2025-06-04 ENCOUNTER — OFFICE VISIT (OUTPATIENT)
Dept: OBGYN CLINIC | Facility: CLINIC | Age: 50
End: 2025-06-04
Payer: COMMERCIAL

## 2025-06-04 ENCOUNTER — HOSPITAL ENCOUNTER (OUTPATIENT)
Dept: RADIOLOGY | Facility: IMAGING CENTER | Age: 50
Discharge: HOME | End: 2025-06-04
Attending: ORTHOPAEDIC SURGERY
Payer: COMMERCIAL

## 2025-06-04 ENCOUNTER — APPOINTMENT (OUTPATIENT)
Dept: RADIOLOGY | Facility: AMBULARY SURGERY CENTER | Age: 50
End: 2025-06-04
Attending: ORTHOPAEDIC SURGERY
Payer: COMMERCIAL

## 2025-06-04 VITALS — HEIGHT: 65 IN | BODY MASS INDEX: 27.42 KG/M2 | WEIGHT: 164.6 LBS

## 2025-06-04 DIAGNOSIS — Z01.89 ENCOUNTER FOR LOWER EXTREMITY COMPARISON IMAGING STUDY: ICD-10-CM

## 2025-06-04 DIAGNOSIS — S93.324A LISFRANC DISLOCATION, RIGHT, INITIAL ENCOUNTER: Primary | ICD-10-CM

## 2025-06-04 DIAGNOSIS — S93.324A LISFRANC DISLOCATION, RIGHT, INITIAL ENCOUNTER: ICD-10-CM

## 2025-06-04 DIAGNOSIS — M79.671 PAIN IN RIGHT FOOT: ICD-10-CM

## 2025-06-04 PROCEDURE — 99203 OFFICE O/P NEW LOW 30 MIN: CPT | Performed by: ORTHOPAEDIC SURGERY

## 2025-06-04 PROCEDURE — 73620 X-RAY EXAM OF FOOT: CPT

## 2025-06-04 PROCEDURE — 73630 X-RAY EXAM OF FOOT: CPT

## 2025-06-04 PROCEDURE — 73718 MRI LOWER EXTREMITY W/O DYE: CPT

## 2025-06-04 NOTE — PROGRESS NOTES
James R Lachman, M.D.  Attending, Orthopaedic Surgery  Foot and Ankle  [facility]        ORTHOPAEDIC FOOT AND ANKLE CLINIC VISIT     Assessment and Plan     Assessment & Plan  Encounter for lower extremity comparison imaging study    Lisfranc dislocation, right, initial encounter  Explained to the patient that she has x ray findings , an examination and acute injury to her right foot that is concerning for a Lisfranc ligament injury. The pathoanatomy and natural history of this diagnosis was explained to the patient in detail today in the office. She understood and all questions were answered  We will order a STAT MRI of her right foot to further evaluate the Lisfranc ligament complex  She was instructed to begin use of a CAM boot until the results of her MRI are obtained  Instructed the patient that if her MRI does confirm a Lisfranc ligament tear then a surgical procedure will be required for her to make a full recovery  Return to clinic after MRI results for discussion of results and further treatment options based on these results            History of Present Illness:     Chief Complaint:   Chief Complaint   Patient presents with    Right Foot - Pain     Happened Friday in [facility]. She passed out and fell. Her foot was under her.      Gracia Chirinos is a 49 y.o. female who is being seen for right foot pain. Patient reports that on 5/20/25 she was in [facility] and fainted while she believes her foot twisted underneath her.  Pain is localized at dorsal midfoot with minimal radiating and described as sharp and severe. Patient denies numbness, tingling or radicular pain.  Denies history of neuropathy.  Patient does not smoke, does not have diabetes and does not take blood thinners.  Patient denies family history of anesthesia complications and has not had any complications with anesthesia.     Pain/symptom timing:  Worse during the day when active  Pain/symptom context:  Worse with activites and  work  Pain/symptom modifying factors:  Rest makes better, activities make worse  Pain/symptom associated signs/symptoms: none    Prior treatment   NSAIDsYes    Injections No   Bracing/Orthotics Yes   Physical Therapy No     Orthopedic Surgical History:   See below    Past Medical, Surgical and Social History:  Past Medical History:  has a past medical history of Abnormal Pap smear of cervix, RAMIREZ positive, Anemia (), Anxiety, Arthritis (2024), Basal cell carcinoma, Cataract (), Depression, Glaucoma (), Migraine, Migraine, Miscarriage (), MRSA colonization (2016), Neck pain, Pregnancy, Rheumatoid arthritis (HCC) (2024), Squamous cell skin cancer (2025), Vertebral artery dissection (), Vulvovaginitis, and Yeast infection.  Problem List: does not have any pertinent problems on file.  Past Surgical History:  has a past surgical history that includes  section, low transverse (); Dilation and curettage of uterus (); LASIK; Colonoscopy; Gynecologic cryosurgery; Skin lesion excision; Spinal fusion (10/2023); FL injection left hip (arthrogram) (2025); and Mohs surgery (2025).  Family History: family history includes Brain cancer (age of onset: 56) in her mother; Breast cancer in her maternal aunt; Depression in her mother; Early death in her mother; Hypertension in her mother; Lung cancer (age of onset: 56) in her mother; Lupus in her mother; Multiple sclerosis in her father; No Known Problems in her daughter, maternal aunt, maternal grandfather, maternal grandmother, paternal grandfather, and paternal grandmother.  Social History:  reports that she has never smoked. She has never used smokeless tobacco. She reports that she does not drink alcohol and does not use drugs.  Current Medications: has a current medication list which includes the following prescription(s): alprazolam, butalbital-acetaminophen-caffeine, escitalopram, ferrous sulfate, hydroxyzine  "hcl, latanoprost, naproxen, ondansetron, pramipexole, probiotic product, zepbound, tizanidine, trazodone, benzonatate, fluticasone, gabapentin, guaifenesin-codeine, metformin, naloxone, rimegepant sulfate, sodium chloride, zepbound, and zyrtec allergy, and the following Facility-Administered Medications: botulinum toxin type a.  Allergies: is allergic to reglan [metoclopramide].     Review of Systems:  General- denies fever/chills  HEENT- denies hearing loss or sore throat  Eyes- denies eye pain or visual disturbances, denies red eyes  Respiratory- denies cough or SOB  Cardio- denies chest pain or palpitations  GI- denies abdominal pain  Endocrine- denies urinary frequency  Urinary- denies pain with urination  Musculoskeletal- Negative except noted above  Skin- denies rashes or wounds  Neurological- denies dizziness or headache  Psychiatric- denies anxiety or difficulty concentrating    Physical Exam:   Ht 5' 5\" (1.651 m)   Wt 74.7 kg (164 lb 9.6 oz)   LMP 05/29/2025 (Exact Date)   BMI 27.39 kg/m²   General/Constitutional: No apparent distress: well-nourished and well developed.  Eyes: normal ocular motion  Cardio: RRR, Normal S1S2, No m/r/g  Lymphatic: No appreciable lymphadenopathy  Respiratory: Non-labored breathing, CTA b/l no w/c/r  Vascular: No edema, swelling or tenderness, except as noted in detailed exam.  Integumentary: No impressive skin lesions present, except as noted in detailed exam.  Neuro: No ataxia or tremors noted  Psych: Normal mood and affect, oriented to person, place and time. Appropriate affect.  Musculoskeletal: Normal, except as noted in detailed exam and in HPI.    Examination    Right    Gait Antalgic   Musculoskeletal Tender to palpation at dorsal midfoot    Skin Normal.      Nails Normal    Range of Motion  20 degrees dorsiflexion, 30 degrees plantarflexion  Subtalar motion: normal    Stability Stable    Muscle Strength 5/5 tibialis anterior  5/5 gastrocnemius-soleus  5/5 posterior " tibialis  5/5 peroneal/eversion strength  5/5 EHL  5/5 FHL    Neurologic Normal    Sensation Intact to light touch throughout sural, saphenous, superficial peroneal, deep peroneal and medial/lateral plantar nerve distributions.  Weston-Eric 5.07 filament (10g) testing deferred.    Cardiovascular Brisk capillary refill < 2 seconds,intact DP and PT pulses    Special Tests None      Imaging Studies:   3 views of the right foot were taken, reviewed and interpreted independently that demonstrate findings suggestive of Lisfranc ligament complex instability. No other acute osseous abnormalities. Reviewed by me personally.        James R. Lachman, MD  Foot & Ankle Surgery   Department of Orthopaedic Surgery  [facility]      I personally performed the service.    James R. Lachman, MD    Scribe Attestation      I,:  Alex Westbrook am acting as a scribe while in the presence of the attending physician.:       I,:  James R Lachman, MD personally performed the services described in this documentation    as scribed in my presence.:

## 2025-06-04 NOTE — LETTER
June 4, 2025     Patient: Gracai Chirinos  YOB: 1975  Date of Visit: 6/4/2025      To Whom it May Concern:    Gracia Chirinos is under my professional care. Gracia was seen in my office on 6/4/2025. Gracia may return to work as tolerated. Must use CAM boot while working until her follow up visit to discuss MRI results.    If you have any questions or concerns, please don't hesitate to call.         Sincerely,          James R Lachman, MD        CC: No Recipients

## 2025-06-05 NOTE — PROGRESS NOTES
Cardiology Consultation     Gracia Chirinos  [id number]  1975  [facility]  [address]  [place] [id number]  1. ANETTE (obstructive sleep apnea)  Echo    Zio Monitor    Lipid panel    Hepatic function panel    sodium chloride 1 g tablet      2. Dizziness  Echo    Zio Monitor    Lipid panel    Hepatic function panel    sodium chloride 1 g tablet      3. Hypotension, unspecified hypotension type  Echo    Zio Monitor    Lipid panel    Hepatic function panel    sodium chloride 1 g tablet          HPI patient is here for cardiology evaluation.  Patient has ANETTE, Migraine headache, RA and .  She presented to the ED 2025 after a syncopal episode with head strike and injury to the right foot.  Patient given 1 L of NS in the ED.  She had improvement in symptoms with this.  EKG 2025 demonstrated NSR and was a normal tracing.  Troponin was normal.  CXR showed NAD.  Patient admits to having orthostatic hypotension for years.  This most recent event however culminated in syncope.  She was in [place] and squatted to look at something and became lightheaded and passed out.  She injured her right foot which will likely require surgery for a Lisfranc injury.  She has had no specific evaluation for her OH.  There is no history of this in the family.  In reference to FH her mother  of cancer.  Her mother did have HTN.  Her father had HTN and had an MI and stent placed.  And is a non-smoker.  She has 1 caffeinated coffee per day.  She started Zepbound last fall and has lost 26 pounds.  Her OH has been worse the past 5 months.  She has had back surgery in the past.  She does use a muscle relaxant.  She was diagnosed with ANETTE when she was heavier and uses CPAP.  She is being retested tomorrow given that she has lost weight to see what her status is.    PMH-  Past Medical History[1]     SOCIAL HISTORY-  Social  "History     Socioeconomic History   • Marital status:      Spouse name: Not on file   • Number of children: Not on file   • Years of education: Not on file   • Highest education level: Not on file   Occupational History   • Not on file   Tobacco Use   • Smoking status: Never   • Smokeless tobacco: Never   Vaping Use   • Vaping status: Never Used   Substance and Sexual Activity   • Alcohol use: No   • Drug use: No   • Sexual activity: Yes     Partners: Male     Birth control/protection: Male Sterilization   Other Topics Concern   • Not on file   Social History Narrative    Feels safe at home      Social Drivers of Health     Financial Resource Strain: Not on file   Food Insecurity: Not on file   Transportation Needs: Not on file   Physical Activity: Not on file   Stress: Not on file   Social Connections: Not on file   Intimate Partner Violence: Not At Risk (10/4/2023)    [facility] from [facility]    Humiliation, Afraid, Rape, and Kick questionnaire    • Fear of Current or Ex-Partner: No    • Emotionally Abused: No    • Physically Abused: No    • Sexually Abused: No   Housing Stability: Not on file        FAMILY HISTORY-  Family History[2]    SURGICAL HISTORY-  Past Surgical History[3]    Current Medications[4]  Allergies   Allergen Reactions   • Reglan [Metoclopramide] Confusion     Pt reports Reglan \"makes me lose my mind and go crazy.\"     Vitals:    06/09/25 0824   BP: 106/60   BP Location: Right arm   Patient Position: Sitting   Cuff Size: Standard   Pulse: 82   SpO2: 98%   Weight: 72.6 kg (160 lb)   Height: 5' 5\" (1.651 m)         Review of Systems:  Review of Systems   All other systems reviewed and are negative.      Physical Exam:  Physical Exam  Vitals reviewed.   Constitutional:       Appearance: She is well-developed.   HENT:      Head: Normocephalic and atraumatic.     Eyes:      Conjunctiva/sclera: Conjunctivae normal.       Cardiovascular:      Rate and Rhythm: Normal rate.      " Heart sounds: Normal heart sounds.   Pulmonary:      Effort: Pulmonary effort is normal.      Breath sounds: Normal breath sounds.     Musculoskeletal:      Cervical back: Normal range of motion and neck supple.     Skin:     General: Skin is warm and dry.     Neurological:      Mental Status: She is alert and oriented to person, place, and time.       Discussion/Summary: Patient with OH.  Her blood pressure is low normal today.  Will prescribe sodium chloride tablets 1 tablet with each meal.  Have encouraged her to drink fluids liberally.  We did discuss support stockings, abdominal binder and the Krishna protocol which are exercises for core strength.  Will exclude structural heart disease.  Will check an echo to assess LV function and valve structure and function.  Will check a Zio patch to assess for arrhythmia.  Will check her cholesterol.  I have asked her to call if there is a problem in the interim.  I will see her in follow-up in 6 months and sooner as is necessary.         [1]  Past Medical History:  Diagnosis Date   • Abnormal Pap smear of cervix    • RAMIREZ positive    • Anemia 2024   • Anxiety    • Arthritis March 2024   • Basal cell carcinoma    • Cataract 2024    Medicine related   • Depression    • Glaucoma 2019   • Migraine    • Migraine    • Miscarriage 2007   • MRSA colonization 07/13/2016   • Neck pain    • Pregnancy    • Rheumatoid arthritis (HCC) 03/2024   • Squamous cell skin cancer 03/19/2025    SCCIS forehead-Mohs   • Vertebral artery dissection (HCC)    • Vulvovaginitis    • Yeast infection    [2]  Family History  Problem Relation Name Age of Onset   • Hypertension Mother Judit    • Lung cancer Mother Judit 56   • Brain cancer Mother Judit 56   • Depression Mother Judit    • Lupus Mother Judit    • Early death Mother Judit         Lung cancer   • Breast cancer Maternal Aunt          dx in 40's    • Multiple sclerosis Father     • No Known Problems Daughter     • No Known Problems  Maternal Grandmother     • No Known Problems Maternal Grandfather     • No Known Problems Paternal Grandmother     • No Known Problems Paternal Grandfather     • No Known Problems Maternal Aunt     [3]  Past Surgical History:  Procedure Laterality Date   •  SECTION, LOW TRANSVERSE     • COLONOSCOPY     • DILATION AND CURETTAGE OF UTERUS     • FL INJECTION LEFT HIP (ARTHROGRAM)  2025   • GYNECOLOGIC CRYOSURGERY      cervix   • LASIK     • MOHS SURGERY  2025    SCCIS forehead;    • SKIN LESION EXCISION      basal cell carcinoma of left cheek   • SPINAL FUSION  10/2023   [4]    Current Outpatient Medications:   •  ALPRAZolam (XANAX) 0.25 mg tablet, Take 1 tablet (0.25 mg total) by mouth daily as needed for anxiety, Disp: 30 tablet, Rfl: 0  •  butalbital-acetaminophen-caffeine (Esgic) -40 mg per tablet, Take 1 tablet by mouth every 4 (four) hours as needed for headaches, Disp: 30 tablet, Rfl: 0  •  escitalopram (LEXAPRO) 20 mg tablet, Take 1 tablet (20 mg total) by mouth daily, Disp: 90 tablet, Rfl: 0  •  ferrous sulfate 325 (65 FE) MG EC tablet, 325mg every other day with Vitamin C, Disp: 90 tablet, Rfl: 0  •  gabapentin (NEURONTIN) 300 mg capsule, Take 2 capsules (600 mg total) by mouth 2 (two) times a day Take 2 capsule in the morning and 2 at bedtime, Disp: 120 capsule, Rfl: 1  •  hydrOXYzine HCL (ATARAX) 25 mg tablet, Take 1 tablet (25 mg total) by mouth in the morning, Disp: 90 tablet, Rfl: 0  •  latanoprost (XALATAN) 0.005 % ophthalmic solution, , Disp: , Rfl: 2  •  naproxen (NAPROSYN) 500 mg tablet, Take 1 tablet (500 mg total) by mouth 2 (two) times a day with meals, Disp: 60 tablet, Rfl: 0  •  ondansetron (ZOFRAN) 4 mg tablet, Take 1 tablet (4 mg total) by mouth every 8 (eight) hours as needed for nausea or vomiting, Disp: 30 tablet, Rfl: 0  •  pramipexole (MIRAPEX) 0.5 mg tablet, Take 1 tablet (0.5 mg total) by mouth 2 (two) times a day, Disp: 90 tablet, Rfl: 1  •   Probiotic Product (PRO-BIOTIC BLEND PO), Take by mouth, Disp: , Rfl:   •  sodium chloride 1 g tablet, Take 1 tablet (1 g total) by mouth 3 (three) times a day, Disp: 270 tablet, Rfl: 3  •  tirzepatide (Zepbound) 10 mg/0.5 mL auto-injector, Inject 0.5 mL (10 mg total) under the skin once a week Do not start before May 22, 2025., Disp: 2 mL, Rfl: 2  •  tiZANidine (ZANAFLEX) 2 mg tablet, Take 1-2 tablets as needed at bedtime (max 4mg/day) for muscle spasm, Disp: 60 tablet, Rfl: 1  •  traZODone (DESYREL) 50 mg tablet, Take 1.5 tablets (75 mg total) by mouth daily at bedtime, Disp: 135 tablet, Rfl: 0  •  metFORMIN (GLUCOPHAGE-XR) 500 mg 24 hr tablet, Start with 1 tablet with dinner and after 1 week increase to 1 tablet twice a day with meals. (Patient not taking: Reported on 5/15/2025), Disp: 180 tablet, Rfl: 3  •  rimegepant sulfate (NURTEC) 75 mg TBDP, Take 1 tablet (75 mg) by mouth once at the onset of a headache. Max dose: 75 mg/day. (Patient not taking: Reported on 2/26/2025), Disp: 16 tablet, Rfl: 3  •  ZyrTEC Allergy 10 MG tablet, Take 10 mg by mouth daily (Patient not taking: Reported on 4/16/2025), Disp: , Rfl:     Current Facility-Administered Medications:   •  Botulinum Toxin Type A SOLR 200 Units, 200 Units, Injection, See Admin Instructions, , 200 Units at 04/16/25 1059

## 2025-06-09 ENCOUNTER — TELEPHONE (OUTPATIENT)
Age: 50
End: 2025-06-09

## 2025-06-09 ENCOUNTER — OFFICE VISIT (OUTPATIENT)
Dept: CARDIOLOGY CLINIC | Facility: CLINIC | Age: 50
End: 2025-06-09
Payer: COMMERCIAL

## 2025-06-09 VITALS
HEART RATE: 82 BPM | HEIGHT: 65 IN | BODY MASS INDEX: 26.66 KG/M2 | OXYGEN SATURATION: 98 % | SYSTOLIC BLOOD PRESSURE: 106 MMHG | WEIGHT: 160 LBS | DIASTOLIC BLOOD PRESSURE: 60 MMHG

## 2025-06-09 DIAGNOSIS — R42 DIZZINESS: ICD-10-CM

## 2025-06-09 DIAGNOSIS — G47.33 OSA (OBSTRUCTIVE SLEEP APNEA): Primary | ICD-10-CM

## 2025-06-09 DIAGNOSIS — I95.9 HYPOTENSION, UNSPECIFIED HYPOTENSION TYPE: ICD-10-CM

## 2025-06-09 PROCEDURE — 99204 OFFICE O/P NEW MOD 45 MIN: CPT | Performed by: INTERNAL MEDICINE

## 2025-06-09 RX ORDER — SODIUM CHLORIDE 1 G/1
1 TABLET ORAL 3 TIMES DAILY
Qty: 270 TABLET | Refills: 3 | Status: SHIPPED | OUTPATIENT
Start: 2025-06-09

## 2025-06-09 NOTE — PATIENT INSTRUCTIONS
Start sodium chloride tablets, 1 tablet with each meal.  Increase your fluid intake.  Will arrange cardiac testing.  Please call if there is a problem.  I will see you in follow-up.

## 2025-06-09 NOTE — TELEPHONE ENCOUNTER
Caller: Patient    Doctor: Dr. Lachman / [facility]    Reason for call: Patient states she was told she should be having surgery this Thursday 6/12 and needs to know if this is still possible, as she needs to work out their schedule with her ; please advise.     Call back#: [phone]

## 2025-06-09 NOTE — TELEPHONE ENCOUNTER
Hebirgit Doc! I don't have her as having sx Thursday! She has to come in to go over MRI & sign consent?

## 2025-06-10 ENCOUNTER — PATIENT MESSAGE (OUTPATIENT)
Age: 50
End: 2025-06-10

## 2025-06-10 ENCOUNTER — HOSPITAL ENCOUNTER (OUTPATIENT)
Dept: SLEEP CENTER | Facility: CLINIC | Age: 50
Discharge: HOME | End: 2025-06-10
Payer: COMMERCIAL

## 2025-06-10 ENCOUNTER — OFFICE VISIT (OUTPATIENT)
Dept: OBGYN CLINIC | Facility: CLINIC | Age: 50
End: 2025-06-10
Payer: COMMERCIAL

## 2025-06-10 VITALS — HEIGHT: 65 IN | WEIGHT: 160 LBS | BODY MASS INDEX: 26.66 KG/M2

## 2025-06-10 DIAGNOSIS — G47.33 OSA (OBSTRUCTIVE SLEEP APNEA): ICD-10-CM

## 2025-06-10 DIAGNOSIS — S93.324A LISFRANC DISLOCATION, RIGHT, INITIAL ENCOUNTER: Primary | ICD-10-CM

## 2025-06-10 PROCEDURE — 99213 OFFICE O/P EST LOW 20 MIN: CPT | Performed by: ORTHOPAEDIC SURGERY

## 2025-06-10 PROCEDURE — 95810 POLYSOM 6/> YRS 4/> PARAM: CPT

## 2025-06-10 RX ORDER — CHLORHEXIDINE GLUCONATE ORAL RINSE 1.2 MG/ML
15 SOLUTION DENTAL ONCE
Status: CANCELLED | OUTPATIENT
Start: 2025-06-10 | End: 2025-06-10

## 2025-06-10 RX ORDER — CHLORHEXIDINE GLUCONATE 40 MG/ML
SOLUTION TOPICAL DAILY PRN
Status: CANCELLED | OUTPATIENT
Start: 2025-06-10

## 2025-06-10 RX ORDER — SODIUM CHLORIDE, SODIUM LACTATE, POTASSIUM CHLORIDE, CALCIUM CHLORIDE 600; 310; 30; 20 MG/100ML; MG/100ML; MG/100ML; MG/100ML
20 INJECTION, SOLUTION INTRAVENOUS CONTINUOUS
Status: CANCELLED | OUTPATIENT
Start: 2025-06-10

## 2025-06-10 NOTE — PATIENT INSTRUCTIONS
James R Lachman, M.D.  Attending, Orthopaedic Surgery  [facility]  [place] Office Phone: [phone] ? Fax: [phone]  [place] Office Phone: [phone] ? Fax:[phone]    : Janiya Leal MA     Surgery Coordinators [place]: Sulma Amin, [phone]  Sofia Wolf, [phone]  Surgery Coordinator [place]:  Aleshia Mckeon, [phone]                                                       Monica Santos, [phone]                                                            [email or web address]   PRE-OPERATIVE AND POST-OPERATIVE INSTRUCTIONS    General Information:  Your surgery is with Dr. Lachman.  Dates can change (although rare) depending on emergencies.  Typical post operative visits are at the following intervals:  3 weeks post surgery(except 1 week for bunions and wound monitoring), 6 weeks post surgery, 3 months post surgery, 6 months post surgery, and then on a yearly basis.  However, this may change based on Dr. Lachmans’ recommendation.  #1 post-operative rule for foot/ankle surgery:  ONCE YOU ARE OUT OF YOUR CAST AND/OR REMOVABLE BOOT, SWELLING MAY PERSIST FOR MANY MONTHS.  YOU MIGHT ALSO EXPERIENCE A BLUISH DISCOLORATION OF YOUR LEG.  THIS IS NORMAL AND PART OF THE USUAL POSTOPERATIVE EXPERIENCE.    SMOKING:  Smoking results in incomplete healing of fractures (broken bones) and joints that my have been fused.  Smoking and nicotine also prevents the growth of bone into ankle replacements and bone healing.  It also slows the healing of muscles and skin (soft tissue).  Therefore, please do not have surgery if you continue to smoke.  We reserve the right to cancel your surgery if we suspect that you are smoking.  DO NOT use nicorette gum or other patches.  Please find an alternative method to quit smoking before your surgery.    Pre-Operative Information:  Surgery date and preoperative visits:  If you have  medical problems, such as an abnormal EKG, history of BLOOD CLOT, ANEURYSM, and any other heart condition, please inform us so that we can get your medical clearance several weeks before the surgery.  Please bring any important medical information, such as an EKG, chest x-ray, or echocardiogram, with you to ensure that your surgery will not be delayed.  If needed, you will receive your preoperative appointments in the mail or by phone from our scheduling office.  The location of the preoperative appointment will be given to you also.  You may not eat after midnight the night before surgery.  If you do, your surgery will be cancelled.   You will receive a phone call from your surgery center the day before your surgery (if your surgery is on a Monday, you will get a call the Friday before).   If you do not hear from someone by 4pm the day before your surgery, please call the Surgical coordinator (number above) to notify us.  Start taking Vitamin D3 4000 units per day and Calcium 1200mg per day immediately. You will continue this until your 3 month post-op visit. These are over the counter and available at all pharmacies and supermarkets.  FOR THOSE HAVING SURGERY AT [facility]- IF YOU WILL NEED CRUTCHES OR A ROLLING WALKER AFTER SURGERY, ASK FOR A PRESCRIPTION FOR THIS FROM OUR OFFICE TODAY.  THIS CANNOT BE HANDLED THE DAY OF SURGERY AS [facility] DOES NOT STOCK THESE.    Because bacterial can often enter any defect in the skin, it is important to avoid any cuts before surgery.  Any breaks in the skin on the leg will often result in your surgery being postponed.  Please avoid going on a very long walk the day prior to surgery, or doing other activities that could lead to irritation of the skin, including yard work, extra athletic activity, or shaving.   This could result in surgery cancellation.  You MUST be fasting the day of your surgery.  Therefore, please do not consume any foot or beverage  after midnight the night before surgery.  The morning of surgery you may take your usual medications with a sip of water.  It is important not to take anti-inflammatory medication like Ibuprofen, Motrin, Naproxen (Aleve), or Aspirin 7-10 days before surgery because they will make you bleed more than usual.  Vitamin, E, Plavix and Coumadin also have the same effect.  Stop Aspirin and Vitamin E two weeks before surgery.  YOUR MEDICAL DOCTOR SHOULD TELL YOU WHEN TO STOP COUMADIN OR PLAVIX.  If your surgery involves any bone healing, please do not take anti-inflammatories for at least 6 weeks after surgery.  This can impede bone healing (ibuprofen, Aleve, Relafen, iodine).  Tylenol is fine to take.    PREOPERATIVE BATHING INSTRUCTIONS:    Before your surgery, bathe with Hibiclens (4% Chlorhexidene) as instructed below.  This skin cleanser will help reduce the bacteria on your skin before surgery.  To avoid irritating your eyes, do not apply Hibiclens above the level of your neck.  On the evening before AND the morning of surgery, bathe your entire body except the face and scalp, then rinse freely.  DO NOT apply to your face or scalp, as Hibiclens can irritate your eyes.  Purchasing information:   Hibiclens is available without a prescription at most retail pharmacies.     ADDITIONAL INSTRUCTIONS:  PATIENTS HAVING FOOT/ANKLE SURGERY     In preparation for your upcoming surgery, we kindly request and advise the following:  Notify our office if you are taking any of the following:  Coumadin (warfarin):  Persantine (dipyridamole); Pletal (cilostazol); Plavix (clopidogrel); Ticlid (ticlopidine); Agrylin (anagrelide); Aggrenox (dipyridamole and aspirin) or other blood thinners,.  In addition, stop taking Vitamin E and herbal supplements.  Do not schedule any elective dental work for at least 6 months after surgery.  If you had an ankle replacement, you will need to take antibiotics before any future dental procedures. Your  dentist or our office can prescribe these for you.  1000mg of Amoxicillin 1 hour prior to any dental procedure is the recommended dosing.      THREE RULES:    After surgery you will most likely be given the instructions “KEEP YOUR TOES ABOVE YOUR NOSE.”  This means that you MUST have your feet elevated higher than your heart.  Keeping your toes above your nose helps to heal the muscles and skin (soft tissues) by reducing swelling in your leg.  This position also helps to prevent infection, and is very important in avoiding deep venous thrombosis (blood clots).    In order to keep the blood circulating in your legs and in order to avoid deep vein   thrombosis (blood clots), we ask patients to GET UP ONCE AN HOUR during the day.  This means you should at least cross the room and come back.  It does not mean you have to be up for long periods of time.  In most cases we will not have people immediately put any weight on their operated part.  This is important to prevent loosening of metal or other devices holding the bones together.  It also prevents irritation of the soft tissues which can lead to prolonged healing.  When we say get up once an hour, please walk, hop or move with an assisted device.  This is important!    Do not do any excessive walking during the first few days after surgery.  Recovering from surgery is a full-time task for the patient.  Postoperative care is important to avoid irritating the skin incision, which can lead to infection.  Please do not plan activities or go out of town for several weeks after surgery.  If you are unsure about your future activities, please schedule surgery only when you know it is acceptable for you.  Scheduling surgery and then canceling the date, prevents other people from having surgery on that date as it takes time to line everything up effectively.  If you cancel your surgery the week of your planned surgery, we reserve the right to cancel all future surgical  procedures.    THE DAY OF SURGERY:    Arrival to the hospital or outpatient surgical center on time is imperative.  If you arrive late, then your surgery will be cancelled.  You MUST have a family member/friend bring you, stay with you throughout the DURATION of your surgery, and drive you home.  You MUST be fasting the day of your surgery.  Therefore, do not consume any food or beverage after midnight the night before surgery.  At your pre-operative visit with the anesthesia staff, or during your phone screen, a nurse will instruct you what medications you will need to take the day of surgery.  MAKE SURE THAT THE PHARMACY LISTED IN THE ELECTRONIC MEDICAL RECORD (EPIC) IS YOUR PREFERRED PHARMACY. For example, if you are staying with family or a friend, and will not be near your preferred pharmacy, YOU MUST, tell the nurses checking you in the day of surgery so that this can be changed in the system.  If your prescriptions are sent to a pharmacy, this cannot be changed.      AFTER YOUR SURGERY:  Bleeding through the bandage almost always occurs.  Do not let this alarm you.  Simply add more gauze or a towel, call us, and come in for a dressing change.   If you think it is excessive, contact us immediately or go to the local emergency room.    Do not get the bandage wet.  Showering is possible with plastic protectors.   Be very careful, as the bathroom can be wet and slippery.  If you do get your dressing wet, it should be changed immediately.  Please contact us.      ONCE YOUR ARE OUT OF YOUR CAST AND/OR REMOVABLE BOOT, SWELLING MAY PERSIST FOR MANY MONTHS.  YOU MIGHT ALSO EXPERIENCE A BLUISH DISCOLORATION OF YOUR LEG.  THIS IS NORMAL AND PART OF THE USUAL POSTOPERATIVE EXPERIENCE.  WEARING COMPRESSION HOSE (ELASTIC STOCKINGS) CAN HELP AVOID SOME OF THIS SWELLING.      DRESSING:   The purpose of the surgical dressing is to keep your wound and the surgical site protected from the environment.  Most dressings contain  splints, which help to hold your foot and ankle in a corrected position, and also allow the surgical site to heal properly. Dressings will remain in place and undisturbed until the first postop visit.   If you have a drain in place, this will need to be removed in 1-3 days after surgery.  The time for the drain to be pulled will be written on your discharge instruction sheet.    CAST  INSTRUCTIONS:  You may or may not get a cast following surgery.  If you do, pay close attention to the following:    After application of a splint or cast, it is very important to elevate your leg for 24 to 72 hours.   The injured area should be elevated well above the heart.   Remember “Toes above your Nose”.  Rest and elevation greatly reduce pain and speed the healing process by minimizing early swelling.    CALL YOUR DOCTORS OFFICE OR VISIT LOCATION EMERGENCY ROOM IF YOU HAVE ANY OF THE FOLLOWING:    Significant increased pain, which may be caused by swelling, and the feeling that the splint or cast is too tight  Numbness and tingling in your hand or foot, which may be caused by too much pressure on the nerves  Burning and stinging, which may be caused by too much pressure on the skin  Excessive swelling below the cast, which may mean the cast is slowing your blood circulation  Loss of active movement of toes, which request an urgent evaluation  Loss of “capillary refill”.  Pinch the tip of toes and natanael the skin.  Release pressure and if the skin does not return pink then call the office immediately.      DO NOT GET YOUR CAST WET.   Bacteria thrive in moist dark areas.  We do not want this.   If your cast becomes wet, return to the office and we will apply another one.    PAIN AFTER SURGERY:  Narcotic pain medication can and will depress your respiratory system if taken in excess.  The goal of pain management with narcotics is to be comfortable not pain free.  If you take enough narcotics to be pain free then you run the risk of  stopping breathing.  If this happens, call 911 immediately!  Pain in the heel is often  caused by pressure from the weight of your foot on the bed.  Make sure your heel is suspended off the bed by keeping a pillow underneath your calf not your knee.    Medications:  You will be given narcotic pain medication. Do NOT drive while taking narcotic medications. Medications such as Darvocet, Percocet, Vicoden or Tylenol #3, also contain acetaminophen (Tylenol). Do not take acetaminophen or Tylenol from home when taking theses medications. When you fill your prescription, you may ask the pharmacist if your pain medication has acetaminophen/Tylenol in it. It is okay to take Tylenol with Oxycontin/Oxycodone. Should you have pain after taking your prescription medication, ibuprophen (Motrin, Advil, and Alleve) is a common over the counter preparation and may often be taken with the prescription pain medication as long as you take them with food. These medications can irritate the stomach lining.   Unless you are allergic to aspirin or currently taking a blood thinner, Dr. Lachmans’ patients are requested to take one 81 mg aspirin every 12 hours until you are back to walking normally after surgery (This can be up to 6 weeks).  Narcotic medications commonly cause nausea. Taking them with food will decrease this side effect. If you are having extreme nausea, please contact us for an alternative medication or for something that can be taken with this medication to decrease the nausea.   Also, narcotic medications frequently cause constipation. An increase of fiber, fruits and vegetables in your diet may alleviate this problem, or if necessary, you may use an over-the-counter medication such as senekot, colace, or Fibercon for constipation problems.   You should resume all medications you were taking prior to the surgery unless otherwise specified.     Activity:   Because of your recent foot surgery, your activity level will  decrease. You will need to elevate your foot ABOVE the level of your heart for a minimum of four days. The length of time necessary for the swelling to go down, and for your wounds to heal properly depends greatly on your efforts here. Elevation is extremely important to avoid compromising the blood supply to your foot. Remember when your foot is down it will swell, which will increase pain and slow healing. Wiggle your toes frequently if possible.   If you go home with a regional block, (a type of anesthesia) the foot and leg will be numb. Think of ways to get into your house and around the house until the block wears off.   Keep in mind that it may be a legal issue if you drive while in a cast or splint, especially when the splint is on the right foot. You may call the Department of [employer] to schedule a road test if you have adaptive equipment applied to your car.   The amount of weight you are allowed to bear on your foot will be written on your discharge sheet filled out at the time of surgery. The following is an explanation of the possibilities:       COVID-19 Policies  [facility] has the following policies when it comes to ELECTIVE surgery  No elective surgery requiring anesthesia until 7 weeks after a patient tested positive for COVID-19   No elective surgery requiring anesthesia until 3 months after a patient was hospitalized for COVID-19      Non-weight bearing:   You are to put NO weight whatsoever on your foot. When using crutches or a walker, your foot should not touch the ground, except when you are standing. Then, it may rest on the ground. If you are to be non-weight bearing, and you are not compliant, you could compromise the surgery.     Some of our patients have been requesting prescriptions for a roll-a-bout knee scooter. [employer] and other insurances have been denying these claims, and you may either have to rent one or pay out of pocket to purchase one.  THIS SHOULD BE  PURCHASED PRIOR TO THE SURGERY AND YOU SHOULD BRING IT WITH YOU THE DAY OF THE SURGERY TO AIDE IN GETTING FROM THE CAR INTO THE HOUSE AFTER SURGERY.

## 2025-06-10 NOTE — LETTER
Kaya 10, 2025     Patient: Gracia Chirinos  YOB: 1975  Date of Visit: 6/10/2025      To Whom it May Concern:    Gracia Chirinos is under my professional care. Gracia was seen in my office on 6/10/2025. She will be having a surgical procedure on 6/12/25. The average time out of work and back to full duty for this procedure is 4 to 4.5 months.     If you have any questions or concerns, please don't hesitate to call.          Sincerely,          James R Lachman, MD        CC: No Recipients

## 2025-06-10 NOTE — H&P (VIEW-ONLY)
James R Lachman, M.D.  Attending, Orthopaedic Surgery  Foot and Ankle  [facility]      ORTHOPAEDIC FOOT AND ANKLE CLINIC VISIT     Assessment and Plan     Assessment & Plan  Lisfranc dislocation, right, initial encounter  Explained to the patient that her MRI does confirm an injury to her Lisfranc ligament complex with instability of her right foot. This will require a surgical procedure to make a full recovery. She understood and all questions were answered  She will continue with the use of CAM boot until her surgical date (6/12/25)  Informed consent was obtained today for a Right Lisfranc arthrodesis.     CONSENT FOR BONY PROCEDURES:   Patient understands that there is no guarantee that the surgery will relieve all of Her pain and also understands that there may be a prolonged course of protected weight-bearing status required which will restrict them from driving and other activities as discussed at today's visit. Patient recognizes that there are risks with surgery including bleeding, numbness, nerve irritation, wound complications, infection, continued pain, joint stiffness, malunion, nonunion, anesthetic complications, death, failure of procedure and possible need for further surgery. The patient understands that there is no guarantee that this surgery will relieve all of Her pain and symptoms.  Patient understands that there is no guarantee that they will return to full function after the procedure. Patient has provided informed consent for the procedure.              History of Present Illness:   Chief Complaint:   Chief Complaint   Patient presents with    Follow-up     Follow up of the right foot. MRI review. In cam boot and walking.     Gracia Chirinos is a 49 y.o. female who is being seen in follow-up for Right foot, Lisfranc injury. When we last saw she we recommended WBAT in a CAM boot.  Pain has not improved. Residual pain is localized at midfoot with minimal radiating and  described as sharp and severe.      Pain/symptom timing:  Worse during the day when active  Pain/symptom context:  Worse with activites and work  Pain/symptom modifying factors:  Rest makes better, activities make worse  Pain/symptom associated signs/symptoms: none    Prior treatment   NSAIDsYes   Injections No   Bracing/Orthotics Yes    Physical Therapy No     Orthopedic Surgical History:   See below    Past Medical, Surgical and Social History:  Past Medical History:  has a past medical history of Abnormal Pap smear of cervix, RAMIREZ positive, Anemia (), Anxiety, Arthritis (2024), Basal cell carcinoma, Cataract (), Depression, Glaucoma (), Migraine, Migraine, Miscarriage (), MRSA colonization (2016), Neck pain, Pregnancy, Rheumatoid arthritis (HCC) (2024), Squamous cell skin cancer (2025), Vertebral artery dissection (), Vulvovaginitis, and Yeast infection.  Problem List: does not have any pertinent problems on file.  Past Surgical History:  has a past surgical history that includes  section, low transverse (); Dilation and curettage of uterus (); LASIK; Colonoscopy; Gynecologic cryosurgery; Skin lesion excision; Spinal fusion (10/2023); FL injection left hip (arthrogram) (2025); and Mohs surgery (2025).  Family History: family history includes Brain cancer (age of onset: 56) in her mother; Breast cancer in her maternal aunt; Depression in her mother; Early death in her mother; Hypertension in her mother; Lung cancer (age of onset: 56) in her mother; Lupus in her mother; Multiple sclerosis in her father; No Known Problems in her daughter, maternal aunt, maternal grandfather, maternal grandmother, paternal grandfather, and paternal grandmother.  Social History:  reports that she has never smoked. She has never used smokeless tobacco. She reports that she does not drink alcohol and does not use drugs.  Current Medications: has a current medication list  "which includes the following prescription(s): alprazolam, butalbital-acetaminophen-caffeine, escitalopram, ferrous sulfate, hydroxyzine hcl, latanoprost, naproxen, ondansetron, pramipexole, probiotic product, sodium chloride, zepbound, tizanidine, trazodone, gabapentin, metformin, rimegepant sulfate, and zyrtec allergy, and the following Facility-Administered Medications: botulinum toxin type a.  Allergies: is allergic to reglan [metoclopramide].     Review of Systems:  General- denies fever/chills  HEENT- denies hearing loss or sore throat  Eyes- denies eye pain or visual disturbances, denies red eyes  Respiratory- denies cough or SOB  Cardio- denies chest pain or palpitations  GI- denies abdominal pain  Endocrine- denies urinary frequency  Urinary- denies pain with urination  Musculoskeletal- Negative except noted above  Skin- denies rashes or wounds  Neurological- denies dizziness or headache  Psychiatric- denies anxiety or difficulty concentrating    Physical Exam:   Ht 5' 5\" (1.651 m)   Wt 72.6 kg (160 lb)   LMP 05/29/2025 (Exact Date)   BMI 26.63 kg/m²   General/Constitutional: No apparent distress: well-nourished and well developed.  Eyes: normal ocular motion  Lymphatic: No appreciable lymphadenopathy  Respiratory: Non-labored breathing  Vascular: No edema, swelling or tenderness, except as noted in detailed exam.  Integumentary: No impressive skin lesions present, except as noted in detailed exam.  Neuro: No ataxia or tremors noted  Psych: Normal mood and affect, oriented to person, place and time. Appropriate affect.  Musculoskeletal: Normal, except as noted in detailed exam and in HPI.    Examination    Right    Gait Antalgic   Musculoskeletal Tender to palpation at midfoot    Skin Normal.    Nails Normal    Range of Motion  20 degrees dorsiflexion, 30 degrees plantarflexion  Subtalar motion: normal    Stability Stable    Muscle Strength 5/5 tibialis anterior  5/5 gastrocnemius-soleus  5/5 posterior " tibialis  5/5 peroneal/eversion strength  5/5 EHL  5/5 FHL    Neurologic Normal    Sensation Intact to light touch throughout sural, saphenous, superficial peroneal, deep peroneal and medial/lateral plantar nerve distributions.  [place]-Eric 5.07 filament (10g) testing deferred.    Cardiovascular Brisk capillary refill < 2 seconds,intact DP and PT pulses    Special Tests None      Imaging Studies:   MRI available for review of Right foot which demonstrates tear of Lisfranc ligament at the medial cuneiform. Reviewed by me personally.      James R. Lachman, MD  Foot & Ankle Surgery   Department of Orthopaedic Surgery  [facility]      I personally performed the service.    James R. Lachman, MD    Scribe Attestation      I,:  Alex Westbrook am acting as a scribe while in the presence of the attending physician.:       I,:  James R Lachman, MD personally performed the services described in this documentation    as scribed in my presence.:

## 2025-06-10 NOTE — PROGRESS NOTES
James R Lachman, M.D.  Attending, Orthopaedic Surgery  Foot and Ankle  [facility]      ORTHOPAEDIC FOOT AND ANKLE CLINIC VISIT     Assessment and Plan     Assessment & Plan  Lisfranc dislocation, right, initial encounter  Explained to the patient that her MRI does confirm an injury to her Lisfranc ligament complex with instability of her right foot. This will require a surgical procedure to make a full recovery. She understood and all questions were answered  She will continue with the use of CAM boot until her surgical date (6/12/25)  Informed consent was obtained today for a Right Lisfranc arthrodesis.     CONSENT FOR BONY PROCEDURES:   Patient understands that there is no guarantee that the surgery will relieve all of Her pain and also understands that there may be a prolonged course of protected weight-bearing status required which will restrict them from driving and other activities as discussed at today's visit. Patient recognizes that there are risks with surgery including bleeding, numbness, nerve irritation, wound complications, infection, continued pain, joint stiffness, malunion, nonunion, anesthetic complications, death, failure of procedure and possible need for further surgery. The patient understands that there is no guarantee that this surgery will relieve all of Her pain and symptoms.  Patient understands that there is no guarantee that they will return to full function after the procedure. Patient has provided informed consent for the procedure.              History of Present Illness:   Chief Complaint:   Chief Complaint   Patient presents with    Follow-up     Follow up of the right foot. MRI review. In cam boot and walking.     Gracia Chirinos is a 49 y.o. female who is being seen in follow-up for Right foot, Lisfranc injury. When we last saw she we recommended WBAT in a CAM boot.  Pain has not improved. Residual pain is localized at midfoot with minimal radiating and  described as sharp and severe.      Pain/symptom timing:  Worse during the day when active  Pain/symptom context:  Worse with activites and work  Pain/symptom modifying factors:  Rest makes better, activities make worse  Pain/symptom associated signs/symptoms: none    Prior treatment   NSAIDsYes   Injections No   Bracing/Orthotics Yes    Physical Therapy No     Orthopedic Surgical History:   See below    Past Medical, Surgical and Social History:  Past Medical History:  has a past medical history of Abnormal Pap smear of cervix, RAMIREZ positive, Anemia (), Anxiety, Arthritis (2024), Basal cell carcinoma, Cataract (), Depression, Glaucoma (), Migraine, Migraine, Miscarriage (), MRSA colonization (2016), Neck pain, Pregnancy, Rheumatoid arthritis (HCC) (2024), Squamous cell skin cancer (2025), Vertebral artery dissection (), Vulvovaginitis, and Yeast infection.  Problem List: does not have any pertinent problems on file.  Past Surgical History:  has a past surgical history that includes  section, low transverse (); Dilation and curettage of uterus (); LASIK; Colonoscopy; Gynecologic cryosurgery; Skin lesion excision; Spinal fusion (10/2023); FL injection left hip (arthrogram) (2025); and Mohs surgery (2025).  Family History: family history includes Brain cancer (age of onset: 56) in her mother; Breast cancer in her maternal aunt; Depression in her mother; Early death in her mother; Hypertension in her mother; Lung cancer (age of onset: 56) in her mother; Lupus in her mother; Multiple sclerosis in her father; No Known Problems in her daughter, maternal aunt, maternal grandfather, maternal grandmother, paternal grandfather, and paternal grandmother.  Social History:  reports that she has never smoked. She has never used smokeless tobacco. She reports that she does not drink alcohol and does not use drugs.  Current Medications: has a current medication list  "which includes the following prescription(s): alprazolam, butalbital-acetaminophen-caffeine, escitalopram, ferrous sulfate, hydroxyzine hcl, latanoprost, naproxen, ondansetron, pramipexole, probiotic product, sodium chloride, zepbound, tizanidine, trazodone, gabapentin, metformin, rimegepant sulfate, and zyrtec allergy, and the following Facility-Administered Medications: botulinum toxin type a.  Allergies: is allergic to reglan [metoclopramide].     Review of Systems:  General- denies fever/chills  HEENT- denies hearing loss or sore throat  Eyes- denies eye pain or visual disturbances, denies red eyes  Respiratory- denies cough or SOB  Cardio- denies chest pain or palpitations  GI- denies abdominal pain  Endocrine- denies urinary frequency  Urinary- denies pain with urination  Musculoskeletal- Negative except noted above  Skin- denies rashes or wounds  Neurological- denies dizziness or headache  Psychiatric- denies anxiety or difficulty concentrating    Physical Exam:   Ht 5' 5\" (1.651 m)   Wt 72.6 kg (160 lb)   LMP 05/29/2025 (Exact Date)   BMI 26.63 kg/m²   General/Constitutional: No apparent distress: well-nourished and well developed.  Eyes: normal ocular motion  Lymphatic: No appreciable lymphadenopathy  Respiratory: Non-labored breathing  Vascular: No edema, swelling or tenderness, except as noted in detailed exam.  Integumentary: No impressive skin lesions present, except as noted in detailed exam.  Neuro: No ataxia or tremors noted  Psych: Normal mood and affect, oriented to person, place and time. Appropriate affect.  Musculoskeletal: Normal, except as noted in detailed exam and in HPI.    Examination    Right    Gait Antalgic   Musculoskeletal Tender to palpation at midfoot    Skin Normal.    Nails Normal    Range of Motion  20 degrees dorsiflexion, 30 degrees plantarflexion  Subtalar motion: normal    Stability Stable    Muscle Strength 5/5 tibialis anterior  5/5 gastrocnemius-soleus  5/5 posterior " tibialis  5/5 peroneal/eversion strength  5/5 EHL  5/5 FHL    Neurologic Normal    Sensation Intact to light touch throughout sural, saphenous, superficial peroneal, deep peroneal and medial/lateral plantar nerve distributions.  Schoolcraft-Eric 5.07 filament (10g) testing deferred.    Cardiovascular Brisk capillary refill < 2 seconds,intact DP and PT pulses    Special Tests None      Imaging Studies:   MRI available for review of Right foot which demonstrates tear of Lisfranc ligament at the medial cuneiform. Reviewed by me personally.      James R. Lachman, MD  Foot & Ankle Surgery   Department of Orthopaedic Surgery  [facility]      I personally performed the service.    James R. Lachman, MD    Scribe Attestation      I,:  Alex Wetsbrook am acting as a scribe while in the presence of the attending physician.:       I,:  James R Lachman, MD personally performed the services described in this documentation    as scribed in my presence.:

## 2025-06-11 ENCOUNTER — ANESTHESIA EVENT (OUTPATIENT)
Dept: PERIOP | Facility: AMBULARY SURGERY CENTER | Age: 50
End: 2025-06-11
Payer: COMMERCIAL

## 2025-06-11 NOTE — PRE-PROCEDURE INSTRUCTIONS
Pre-Surgery Instructions:   Medication Instructions    ALPRAZolam (XANAX) 0.25 mg tablet Take as directed    butalbital-acetaminophen-caffeine (Esgic) -40 mg per tablet Uses PRN- OK to take day of surgery    escitalopram (LEXAPRO) 20 mg tablet Take as directed    ferrous sulfate 325 (65 FE) MG EC tablet LD 6/10/25    gabapentin (NEURONTIN) 300 mg capsule Take as directed    hydrOXYzine HCL (ATARAX) 25 mg tablet Take as directed    latanoprost (XALATAN) 0.005 % ophthalmic solution Use as directed    metFORMIN (GLUCOPHAGE-XR) 500 mg 24 hr tablet Hold day of surgery.    naproxen (NAPROSYN) 500 mg tablet LD 6/10/25    ondansetron (ZOFRAN) 4 mg tablet Uses PRN- OK to take day of surgery    pramipexole (MIRAPEX) 0.5 mg tablet Take as directed    Probiotic Product (PRO-BIOTIC BLEND PO) Take as directed    rimegepant sulfate (NURTEC) 75 mg TBDP Take as directed    tirzepatide (Zepbound) 10 mg/0.5 mL auto-injector LD 6/5/25    tiZANidine (ZANAFLEX) 2 mg tablet Take as directed    traZODone (DESYREL) 50 mg tablet Take as directed        Medication instructions for day of surgery reviewed. Please take all instructed medications with only a sip of water. Please do not take any over the counter (non-prescribed) vitamins or supplements for one week prior to date of surgery.      You will receive a call one business day prior to surgery with an arrival time and hospital directions. If your surgery is scheduled on a Monday, the hospital will be calling you on the Friday prior to your surgery. If you have not heard from anyone by 8pm, please call the hospital supervisor through the hospital  at [phone]. ([facility] [phone] or [place] [phone]).    Do not eat or drink anything after midnight the night before your surgery, including candy, mints, lifesavers, or chewing gum. Do not drink alcohol 24hrs before your surgery. Try not to smoke at least 24hrs before your surgery.       Follow the pre surgery  showering instructions as listed in the “My Surgical Experience Booklet” or otherwise provided by your surgeon's office. Do not use a blade to shave the surgical area 1 week before surgery. It is okay to use a clean electric clippers up to 24 hours before surgery. Do not apply any lotions, creams, including makeup, cologne, deodorant, or perfumes after showering on the day of your surgery. Do not use dry shampoo, hair spray, hair gel, or any type of hair products.     No contact lenses, eye make-up, or artificial eyelashes. Remove nail polish, including gel polish, and any artificial, gel, or acrylic nails if possible. Remove all jewelry including rings and body piercing jewelry.     Wear causal clothing that is easy to take on and off. Consider your type of surgery.    Keep any valuables, jewelry, piercings at home. Please bring any specially ordered equipment (sling, braces) if indicated.    Arrange for a responsible person to drive you to and from the hospital on the day of your surgery. Please confirm the visitor policy for the day of your procedure when you receive your phone call with an arrival time.     Call the surgeon's office with any new illnesses, exposures, or additional questions prior to surgery.    Please reference your “My Surgical Experience Booklet” for additional information to prepare for your upcoming surgery.

## 2025-06-11 NOTE — PROGRESS NOTES
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  Caffeine use:NO    Alcohol:Dayshift workers after 5PM: Alcohol use:NO    Typical day for patient:NO         Study Documentation    Sleep Study Indications: CPAP user with significant weight loss    Montage used: Standard    Transcutaneous CO2 used: NO    Sleep Study Type:     Diagnostic Sleep Study           Oxygen Data  Supplemental O2 used: No      EKG/EEG/Abnormal Behaviors   EKG abnormalities: no None    EEG abnormalities: no    Were abnormal behaviors in sleep observed:NO  No    Snoring    Character:  Moderate    Frequency: Frequent        Is Total Sleep Study Recording Time < 2 hours: N/A    Is Total Sleep Study Recording Time > 2 hours but study is incomplete: N/A    Is Total Sleep Study Recording Time 6 hours or more but sleep was not obtained: NO        Post-Sleep Study    Medication used at bedtime or during sleep study:YES prescription sleep aid    Patient reports time it took to fall asleep:less than 20 minutes    Patient reports waking up during study:3 or more times.  Patient reports returning to sleep without difficulty.    Patient reports sleeping 4 to 6 hours with dreaming.    Does the Patient feel this is a typical night of sleep:worse than usual    Patient rated sleepiness: Not sleepy or tired

## 2025-06-12 ENCOUNTER — HOSPITAL ENCOUNTER (OUTPATIENT)
Facility: AMBULARY SURGERY CENTER | Age: 50
Setting detail: OUTPATIENT SURGERY
Discharge: HOME | End: 2025-06-12
Attending: ORTHOPAEDIC SURGERY | Admitting: ORTHOPAEDIC SURGERY
Payer: COMMERCIAL

## 2025-06-12 ENCOUNTER — APPOINTMENT (OUTPATIENT)
Dept: RADIOLOGY | Facility: AMBULARY SURGERY CENTER | Age: 50
End: 2025-06-12
Payer: COMMERCIAL

## 2025-06-12 ENCOUNTER — ANESTHESIA (OUTPATIENT)
Dept: PERIOP | Facility: AMBULARY SURGERY CENTER | Age: 50
End: 2025-06-12
Payer: COMMERCIAL

## 2025-06-12 VITALS
DIASTOLIC BLOOD PRESSURE: 59 MMHG | RESPIRATION RATE: 16 BRPM | OXYGEN SATURATION: 98 % | HEART RATE: 66 BPM | TEMPERATURE: 97.2 F | SYSTOLIC BLOOD PRESSURE: 105 MMHG

## 2025-06-12 DIAGNOSIS — S93.324A LISFRANC DISLOCATION, RIGHT, INITIAL ENCOUNTER: Primary | ICD-10-CM

## 2025-06-12 LAB
EXT PREGNANCY TEST URINE: NEGATIVE
EXT. CONTROL: NORMAL

## 2025-06-12 PROCEDURE — C1713 ANCHOR/SCREW BN/BN,TIS/BN: HCPCS | Performed by: ORTHOPAEDIC SURGERY

## 2025-06-12 PROCEDURE — 81025 URINE PREGNANCY TEST: CPT | Performed by: ORTHOPAEDIC SURGERY

## 2025-06-12 PROCEDURE — C1781 MESH (IMPLANTABLE): HCPCS | Performed by: ORTHOPAEDIC SURGERY

## 2025-06-12 PROCEDURE — 73620 X-RAY EXAM OF FOOT: CPT

## 2025-06-12 PROCEDURE — C1734 ORTH/DEVIC/DRUG BN/BN,TIS/BN: HCPCS | Performed by: ORTHOPAEDIC SURGERY

## 2025-06-12 DEVICE — LOCKING SCREW
Type: IMPLANTABLE DEVICE | Site: FOOT | Status: FUNCTIONAL
Brand: VARIAX

## 2025-06-12 DEVICE — BROAD STRAIGHT PLATE
Type: IMPLANTABLE DEVICE | Site: FOOT | Status: FUNCTIONAL
Brand: VARIAX

## 2025-06-12 DEVICE — BONE SCREW
Type: IMPLANTABLE DEVICE | Site: FOOT | Status: FUNCTIONAL
Brand: VARIAX

## 2025-06-12 DEVICE — HEADLESS COMPRESSION SCREW
Type: IMPLANTABLE DEVICE | Site: FOOT | Status: FUNCTIONAL
Brand: FIXOS

## 2025-06-12 DEVICE — GRAFT BIO4 ORTHO BONE MATRIX 2.5ML-: Type: IMPLANTABLE DEVICE | Site: FOOT | Status: FUNCTIONAL

## 2025-06-12 RX ORDER — HYDROMORPHONE HCL/PF 1 MG/ML
0.5 SYRINGE (ML) INJECTION
Status: COMPLETED | OUTPATIENT
Start: 2025-06-12 | End: 2025-06-12

## 2025-06-12 RX ORDER — BUPIVACAINE HYDROCHLORIDE 5 MG/ML
INJECTION, SOLUTION EPIDURAL; INTRACAUDAL; PERINEURAL
Status: COMPLETED | OUTPATIENT
Start: 2025-06-12 | End: 2025-06-12

## 2025-06-12 RX ORDER — ACETAMINOPHEN 10 MG/ML
1000 INJECTION, SOLUTION INTRAVENOUS ONCE
Status: COMPLETED | OUTPATIENT
Start: 2025-06-12 | End: 2025-06-12

## 2025-06-12 RX ORDER — CHLORHEXIDINE GLUCONATE ORAL RINSE 1.2 MG/ML
15 SOLUTION DENTAL ONCE
Status: COMPLETED | OUTPATIENT
Start: 2025-06-12 | End: 2025-06-12

## 2025-06-12 RX ORDER — ONDANSETRON 4 MG/1
4 TABLET, FILM COATED ORAL EVERY 8 HOURS PRN
Qty: 10 TABLET | Refills: 0 | Status: SHIPPED | OUTPATIENT
Start: 2025-06-12

## 2025-06-12 RX ORDER — LIDOCAINE HYDROCHLORIDE 10 MG/ML
INJECTION, SOLUTION EPIDURAL; INFILTRATION; INTRACAUDAL; PERINEURAL AS NEEDED
Status: DISCONTINUED | OUTPATIENT
Start: 2025-06-12 | End: 2025-06-12

## 2025-06-12 RX ORDER — EPHEDRINE SULFATE 50 MG/ML
INJECTION INTRAVENOUS AS NEEDED
Status: DISCONTINUED | OUTPATIENT
Start: 2025-06-12 | End: 2025-06-12

## 2025-06-12 RX ORDER — HYDROMORPHONE HCL/PF 1 MG/ML
0.5 SYRINGE (ML) INJECTION ONCE
Status: DISCONTINUED | OUTPATIENT
Start: 2025-06-12 | End: 2025-06-12 | Stop reason: HOSPADM

## 2025-06-12 RX ORDER — CHLORHEXIDINE GLUCONATE 40 MG/ML
SOLUTION TOPICAL DAILY PRN
Status: DISCONTINUED | OUTPATIENT
Start: 2025-06-12 | End: 2025-06-12 | Stop reason: HOSPADM

## 2025-06-12 RX ORDER — CEFAZOLIN SODIUM 2 G/50ML
2000 SOLUTION INTRAVENOUS ONCE
Status: COMPLETED | OUTPATIENT
Start: 2025-06-12 | End: 2025-06-12

## 2025-06-12 RX ORDER — ONDANSETRON 2 MG/ML
4 INJECTION INTRAMUSCULAR; INTRAVENOUS ONCE AS NEEDED
Status: DISCONTINUED | OUTPATIENT
Start: 2025-06-12 | End: 2025-06-12 | Stop reason: HOSPADM

## 2025-06-12 RX ORDER — OXYCODONE HYDROCHLORIDE 5 MG/1
5 TABLET ORAL EVERY 4 HOURS PRN
Qty: 30 TABLET | Refills: 0 | Status: SHIPPED | OUTPATIENT
Start: 2025-06-12

## 2025-06-12 RX ORDER — ASPIRIN 81 MG/1
81 TABLET ORAL 2 TIMES DAILY
Qty: 84 TABLET | Refills: 0 | Status: SHIPPED | OUTPATIENT
Start: 2025-06-12 | End: 2025-07-24

## 2025-06-12 RX ORDER — ALBUTEROL SULFATE 0.83 MG/ML
2.5 SOLUTION RESPIRATORY (INHALATION) ONCE AS NEEDED
Status: DISCONTINUED | OUTPATIENT
Start: 2025-06-12 | End: 2025-06-12 | Stop reason: HOSPADM

## 2025-06-12 RX ORDER — FENTANYL CITRATE 50 UG/ML
INJECTION, SOLUTION INTRAMUSCULAR; INTRAVENOUS AS NEEDED
Status: DISCONTINUED | OUTPATIENT
Start: 2025-06-12 | End: 2025-06-12

## 2025-06-12 RX ORDER — PHENYLEPHRINE HCL IN 0.9% NACL 1 MG/10 ML
SYRINGE (ML) INTRAVENOUS AS NEEDED
Status: DISCONTINUED | OUTPATIENT
Start: 2025-06-12 | End: 2025-06-12

## 2025-06-12 RX ORDER — MAGNESIUM HYDROXIDE 1200 MG/15ML
LIQUID ORAL AS NEEDED
Status: DISCONTINUED | OUTPATIENT
Start: 2025-06-12 | End: 2025-06-12 | Stop reason: HOSPADM

## 2025-06-12 RX ORDER — FENTANYL CITRATE/PF 50 MCG/ML
25 SYRINGE (ML) INJECTION
Status: COMPLETED | OUTPATIENT
Start: 2025-06-12 | End: 2025-06-12

## 2025-06-12 RX ORDER — ONDANSETRON 2 MG/ML
INJECTION INTRAMUSCULAR; INTRAVENOUS AS NEEDED
Status: DISCONTINUED | OUTPATIENT
Start: 2025-06-12 | End: 2025-06-12

## 2025-06-12 RX ORDER — DEXAMETHASONE SODIUM PHOSPHATE 10 MG/ML
INJECTION, SOLUTION INTRAMUSCULAR; INTRAVENOUS AS NEEDED
Status: DISCONTINUED | OUTPATIENT
Start: 2025-06-12 | End: 2025-06-12

## 2025-06-12 RX ORDER — KETOROLAC TROMETHAMINE 30 MG/ML
15 INJECTION, SOLUTION INTRAMUSCULAR; INTRAVENOUS ONCE
Status: COMPLETED | OUTPATIENT
Start: 2025-06-12 | End: 2025-06-12

## 2025-06-12 RX ORDER — SODIUM CHLORIDE, SODIUM LACTATE, POTASSIUM CHLORIDE, CALCIUM CHLORIDE 600; 310; 30; 20 MG/100ML; MG/100ML; MG/100ML; MG/100ML
20 INJECTION, SOLUTION INTRAVENOUS CONTINUOUS
Status: DISCONTINUED | OUTPATIENT
Start: 2025-06-12 | End: 2025-06-12 | Stop reason: HOSPADM

## 2025-06-12 RX ORDER — PROPOFOL 10 MG/ML
INJECTION, EMULSION INTRAVENOUS AS NEEDED
Status: DISCONTINUED | OUTPATIENT
Start: 2025-06-12 | End: 2025-06-12

## 2025-06-12 RX ORDER — OXYCODONE HYDROCHLORIDE 5 MG/1
5 TABLET ORAL ONCE AS NEEDED
Status: DISCONTINUED | OUTPATIENT
Start: 2025-06-12 | End: 2025-06-12 | Stop reason: HOSPADM

## 2025-06-12 RX ORDER — METHOCARBAMOL 500 MG/1
500 TABLET, FILM COATED ORAL ONCE
Status: COMPLETED | OUTPATIENT
Start: 2025-06-12 | End: 2025-06-12

## 2025-06-12 RX ORDER — KETAMINE HCL IN NACL, ISO-OSM 100MG/10ML
SYRINGE (ML) INJECTION AS NEEDED
Status: DISCONTINUED | OUTPATIENT
Start: 2025-06-12 | End: 2025-06-12

## 2025-06-12 RX ORDER — MIDAZOLAM HYDROCHLORIDE 2 MG/2ML
INJECTION, SOLUTION INTRAMUSCULAR; INTRAVENOUS AS NEEDED
Status: DISCONTINUED | OUTPATIENT
Start: 2025-06-12 | End: 2025-06-12

## 2025-06-12 RX ORDER — VANCOMYCIN HYDROCHLORIDE 1 G/20ML
INJECTION, POWDER, LYOPHILIZED, FOR SOLUTION INTRAVENOUS AS NEEDED
Status: DISCONTINUED | OUTPATIENT
Start: 2025-06-12 | End: 2025-06-12 | Stop reason: HOSPADM

## 2025-06-12 RX ORDER — PROMETHAZINE HYDROCHLORIDE 25 MG/ML
6.25 INJECTION, SOLUTION INTRAMUSCULAR; INTRAVENOUS ONCE AS NEEDED
Status: DISCONTINUED | OUTPATIENT
Start: 2025-06-12 | End: 2025-06-12 | Stop reason: HOSPADM

## 2025-06-12 RX ADMIN — EPHEDRINE SULFATE 5 MG: 50 INJECTION INTRAVENOUS at 09:43

## 2025-06-12 RX ADMIN — FENTANYL CITRATE 25 MCG: 50 INJECTION INTRAMUSCULAR; INTRAVENOUS at 10:24

## 2025-06-12 RX ADMIN — DEXAMETHASONE SODIUM PHOSPHATE 10 MG: 10 INJECTION INTRAMUSCULAR; INTRAVENOUS at 09:09

## 2025-06-12 RX ADMIN — HYDROMORPHONE HYDROCHLORIDE 0.5 MG: 1 INJECTION, SOLUTION INTRAMUSCULAR; INTRAVENOUS; SUBCUTANEOUS at 11:29

## 2025-06-12 RX ADMIN — CHLORHEXIDINE GLUCONATE 15 ML: 1.2 SOLUTION ORAL at 08:19

## 2025-06-12 RX ADMIN — Medication 50 MCG: at 09:21

## 2025-06-12 RX ADMIN — EPHEDRINE SULFATE 5 MG: 50 INJECTION INTRAVENOUS at 09:52

## 2025-06-12 RX ADMIN — PROPOFOL 200 MG: 10 INJECTION, EMULSION INTRAVENOUS at 09:09

## 2025-06-12 RX ADMIN — HYDROMORPHONE HYDROCHLORIDE 0.5 MG: 1 INJECTION, SOLUTION INTRAMUSCULAR; INTRAVENOUS; SUBCUTANEOUS at 11:40

## 2025-06-12 RX ADMIN — MIDAZOLAM 2 MG: 1 INJECTION INTRAMUSCULAR; INTRAVENOUS at 08:30

## 2025-06-12 RX ADMIN — Medication 30 MG: at 11:55

## 2025-06-12 RX ADMIN — KETOROLAC TROMETHAMINE 15 MG: 30 INJECTION, SOLUTION INTRAMUSCULAR; INTRAVENOUS at 11:13

## 2025-06-12 RX ADMIN — HYDROMORPHONE HYDROCHLORIDE 0.5 MG: 1 INJECTION, SOLUTION INTRAMUSCULAR; INTRAVENOUS; SUBCUTANEOUS at 10:56

## 2025-06-12 RX ADMIN — ACETAMINOPHEN 1000 MG: 1000 INJECTION, SOLUTION INTRAVENOUS at 11:16

## 2025-06-12 RX ADMIN — FENTANYL CITRATE 50 MCG: 50 INJECTION INTRAMUSCULAR; INTRAVENOUS at 10:14

## 2025-06-12 RX ADMIN — BUPIVACAINE 5 ML: 13.3 INJECTION, SUSPENSION, LIPOSOMAL INFILTRATION at 08:40

## 2025-06-12 RX ADMIN — FENTANYL CITRATE 25 MCG: 50 INJECTION INTRAMUSCULAR; INTRAVENOUS at 10:28

## 2025-06-12 RX ADMIN — LIDOCAINE HYDROCHLORIDE 50 MG: 10 INJECTION, SOLUTION EPIDURAL; INFILTRATION; INTRACAUDAL at 09:09

## 2025-06-12 RX ADMIN — FENTANYL CITRATE 50 MCG: 50 INJECTION INTRAMUSCULAR; INTRAVENOUS at 08:30

## 2025-06-12 RX ADMIN — HYDROMORPHONE HYDROCHLORIDE 0.5 MG: 1 INJECTION, SOLUTION INTRAMUSCULAR; INTRAVENOUS; SUBCUTANEOUS at 10:42

## 2025-06-12 RX ADMIN — DEXMEDETOMIDINE 12 MCG: 100 INJECTION, SOLUTION INTRAVENOUS at 09:13

## 2025-06-12 RX ADMIN — Medication 50 MCG: at 09:32

## 2025-06-12 RX ADMIN — METHOCARBAMOL 500 MG: 500 TABLET ORAL at 11:18

## 2025-06-12 RX ADMIN — ONDANSETRON 4 MG: 2 INJECTION, SOLUTION INTRAMUSCULAR; INTRAVENOUS at 09:09

## 2025-06-12 RX ADMIN — SODIUM CHLORIDE, SODIUM LACTATE, POTASSIUM CHLORIDE, AND CALCIUM CHLORIDE: .6; .31; .03; .02 INJECTION, SOLUTION INTRAVENOUS at 08:39

## 2025-06-12 RX ADMIN — FENTANYL CITRATE 25 MCG: 50 INJECTION INTRAMUSCULAR; INTRAVENOUS at 10:38

## 2025-06-12 RX ADMIN — Medication 50 MCG: at 09:24

## 2025-06-12 RX ADMIN — CEFAZOLIN SODIUM 2000 MG: 2 SOLUTION INTRAVENOUS at 09:09

## 2025-06-12 RX ADMIN — BUPIVACAINE HYDROCHLORIDE 15 ML: 5 INJECTION, SOLUTION EPIDURAL; INTRACAUDAL; PERINEURAL at 08:40

## 2025-06-12 RX ADMIN — FENTANYL CITRATE 25 MCG: 50 INJECTION INTRAMUSCULAR; INTRAVENOUS at 10:33

## 2025-06-12 NOTE — ANESTHESIA PROCEDURE NOTES
Peripheral Block    Patient location during procedure: holding area  Reason for block: at surgeon's request and post-op pain management  Staffing  Performed by: Radha Faustin MD  Authorized by: Radha Faustin MD    Preanesthetic Checklist  Completed: patient identified, IV checked, site marked, risks and benefits discussed, surgical consent, monitors and equipment checked, pre-op evaluation and timeout performed  Peripheral Block  Prep: ChloraPrep  Patient monitoring: frequent blood pressure checks, continuous pulse oximetry and heart rate  Block type: Popliteal  Laterality: right  Injection technique: single-shot  Procedures: ultrasound guided, Ultrasound guidance required for the procedure to increase accuracy and safety of medication placement and decrease risk of complications.  Ultrasound permanent image saved  bupivacaine (PF) (MARCAINE) 0.5 % injection 20 mL - Perineural   15 mL - 6/12/2025 8:40:00 AM  bupivacaine liposomal (EXPAREL) 1.3 % injection 20 mL - Perineural   15 mL - 6/12/2025 8:40:00 AM  Needle  Needle type: Stimuplex   Needle gauge: 20 G  Needle length: 4 in  Needle localization: anatomical landmarks and ultrasound guidance  Assessment  Injection assessment: incremental injection, frequent aspiration, injected with ease, negative aspiration, negative for heart rate change, no paresthesia on injection, no symptoms of intraneural/intravenous injection and needle tip visualized at all times  Paresthesia pain: none  Post-procedure:  site cleaned  patient tolerated the procedure well with no immediate complications  Additional Notes  Popliteal nerve block, ultrasound guided  30mL total; 15mL exparel and 15 mL of bupi 0.5%  Patient tolerated procedure well with no evidence of immediate complications  U/S image saved into chart

## 2025-06-12 NOTE — ANESTHESIA PROCEDURE NOTES
Peripheral Block    Patient location during procedure: holding area  Start time: 6/12/2025 8:40 AM  Reason for block: at surgeon's request and post-op pain management  Staffing  Performed by: Radha Faustin MD  Authorized by: Radha Faustin MD    Preanesthetic Checklist  Completed: patient identified, IV checked, site marked, risks and benefits discussed, surgical consent, monitors and equipment checked, pre-op evaluation and timeout performed  Peripheral Block  Prep: ChloraPrep  Patient monitoring: frequent blood pressure checks, continuous pulse oximetry and heart rate  Block type: Adductor Canal  Laterality: right  Injection technique: single-shot  Procedures: ultrasound guided, Ultrasound guidance required for the procedure to increase accuracy and safety of medication placement and decrease risk of complications.  Ultrasound permanent image saved  bupivacaine liposomal (EXPAREL) 1.3 % injection 20 mL - Perineural   5 mL - 6/12/2025 8:40:00 AM  bupivacaine (PF) (MARCAINE) 0.5 % injection 20 mL - Perineural   15 mL - 6/12/2025 8:40:00 AM  Needle  Needle type: StimupDigiSynd   Needle gauge: 20 G  Needle length: 4 in  Needle localization: anatomical landmarks and ultrasound guidance  Assessment  Injection assessment: incremental injection, frequent aspiration, injected with ease, negative aspiration, negative for heart rate change, no paresthesia on injection, no symptoms of intraneural/intravenous injection and needle tip visualized at all times  Paresthesia pain: none  Post-procedure:  site cleaned  patient tolerated the procedure well with no immediate complications  Additional Notes  Rt adductor canal block, ultrasound guided  20mL total; 5mL exparel and 15 mL of bupi 0.5%  Patient tolerated procedure well with no evidence of immediate complications  U/S image saved into chart

## 2025-06-12 NOTE — INTERVAL H&P NOTE
H&P reviewed. After examining the patient I find no changes in the patients condition since the H&P had been written.    There were no vitals filed for this visit.    Plan for right lisfranc arthrodesis

## 2025-06-12 NOTE — OP NOTE
OPERATIVE REPORT  PATIENT NAME: Gracia Chirinos    :  1975  MRN: [id number]  Pt Location: [facility] OR ROOM 06    SURGERY DATE: 2025    Surgeons and Role:     * James R Lachman, MD - Primary     * Aleshia Hurd PA-C - Assisting  Michael Kumar MD- assisting resident      Preop Diagnosis:  Lisfranc dislocation, right, initial encounter [S93.324A]    Post-Op Diagnosis Codes:     * Lisfranc dislocation, right, initial encounter [S93.324A]    Procedure(s):  Right - Right lisfranc arthrodesis    Specimen(s):  * No specimens in log *    Estimated Blood Loss:   Minimal    Drains:  * No LDAs found *    Anesthesia Type:   Choice    Operative Indications:  Lisfranc dislocation, right, initial encounter [S93.324A]      Operative Findings:  Consistent with diagnosis       Complications:   None    Procedure and Technique:  1. Examination under anesthesia, left lisfranc fracture dislocation  2. Arthrodesis 2nd tarsometatarsal joint  3. Arthrodesis Intercuneiform joint C1C2  4. Arthrodesis Medial cuneiform to base of 2nd metatarsal  5. Allograft bone grafting arthrodesis sites  6. FLUOROSCOPE EXAM without benefit of Radiologist <1 HR EXTENSIVE   7. Placement into short leg nonweightbearing plaster splint.        OPERATIVE REPORT:   The patient was met in the preoperative holding area.  Dr. Lachman reviewed the procedure with the patient, reviewed the consent form, and marked the correct extremity.  The patient was then taken to the operating room and the nursing staff reviewed the patient indentification and operative consent form.  The patient was then placed in supine position on the operating room table and a seatbelt was placed.  All bony prominences were well padded.       After anesthesia had completed anesthetizing the patient, a fluoroscopic stress exam was conducted which demonstrated instability of the C1M2 ligament as well as the 2nd MT bases at the 2nd TMT joints.  This confirmed the need for arthrodesis.      A prescrub with chlorhexidine and alcohol was performed. The operative lower extremity was then prepped and draped in sterile fashion.  A timeout was performed by Dr. Lachman that identified the correct patient, correct procedure, correct extremity, the fact that antibiotics were given within one hour prior to the incision and that the correct instruments were available to proceed with the case.  The operative team introduced themselves if any new members were present.     An esmarch was used to exsanguinate the operative leg and used as a tourniquet.  See anesthesia documentation for tourniquet time.   Attention was turned to operative dorsal midfoot foot and a standard linear incision in line with the lateral border of the 1st TMT was made.  Sharp dissection was taken through the skin and bovie electrocautery was used to control hemostasis.  Scissor dissection was used for the deep dissection using caution to protect the superficial peroneal nerve branches traversing the surgical field.  The interval between EHL and EHB tendons were identified and the deep neurovascular bundle was identified, mobilized and retracted.  We immediately encountered the zone of injury and were met with hematoma and could visual the C1M2 joint as the dorsal lisfranc ligament complex had been significantly injured. The 1st TMT joint was not involved and was not unstable.     We continued dissection over the cuneiforms and saw dorsal ligamentous injury to the intercuneiform C1C2.  We determined that a lisfranc fusion was necessary of the C1M2 joint, C1C2 joint and C2M2 joints.  We used a Hintermann distractor to expose the C1C2 and C1M2 joints. We used the wooden handle periosteal elevator to remove the cartilage in these joints. We then irrigated thoroughly and then fenestrated the subchondral plate with a 2.0mm drill bit destroying the subchondral bone to encourage a healing surface. We then placed Wray Bio4 bone graft into the  arthrodesis site.  Next we did the same thing to the 2nd TMT joint first removing the cartilage then drilling to prepare the bony surface for healing.  We placed bone graft into the 2nd TMT joint as well.  Next, we pinned the intercuneiform joint and confirmed reduction on fluoroscopy.  We then drilled a placed a 4.0mm headless compression screw to stabilize the reduction.  We did the same thing to the 2nd TMT joint ensuring no plantar or dorsiflexion.      Using the large bone clamp, we open reduced the base of the 2nd metatarsal to the lateral border of the medial cuneiform and inserted a guidewire for a 4.0 headless compression screw through an independent incision.  We confirmed the reduction on fluoroscopy and when we were satisfied in all planes, we drilled and then inserted the cannulated screw to hold the reduction.  Next we sized a bridging dorsal plate from the middle cuneiform to the 2nd metatarsal and then another compression screw plantarly from the medial cuneiform to the middle cuneiform.  We confirmed plate position and maintained midfoot reduction on fluoroscopy and then filled the plates with appropriate sized screws.   Next, with the medial and middle columns securely fixed, we performed an examination under anesthesia of the 4th and 5th TMT joints. These joints were stable and did not require k-wire pinning.        Fluoroscopy was used to confirm alignment.  Dr. Lachman independently reviewed and assessed all fluoroscopic images.  The wound was copiously irrigated. Vancomycin powder, 500mg, was used in the wound bed.  The tourniquet was released and all bleeding was controlled using the electrocautery.        All incisions were closed with a combination of 2-0 vicryl for the deep tissues, 4-0 vicryl for subcutaneous tissue, and 3-0 nylon for the skin.  A well padded splint was placed.  All toes were pink and warm at the end of the case.   I was present for the entire procedure     I was present  for the entire procedure.    Patient Disposition:  PACU          SIGNATURE: James R Lachman, MD  DATE: June 12, 2025  TIME: 8:40 AM

## 2025-06-12 NOTE — DISCHARGE INSTR - AVS FIRST PAGE
James R Lachman, M.D.  Attending, Orthopaedic Surgery  [facility]  [place] Office Phone: [phone] ? Fax: [phone]  [place] Office Phone: [phone] ? Fax:[phone]    : Janiya Leal MA    Surgery Coordinators [place]: Sulma Amin, [phone]  Sofia Luciano, [phone]  Surgery Coordinator [place]:  Aleshia Mckeon, [phone]                                                        Monica Santos, [phone]                                                                                                                      [email or web address]   POST-OPERATIVE INSTRUCTIONS    General Information:  Typical post operative visits are at the following intervals:  2-3 weeks post surgery, 6 weeks post surgery, 3 months post surgery, 6 months post surgery, and then on a yearly basis.  However, this may change based on Dr. Lachmans’ recommendation.  #1 post-operative rule for foot/ankle surgery:  ONCE YOU ARE OUT OF YOUR CAST AND/OR REMOVABLE BOOT, SWELLING MAY PERSIST FOR MANY MONTHS.  YOU MIGHT ALSO EXPERIENCE A BLUISH DISCOLORATION OF YOUR LEG.  THIS IS NORMAL AND PART OF THE USUAL POSTOPERATIVE EXPERIENCE.  DO NOT WAIT UNTIL YOUR BLOCK WEARS OFF TO TAKE YOUR PAIN MEDICATION.  IT TAKES A FEW DOSES OF THE PAIN MEDICATION TO REACH A THERAPEUTIC LEVEL.  TAKE A TABLET PROACTIVELY BEFORE YOU HAVE ANY PAIN AND AGAIN 4 HOURS LATER SO WHEN THE BLOCK WEARS OFF, YOU ARE NOT CAUGHT OFF GUARD.    SMOKING:  Smoking results in incomplete healing of fractures (broken bones) and joints that my have been fused.  Smoking and nicotine also prevents the growth of bone into ankle replacements and bone healing.  It also slows the healing of muscles and skin (soft tissue).  Therefore, please do not have surgery if you continue to smoke.  We reserve the right to cancel your surgery if we suspect that you are smoking.  DO NOT use  nicorette gum or other patches.  Please find an alternative method to quit smoking before your surgery and do not restart after surgery to allow for healing.      THREE RULES:    After surgery you will most likely be given the instructions “KEEP YOUR TOES ABOVE YOUR NOSE.”  This means that you MUST have your feet elevated higher than your heart.  Keeping your toes above your nose helps to heal the muscles and skin (soft tissues) by reducing swelling in your leg.  This position also helps to prevent infection, and is very important in avoiding deep venous thrombosis (blood clots).    In order to keep the blood circulating in your legs and in order to avoid deep vein   thrombosis (blood clots), we ask patients to GET UP ONCE AN HOUR during the day.  This means you should at least cross the room and come back.  It does not mean you have to be up for long periods of time.  In most cases we will not have people immediately put any weight on their operated part.  This is important to prevent loosening of metal or other devices holding the bones together.  It also prevents irritation of the soft tissues which can lead to prolonged healing.  When we say get up once an hour, please walk, hop or move with an assisted device.  This is important!    Do not do any excessive walking during the first few days after surgery.  Recovering from surgery is a full-time task for the patient.  Postoperative care is important to avoid irritating the skin incision, which can lead to infection.  Please do not plan activities or go out of town for several weeks after surgery.        AFTER YOUR SURGERY:  Bleeding through the bandage almost always occurs.  Do not let this alarm you.  Simply overwrap with an ABD pad and Ace bandage (The nurses discharging your from the day of surgery will provide this.)   If you think it is excessive, you can come in early for a dressing change (at around 1 week instead of 3 weeks postop.)    Do not get the  bandage wet.  Showering is possible with plastic protectors.   Be very careful, as the bathroom can be wet and slippery.  If you do get your dressing wet, it should be changed immediately.  Please contact us.      ONCE YOUR ARE OUT OF YOUR CAST AND/OR REMOVABLE BOOT, SWELLING MAY PERSIST FOR MANY MONTHS.  THERE WILL ALSO BE A BLUISH DISCOLORATION OF YOUR LEG FOR MONTHS.  THIS IS NORMAL AND PART OF THE USUAL POSTOPERATIVE EXPERIENCE.  WEARING COMPRESSION HOSE (ELASTIC STOCKINGS) CAN HELP AVOID SOME OF THIS SWELLING.    Ice the area 20 minutes every hour once the nerve block wears off. If you are in a cast or a splint, you may need to leave the ice on longer than 20 minutes in order to feel any benefits.       DRESSING:   The purpose of the surgical dressing is to keep your wound and the surgical site protected from the environment.  Most dressings contain splints, which help to hold your foot and ankle in a corrected position, and also allow the surgical site to heal properly. IF YOU GO TO THE EMERGENCY ROOM FOR ANY REASON, AND THEY REMOVE YOUR DRESSING OR SPLINT, YOU MUST BE SEEN IN DR. LACHMANS [facility] TO HAVE IT REPLACED) AS SOON AS POSSIBLE (IDEALLY THE NEXT DAY).   If you have a drain in place, this will need to be removed in 1 day after surgery.  The time for the drain to be pulled will be written on your discharge instruction sheet.    CAST  INSTRUCTIONS:  You may or may not get a cast following surgery.  If you do, pay close attention to the following:    After application of a splint or cast, it is very important to elevate your leg for 24 to 72 hours.   The injured area should be elevated well above the heart.   Remember “Toes above your Nose”.  Rest and elevation greatly reduce pain and speed the healing process by minimizing early swelling.    CALL YOUR DOCTORS OFFICE OR VISIT LOCATION EMERGENCY ROOM IF YOU HAVE ANY OF THE FOLLOWING:    Significant increased pain, which may be caused by swelling (Strict  elevation will alleviate this)  Numbness and tingling in your hand or foot, which may be caused by too much pressure on the nerves (There is always numbness after surgery due to nerve blocks)  Burning and stinging, which may be caused by too much pressure on the skin  Excessive swelling below the cast, which may mean the cast is slowing your blood circulation  Loss of active movement of toes, which request an urgent evaluation (if you have had a nerve block, this is an expected thing and totally normal)  Loss of “capillary refill”.  Pinch the tip of toes and natanael the skin.  Release pressure and if the skin does not return pink then call the office immediately.      DO NOT GET YOUR CAST WET.   Bacteria thrive in moist dark areas.  We do not want this.   If your cast becomes wet, return to the office and we will apply another one.    PAIN AFTER SURGERY:  Narcotic pain medication can and will depress your respiratory system if taken in excess.  The goal of pain management with narcotics is to be comfortable not pain free.  If you take enough narcotics to be pain free then you run the risk of stopping breathing.  If this happens, call 911 immediately!  Pain in the heel is often  caused by pressure from the weight of your foot on the bed.  Make sure your heel is suspended off the bed by keeping a pillow underneath your calf not your knee.    Medications:  You will be given narcotic pain medication. Do NOT drive while taking narcotic medications. Medications such as Darvocet, Percocet, Vicoden or Tylenol #3, also contain acetaminophen (Tylenol). Do not take acetaminophen or Tylenol from home when taking theses medications. When you fill your prescription, you may ask the pharmacist if your pain medication has acetaminophen/Tylenol in it. It is okay to take Tylenol with Oxycontin/Oxycodone.   Unless you are allergic to aspirin or currently taking a blood thinner, Dr. Lachmans’ patients are requested to take one 81 mg  aspirin every 12 hours until you are back to walking normally after surgery (This can be up to 6 weeks). Ecotrin (Enteric-coated aspirin) is more sensitive to the stomach and we recommend purchasing this instead of regular aspirin to minimize the risk of stomach irritation.  Narcotic medications commonly cause nausea. Taking them with food will decrease this side effect. If you are having extreme nausea, please contact us for an alternative medication or for something that can be taken with this medication to decrease the nausea.   Also, narcotic medications frequently cause constipation. An increase of fiber, fruits and vegetables in your diet may alleviate this problem, or if necessary, you may use an over-the-counter medication such as senekot, colace, or Fibercon for constipation problems.   You should resume all medications you were taking prior to the surgery unless otherwise specified.   If you had fracture surgery, bony surgery like an osteotomy or fusion, or a surgery that requires bone healing, you are advised to take Vitamin D and Calcium to improve healing potential.  Vitamin D3 4000 units/day and Calcium 1200mg/day. These are over the counter medications so please pick them up at the pharmacy when you are picking up your prescriptions.    Activity:   Because of your recent foot surgery, your activity level will decrease. You will need to elevate your foot ABOVE the level of your heart for a minimum of four days. The length of time necessary for the swelling to go down, and for your wounds to heal properly depends greatly on your efforts here. Elevation is extremely important to avoid compromising the blood supply to your foot. Remember when your foot is down it will swell, which will increase pain and slow healing. Wiggle your toes frequently if possible.   If you go home with a regional block, (a type of anesthesia) the foot and leg will be numb. Think of ways to get into your house and around the  house until the block wears off.   Keep in mind that it may be a legal issue if you drive while in a cast or splint, especially when the splint is on the right foot. You may call the Department of Motor Vehicles to schedule a road test if you have adaptive equipment applied to your car.   The amount of weight you are allowed to bear on your foot will be written on your discharge sheet filled out at the time of surgery. The following is an explanation of the possibilities:     Non-weight bearing:   You are to put NO weight whatsoever on your foot. When using crutches or a walker, your foot should not touch the ground, except when you are standing. Then, it may rest on the ground. If you are to be non-weight bearing, and you are not compliant, you could compromise the surgery.     Some of our patients have been requesting prescriptions for a roll-a-bout knee scooter. [employer] and other insurances have been denying these claims, and you may either have to rent one or pay out of pocket to purchase one.

## 2025-06-12 NOTE — ANESTHESIA POSTPROCEDURE EVALUATION
Post-Op Assessment Note    CV Status:  Stable    Pain management: adequate       Mental Status:  Awake and sleepy   Hydration Status:  Euvolemic   PONV Controlled:  Controlled   Airway Patency:  Patent     Post Op Vitals Reviewed: Yes    No anethesia notable event occurred.    Staff: CRNA           Last Filed PACU Vitals:  Vitals Value Taken Time   Temp 97.1    Pulse 72    /56    Resp 16    SpO2 100

## 2025-06-12 NOTE — ANESTHESIA POSTPROCEDURE EVALUATION
Post-Op Assessment Note    CV Status:  Stable    Pain management: adequate       Mental Status:  Sleepy and arousable   Hydration Status:  Euvolemic   PONV Controlled:  Controlled   Airway Patency:  Patent     Post Op Vitals Reviewed: Yes    No anethesia notable event occurred.    Staff: Anesthesiologist, CRNA           Last Filed PACU Vitals:  Vitals Value Taken Time   Temp 97.8 °F (36.6 °C) 06/12/25 12:29   Pulse 65 06/12/25 12:29   BP 96/51 06/12/25 12:29   Resp 18 06/12/25 12:29   SpO2 100 % 06/12/25 12:29       Modified Wendi:     Vitals Value Taken Time   Activity 2 06/12/25 12:29   Respiration 2 06/12/25 12:29   Circulation 2 06/12/25 12:29   Consciousness 1 06/12/25 12:29   Oxygen Saturation 2 06/12/25 12:29     Modified Wendi Score: 9

## 2025-06-19 ENCOUNTER — TELEPHONE (OUTPATIENT)
Dept: OBGYN CLINIC | Facility: HOSPITAL | Age: 50
End: 2025-06-19

## 2025-06-19 DIAGNOSIS — G25.81 RESTLESS LEGS SYNDROME (RLS): ICD-10-CM

## 2025-06-19 DIAGNOSIS — S93.324A LISFRANC DISLOCATION, RIGHT, INITIAL ENCOUNTER: Primary | ICD-10-CM

## 2025-06-19 RX ORDER — OXYCODONE HYDROCHLORIDE 5 MG/1
5 TABLET ORAL EVERY 4 HOURS PRN
Qty: 15 TABLET | Refills: 0 | Status: SHIPPED | OUTPATIENT
Start: 2025-06-19 | End: 2025-06-24

## 2025-06-19 NOTE — TELEPHONE ENCOUNTER
Reason for call:   [x] Refill   [] Prior Auth  [] Other:     Office:   [] PCP/Provider -   [x] Specialty/Provider - [facility]  Authorized By: Erickson Molina DO    Medication: gabapentin (NEURONTIN) 300 mg capsule     Dose/Frequency:  Take 2 capsules (600 mg total) by mouth 2 (two) times a day     Quantity: 120    [facility] [facility] [place]     Local Pharmacy   Does the patient have enough for 3 days?   [] Yes   [x] No - Send as HP to POD    Mail Away Pharmacy   Does the patient have enough for 10 days?   [] Yes   [] No - Send as HP to POD

## 2025-06-19 NOTE — TELEPHONE ENCOUNTER
Pt contacted Call Center requested refill of their medication.        Doctor Name: Dr. Lachman      Medication Name: Oxycodone      Dosage of Med: 5mg      Frequency of Med: 6 Tablets      Remaining Medication: 6 Tablets      Pharmacy and Location:   [facility] [place], [place] - [address]  [address], [place] [address]         Pt. Preferred Callback Phone Number: [phone]      Thank you.       PLEASE ADVISE PATIENTS:    REFILL REQUESTS WILL BE PROCESSED WITHIN 24-48 HOURS.

## 2025-06-19 NOTE — TELEPHONE ENCOUNTER
Last office visit 3/19/2025  Next office visit TBS, patient cancelled 9/24/25 visit. [employer] message to patient to schedule a compliance appointment sent.         Dr. Molina,    Please review RX request and sign if agreeable,   Thank you.

## 2025-06-20 RX ORDER — GABAPENTIN 300 MG/1
600 CAPSULE ORAL 2 TIMES DAILY
Qty: 120 CAPSULE | Refills: 1 | Status: SHIPPED | OUTPATIENT
Start: 2025-06-20 | End: 2025-08-19

## 2025-06-24 ENCOUNTER — TELEPHONE (OUTPATIENT)
Age: 50
End: 2025-06-24

## 2025-06-24 NOTE — TELEPHONE ENCOUNTER
Patient has a NV in [place] on 6/27 for a possible spitting stitch on her forehead, she has another appt with another practice on 6/25 that she is wondering if she can come in then instead so she only had to make one run out to [place], I see there are no openings, but as this is a quicker appt I thought I would just ask, please call pt back to possibly work something out, thank you so much!

## 2025-06-24 NOTE — PATIENT COMMUNICATION
Due to patient recent foot sx she cannot make it into office until Friday afternoon.  Recommended to continue Vaseline until evaluated

## 2025-06-25 ENCOUNTER — OFFICE VISIT (OUTPATIENT)
Dept: OBGYN CLINIC | Facility: CLINIC | Age: 50
End: 2025-06-25

## 2025-06-25 VITALS — BODY MASS INDEX: 26.66 KG/M2 | WEIGHT: 160 LBS | HEIGHT: 65 IN

## 2025-06-25 DIAGNOSIS — S93.324A LISFRANC DISLOCATION, RIGHT, INITIAL ENCOUNTER: Primary | ICD-10-CM

## 2025-06-25 PROCEDURE — 99024 POSTOP FOLLOW-UP VISIT: CPT | Performed by: ORTHOPAEDIC SURGERY

## 2025-06-25 NOTE — PROGRESS NOTES
"      James R Lachman, M.D.  Attending, Orthopaedic Surgery  Foot and Ankle  [facility]      ORTHOPAEDIC FOOT AND ANKLE POST-OP VISIT     Procedure:      Right lisfranc arthrodesis        Date of surgery:   6/12/2025    Assessment & Plan  Lisfranc dislocation, right, initial encounter  Patient presents early today as her splint is loose and uncomfortable. Changed into cast today.   1. Weightbearing Status - NWB operative extremity  2. DVT prophylaxis - ASA 81 mg BID  3. Continue to elevate 23 hrs/day getting up 1x per hour to prevent a blood clot  4. Pain control - OTC pain medication  5. RTC in 1 week(s)  6. Xrays needed next visit - No          History of Present Illness:   Chief Complaint:   Chief Complaint   Patient presents with    Post-op     Post op 13 days. Splint change. Loose.   Right lisfranc arthrodesis - Right       Gracia Chirinos is a 49 y.o. female who is being seen for 2 week post-operative visit for the above procedure. Pain is well controlled and the patient has successfully transitioned to OTC pain medicines.  she is taking ASA 81 mg BID for DVT prophylaxis. Patient has been NWB in a Splint      Review of Systems:  General- denies fever/chills  Respiratory- denies cough or SOB  Cardio- denies chest pain or palpitations  GI- denies abdominal pain  Musculoskeletal- Negative except noted above  Skin- denies rashes or wounds    Physical Exam:   Ht 5' 5\" (1.651 m)   Wt 72.6 kg (160 lb)   LMP 05/29/2025 (Exact Date)   BMI 26.63 kg/m²   General/Constitutional: No apparent distress: well-nourished and well developed.  Eyes: normal ocular motion  Lymphatic: No appreciable lymphadenopathy  Respiratory: Non-labored breathing  Vascular: No edema, swelling or tenderness, except as noted in detailed exam.  Integumentary: No impressive skin lesions present, except as noted in detailed exam.  Neuro: No ataxia or tremors noted  Psych: Normal mood and affect, oriented to person, place and " time. Appropriate affect.  Musculoskeletal: Normal, except as noted in detailed exam and in HPI.    Examination    right        Incision Clean, dry, intact  Sutures In    Ecchymosis mild    Swelling mild    Sensation Intact to light touch throughout sural, saphenous, superficial peroneal, deep peroneal and medial/lateral plantar nerve distributions.  Sullivan-Eric 5.07 filament (10g) testing deferred.    Cardiovascular Brisk capillary refill < 2 seconds,intact DP and PT pulses    Special Tests None      Imaging Studies:   No new images        I personally performed the service.    Aleshia Hurd PA-C

## 2025-06-25 NOTE — ASSESSMENT & PLAN NOTE
Patient presents early today as her splint is loose and uncomfortable. Changed into cast today.   1. Weightbearing Status - NWB operative extremity  2. DVT prophylaxis - ASA 81 mg BID  3. Continue to elevate 23 hrs/day getting up 1x per hour to prevent a blood clot  4. Pain control - OTC pain medication  5. RTC in 1 week(s)  6. Xrays needed next visit - No

## 2025-06-25 NOTE — PATIENT INSTRUCTIONS
"Care of Casts and Splints    Casts and splints support and protect injured bones and soft tissue. When you break a bone, your doctor will put the pieces back together in the right position. Casts and splints hold the bones in place while they heal. They also reduce pain, swelling, and muscle spasm.    In some cases, splints and casts are applied following surgery.    Splints or \"half-casts\" provide less support than casts. However, splints can be adjusted to accommodate swelling from injuries easier than enclosed casts. Your doctor will decide which type of support is best for you.    Types of Splints and Casts  Casts are custom-made. They must fit the shape of your injured limb correctly to provide the best support. Casts can be made of plaster or fiberglass -- a plastic that can be shaped.  Splints or half-casts can also be custom-made, especially if an exact fit is necessary. Other times, a ready-made splint will be used. These off-the-shelf splints are made in a variety of shapes and sizes, and are much easier and faster to use. They have Velcro straps which make the splints easy to put on, take off, and adjust.    Materials  Fiberglass or plaster materials form the hard supportive layer in splints and casts.  Fiberglass is lighter in weight and stronger than plaster. In addition, x-rays can \"see through\" fiberglass better than through plaster. This is important because your doctor will probably schedule additional x-rays after your splint or cast has been applied. X-rays can show whether the bones are healing well or have moved out of place.  Plaster is less expensive than fiberglass and shapes better than fiberglass for some uses.    Application  Both fiberglass and plaster splints and casts use padding, usually cotton, as a protective layer next to the skin. Both materials come in strips or rolls which are dipped in water and applied over the padding covering the injured area.  The splint or cast must fit the " "shape of the injured arm or leg correctly to provide the best possible support. Generally, the splint or cast also covers the joint above and below the broken bone.  In many cases, a splint is applied to a fresh injury first. As swelling subsides, a full cast may replace the splint.  Sometimes, it may be necessary to replace a cast as swelling goes down and the cast gets \"too big.\" As a fracture heals, the cast may be replaced by a splint to make it easier to perform physical therapy exercises.        Apply ice to the splint or cast and elevate your leg to reduce swelling.  Warning Signs  Swelling can create a lot of pressure under your cast. This can lead to problems. If you experience any of the following symptoms, contact your doctor's office immediately for advice.  Increased pain and the feeling that the splint or cast is too tight. This may be caused by swelling.   Numbness and tingling in your hand or foot. This may be caused by too much pressure on the nerves.   Burning and stinging. This may be caused by too much pressure on the skin.   Excessive swelling below the cast. This may mean the cast is slowing your blood circulation.   Loss of active movement of toes or fingers. This requires an urgent evaluation by your doctor.      Getting Used to a Splint or Cast  Swelling due to your injury may cause pressure in your splint or cast for the first 48 to 72 hours. This may cause your injured arm or leg to feel snug or tight in the splint or cast. If you have a splint, your doctor will show you how to adjust it to accommodate the swelling.  It is very important to keep the swelling down. This will lessen pain and help your injury heal. To help reduce swelling:  Elevate. It is very important to elevate your injured arm or leg for the first 24 to 72 hours. Prop your injured arm or leg up above your heart by putting it on pillows or some other support. You will have to recline if the splint or cast is on your leg. " "Elevation allows clear fluid and blood to drain \"downhill\" to your heart.   Exercise. Move your uninjured, but swollen fingers or toes gently and often. Moving them often will prevent stiffness.   Ice. Apply ice to the splint or cast. Place the ice in a dry plastic bag or ice pack and loosely wrap it around the splint or cast at the level of the injury. Ice that is packed in a rigid container and touches the cast at only one point will not be effective.  Taking Care of Your Splint or Cast  Your doctor will explain any restrictions on using your injured arm or leg while it is healing. You must follow your doctor's instructions carefully to make sure your bone heals properly. The following information provides general guidelines only, and is not a substitute for your doctor's advice.  After you have adjusted to your splint or cast for a few days, it is important to keep it in good condition. This will help your recovery.  Keep your splint or cast dry. Moisture weakens plaster and damp padding next to the skin can cause irritation. Use two layers of plastic or purchase waterproof shields to keep your splint or cast dry while you shower or bathe. Even if the cast is covered, do not submerge it or hold it under running water. A small pinhole in the cast cover can cause the injury to get soaked.   Walking casts. Do not walk on a \"walking cast\" until it is completely dry and hard. It takes about one hour for fiberglass, and two to three days for plaster to become hard enough to walk on.   Avoid dirt. Keep dirt, sand, and powder away from the inside of your splint or cast.   Padding. Do not pull out the padding from your splint or cast.   Itching. Do not stick objects such as coat hangers inside the splint or cast to scratch itching skin. Do not apply powders or deodorants to itching skin. If itching persists, contact your doctor.   Trimming. Do not break off rough edges of the cast or trim the cast before asking your " "doctor.   Skin. Inspect the skin around the cast. If your skin becomes red or raw around the cast, contact your doctor.   Inspect the cast regularly. If it becomes cracked or develops soft spots, contact your doctor's office.  Use common sense. You have a serious injury and you must protect your cast from damage so it can protect your injury while it heals.  After the initial swelling has subsided, proper splint or cast support will usually allow you to continue your daily activities with a minimum of inconvenience.  Cast Removal  Never remove the cast yourself. You may cut your skin or prevent proper healing of your injury.  Your doctor will use a cast saw to remove your cast. The saw vibrates, but does not rotate. If the blade of the saw touches the padding inside the hard shell of the cast, the padding will vibrate with the blade and will protect your skin. Cast saws make noise and may feel \"hot\" from friction, but will not harm you -- \"their bark is worse than their bite.\"  If you do feel pain while the cast is being removed, let your doctor or an assistant know and they will be able to make adjustments.  The saw vibrates but does not rotate. Cast saws make noise but will not harm you.     "

## 2025-06-26 ENCOUNTER — RESULTS FOLLOW-UP (OUTPATIENT)
Dept: SLEEP CENTER | Facility: CLINIC | Age: 50
End: 2025-06-26

## 2025-06-26 NOTE — TELEPHONE ENCOUNTER
Diagnostic study resulted, confirms mild ANETTE with respiratory event related desaturations.     Patient to follow-up with provider, scheduled 8/6/2025 with Dr. Molina.    Call placed to patient, advised of above.    Added to the wait list for a sooner appointment with Dr. Molina in the [place] office.

## 2025-06-26 NOTE — TELEPHONE ENCOUNTER
----- Message from Erickson Molina DO sent at 6/20/2025  6:22 PM EDT -----  Will plan to discuss the results of her sleep study at her follow-up appointment.  ----- Message -----  From: Angela, Radiology Results In  Sent: 6/20/2025   3:53 PM EDT  To: Erickson Molina DO

## 2025-06-27 ENCOUNTER — CLINICAL SUPPORT (OUTPATIENT)
Dept: DERMATOLOGY | Facility: CLINIC | Age: 50
End: 2025-06-27

## 2025-06-27 DIAGNOSIS — Z48.02 ENCOUNTER FOR REMOVAL OF SUTURES: Primary | ICD-10-CM

## 2025-06-27 PROCEDURE — RECHECK: Performed by: DERMATOLOGY

## 2025-06-27 NOTE — PROGRESS NOTES
"Suture removal    Date/Time: 6/27/2025 1:45 PM    Performed by: Doretha Salmeron RN  Authorized by: Eliza Segovia MD    Universal Protocol:  procedure performed by consultantConsent: Verbal consent obtained. Written consent not obtained  Risks and benefits: risks, benefits and alternatives were discussed  Consent given by: patient  Time out: Immediately prior to procedure a \"time out\" was called to verify the correct patient, procedure, equipment, support staff and site/side marked as required.  Timeout called at: 6/27/2025 1:45 PM.  Patient understanding: patient states understanding of the procedure being performed  Patient consent: the patient's understanding of the procedure matches consent given  Procedure consent: procedure consent matches procedure scheduled  Relevant documents: relevant documents not present or verified  Test results: test results not available  Site marked: the operative site was not marked  Radiology Images displayed and confirmed. If images not available, report reviewed: imaging studies not available  Patient identity confirmed: verbally with patient      Patient location:  Clinic  Location:     Location:  Head/neck    Head/neck location:  Forehead  Procedure details:     Tools used:  Suture removal kit    Wound appearance:  No sign(s) of infection, good wound healing, clean, moist and pink  Post-procedure details:     Post-procedure assessment: Vaseline applied.    Patient tolerance of procedure:  Tolerated well, no immediate complications  Comments:      One spitting suture removed       "

## 2025-06-30 ENCOUNTER — TELEPHONE (OUTPATIENT)
Age: 50
End: 2025-06-30

## 2025-06-30 NOTE — TELEPHONE ENCOUNTER
Message sent to auth team to check on status of [facility]. Last message in referral was that they were checking to see why Dr. Freeman was not in tier one of patients health plan. Will await answer from auth team.

## 2025-06-30 NOTE — TELEPHONE ENCOUNTER
Caller: Patient     Doctor: Dr. Freeman     Reason for call: Calling to reschedule procedure.     Was not sure which one can be scheduled as one was in the appeals process?     Please assist       Call back#: [phone]

## 2025-07-01 NOTE — TELEPHONE ENCOUNTER
Caller: Gracia     Doctor: Dr. Freeman     Reason for call: Patient calling asking if she can get an update on auth in regards to procedure please advise     Call back#: [phone]

## 2025-07-02 ENCOUNTER — OFFICE VISIT (OUTPATIENT)
Dept: OBGYN CLINIC | Facility: CLINIC | Age: 50
End: 2025-07-02

## 2025-07-02 VITALS — BODY MASS INDEX: 26.66 KG/M2 | WEIGHT: 160 LBS | HEIGHT: 65 IN

## 2025-07-02 DIAGNOSIS — S93.324A LISFRANC DISLOCATION, RIGHT, INITIAL ENCOUNTER: Primary | ICD-10-CM

## 2025-07-02 PROCEDURE — 99024 POSTOP FOLLOW-UP VISIT: CPT | Performed by: ORTHOPAEDIC SURGERY

## 2025-07-02 NOTE — ASSESSMENT & PLAN NOTE
1. Weightbearing Status - NWB operative extremity in cast  2. DVT prophylaxis - ASA 81 mg BID  3. Continue to elevate to control swelling   4. Pain control - OTC pain medication  5. RTC in 3 week(s)  6. Xrays needed next visit - Yes weightbearing foot  Orders:    Ambulatory Referral to Physical Therapy; Future

## 2025-07-02 NOTE — PROGRESS NOTES
"      James R Lachman, M.D.  Attending, Orthopaedic Surgery  Foot and Ankle  [facility]      ORTHOPAEDIC FOOT AND ANKLE POST-OP VISIT     Procedure:      Right lisfranc arthrodesis        Date of surgery:   6/12/2025      Assessment & Plan  Lisfranc dislocation, right, initial encounter  1. Weightbearing Status - NWB operative extremity in cast  2. DVT prophylaxis - ASA 81 mg BID  3. Continue to elevate to control swelling   4. Pain control - OTC pain medication  5. RTC in 3 week(s)  6. Xrays needed next visit - Yes weightbearing foot  Orders:    Ambulatory Referral to Physical Therapy; Future       History of Present Illness:   Chief Complaint:   Chief Complaint   Patient presents with    Post-op     Post op 3 weeks. Splint off and stiches out.   Right lisfranc arthrodesis - Right       Gracia Chirinos is a 49 y.o. female who is being seen for 3 week post-operative visit for the above procedure. Pain is well controlled and the patient has successfully transitioned to OTC pain medicines.  she is taking ASA 81 mg BID for DVT prophylaxis. Patient has been NWB in a Short leg cast      Review of Systems:  General- denies fever/chills  Respiratory- denies cough or SOB  Cardio- denies chest pain or palpitations  GI- denies abdominal pain  Musculoskeletal- Negative except noted above  Skin- denies rashes or wounds    Physical Exam:   Ht 5' 5\" (1.651 m)   Wt 72.6 kg (160 lb)   BMI 26.63 kg/m²   General/Constitutional: No apparent distress: well-nourished and well developed.  Eyes: normal ocular motion  Lymphatic: No appreciable lymphadenopathy  Respiratory: Non-labored breathing  Vascular: No edema, swelling or tenderness, except as noted in detailed exam.  Integumentary: No impressive skin lesions present, except as noted in detailed exam.  Neuro: No ataxia or tremors noted  Psych: Normal mood and affect, oriented to person, place and time. Appropriate affect.  Musculoskeletal: Normal, except as " noted in detailed exam and in HPI.    Examination    right        Incision Clean, dry, intact  Sutures Removed this visit    Ecchymosis none    Swelling mild    Sensation Intact to light touch throughout sural, saphenous, superficial peroneal, deep peroneal and medial/lateral plantar nerve distributions.  Dent-Eric 5.07 filament (10g) testing deferred.    Cardiovascular Brisk capillary refill < 2 seconds,intact DP and PT pulses    Special Tests None      Imaging Studies:   No new images      Scribe Attestation      I,:  Aleshia Hurd PA-C am acting as a scribe while in the presence of the attending physician.:       I,:  James R Lachman, MD personally performed the services described in this documentation    as scribed in my presence.:                James R. Lachman, MD  Foot & Ankle Surgery   Department of Orthopaedic Surgery  [facility]      I personally performed the service.    James R. Lachman, MD

## 2025-07-08 NOTE — TELEPHONE ENCOUNTER
Caller: destinee Fagan     Doctor: Dr. Freeman    Reason for call:  Patient calling for an update on auth in regards to procedure please advise        Call back#: [phone]

## 2025-07-10 ENCOUNTER — TELEPHONE (OUTPATIENT)
Dept: NEUROLOGY | Facility: CLINIC | Age: 50
End: 2025-07-10

## 2025-07-10 NOTE — TELEPHONE ENCOUNTER
Caller: Gracia    Doctor: Dr. Freeman     Reason for call: pt is calling for status of authorization for procedure.   Please advise pt     Call back#: [phone]

## 2025-07-10 NOTE — TELEPHONE ENCOUNTER
Lorenzo Barrientos! This patient is asking for an update on her case. Last referral note was on 06/03/25 that you were waiting on a response from provider rep. Did you ever hear anything? Patient is asking for update. Thanks!

## 2025-07-14 ENCOUNTER — TELEPHONE (OUTPATIENT)
Dept: BARIATRICS | Facility: CLINIC | Age: 50
End: 2025-07-14

## 2025-07-16 ENCOUNTER — TELEMEDICINE (OUTPATIENT)
Dept: BARIATRICS | Facility: CLINIC | Age: 50
End: 2025-07-16
Payer: COMMERCIAL

## 2025-07-16 VITALS — BODY MASS INDEX: 25.92 KG/M2 | WEIGHT: 155.6 LBS | HEIGHT: 65 IN

## 2025-07-16 DIAGNOSIS — E66.09 CLASS 1 OBESITY DUE TO EXCESS CALORIES WITH SERIOUS COMORBIDITY AND BODY MASS INDEX (BMI) OF 30.0 TO 30.9 IN ADULT: ICD-10-CM

## 2025-07-16 DIAGNOSIS — E66.811 CLASS 1 OBESITY DUE TO EXCESS CALORIES WITH SERIOUS COMORBIDITY AND BODY MASS INDEX (BMI) OF 30.0 TO 30.9 IN ADULT: ICD-10-CM

## 2025-07-16 PROCEDURE — 99215 OFFICE O/P EST HI 40 MIN: CPT | Performed by: INTERNAL MEDICINE

## 2025-07-16 RX ORDER — TIRZEPATIDE 10 MG/.5ML
10 INJECTION, SOLUTION SUBCUTANEOUS WEEKLY
Qty: 2 ML | Refills: 3 | Status: SHIPPED | OUTPATIENT
Start: 2025-07-16 | End: 2025-10-08

## 2025-07-16 NOTE — PROGRESS NOTES
Administrative Statements   Encounter provider Izabella Romero MD    The Patient is located at Home and in the following state in which I hold an active license [place].    The patient was identified by name and date of birth. Gracia Chirinos was informed that this is a telemedicine visit and that the visit is being conducted through the Epic Embedded platform. She agrees to proceed..  My office door was closed. No one else was in the room.  She acknowledged consent and understanding of privacy and security of the video platform. The patient has agreed to participate and understands they can discontinue the visit at any time.    I have spent a total time of 40 minutes in caring for this patient on the day of the visit/encounter including as below not including the time spent for establishing the audio/video connection.   Assessment/Plan     Gracia Chirinos is 49 y.o. year old female  who comes in for consultation for assistance with weight management.     - Discussed options of [facility]-Intensive Lifestyle Intervention Program, Very Low Calorie Diet-VLCD, and Conservative Program and the role of weight loss medications.  - Patient is interested in pursuing Conservative Program    Class 1 obesity due to excess calories with serious comorbidity and body mass index (BMI) of 30.0 to 30.9 in adult  Antiobesity Medications/Medical --  Patient reports since last office visit she passed out - ? Orthostatic hypotension and broke her foot had to undergo surgery in June  Will be getting echo and Holter monitor to evaluate  She is currently on 10 mg of Zepbound and 500 mg of metformin  No further changes will be made to the dose as her BMI is almost close to normal and we would encourage adequate calories and protein to help with healing  We anticipate Zepbound 10 mg to be the maintenance dose  Oral medication options reviewed:   When she retried phentermine a few years ago she did not like the way it made her feel. She does not  want to take Topamax as she tried it years ago for migraines and it contributed to tremors.   No contraindications to Wellbutrin and naltrexone  Tried to wean off gabapentin-which she uses for RLS   Patient is extremely pleased that her inflammatory markers have improved CRP got down to 2.5 from 16 after she started the Zepbound. She got off the methotrexate and feels her rheumatological condition is improving       Nutrition:    Nutrition Prescription  Calories: 2110-4310 kcal 1# per week w/o exercise  Protein: 75-80 g  Fluid: 2000 ml     Meal Plan (Harvey/Pro)  Breakfast: 150-200  Snack: --  Lunch: 300  Snack: 150  Dinner: 500-Factor meal  Snack: optinal 100 harvey snack  Enhance protein intake to enable healing  Include protein-based snacks    Physical Activity:   Surgery in beginning of June on foot After she had an episode of passing out  She also had skin cancer removal surgery  Will need to start physical therapy not going back to work until Nov   Body comp when she is recovered   Patient reports previous SI joint dysfunction nerve pain improved due to immobility  She was going to the gym and lifting some weights prior to this that she hopes to get back to       Sleep: -  Has not been sleeping as well due to recent injury  ANETTE on CPAP ; 8-9 hours of sleep per night    Food Behaviors/Stress/pyschosocial:    On Lexapro for depression  Adequate suppression of hunger and cravings on Zepbound          Gracia was seen today for follow-up.    Diagnoses and all orders for this visit:    Class 1 obesity due to excess calories with serious comorbidity and body mass index (BMI) of 30.0 to 30.9 in adult  -     tirzepatide (Zepbound) 10 mg/0.5 mL auto-injector; Inject 0.5 mL (10 mg total) under the skin once a week                -In addition, please follow general recommendations below.          Return visit:  6-8 weeks        General Lifestyle recommendations:    Nutrition   -Avoid skipping meals. Avoid sugary beverages. At  "least 64oz of water daily.  Limit processed food, refined sugars and grain. Encourage  healthy choices for meals and snacks   -Focus on protein goals and non starchy fiber rich vegetables for satiety effect and to help support a calorie deficit.   - Emphasize portion control, well balanced macronutrient's (protein, carbohydrate, fat using MyPlate method )and adequate protein with each meal/snacks and distributing calories equally throughout the day along with.   -Advise starting the day with a protein breakfast   Behavioral/Stress   Food log via carlos or provided paper log (carlos options include www.RegaloCard.com, sparkpeople.com, loseit.com, calorieking.com, Hyperformix). Encouraged mindful eating. Be sure to set aside time to eat, eat slowly, and savor your food. Consider meditation apps and/or taking a few minutes of mindfulness every AM. Understand the role of regarding the role of stress hormone cortisol in promoting weight gain and visceral fat accumulation. Weigh daily or atleast 2-3 times/ week  Physical Activity   Increase physical activity by 10 minutes daily. Gradually increase physical activity to a goal of 5 days per week for 30 minutes of MODERATE intensity ( should be able to pass the \"talk test\" but should not be able to sing. Target 150-300 minutes  PLUS 2 days per week of FULL BODY resistance training. Progression will be addressed at follow up visits. Encouraged contemplation regarding establishing a daily physical activity routine  - Resistance training along with increase protein intake is important to maintain and enhance metabolism  Sleep   Encourage sleep hygiene and importance of having adequate sleep duration at least > 6 hours to support response in weight loss efforts    Handouts provided :  [facility]  MyPlate and food quality  Food log resources, phone carlos or paper journal  Antiobesity medications options     - Discussed at length and the role of weight loss " medications and medication options   - Explained the importance of making lifestyle changes in addition to starting any anti-obesity medications if the  patient chooses.  -  Initial weight loss goal of 5-10% weight loss for improved health  - Weight loss can improve patient's co-morbid conditions and/or prevent weight-related complications.  - Weight is not at goal and patient has been unable to achieve a meaningful weight loss above 5% using various programs and tools for more than 6 months    Gracia was seen today for follow-up.    Diagnoses and all orders for this visit:    Class 1 obesity due to excess calories with serious comorbidity and body mass index (BMI) of 30.0 to 30.9 in adult  -     tirzepatide (Zepbound) 10 mg/0.5 mL auto-injector; Inject 0.5 mL (10 mg total) under the skin once a week                Zepbound Instructions:    - Begin Zepbound 2.5 mg subcutaneously once a week. Dose changes may occur after 4 doses if medication is tolerated. You will be assessed prior to each dose change to make sure you are tolerating the medication well.  - Please message me when you have 2 pens left from the prescription so there are no lapses in treatment.  - Visit Zepbound.com for further information/injection instructions.   - Take precautions to avoid dehydration. Aim for 64-80 oz of fluids/day  -Stop eating before you feel full  -Instructed  to administer a missed dose as soon as possible within 4 days. If more than 4 days have passed, skip the missed dose, administer the next dose on the regularly scheduled day, and resume the regular once-weekly dosing schedule  -Please eat small frequent meals to help reduce nausea. Avoid excessively fatty meals fried foods. Lemon water and saltine crackers may help with this.   - Side effects of Zepbound discussed: nausea, vomiting, diarrhea, and constipation. If you experience fever, nausea/vomiting, and pain radiating to your back this may be a sign of pancreatitis.  Please go to the emergency room if this occurs.  - Consider OTC bowel regimen including stool softeners, fiber supplements to regularize bowel movements while on medication. MiraLAX can be used if needed  - - If on oral birth control a 2nd method of birth control is recommended during the 1st 8 weeks of therapy and for 4 weeks after any dosage change.   - Patient understands the side effects of the medication and proper administration. Patient agrees with the treatment plan and all questions were answered.    Subcutaneous injection:  Inject subQ into the thigh, abdomen, or upper arm; rotate injection sites.  Administer at any time of day, with or without meals.  Administer separately from insulin (do not mix the products); may inject both medications in the same body region but not adjacent to each other.  The day of the weekly administration can be changed as long as the time between the 2 doses is at least 3 days (72 hours)  Administer a missed dose as soon as possible within 4 days (96 hours) of missed dose; if more than 4 days have passed, skip the missed dose and administer next dose on regularly scheduled day and resume regular once weekly dosing schedule             Total time spent reviewing chart, interviewing patient, examining patient, discussing plan, answering all questions, and documentin min, with >50% face-to-face time spent counseling patient on nonsurgical interventions for the treatment of excess weight. Discussed in detail nonsurgical options including intensive lifestyle intervention program, very low-calorie diet program and conservative program.  Discussed the role of weight loss medications.  Counseled patient on diet behavior and exercise modification for weight loss.        History of present illness/ Weight history   Patient reports that she has had trouble with her weight all her life.  She grew up in [place] and states that her mom and dad were not very strict with what she ate.   She states her mother had obesity and had father also gained weight after he was sedentary due to her diagnosis of MS.  He has had bariatric surgery by Dr. Jordan and is a patient at our center.  She states that she has been up and down with her weight all her life.  When she gets to her current weight of 180s she states that she starts implementing lifestyle efforts such as diet and physical activity.  Lately she states her physical activity has been limited due to back surgery last year.  She has also been evaluated for autoimmune disease and has been getting a few rounds of steroids this year which is also significantly contributed to her weight.  She reports 160 was her lowest weight and her highest weight is 188.  She started seeing the dietitian and has lost 5 pain 8 pounds since then.      She would really like to consider medication as she feels like the food noise has been significant.  She describes binge eating where she can eat a whole box of cinnamon rolls.  She does report that she lives alone and if she were living with her  or her daughter it is possible she could hide what she is eating.  She also reports eating until uncomfortably full.  She denies any purging or restrictive behaviors.  She states that she has mentioned the above issue to her therapist but has not been seeing them as frequently.  She has never been formally diagnosed with an eating disorder or been referred to an eating disorder therapist.  She reports binge episodes 1-2 times a month.  She also states that she could eat at the end of her workday while watching TV and uses food for comfort    She states that she had tried fen-phen in the 90s and had succeeded in losing weight.  When she retried phentermine a few years ago she did not like the way it made her feel.  She does not want to take Topamax as she tried it years ago for migraines and it contributed to tremors.  She also notes low ferritin  Has been seeing CORDELIA Dean  and was provided nutrition prescription below.      Nutrition Prescription  Calories: 0409-2636 kcal 1# per week w/o exercise  Protein: 75 g  Fluid: 2000 ml     Meal Plan (Harvey/Pro)  Breakfast: 150-200  Snack: --  Lunch: 300  Snack: 150  Dinner: 500-Factor meal  Snack: optinal 100 harvey snack      Screener Tool for Eating Disorders   (URL: SBIRT-ED - Eatingdisorderscreener.org)     1. Do you make yourself throw up because you feel uncomfortably full?  No     2. Do you worry you have lost control over how much you eat?  Yes     3. Have you recently lost more than 15 pounds in a 3-month period?  No     4. Do you think you are fat even though others say you are too thin?  No     5. Would you say that food dominates your life?   Yes     TOTAL: 2 /5     SCORING GUIDE:  LOW RISK: 1-2/5  MEDIUM RISK: 3/5  HIGH RISK: 4-5/5      Wt Readings from Last 30 Encounters:   07/16/25 70.6 kg (155 lb 9.6 oz)   07/02/25 72.6 kg (160 lb)   06/25/25 72.6 kg (160 lb)   06/10/25 72.6 kg (160 lb)   06/09/25 72.6 kg (160 lb)   06/04/25 74.7 kg (164 lb 9.6 oz)   06/03/25 73.9 kg (163 lb)   05/31/25 74 kg (163 lb 2.3 oz)   05/22/25 74.4 kg (164 lb)   05/15/25 74.6 kg (164 lb 6.4 oz)   04/30/25 76.6 kg (168 lb 12.8 oz)   04/24/25 77.1 kg (170 lb)   04/16/25 75.7 kg (166 lb 12.8 oz)   03/19/25 78.5 kg (173 lb)   03/19/25 78.7 kg (173 lb 9.6 oz)   02/26/25 76 kg (167 lb 9.6 oz)   02/10/25 77.3 kg (170 lb 6.4 oz)   02/07/25 80 kg (176 lb 6.4 oz)   01/13/25 81 kg (178 lb 9.6 oz)   01/03/25 81.8 kg (180 lb 6.4 oz)   12/23/24 83.5 kg (184 lb)   12/16/24 84.5 kg (186 lb 3.2 oz)   12/12/24 88.2 kg (194 lb 6.4 oz)   12/02/24 86.2 kg (190 lb)   11/25/24 83.3 kg (183 lb 9.6 oz)   11/19/24 83.3 kg (183 lb 9.6 oz)   11/05/24 85.6 kg (188 lb 12.8 oz)   10/23/24 83.5 kg (184 lb)   09/11/24 83.9 kg (185 lb)   08/02/24 83.9 kg (185 lb)       RA- inflammation down, MTX took         Lifestyle questionnaire       Dietary Recall  Not tolerating the milky products and  coffee any more  Not craving sweets  B: Restoration oats has a protein cereal (3/4 cu =250 cals, 11gm protein) with a little milk OR Auxier oatmeal   S: apple, protein bar (Pure protein),   L: was making a smoothie--yogurt, Pbfit, ice, muscle milk, 1/2 banana but hasn't been liking it lately.  Suggested deli meat roll-ups and avoid skipping  D:  3oz protein, veggies and starch  *suggested to try clear protein or protein shot mixed in water ie:baripal     Beverages: water with zero lalo flavoring or seltzer  Volume of beverage intake: 64oz     Weekends: same  Cravings: sugar   Trouble area of day: most at night.     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 25.0-29.9- Overweight  Difference: -17 lbs      Anti obesity Medications/ Medical---    Weight loss medication and dose: Zepbound   Date initiated: November 2025         Colonoscopy: UTD  Mammogram: UTD    Medication considerations/contraindications     -Patient denies personal history of pancreatitis. Patient also denies personal and family history of medullary thyroid cancer, and multiple endocrine neoplasia type 2 (MEN 2 tumor). -Patient denies any history of kidney stones, seizures, or+ glaucoma, diabetic retinopathy, gall bladder  disease, gastroparesis, hyperthyroidism.  -Denies Hx of CAD, PAD, palpitations, arrhythmia, uncontrolled hypertension  -Denies uncontrolled anxiety or depression, suicidal behavior or thinking , insomnia or sleep disturbance.         Past medical history/past surgical history       Previous notes and records have been reviewed.    The following portions of the patient's history were reviewed and updated as appropriate: allergies, current medications, past family history, past medical history, past social history, past surgical history, and problem list.    Past Medical History:   Diagnosis Date    Abnormal Pap smear of cervix     Acne     RAMIREZ positive     Anemia     Anxiety     Arthritis 2024    Autoimmune disease (HCC) 2024    RA    Back pain     Basal cell carcinoma     Bunion     Cancer (HCC)     Skin cancer    Cataract     Medicine related    Depression     Difficulty walking     Arthritis    Glaucoma 2019    Headache(784.0)     Chronic    Migraine     Migraine     Miscarriage     MRSA colonization 2016    Neck pain     Obesity     Pregnancy     Rheumatoid arthritis (HCC) 2024    Shin splints     Sleep apnea     Squamous cell skin cancer 2025    SCCIS forehead-Mohs    Urinary tract infection     Vertebral artery dissection (HCC)     Visual impairment     Cataract    Vulvovaginitis     Yeast infection          Past Surgical History:   Procedure Laterality Date    BACK SURGERY  10/2023     SECTION, LOW TRANSVERSE      COLONOSCOPY      DILATION AND CURETTAGE OF UTERUS      EPIDURAL BLOCK INJECTION      FL INJECTION LEFT HIP (ARTHROGRAM)  2025    GYNECOLOGIC CRYOSURGERY      cervix    LASIK      MOHS SURGERY  2025    SCCIS forehead;     MN ARTHRD MIDTARSL/TARSOMETATARSAL MULT/TRANSVRS Right 2025    Procedure: Right lisfranc arthrodesis;  Surgeon: James R Lachman, MD;  [facility] MA[facility] OR;   "Service: Orthopedics    SKIN BIOPSY      SKIN LESION EXCISION      basal cell carcinoma of left cheek    SPINAL FUSION  10/2023    TRIGGER POINT INJECTION      Off and on for 20 years             Family History   Problem Relation Name Age of Onset    Hypertension Mother Judit     Lung cancer Mother Judit 56    Brain cancer Mother Judit 56    Depression Mother Judit     Lupus Mother Judit     Early death Mother Judit         Lung cancer    Cancer Mother Judit     Arthritis Mother Judit     Autoimmune disease Mother Judit         Lupus    Snoring Mother Judit     Miscarriages / Stillbirths Mother Judit     Breast cancer Maternal Aunt Pilar Dixon         dx in 40's     Multiple sclerosis Father Dre     Arthritis Father Dre     Migraines Father Dre     Osteoporosis Father Dre     Autoimmune disease Father Dre         Ms    Sleep apnea Father Dre     Restless legs syndrome Father Dre     Chronic fatigue Father Dre         MS    No Known Problems Daughter      No Known Problems Maternal Grandmother      No Known Problems Maternal Grandfather      No Known Problems Paternal Grandmother      No Known Problems Paternal Grandfather      No Known Problems Maternal Aunt              Objective     Ht 5' 5\" (1.651 m)   Wt 70.6 kg (155 lb 9.6 oz)   LMP 07/02/2025   BMI 25.89 kg/m²       Review of Systems   Constitutional:  Negative for fatigue.   HENT:  Negative for sore throat.    Respiratory:  Negative for cough and shortness of breath.    Cardiovascular:  Negative for chest pain, palpitations and leg swelling.   Gastrointestinal:  Negative for abdominal pain, constipation, diarrhea and nausea.   Genitourinary:  Negative for dysuria.   Musculoskeletal:  Negative for arthralgias and back pain.   Skin:  Negative for rash.   Neurological:  Negative for headaches.   Psychiatric/Behavioral:  Negative for dysphoric mood. The patient is not nervous/anxious.        Physical " Exam  Vitals and nursing note reviewed.   Constitutional:       Appearance: Normal appearance.   HENT:      Head: Normocephalic.   Pulmonary:      Effort: Pulmonary effort is normal.     Neurological:      General: No focal deficit present.      Mental Status: She is alert and oriented to person, place, and time.     Psychiatric:         Mood and Affect: Mood normal.         Behavior: Behavior normal.         Thought Content: Thought content normal.         Judgment: Judgment normal.            Medications       Current Outpatient Medications:     ALPRAZolam (XANAX) 0.25 mg tablet, Take 1 tablet (0.25 mg total) by mouth daily as needed for anxiety, Disp: 30 tablet, Rfl: 0    aspirin (ECOTRIN LOW STRENGTH) 81 mg EC tablet, Take 1 tablet (81 mg total) by mouth in the morning and 1 tablet (81 mg total) before bedtime., Disp: 84 tablet, Rfl: 0    escitalopram (LEXAPRO) 20 mg tablet, Take 1 tablet (20 mg total) by mouth daily, Disp: 90 tablet, Rfl: 0    ferrous sulfate 325 (65 FE) MG EC tablet, 325mg every other day with Vitamin C, Disp: 90 tablet, Rfl: 0    gabapentin (NEURONTIN) 300 mg capsule, Take 2 capsules (600 mg total) by mouth 2 (two) times a day Take 2 capsule in the morning and 2 at bedtime, Disp: 120 capsule, Rfl: 1    hydrOXYzine HCL (ATARAX) 25 mg tablet, Take 1 tablet (25 mg total) by mouth in the morning, Disp: 90 tablet, Rfl: 0    latanoprost (XALATAN) 0.005 % ophthalmic solution, , Disp: , Rfl: 2    metFORMIN (GLUCOPHAGE-XR) 500 mg 24 hr tablet, Start with 1 tablet with dinner and after 1 week increase to 1 tablet twice a day with meals., Disp: 180 tablet, Rfl: 3    ondansetron (ZOFRAN) 4 mg tablet, Take 1 tablet (4 mg total) by mouth every 8 (eight) hours as needed for nausea or vomiting, Disp: 10 tablet, Rfl: 0    pramipexole (MIRAPEX) 0.5 mg tablet, Take 1 tablet (0.5 mg total) by mouth 2 (two) times a day, Disp: 90 tablet, Rfl: 1    Probiotic Product (PRO-BIOTIC BLEND PO), Take by mouth, Disp: ,  Rfl:     rimegepant sulfate (NURTEC) 75 mg TBDP, Take 1 tablet (75 mg) by mouth once at the onset of a headache. Max dose: 75 mg/day., Disp: 16 tablet, Rfl: 3    sodium chloride 1 g tablet, Take 1 tablet (1 g total) by mouth 3 (three) times a day, Disp: 270 tablet, Rfl: 3    tirzepatide (Zepbound) 10 mg/0.5 mL auto-injector, Inject 0.5 mL (10 mg total) under the skin once a week, Disp: 2 mL, Rfl: 3    tiZANidine (ZANAFLEX) 2 mg tablet, Take 1-2 tablets as needed at bedtime (max 4mg/day) for muscle spasm, Disp: 60 tablet, Rfl: 1    traZODone (DESYREL) 50 mg tablet, Take 1.5 tablets (75 mg total) by mouth daily at bedtime, Disp: 135 tablet, Rfl: 0    butalbital-acetaminophen-caffeine (Esgic) -40 mg per tablet, Take 1 tablet by mouth every 4 (four) hours as needed for headaches, Disp: 30 tablet, Rfl: 0    oxyCODONE (Roxicodone) 5 immediate release tablet, Take 1 tablet (5 mg total) by mouth every 4 (four) hours as needed for severe pain for up to 30 doses Max Daily Amount: 30 mg, Disp: 30 tablet, Rfl: 0    ZyrTEC Allergy 10 MG tablet, Take 10 mg by mouth daily (Patient not taking: Reported on 4/16/2025), Disp: , Rfl:     Current Facility-Administered Medications:     Botulinum Toxin Type A SOLR 200 Units, 200 Units, Injection, See Admin Instructions, , 200 Units at 04/16/25 1054           Labs and imaging     Recent labs and imaging have been personally reviewed.  Lab Results   Component Value Date    WBC 6.79 05/31/2025    HGB 12.9 05/31/2025    HCT 38.3 05/31/2025    MCV 87 05/31/2025     05/31/2025     Lab Results   Component Value Date     10/10/2014    SODIUM 139 05/31/2025    K 3.8 05/31/2025     05/31/2025    CO2 28 05/31/2025    ANIONGAP 7 10/10/2014    AGAP 4 05/31/2025    BUN 17 05/31/2025    CREATININE 0.71 05/31/2025    GLUC 85 05/31/2025    GLUF 75 04/11/2025    CALCIUM 8.6 05/31/2025    AST 19 05/31/2025    ALT 10 05/31/2025    ALKPHOS 60 05/31/2025    PROT 6.8 10/10/2014    TP  7.0 05/31/2025    BILITOT 0.2 10/10/2014    TBILI 0.32 05/31/2025    EGFR 100 05/31/2025     Lab Results   Component Value Date    HGBA1C 5.6 12/16/2024     Lab Results   Component Value Date    REE7LJXSPNYP 1.210 05/31/2025    TSH 1.40 07/13/2020     Lab Results   Component Value Date    CHOLESTEROL 191 01/29/2024     Lab Results   Component Value Date    HDL 56 01/29/2024     Lab Results   Component Value Date    TRIG 154 (H) 01/29/2024     Lab Results   Component Value Date    LDLCALC 108 (H) 01/29/2024

## 2025-07-16 NOTE — ASSESSMENT & PLAN NOTE
Antiobesity Medications/Medical --  Patient reports since last office visit she passed out - ? Orthostatic hypotension and broke her foot had to undergo surgery in June  Will be getting echo and Holter monitor to evaluate  She is currently on 10 mg of Zepbound and 500 mg of metformin  No further changes will be made to the dose as her BMI is almost close to normal and we would encourage adequate calories and protein to help with healing  We anticipate Zepbound 10 mg to be the maintenance dose  Oral medication options reviewed:   When she retried phentermine a few years ago she did not like the way it made her feel. She does not want to take Topamax as she tried it years ago for migraines and it contributed to tremors.   No contraindications to Wellbutrin and naltrexone  Tried to wean off gabapentin-which she uses for RLS   Patient is extremely pleased that her inflammatory markers have improved CRP got down to 2.5 from 16 after she started the Zepbound. She got off the methotrexate and feels her rheumatological condition is improving       Nutrition:    Nutrition Prescription  Calories: 7858-6104 kcal 1# per week w/o exercise  Protein: 75-80 g  Fluid: 2000 ml     Meal Plan (Harvey/Pro)  Breakfast: 150-200  Snack: --  Lunch: 300  Snack: 150  Dinner: 500-Factor meal  Snack: optinal 100 harvey snack  Enhance protein intake to enable healing  Include protein-based snacks    Physical Activity:   Surgery in beginning of June on foot After she had an episode of passing out  She also had skin cancer removal surgery  Will need to start physical therapy not going back to work until Nov   Body comp when she is recovered   Patient reports previous SI joint dysfunction nerve pain improved due to immobility  She was going to the gym and lifting some weights prior to this that she hopes to get back to       Sleep: -  Has not been sleeping as well due to recent injury  ANETTE on CPAP ; 8-9 hours of sleep per night    Food  Behaviors/Stress/pyschosocial:    On Lexapro for depression  Adequate suppression of hunger and cravings on Zepbound

## 2025-07-17 ENCOUNTER — TELEPHONE (OUTPATIENT)
Age: 50
End: 2025-07-17

## 2025-07-17 ENCOUNTER — OFFICE VISIT (OUTPATIENT)
Dept: OBGYN CLINIC | Facility: OTHER | Age: 50
End: 2025-07-17

## 2025-07-17 DIAGNOSIS — Z98.890 STATUS POST RIGHT FOOT SURGERY: Primary | ICD-10-CM

## 2025-07-17 DIAGNOSIS — Z47.89 ENCOUNTER FOR CAST CHANGE: ICD-10-CM

## 2025-07-17 PROCEDURE — 99024 POSTOP FOLLOW-UP VISIT: CPT

## 2025-07-17 NOTE — PROGRESS NOTES
Orthopaedic Surgery - Office Note  Gracia Chirinos (49 y.o. female)   : 1975   MRN: [id number]  Encounter Date: 2025    Chief Complaint   Patient presents with    Right Ankle - Pain     Cast change          Assessment & Plan  Status post right foot surgery-right lisfranc arthorodesis 25  1. Weightbearing Status - NWB operative extremity in cast  2. DVT prophylaxis - ASA 81 mg BID  3. Continue to elevate to control swelling   4. Pain control - OTC pain medication      May follow up with PCP for syncopal episode         Encounter for cast change  Follow cast care instructions          Return for Recheck with Dr. Lachman as scheduled.        History of Present Illness  Patient is here for cast change after getting her postoperative cast wet.  She reports getting the cast dry with hair dryer and paper towels failed.  She is here today requesting a cast change.  She reports she was taking a shower and fainted, getting the cast wet.  She reports the top of her foot still feels numb.    Review of Systems  Pertinent items are noted in HPI.  All other systems were reviewed and are negative.    Physical Exam  LMP 2025   Cons: Appears well.  No apparent distress.  Psych: Alert. Oriented x3.  Mood and affect normal.    Saturated cast noted.  Incisions without signs of infection.  SLC in neutral positions removed and new cast applied.  No significant swelling noted.  Mild ecchymosis at the great toe laterally.      Studies Reviewed  Phone messages and communications reviewed  Xrays not needed at this time, fainted while protected in well padded and appropriate fitting SLC.  Patient is NWB until next appt with foot and ankle surgeon.    Cast application    Date/Time: 2025 6:30 PM    Performed by: Tabitha Brown MA  Authorized by: Kartik Burt PA-C    Risks and benefits: Risks, benefits and alternatives were discussed    Consent given by:  Patient  Time Out:     Time out: Immediately prior to  the procedure a time out was called    Patient states understanding of procedure being performed: Yes    Patient's understanding of procedure matches consent: Yes    Relevant documents present and verified: Yes    Test results available and properly labeled: Yes    Site marked: Yes    Radiology Images displayed and confirmed. If images not available, report reviewed: Yes    Patient identity confirmed:  Verbally with patient  Pre-procedure details:     Sensation:  Normal  Procedure details:     Laterality:  Right    Location:  Foot    Cast type:  Short leg    Splint composition: static    Post-procedure details:     Pain:  Unchanged    Sensation:  Normal    Patient tolerance of procedure:  Tolerated well, no immediate complications        Medical, Surgical, Family, and Social History  The patient's medical history, family history, and social history, were reviewed and updated as appropriate.    Past Medical History[1]    Past Surgical History[2]    Family History[3]    Social History     Occupational History    Not on file   Tobacco Use    Smoking status: Never    Smokeless tobacco: Never   Vaping Use    Vaping status: Never Used   Substance and Sexual Activity    Alcohol use: No    Drug use: No    Sexual activity: Yes     Partners: Male     Birth control/protection: Male Sterilization       Allergies[4]    Current Medications[5]      Kartik Burt PA-C         [1]   Past Medical History:  Diagnosis Date    Abnormal Pap smear of cervix     Acne 1998    RAMIREZ positive     Anemia 2024    Anxiety     Arthritis March 2024    Autoimmune disease (HCC) March 2024    RA    Back pain     Basal cell carcinoma     Bunion 2020    Cancer (HCC)     Skin cancer    Cataract 2024    Medicine related    Depression     Difficulty walking 2023    Arthritis    Glaucoma 2019    Headache(784.0)     Chronic    Migraine     Migraine     Miscarriage 2007    MRSA colonization 07/13/2016    Neck pain     Obesity 2022    Pregnancy      Rheumatoid arthritis (HCC) 2024    Shin splints     Sleep apnea     Squamous cell skin cancer 2025    SCCIS forehead-Mohs    Urinary tract infection     Vertebral artery dissection (HCC)     Visual impairment     Cataract    Vulvovaginitis     Yeast infection    [2]   Past Surgical History:  Procedure Laterality Date    BACK SURGERY  10/2023     SECTION, LOW TRANSVERSE      COLONOSCOPY      DILATION AND CURETTAGE OF UTERUS      EPIDURAL BLOCK INJECTION      FL INJECTION LEFT HIP (ARTHROGRAM)  2025    GYNECOLOGIC CRYOSURGERY      cervix    LASIK      MOHS SURGERY  2025    SCCIS forehead;     KY ARTHRD MIDTARSL/TARSOMETATARSAL MULT/TRANSVRS Right 2025    Procedure: Right lisfranc arthrodesis;  Surgeon: James R Lachman, MD;  Location: [facility] MA[facility];  Service: Orthopedics    SKIN BIOPSY      SKIN LESION EXCISION      basal cell carcinoma of left cheek    SPINAL FUSION  10/2023    TRIGGER POINT INJECTION      Off and on for 20 years   [3]   Family History  Problem Relation Name Age of Onset    Hypertension Mother Judit     Lung cancer Mother Judit 56    Brain cancer Mother Judit 56    Depression Mother Judit     Lupus Mother Judit     Early death Mother Judit         Lung cancer    Cancer Mother Judit     Arthritis Mother Judit     Autoimmune disease Mother Judit         Lupus    Snoring Mother Judit     Miscarriages / Stillbirths Mother Judit     Breast cancer Maternal Aunt Pilar Dixon         dx in 40's     Multiple sclerosis Father Dre     Arthritis Father Dre     Migraines Father Dre     Osteoporosis Father Dre     Autoimmune disease Father Dre         Ms    Sleep apnea Father Dre     Restless legs syndrome Father Dre     Chronic fatigue Father Dre         MS    No Known Problems Daughter      No Known Problems Maternal Grandmother      No Known Problems Maternal Grandfather      No  "Known Problems Paternal Grandmother      No Known Problems Paternal Grandfather      No Known Problems Maternal Aunt     [4]   Allergies  Allergen Reactions    Reglan [Metoclopramide] Confusion     Pt reports Reglan \"makes me lose my mind and go crazy.\"   [5]   Current Outpatient Medications:     ALPRAZolam (XANAX) 0.25 mg tablet, Take 1 tablet (0.25 mg total) by mouth daily as needed for anxiety, Disp: 30 tablet, Rfl: 0    aspirin (ECOTRIN LOW STRENGTH) 81 mg EC tablet, Take 1 tablet (81 mg total) by mouth in the morning and 1 tablet (81 mg total) before bedtime., Disp: 84 tablet, Rfl: 0    butalbital-acetaminophen-caffeine (Esgic) -40 mg per tablet, Take 1 tablet by mouth every 4 (four) hours as needed for headaches, Disp: 30 tablet, Rfl: 0    escitalopram (LEXAPRO) 20 mg tablet, Take 1 tablet (20 mg total) by mouth daily, Disp: 90 tablet, Rfl: 0    ferrous sulfate 325 (65 FE) MG EC tablet, 325mg every other day with Vitamin C, Disp: 90 tablet, Rfl: 0    gabapentin (NEURONTIN) 300 mg capsule, Take 2 capsules (600 mg total) by mouth 2 (two) times a day Take 2 capsule in the morning and 2 at bedtime, Disp: 120 capsule, Rfl: 1    hydrOXYzine HCL (ATARAX) 25 mg tablet, Take 1 tablet (25 mg total) by mouth in the morning, Disp: 90 tablet, Rfl: 0    latanoprost (XALATAN) 0.005 % ophthalmic solution, , Disp: , Rfl: 2    metFORMIN (GLUCOPHAGE-XR) 500 mg 24 hr tablet, Start with 1 tablet with dinner and after 1 week increase to 1 tablet twice a day with meals., Disp: 180 tablet, Rfl: 3    ondansetron (ZOFRAN) 4 mg tablet, Take 1 tablet (4 mg total) by mouth every 8 (eight) hours as needed for nausea or vomiting, Disp: 10 tablet, Rfl: 0    oxyCODONE (Roxicodone) 5 immediate release tablet, Take 1 tablet (5 mg total) by mouth every 4 (four) hours as needed for severe pain for up to 30 doses Max Daily Amount: 30 mg, Disp: 30 tablet, Rfl: 0    pramipexole (MIRAPEX) 0.5 mg tablet, Take 1 tablet (0.5 mg total) by mouth 2 " (two) times a day, Disp: 90 tablet, Rfl: 1    Probiotic Product (PRO-BIOTIC BLEND PO), Take by mouth, Disp: , Rfl:     rimegepant sulfate (NURTEC) 75 mg TBDP, Take 1 tablet (75 mg) by mouth once at the onset of a headache. Max dose: 75 mg/day., Disp: 16 tablet, Rfl: 3    sodium chloride 1 g tablet, Take 1 tablet (1 g total) by mouth 3 (three) times a day, Disp: 270 tablet, Rfl: 3    tirzepatide (Zepbound) 10 mg/0.5 mL auto-injector, Inject 0.5 mL (10 mg total) under the skin once a week, Disp: 2 mL, Rfl: 3    tiZANidine (ZANAFLEX) 2 mg tablet, Take 1-2 tablets as needed at bedtime (max 4mg/day) for muscle spasm, Disp: 60 tablet, Rfl: 1    traZODone (DESYREL) 50 mg tablet, Take 1.5 tablets (75 mg total) by mouth daily at bedtime, Disp: 135 tablet, Rfl: 0    ZyrTEC Allergy 10 MG tablet, Take 10 mg by mouth daily (Patient not taking: Reported on 4/16/2025), Disp: , Rfl:     Current Facility-Administered Medications:     Botulinum Toxin Type A SOLR 200 Units, 200 Units, Injection, See Admin Instructions, , 200 Units at 04/16/25 1057

## 2025-07-17 NOTE — TELEPHONE ENCOUNTER
Per SANDY Hurd via EPIC chat, ok to schedule with [facility] for cast change due to getting cast wet. Called and spoke to pt and advised. Scheduled with TIMUR Burt at [facility].     Kartik this message is from SANDY Hurd:  I believe she's close to 5 weeks post op, sutures were removed at 3 week appt so incision is healed. Whoever sees her can just replace with cast in neutral

## 2025-07-17 NOTE — TELEPHONE ENCOUNTER
EPIC chat sent to Dr Lachman and SANDY Hurd PA-C to see if ok to schedule pt with [facility] today for cast change.

## 2025-07-17 NOTE — PATIENT INSTRUCTIONS
"Patient Education     How to care for your cast   The Basics   Written by the doctors and editors at [facility]   Why do I have a cast? -- Your doctor gave you a cast to treat your broken bone. (A broken bone is also called a \"fracture.\") The cast will reduce your pain and protect your bone as it heals.  Casts are made of hard material that goes over a soft liner and padding. You will keep the cast on until a doctor removes it.  It's important to take care of a cast so that the skin underneath doesn't get hurt or infected.  Can I get my cast wet? -- It depends on what your cast is made of. Casts can be:   Plaster - This is a smooth, white material. Plaster casts should never get wet.   Fiberglass - This material is rougher on the outside. Sometimes it comes in different colors. Fiberglass is waterproof. Casts made of fiberglass might be able to get wet, if the padding underneath is also waterproof.  Your doctor will tell you if your cast and padding are waterproof. Otherwise, you should be careful not to get your cast wet.  If you need to keep your cast dry when you bathe, you can:   Cover it with 2 plastic bags, and tape each bag (separately) to your skin with duct tape (figure 1).   Keep your cast outside of the tub or shower when you wash your body.   For young children, use a rubber band at the top of each plastic bag instead of tape. For them, removing the tape from the skin can hurt too much.  Some people buy a waterproof cast cover to use when bathing. If you use a waterproof cast cover, it's still a good idea to keep your cast outside the tub or shower. These covers are not completely waterproof.  If your cast is not waterproof but gets wet, you can dry it with a hair dryer on the cool setting. Do not use a warm or hot setting, because those settings can burn the skin. You can also use a vacuum  that has a hose to help dry your cast. Put the hose next to your cast so that you suck wet air out of the " cast.  What are other ways I can take care of my cast? -- To take care of your cast, you can:   Keep your cast clean, and avoid getting dirt or sand inside it.   Do not put anything inside your cast.   Do not put powder or lotion on the skin near your cast.   Do not pull the lining out from inside the cast.   Cover your cast when you eat, so it doesn't get dirty.  What if I have pain under my cast during the first few days? -- If you have pain during the first few days, you can:   Put ice on the cast - Use a cold gel pack, bag of ice, or bag of frozen vegetables every 1 to 2 hours, for 15 minutes each time. Do not put the ice (or other cold object) directly on your skin.   Keep your cast raised (for example, on pillows) to help reduce swelling - To reduce swelling and pain, your cast needs to be raised above the level of your heart.   Take medicine to relieve your pain - If your doctor prescribed pain-relieving medicine, you can take that. You can also ask your doctor or nurse about taking over-the-counter medicines, such as acetaminophen (sample brand name: Tylenol) or ibuprofen (sample brand names: Advil, Motrin).  What if the skin under my cast itches? -- If your skin itches, you can use a hair dryer on the cool setting to blow air inside the cast. Do not put anything in your cast to scratch the skin.  When should I call the doctor? -- Call your doctor or nurse right away if:   You have severe pain or pain that is getting worse.   You have sores or cuts on the skin under the cast.   Your cast smells bad, feels too tight, or cracks.   You are unable to move your fingers or toes.   Your fingers or toes are blue, gray, or cold.   Your cast is not waterproof and becomes soaking wet.  All topics are updated as new evidence becomes available and our peer review process is complete.  This topic retrieved from [employer] on: Feb 28, 2024.  Topic 18316 Version 20.0  Release: 32.2.4 - C32.58  © 2024 UpToDate, Inc. and/or its  affiliates. All rights reserved.  figure 1: Using plastic bags to keep a cast dry     To keep a cast (or splint) dry, cover it with 2 plastic bags, and tape each bag (separately) to the skin with duct tape. In young children, you can use rubber bands instead of tape. This is because removing the tape from the skin can be painful for children.  Graphic 19268 Version 5.0  Consumer Information Use and Disclaimer   Disclaimer: This generalized information is a limited summary of diagnosis, treatment, and/or medication information. It is not meant to be comprehensive and should be used as a tool to help the user understand and/or assess potential diagnostic and treatment options. It does NOT include all information about conditions, treatments, medications, side effects, or risks that may apply to a specific patient. It is not intended to be medical advice or a substitute for the medical advice, diagnosis, or treatment of a health care provider based on the health care provider's examination and assessment of a patient's specific and unique circumstances. Patients must speak with a health care provider for complete information about their health, medical questions, and treatment options, including any risks or benefits regarding use of medications. This information does not endorse any treatments or medications as safe, effective, or approved for treating a specific patient. UpToDate, Inc. and its affiliates disclaim any warranty or liability relating to this information or the use thereof.The use of this information is governed by the Terms of Use, available at https://www.woltersAffibodyuwer.com/en/know/clinical-effectiveness-terms. 2024© UpToDate, Inc. and its affiliates and/or licensors. All rights reserved.  Copyright   © 2024 UpToDate, Inc. and/or its affiliates. All rights reserved.

## 2025-07-17 NOTE — TELEPHONE ENCOUNTER
Caller: Gracia    Doctor: Dr. Lachman    Reason for call: Patient accidentally got her cast wet this morning. She tried to dry it with her hair dryer, but it did not work and the cast soaked on the inside. She has been putting paper towels down the cast to absorb the water. Her cast is do to come off next week.  Please advise the patient  Thank you  Call back#: [phone]

## 2025-07-18 ENCOUNTER — PROCEDURE VISIT (OUTPATIENT)
Dept: NEUROLOGY | Facility: CLINIC | Age: 50
End: 2025-07-18
Payer: COMMERCIAL

## 2025-07-18 VITALS
OXYGEN SATURATION: 99 % | TEMPERATURE: 98.4 F | SYSTOLIC BLOOD PRESSURE: 118 MMHG | DIASTOLIC BLOOD PRESSURE: 74 MMHG | HEART RATE: 91 BPM

## 2025-07-18 DIAGNOSIS — G43.709 CHRONIC MIGRAINE WITHOUT AURA WITHOUT STATUS MIGRAINOSUS, NOT INTRACTABLE: Primary | ICD-10-CM

## 2025-07-18 PROCEDURE — 64615 CHEMODENERV MUSC MIGRAINE: CPT

## 2025-07-18 NOTE — PATIENT INSTRUCTIONS
Botox Therapy  Important Information    Our goal is to make sure you fully understand how Botox Therapy treatment may benefit you and to help you understand how you can play an active role in your treatments and ongoing care. Please review the following information below.    Call our office IMMEDIATELY @ [phone] and speak to one of our Botox Coordinators if you have a change in insurance. (a prior authorization is required and accurate information is vital)  Please call at least 24 hours in advance if you can't make your appointment.  Appointments are scheduled every 91 days (this will be scheduled in advance before leaving the office)  You must allow for at least 2-3 treatments to determine if Botox is right for you. It may take a few weeks to see a response from treatment.  Those little bumps you've got on your face will go away in 20-30 minutes (do not massage them)  No laying flat for 4 hours  No heavy exercise for 4 hours  No tight fitting hats or headbands for 4 hours  No hair dye or scalp/face massage or treatment within 24 hours of treatment  You can make normal facial expressions  You may shower and/or wash your hair normally   Botox will take effect in between 7-10 days  We encourage you to use a headache diary or journal to document your headache frequency and severity. You can also utilize the Migraine Luis carlos (downloadable on most smart phones)  Sign up for the Botox Savings Program (commercial insurance patients may qualify). Sign up at botoxYoka or call [phone] Option: 4  To get more information on Botox therapy for Chronic Migraines and see frequently asked questions, please visit [email or web address]  If you have any questions or concerns, please call the office at [phone]    We look forward to servicing you!

## 2025-07-18 NOTE — PROGRESS NOTES
Universal Protocol   procedure performed by consultantConsent: Verbal consent obtained. Written consent obtained  Risks and benefits: risks, benefits and alternatives were discussed  Consent given by: patient  Patient understanding: patient states understanding of the procedure being performed  Patient consent: the patient's understanding of the procedure matches consent given  Procedure consent: procedure consent matches procedure scheduled  Relevant documents: relevant documents present and verified  Patient identity confirmed: verbally with patient      Chemodenervation     Date/Time  7/18/2025 2:00 PM     Performed by  BARB Baez   Authorized by  BARB Baez     Pre-procedure details      Prepped With: Alcohol     Anesthesia  (see MAR for exact dosages):     Anesthesia method:  None   Procedure details      Position:  Upright   Botox      Botox Type:  Type A    Brand:  Botox    mL's of Botulinum Toxin:  200    mL's of preservative free sterile saline:  4    Final Concentration per CC:  50 units    Needle Gauge:  30 G 2.5 inch   Procedures      Botox Procedures: chronic headache      Indications: migraines      Date of last exam:  2/7/2025    Date of last injection:  4/16/2025   Injection Location      Head / Face:  L inferior cervical paraspinal, R superior cervical paraspinal, R inferior cervical paraspinal, L , L superior cervical paraspinal, R , R frontalis, L frontalis, R inferior occipitalis, L lateral occipitalis, L inferior occipitalis, R medial occipitalis, L medial occipitalis, R lateral occipitalis, procerus, R temporalis, L temporalis, R superior trapezius, L superior trapezius, L orbicularis oculi and R orbicularis oculi    L  injection amount:  5 unit(s)    R  injection amount:  5 unit(s)    L lateral frontalis:  5 unit(s)    R lateral frontalis:  5 unit(s)    L medial frontalis:  5 unit(s)    R medial frontalis:  5 unit(s)     L temporalis injection amount:  30 unit(s)    R temporalis injection amount:  30 unit(s)    Comments:  Extra 10 units posterior temporalis bilaterally    Procerus injection amount:  5 unit(s)    L orbicularis oculi injection amount:  5 unit(s)    R orbicularis oculi injection amount:  5 unit(s)    Comments:  2.5 units x 2 bilaterally    L inferior occipitalis injection amount:  5 unit(s)    R inferior occipitalis injection amount:  5 unit(s)    L lateral occipitalis injection amount:  5 unit(s)    R lateral occipitalis injection amount:  5 unit(s)    L medial occipitalis injection amount:  12.5 unit(s)    R medial occipitalis injection amount:  12.5 unit(s)    L inferior cervical paraspinal injection amount:  5 unit(s)    R inferior cervical paraspinal injection amount:  5 unit(s)    L superior cervical paraspinal injection amount:  5 unit(s)    R superior cervical paraspinal injection amount:  5 unit(s)    L superior trapezius injection amount:  15 unit(s)    R superior trapezius injection amount:  15 unit(s)   Total Units      Total units used:  200    Total units discarded:  0   Post-procedure details      Chemodenervation:  Chronic migraine    Facial Nerve Location::  Bilateral facial nerve    Patient tolerance of procedure:  Tolerated well, no immediate complications   Comments       155 units administered following the PREEMPT Protocol, with additional units as medically necessary. Total of 200 units used, 0 units wasted.

## 2025-07-23 ENCOUNTER — APPOINTMENT (OUTPATIENT)
Dept: RADIOLOGY | Facility: AMBULARY SURGERY CENTER | Age: 50
End: 2025-07-23
Attending: ORTHOPAEDIC SURGERY
Payer: COMMERCIAL

## 2025-07-23 ENCOUNTER — OFFICE VISIT (OUTPATIENT)
Dept: OBGYN CLINIC | Facility: CLINIC | Age: 50
End: 2025-07-23

## 2025-07-23 VITALS — HEIGHT: 65 IN | BODY MASS INDEX: 25.83 KG/M2 | WEIGHT: 155 LBS

## 2025-07-23 DIAGNOSIS — F32.A DEPRESSION, UNSPECIFIED DEPRESSION TYPE: ICD-10-CM

## 2025-07-23 DIAGNOSIS — Z01.89 ENCOUNTER FOR LOWER EXTREMITY COMPARISON IMAGING STUDY: ICD-10-CM

## 2025-07-23 DIAGNOSIS — F41.9 ANXIETY: ICD-10-CM

## 2025-07-23 DIAGNOSIS — L29.9 ITCHY SKIN: ICD-10-CM

## 2025-07-23 DIAGNOSIS — S93.324A LISFRANC DISLOCATION, RIGHT, INITIAL ENCOUNTER: ICD-10-CM

## 2025-07-23 DIAGNOSIS — S93.324A LISFRANC DISLOCATION, RIGHT, INITIAL ENCOUNTER: Primary | ICD-10-CM

## 2025-07-23 PROCEDURE — 73620 X-RAY EXAM OF FOOT: CPT

## 2025-07-23 PROCEDURE — 99024 POSTOP FOLLOW-UP VISIT: CPT | Performed by: ORTHOPAEDIC SURGERY

## 2025-07-23 PROCEDURE — 73630 X-RAY EXAM OF FOOT: CPT

## 2025-07-23 NOTE — PROGRESS NOTES
James R Lachman, M.D.  Attending, Orthopaedic Surgery  Foot and Ankle  [facility]      ORTHOPAEDIC FOOT AND ANKLE POST-OP VISIT     Procedure:     Right Lisfranc arthrodesis       Date of surgery:   6/12/25      PLAN  1. Weightbearing Status- PWB operative extremity  2. DVT prophylaxis- Completed  3. Continue to elevate 23hrs/day getting up 1x per hour to prevent a blood clot  4. Pain control- OTC pain medication  5. RTC in 6 weeks  6. Xrays needed next visit - yes Weightbearing Foot    History of Present Illness:   Chief Complaint:   Chief Complaint   Patient presents with    Right Foot - Pain     Gracia Chirinos is a 49 y.o. female who is being seen for post-operative visit for the above procedure. Pain is well controlled and the patient has successfully transitioned to OTC pain medicines.  she is taking ASA 81mg BID for DVT prophylaxis. Patient has been NWB in a short leg cast.      Review of Systems:  General- denies fever/chills  Respiratory- denies cough or SOB  Cardio- denies chest pain or palpitations  GI- denies abdominal pain  Musculoskeletal- Negative except noted above  Skin- denies rashes or wounds    Physical Exam:   [facility] 07/02/2025   General/Constitutional: No apparent distress: well-nourished and well developed.  Eyes: normal ocular motion  Lymphatic: No appreciable lymphadenopathy  Respiratory: Non-labored breathing  Vascular: No edema, swelling or tenderness, except as noted in detailed exam.  Integumentary: No impressive skin lesions present, except as noted in detailed exam.  Neuro: No ataxia or tremors noted  Psych: Normal mood and affect, oriented to person, place and time. Appropriate affect.  Musculoskeletal: Normal, except as noted in detailed exam and in HPI.    Examination    right        Incision Clean, dry, intact  Sutures Previously removed.    Ecchymosis none    Swelling Mild    Sensation Intact to light touch throughout sural, saphenous, superficial peroneal,  deep peroneal and medial/lateral plantar nerve distributions.  [place]-Eric 5.07 filament (10g) testing deferred.    Cardiovascular Brisk capillary refill < 2 seconds,intact DP and PT pulses    Special Tests None      Imaging Studies:   3 views of the right foot were taken, reviewed and interpreted independently that demonstrate stable alignment and position of orthopedic hardware without signs of loosening or failure. Reviewed by me personally.    James R. Lachman, MD  Foot & Ankle Surgery   Department of Orthopaedic Surgery  [facility]      I personally performed the service.    James R. Lachman, MD    Scribe Attestation      I,:  Alex Westbrook am acting as a scribe while in the presence of the attending physician.:       I,:  James R Lachman, MD personally performed the services described in this documentation    as scribed in my presence.:

## 2025-07-23 NOTE — TELEPHONE ENCOUNTER
Patient called to request a refill for their  -ALPRAZolam (XANAX) 0.25 mg tablet, take 1 tablet (0.25 mg total) by mouth daily as needed for anxiety, #30 with 0 refills.    -escitalopram (LEXAPRO) 20 mg tablet and hydrOXYzine HCL (ATARAX) 25 mg tablet were requested today and are pending approval. Patient verbalized understanding and is in agreement.

## 2025-07-23 NOTE — PATIENT INSTRUCTIONS
4 days of partial weight bearing 50%  Then, 4 days of partial weight bearing 75%  Then, 4 days of partial weight bearing 90%  Then 8/4, full weight bearing in the boot and wean your crutches/rolling walker.    Then 2 weeks of weightbearing as tolerated in the CAM boot.    You may begin weaning your boot and transitioning to a sneaker (8/18). It is important to do this gradually to avoid aggravating the healing process.    8/18, you may come out of the boot into a sneaker for 2 hours.  2. 8/19, you may come out of the boot into a sneaker for 4 hours,  3. The next day, you may come out of the boot into a sneaker for 6 hours.  4. Continue this (adding 2 hours per day) as you tolerate. For example, if you do 6 hours out of the boot into a sneaker and your foot swells more than usual at night and it is difficult to control the discomfort, do not advance to 8 hours the next day, stay at 6 hours until you are able to tolerate it.    It is essential to follow this protocol and not modify this.  No matter when you begin weaning the boot, it will be difficult and there will be swelling and soreness.  If you spend more time than is recommended in the boot, this process becomes longer and more painful.    Elevation, Ice and tylenol and staying off of it at night will be important to aide in this transition out of the boot. Swelling and soreness are normal as you begin to do more with the injured leg.    May DC aspirin/lovenox, no longer needed.  May shower, do not soak in a tub/pool/ocean/etc for another 1 weeks.  Begin PT  Scar massage- pea sized amount of lotion, massage into scar for 5 minutes each day.  Compression stocking (Knee high, 20-30mm Hg) to be worn at all times while awake.  Recommend taking the following supplements: Vitamin D3- 4000 units per day and Calcium 1200 mg per day. This will help with bone healing.

## 2025-07-25 ENCOUNTER — OFFICE VISIT (OUTPATIENT)
Dept: PHYSICAL THERAPY | Facility: CLINIC | Age: 50
End: 2025-07-25
Payer: COMMERCIAL

## 2025-07-25 ENCOUNTER — TELEPHONE (OUTPATIENT)
Age: 50
End: 2025-07-25

## 2025-07-25 DIAGNOSIS — S93.324A LISFRANC DISLOCATION, RIGHT, INITIAL ENCOUNTER: ICD-10-CM

## 2025-07-25 PROCEDURE — 97162 PT EVAL MOD COMPLEX 30 MIN: CPT | Performed by: PHYSICAL THERAPIST

## 2025-07-25 PROCEDURE — 97140 MANUAL THERAPY 1/> REGIONS: CPT | Performed by: PHYSICAL THERAPIST

## 2025-07-25 RX ORDER — ESCITALOPRAM OXALATE 20 MG/1
20 TABLET ORAL DAILY
Qty: 30 TABLET | Refills: 0 | Status: CANCELLED | OUTPATIENT
Start: 2025-07-25

## 2025-07-25 RX ORDER — ALPRAZOLAM 0.25 MG
0.25 TABLET ORAL DAILY PRN
Qty: 30 TABLET | Refills: 0 | Status: SHIPPED | OUTPATIENT
Start: 2025-07-25

## 2025-07-25 RX ORDER — HYDROXYZINE HYDROCHLORIDE 25 MG/1
25 TABLET, FILM COATED ORAL DAILY
Qty: 30 TABLET | Refills: 0 | Status: CANCELLED | OUTPATIENT
Start: 2025-07-25

## 2025-07-25 RX ORDER — HYDROXYZINE HYDROCHLORIDE 25 MG/1
25 TABLET, FILM COATED ORAL DAILY
Qty: 30 TABLET | Refills: 0 | Status: SHIPPED | OUTPATIENT
Start: 2025-07-25

## 2025-07-25 RX ORDER — ESCITALOPRAM OXALATE 20 MG/1
20 TABLET ORAL DAILY
Qty: 30 TABLET | Refills: 0 | Status: SHIPPED | OUTPATIENT
Start: 2025-07-25

## 2025-07-25 NOTE — TELEPHONE ENCOUNTER
Last visit 1/3/25 and I did ms pt to call and schedule a visit. I sent a 30 day supply to provider fro approval

## 2025-07-25 NOTE — PROGRESS NOTES
PT EVALUATION    Today's date: 25  Patient name: Gracia Chirinos  : 1975  MRN: [id number]  Referring provider: Aleshia Hurd P*  Dx:   1. Lisfranc dislocation, right, initial encounter        ASSESSMENT:    Gracia Chirinos is a 49 y.o. female who presents 6 week s/p Right Lisfranc arthrodesis with Dr. Lachman on 25. Injury originally occurred in May following a syncopal episode due to orthostatic hypotension where she fell to the ground with her right foot underneath her. Patient evaluated by Orthopedics which found ligament tear and proceeded with surgery. Patient presents this date PWB in CAM with bilateral axillary crutches. Patient notes that pain is well controlled. Incision is intact and healing with no signs of infection. Patient is demonstrating normal post-operative healing and corresponding measurements.  Overall, patient presents with pain, decreased strength, decreased ROM, decreased joint mobility, and ambulatory dysfunction. Due to these impairments, Patient has difficulty performing a/iadls, recreational activities, work-related activities, and engaging in social activities. Patient would benefit from skilled physical therapy to address the impairments, improve their level of function, and to improve their overall quality of life.          Impairments:    restricted ROM    decreased strength   pain with function   activity intolerance   weight bearing intolerance   abnormal gait     Prognosis:  Good  Positive and negative prognostic indicator(s):  multiple comorbidities    Goals:    Short Term Goals: to be achieved by 4 weeks  1) Patient to be independent with basic HEP.  2) Decrease pain to 3/10 at its worst.  3) Increase ankle ROM by 5-10 degrees   4) Increase LE strength by 1/2 MMT grade in all deficient planes.    Long Term Goals: to be achieved by discharge  1) FOTO equal to or greater than indicating improvements with overall function.  2) Ambulation to improve to maximal  level of function  3) Stair negotiation will improve to reciprocal.  4) Sit to stand transfers will improve to maximal level of function       Planned interventions:  home exercise program, patient education, manual therapy, graded activity, flexibility, functional range of motion exercises, strengthening, abdominal trunk stabilization, balance and weight bearing training, gait training, and modalities prn    Duration in visits:  8-12  Frequency: 2 visits per week  Duration in weeks:  6-8    History of Current Injury: Patient notes that pain is well controlled. Patient notes that that. Patient notes that she will get a occasionally cramps in the foot or a shooting pain     Pain location: medial arch, dorsa foot   Pain descriptors: ache to sharp pain  Pain at worst: 4.5/10 on average at worst; 8/10 (sharp pain that is transient)   Pain at best: 1-2/10 (more of a discomfort     Aggravating factors: movement at this point   Easing factors:     Imaging: see imaging tab   Special Questions: incisional numbness.       Occupation: esthescian;   Patient goals: Patient reports goals for skilled PT would be to get back to normal and be able to drive on September 6th.     Objective     Observations     Right Ankle/Foot   Positive for effusion and incision.     Additional Observation Details  Dorsal incision intact and healing. No signs of infection.   Mild swelling dorsally    Tenderness     Right Ankle/Foot   Tenderness in the dorsum foot, mid-plantar aspect and tarsals.     Neurological Testing     Sensation     Ankle/Foot     Right Ankle/Foot   Diminished: light touch    Additional Neurological Details  Residual numbness around incision.     Active Range of Motion     Right Ankle/Foot   Dorsiflexion (ke): 3 degrees   Plantar flexion: 40 degrees   Inversion: 20 degrees   Eversion: 5 degrees   Great toe extension: 5 degrees     Joint Play     Right Ankle/Foot  Joints within functional limits are the subtalar joint.  Hypomobile in the talocrural joint, midfoot and forefoot.     Strength/Myotome Testing     Right Ankle/Foot   Dorsiflexion: 3+  Plantar flexion: 3+  Inversion: 3+  Eversion: 3+    Ambulation   Weight-Bearing Status   Weight-Bearing Status (Right): partial weight-bearing    Assistive device used: crutches    Ambulation: Level Surfaces   Ambulation with assistive device: independent          Precautions: vestibular artery dissection, recent syncopal events, anxiety/depression, chronic pain, history of cancer, headache/migraine      WEIGHT BEARING PROTOCOL  4 days of partial weight bearing 50% (7/23-7/27)  Then, 4 days of partial weight bearing 75% (7/28-7/31)  Then, 4 days of partial weight bearing 90% (8/1-8/4)  Then 8/4, full weight bearing in the boot and wean your crutches/rolling walker.     Then 2 weeks of weightbearing as tolerated in the CAM boot.     You may begin weaning your boot and transitioning to a sneaker (8/18). It is important to do this gradually to avoid aggravating the healing process.     8/18, you may come out of the boot into a sneaker for 2 hours.  2. 8/19, you may come out of the boot into a sneaker for 4 hours,  3. The next day, you may come out of the boot into a sneaker for 6 hours.  4. Continue this (adding 2 hours per day) as you tolerate. For example, if you do 6 hours out of the boot into a sneaker and your foot swells more than usual at night and it is difficult to control the discomfort, do not advance to 8 hours the next day, stay at 6 hours until you are able to tolerate it.     It is essential to follow this protocol and not modify this.  No matter when you begin weaning the boot, it will be difficult and there will be swelling and soreness.  If you spend more time than is recommended in the boot, this process becomes longer and more painful.     Elevation, Ice and tylenol and staying off of it at night will be important to aide in this transition out of the boot. Swelling and  soreness are normal as you begin to do more with the injured leg.     May DC aspirin/lovenox, no longer needed.  May shower, do not soak in a tub/pool/ocean/etc for another 1 weeks.  Begin PT  Scar massage- pea sized amount of lotion, massage into scar for 5 minutes each day.  Compression stocking (Knee high, 20-30mm Hg) to be worn at all times while awake.  Recommend taking the following supplements: Vitamin D3- 4000 units per day and Calcium 1200 mg per day. This will help with bone healing.           Manuals             Manual ankle stretching  NV    All planes             TCJ mobs              Medial arch STM>>>IASTM  NV                         Neuro Re-Ed             Short foot              Towel scrunches  NV            Wobble board ROM  NV            Toe yoga (GT ext digit flex and vice versa)  NV            Weight shifts in boot  NV (biodex maybe ?)                                      Ther Ex             Ankle alphabet              Seated heel slides  NV            Supine clamshells with TB              Sidelying hip abduction with boot NV            Prone hip extension in boot  NV            Towel Gastroc and soleus stretch NV                                      Ther Activity                                       Gait Training             Pre-gait in boot (at a later time)                           Modalities

## 2025-07-25 NOTE — TELEPHONE ENCOUNTER
Patient calling to schedule annual wellness visit with Primary Care Provider and to check on status of three prescription refills she had requested. Informed patient that the three prescriptions she had requested: ALPRAZolam (XANAX) 0.25 mg tablet, hydrOXYzine HCL (ATARAX) 25 mg tablet, and escitalopram (LEXAPRO) 20 mg tablet were confirmed by [facility] around 8:30 AM this morning, 7/25/25. Patient requested her annual appointment be in September.    Appointment scheduled with provider.    Reason: annual wellness visit     Symptoms: N/A    Provider: Dr. Ricardo David    Date/Time: Friday 9/5/25 2:00 PM

## 2025-07-28 ENCOUNTER — TELEPHONE (OUTPATIENT)
Age: 50
End: 2025-07-28

## 2025-07-30 ENCOUNTER — OFFICE VISIT (OUTPATIENT)
Dept: PHYSICAL THERAPY | Facility: CLINIC | Age: 50
End: 2025-07-30
Payer: COMMERCIAL

## 2025-07-30 ENCOUNTER — HOSPITAL ENCOUNTER (OUTPATIENT)
Dept: NON INVASIVE DIAGNOSTICS | Facility: CLINIC | Age: 50
Discharge: HOME | End: 2025-07-30
Attending: INTERNAL MEDICINE
Payer: COMMERCIAL

## 2025-07-30 VITALS
BODY MASS INDEX: 25.83 KG/M2 | HEIGHT: 65 IN | HEART RATE: 91 BPM | SYSTOLIC BLOOD PRESSURE: 118 MMHG | WEIGHT: 155 LBS | DIASTOLIC BLOOD PRESSURE: 74 MMHG

## 2025-07-30 DIAGNOSIS — G47.33 OSA (OBSTRUCTIVE SLEEP APNEA): ICD-10-CM

## 2025-07-30 DIAGNOSIS — I95.9 HYPOTENSION, UNSPECIFIED HYPOTENSION TYPE: ICD-10-CM

## 2025-07-30 DIAGNOSIS — R42 DIZZINESS: ICD-10-CM

## 2025-07-30 DIAGNOSIS — S93.324A LISFRANC DISLOCATION, RIGHT, INITIAL ENCOUNTER: ICD-10-CM

## 2025-07-30 DIAGNOSIS — S93.324D LISFRANC DISLOCATION, RIGHT, SUBSEQUENT ENCOUNTER: Primary | ICD-10-CM

## 2025-07-30 LAB
AORTIC ROOT: 2.4 CM
ASCENDING AORTA: 2.6 CM
BSA FOR ECHO PROCEDURE: 1.78 M2
E WAVE DECELERATION TIME: 145 MS
E/A RATIO: 0.8
FRACTIONAL SHORTENING: 40 (ref 28–44)
INTERVENTRICULAR SEPTUM IN DIASTOLE (PARASTERNAL SHORT AXIS VIEW): 0.9 CM
INTERVENTRICULAR SEPTUM: 0.9 CM (ref 0.6–1.1)
LAAS-AP2: 13.7 CM2
LAAS-AP4: 14.4 CM2
LEFT ATRIUM SIZE: 3.5 CM
LEFT ATRIUM VOLUME (MOD BIPLANE): 34 ML
LEFT ATRIUM VOLUME INDEX (MOD BIPLANE): 19.1 ML/M2
LEFT INTERNAL DIMENSION IN SYSTOLE: 2.8 CM (ref 2.1–4)
LEFT VENTRICULAR INTERNAL DIMENSION IN DIASTOLE: 4.7 CM (ref 3.5–6)
LEFT VENTRICULAR POSTERIOR WALL IN END DIASTOLE: 0.7 CM
LEFT VENTRICULAR STROKE VOLUME: 71 ML
LV EF US.2D.A4C+ESTIMATED: 54 %
LVSV (TEICH): 71 ML
MV E'TISSUE VEL-LAT: 12 CM/S
MV E'TISSUE VEL-SEP: 11 CM/S
MV PEAK A VEL: 0.83 M/S
MV PEAK E VEL: 66 CM/S
MV STENOSIS PRESSURE HALF TIME: 42 MS
MV VALVE AREA P 1/2 METHOD: 5.2
RIGHT ATRIUM AREA SYSTOLE A4C: 13.3 CM2
RIGHT VENTRICLE ID DIMENSION: 3.4 CM
SL CV LEFT ATRIUM LENGTH A2C: 4.5 CM
SL CV LV EF: 60
SL CV PED ECHO LEFT VENTRICLE DIASTOLIC VOLUME (MOD BIPLANE) 2D: 100 ML
SL CV PED ECHO LEFT VENTRICLE SYSTOLIC VOLUME (MOD BIPLANE) 2D: 29 ML
TR MAX PG: 9 MMHG
TR PEAK VELOCITY: 1.5 M/S
TRICUSPID ANNULAR PLANE SYSTOLIC EXCURSION: 1.9 CM
TRICUSPID VALVE PEAK REGURGITATION VELOCITY: 1.49 M/S

## 2025-07-30 PROCEDURE — 93306 TTE W/DOPPLER COMPLETE: CPT | Performed by: INTERNAL MEDICINE

## 2025-07-30 PROCEDURE — 97110 THERAPEUTIC EXERCISES: CPT

## 2025-07-30 PROCEDURE — 97140 MANUAL THERAPY 1/> REGIONS: CPT

## 2025-07-30 PROCEDURE — 97112 NEUROMUSCULAR REEDUCATION: CPT

## 2025-07-30 PROCEDURE — 93306 TTE W/DOPPLER COMPLETE: CPT

## 2025-08-01 ENCOUNTER — OFFICE VISIT (OUTPATIENT)
Dept: PHYSICAL THERAPY | Facility: CLINIC | Age: 50
End: 2025-08-01
Payer: COMMERCIAL

## 2025-08-01 DIAGNOSIS — S93.324A LISFRANC DISLOCATION, RIGHT, INITIAL ENCOUNTER: ICD-10-CM

## 2025-08-01 DIAGNOSIS — S93.324D LISFRANC DISLOCATION, RIGHT, SUBSEQUENT ENCOUNTER: Primary | ICD-10-CM

## 2025-08-01 PROCEDURE — 97112 NEUROMUSCULAR REEDUCATION: CPT | Performed by: PHYSICAL THERAPIST

## 2025-08-01 PROCEDURE — 97140 MANUAL THERAPY 1/> REGIONS: CPT | Performed by: PHYSICAL THERAPIST

## 2025-08-01 PROCEDURE — 97110 THERAPEUTIC EXERCISES: CPT | Performed by: PHYSICAL THERAPIST

## 2025-08-06 ENCOUNTER — APPOINTMENT (OUTPATIENT)
Dept: LAB | Facility: CLINIC | Age: 50
End: 2025-08-06
Attending: PREVENTIVE MEDICINE
Payer: COMMERCIAL

## 2025-08-06 ENCOUNTER — OFFICE VISIT (OUTPATIENT)
Dept: SLEEP CENTER | Facility: CLINIC | Age: 50
End: 2025-08-06
Payer: COMMERCIAL

## 2025-08-06 ENCOUNTER — OFFICE VISIT (OUTPATIENT)
Dept: PHYSICAL THERAPY | Facility: CLINIC | Age: 50
End: 2025-08-06
Payer: COMMERCIAL

## 2025-08-06 ENCOUNTER — APPOINTMENT (OUTPATIENT)
Dept: LAB | Facility: CLINIC | Age: 50
End: 2025-08-06
Payer: COMMERCIAL

## 2025-08-06 ENCOUNTER — RESULTS FOLLOW-UP (OUTPATIENT)
Dept: CARDIOLOGY CLINIC | Facility: CLINIC | Age: 50
End: 2025-08-06

## 2025-08-06 VITALS
BODY MASS INDEX: 25.89 KG/M2 | DIASTOLIC BLOOD PRESSURE: 66 MMHG | WEIGHT: 155.4 LBS | SYSTOLIC BLOOD PRESSURE: 110 MMHG | HEIGHT: 65 IN

## 2025-08-06 DIAGNOSIS — G25.81 RESTLESS LEGS SYNDROME (RLS): ICD-10-CM

## 2025-08-06 DIAGNOSIS — S93.324A LISFRANC DISLOCATION, RIGHT, INITIAL ENCOUNTER: ICD-10-CM

## 2025-08-06 DIAGNOSIS — G47.33 OSA (OBSTRUCTIVE SLEEP APNEA): Primary | ICD-10-CM

## 2025-08-06 DIAGNOSIS — F51.04 CHRONIC INSOMNIA: ICD-10-CM

## 2025-08-06 DIAGNOSIS — S93.324D LISFRANC DISLOCATION, RIGHT, SUBSEQUENT ENCOUNTER: Primary | ICD-10-CM

## 2025-08-06 LAB
CV ZIO BASELINE AVG BPM: 85 BPM
CV ZIO BASELINE BPM HIGH: 203 BPM
CV ZIO BASELINE BPM LOW: 63 BPM
CV ZIO DEVICE ANALYSIS TIME: NORMAL
CV ZIO ECT SVE COUNT: 220 EPISODES
CV ZIO ECT SVE CPLT COUNT: 6 EPISODES
CV ZIO ECT SVE CPLT FREQ: NORMAL
CV ZIO ECT SVE FREQ: NORMAL
CV ZIO ECT SVE TPLT COUNT: 0 EPISODES
CV ZIO ECT SVE TPLT FREQ: 0
CV ZIO ECT VE COUNT: 103 EPISODES
CV ZIO ECT VE CPLT COUNT: 3 EPISODES
CV ZIO ECT VE CPLT FREQ: NORMAL
CV ZIO ECT VE FREQ: NORMAL
CV ZIO ECT VE TPLT COUNT: 0 EPISODES
CV ZIO ECT VE TPLT FREQ: 0
CV ZIO ECTOPIC SVE COUPLET RAW PERCENT: 0 %
CV ZIO ECTOPIC SVE ISOLATED PERCENT: 0.01 %
CV ZIO ECTOPIC SVE TRIPLET RAW PERCENT: 0 %
CV ZIO ECTOPIC VE COUPLET RAW PERCENT: 0 %
CV ZIO ECTOPIC VE ISOLATED PERCENT: 0.01 %
CV ZIO ECTOPIC VE TRIPLET RAW PERCENT: 0 %
CV ZIO ENROLLMENT END: NORMAL
CV ZIO ENROLLMENT START: NORMAL
CV ZIO PATIENT EVENTS DIARIES: 11
CV ZIO PATIENT EVENTS TRIGGERS: 27
CV ZIO PAUSE COUNT: 0
CV ZIO PRESCRIPTION STATUS: NORMAL
CV ZIO SVT AVG BPM: 142 BPM
CV ZIO SVT BPM HIGH: 203 BPM
CV ZIO SVT BPM LOW: 85 BPM
CV ZIO SVT COUNT: 7
CV ZIO SVT F EPI AVG BPM: 162 BPM
CV ZIO SVT F EPI BEATS: 75 BEATS
CV ZIO SVT F EPI BPM HIGH: 203 BPM
CV ZIO SVT F EPI BPM LOW: 146 BPM
CV ZIO SVT F EPI DUR: 27.8 SEC
CV ZIO SVT F EPI END: NORMAL
CV ZIO SVT F EPI START: NORMAL
CV ZIO SVT L EPI AVG BPM: 158 BPM
CV ZIO SVT L EPI BEATS: 2487 BEATS
CV ZIO SVT L EPI BPM HIGH: 197 BPM
CV ZIO SVT L EPI BPM LOW: 125 BPM
CV ZIO SVT L EPI DUR: 965.9 SEC
CV ZIO SVT L EPI END: NORMAL
CV ZIO SVT L EPI START: NORMAL
CV ZIO SVT SYMPT IN PT: NORMAL
CV ZIO TOTAL  ENROLLMENT PERIOD: NORMAL
CV ZIO VT COUNT: 0

## 2025-08-06 PROCEDURE — 97140 MANUAL THERAPY 1/> REGIONS: CPT

## 2025-08-06 PROCEDURE — 97112 NEUROMUSCULAR REEDUCATION: CPT

## 2025-08-06 PROCEDURE — 99214 OFFICE O/P EST MOD 30 MIN: CPT | Performed by: STUDENT IN AN ORGANIZED HEALTH CARE EDUCATION/TRAINING PROGRAM

## 2025-08-06 PROCEDURE — 97110 THERAPEUTIC EXERCISES: CPT

## 2025-08-06 RX ORDER — TRAZODONE HYDROCHLORIDE 50 MG/1
75 TABLET ORAL
Qty: 135 TABLET | Refills: 0 | Status: SHIPPED | OUTPATIENT
Start: 2025-08-06 | End: 2025-11-04

## 2025-08-06 RX ORDER — PRAMIPEXOLE DIHYDROCHLORIDE 0.5 MG/1
0.5 TABLET ORAL 2 TIMES DAILY
Qty: 90 TABLET | Refills: 1 | Status: SHIPPED | OUTPATIENT
Start: 2025-08-06

## 2025-08-06 RX ORDER — GABAPENTIN 300 MG/1
600 CAPSULE ORAL 2 TIMES DAILY
Qty: 120 CAPSULE | Refills: 1 | Status: SHIPPED | OUTPATIENT
Start: 2025-08-06 | End: 2025-10-05

## 2025-08-07 ENCOUNTER — TELEPHONE (OUTPATIENT)
Dept: SLEEP CENTER | Facility: CLINIC | Age: 50
End: 2025-08-07

## 2025-08-08 ENCOUNTER — OFFICE VISIT (OUTPATIENT)
Dept: PHYSICAL THERAPY | Facility: CLINIC | Age: 50
End: 2025-08-08
Payer: COMMERCIAL

## 2025-08-08 DIAGNOSIS — S93.324D LISFRANC DISLOCATION, RIGHT, SUBSEQUENT ENCOUNTER: Primary | ICD-10-CM

## 2025-08-08 PROCEDURE — 97110 THERAPEUTIC EXERCISES: CPT | Performed by: PHYSICAL THERAPIST

## 2025-08-08 PROCEDURE — 97140 MANUAL THERAPY 1/> REGIONS: CPT | Performed by: PHYSICAL THERAPIST

## 2025-08-08 PROCEDURE — 97112 NEUROMUSCULAR REEDUCATION: CPT | Performed by: PHYSICAL THERAPIST

## 2025-08-15 ENCOUNTER — TELEPHONE (OUTPATIENT)
Dept: NEUROLOGY | Facility: CLINIC | Age: 50
End: 2025-08-15

## 2025-08-15 ENCOUNTER — HOSPITAL ENCOUNTER (OUTPATIENT)
Facility: AMBULARY SURGERY CENTER | Age: 50
Setting detail: OUTPATIENT SURGERY
Discharge: HOME | End: 2025-08-15
Attending: STUDENT IN AN ORGANIZED HEALTH CARE EDUCATION/TRAINING PROGRAM | Admitting: STUDENT IN AN ORGANIZED HEALTH CARE EDUCATION/TRAINING PROGRAM
Payer: COMMERCIAL

## 2025-08-15 ENCOUNTER — APPOINTMENT (OUTPATIENT)
Dept: RADIOLOGY | Facility: HOSPITAL | Age: 50
End: 2025-08-15
Payer: COMMERCIAL

## 2025-08-15 PROBLEM — M46.1 SACROILIITIS (HCC): Status: ACTIVE | Noted: 2025-08-15

## 2025-08-18 ENCOUNTER — OFFICE VISIT (OUTPATIENT)
Dept: PHYSICAL THERAPY | Facility: CLINIC | Age: 50
End: 2025-08-18
Payer: COMMERCIAL

## 2025-08-18 DIAGNOSIS — S93.324D LISFRANC DISLOCATION, RIGHT, SUBSEQUENT ENCOUNTER: Primary | ICD-10-CM

## 2025-08-18 PROCEDURE — 97112 NEUROMUSCULAR REEDUCATION: CPT

## 2025-08-18 PROCEDURE — 97140 MANUAL THERAPY 1/> REGIONS: CPT

## 2025-08-18 PROCEDURE — 97110 THERAPEUTIC EXERCISES: CPT

## 2025-08-20 ENCOUNTER — OFFICE VISIT (OUTPATIENT)
Dept: PHYSICAL THERAPY | Facility: CLINIC | Age: 50
End: 2025-08-20
Payer: COMMERCIAL

## 2025-08-20 DIAGNOSIS — S93.324D LISFRANC DISLOCATION, RIGHT, SUBSEQUENT ENCOUNTER: Primary | ICD-10-CM

## 2025-08-20 PROCEDURE — 97112 NEUROMUSCULAR REEDUCATION: CPT

## 2025-08-20 PROCEDURE — 97116 GAIT TRAINING THERAPY: CPT

## 2025-08-20 PROCEDURE — 97110 THERAPEUTIC EXERCISES: CPT

## (undated) DEVICE — 3M™ MICROFOAM™ SURGICAL TAPE 4 ROLLS/CARTON 6 CARTONS/CASE 1528-3: Brand: 3M™ MICROFOAM™

## (undated) DEVICE — CUFF TOURNIQUET 30 X 4 IN QUICK CONNECT DISP 1BLA

## (undated) DEVICE — 3M™ DURAPORE™ SURGICAL TAPE 1538-1, 1 INCH X 10 YARD (2,5CM X 9,1M), 12 ROLLS/BOX: Brand: 3M™ DURAPORE™

## (undated) DEVICE — UNTHREADED GUIDE WIRE
Type: IMPLANTABLE DEVICE | Site: FOOT | Status: NON-FUNCTIONAL
Brand: FIXOS
Removed: 2025-06-12

## (undated) DEVICE — HOLDING PIN
Type: IMPLANTABLE DEVICE | Site: FOOT | Status: NON-FUNCTIONAL
Brand: ANCHORAGE
Removed: 2025-06-12

## (undated) DEVICE — GLOVE SRG BIOGEL 7.5

## (undated) DEVICE — ABDOMINAL PAD: Brand: DERMACEA

## (undated) DEVICE — GLOVE INDICATOR PI UNDERGLOVE SZ 8 BLUE

## (undated) DEVICE — OCCLUSIVE GAUZE STRIP,3% BISMUTH TRIBROMOPHENATE IN PETROLATUM BLEND: Brand: XEROFORM

## (undated) DEVICE — ASTOUND STANDARD SURGICAL GOWN, XXL: Brand: CONVERTORS

## (undated) DEVICE — ANTIBACTERIAL UNDYED BRAIDED (POLYGLACTIN 910), SYNTHETIC ABSORBABLE SUTURE: Brand: COATED VICRYL

## (undated) DEVICE — SUT ETHILON 3-0 PS-1 18 IN 1663G

## (undated) DEVICE — PENCILETTE PUSH BUTTON COATED

## (undated) DEVICE — GAUZE SPONGES,16 PLY: Brand: CURITY

## (undated) DEVICE — ACE WRAP 6 IN UNSTERILE

## (undated) DEVICE — STEINMANN PIN, SMOOTH
Type: IMPLANTABLE DEVICE | Site: FOOT | Status: NON-FUNCTIONAL
Brand: VARIAX
Removed: 2025-06-12

## (undated) DEVICE — CHLORAPREP HI-LITE 26ML ORANGE

## (undated) DEVICE — SPLINT 5 X 30 IN FAST SET PLASTER

## (undated) DEVICE — PADDING CAST 4 IN  COTTON STRL

## (undated) DEVICE — CANNULATED DRILL: Brand: FIXOS

## (undated) DEVICE — SUT VICRYL 2-0 CT-2 18 IN J726D

## (undated) DEVICE — DRAPE SHEET THREE QUARTER

## (undated) DEVICE — SPONGE SCRUB 4 PCT CHLORHEXIDINE

## (undated) DEVICE — BETHLEHEM UNIVERSAL  MIONR EXT: Brand: CARDINAL HEALTH

## (undated) DEVICE — GLOVE SRG BIOGEL 8

## (undated) DEVICE — DRILL BIT, AO, SCALED: Brand: VARIAX

## (undated) DEVICE — DRILL BIT, AO DIA2.6MM X 135MM, SCALED: Brand: VARIAX

## (undated) DEVICE — INTENDED FOR TISSUE SEPARATION, AND OTHER PROCEDURES THAT REQUIRE A SHARP SURGICAL BLADE TO PUNCTURE OR CUT.: Brand: BARD-PARKER ® CARBON RIB-BACK BLADES